# Patient Record
Sex: FEMALE | Race: BLACK OR AFRICAN AMERICAN | NOT HISPANIC OR LATINO | Employment: FULL TIME | URBAN - METROPOLITAN AREA
[De-identification: names, ages, dates, MRNs, and addresses within clinical notes are randomized per-mention and may not be internally consistent; named-entity substitution may affect disease eponyms.]

---

## 2017-05-22 ENCOUNTER — TELEPHONE (OUTPATIENT)
Dept: FAMILY MEDICINE | Facility: CLINIC | Age: 37
End: 2017-05-22

## 2017-05-22 NOTE — TELEPHONE ENCOUNTER
----- Message from Toshia Beckman sent at 5/22/2017  8:07 AM CDT -----  Pt would like to speak to the nurse regarding whats going on with her before shes schedules an appointment / pleasecall [phone]/ma

## 2017-05-22 NOTE — TELEPHONE ENCOUNTER
She needs to be seen and evaluated so that I have an idea what is going on, whether it can be treated, whether she needs to see specialist and what kind of specialist.

## 2017-05-22 NOTE — TELEPHONE ENCOUNTER
Pt states she has tingling in legs up to her back  Offered her a apt but she says that she would prefer you just send her to a specialist. States you usually just send her to a specialist when she has a complaint. Please advise

## 2017-05-23 ENCOUNTER — OFFICE VISIT (OUTPATIENT)
Dept: FAMILY MEDICINE | Facility: CLINIC | Age: 37
End: 2017-05-23
Payer: COMMERCIAL

## 2017-05-23 VITALS
SYSTOLIC BLOOD PRESSURE: 138 MMHG | TEMPERATURE: 98 F | HEART RATE: 106 BPM | OXYGEN SATURATION: 98 % | RESPIRATION RATE: 18 BRPM | BODY MASS INDEX: 52.02 KG/M2 | WEIGHT: 275.56 LBS | HEIGHT: 61 IN | DIASTOLIC BLOOD PRESSURE: 94 MMHG

## 2017-05-23 DIAGNOSIS — I10 ESSENTIAL HYPERTENSION: ICD-10-CM

## 2017-05-23 DIAGNOSIS — Z00.00 PREVENTATIVE HEALTH CARE: Primary | ICD-10-CM

## 2017-05-23 DIAGNOSIS — D50.9 IRON DEFICIENCY ANEMIA, UNSPECIFIED IRON DEFICIENCY ANEMIA TYPE: ICD-10-CM

## 2017-05-23 DIAGNOSIS — R20.2 PARESTHESIAS: ICD-10-CM

## 2017-05-23 PROCEDURE — 3080F DIAST BP >= 90 MM HG: CPT | Mod: S$GLB,,, | Performed by: FAMILY MEDICINE

## 2017-05-23 PROCEDURE — 3075F SYST BP GE 130 - 139MM HG: CPT | Mod: S$GLB,,, | Performed by: FAMILY MEDICINE

## 2017-05-23 PROCEDURE — 99395 PREV VISIT EST AGE 18-39: CPT | Mod: S$GLB,,, | Performed by: FAMILY MEDICINE

## 2017-05-23 PROCEDURE — 99999 PR PBB SHADOW E&M-EST. PATIENT-LVL III: CPT | Mod: PBBFAC,,, | Performed by: FAMILY MEDICINE

## 2017-05-23 RX ORDER — ESCITALOPRAM OXALATE 10 MG/1
10 TABLET ORAL DAILY
Qty: 30 TABLET | Refills: 5 | Status: SHIPPED | OUTPATIENT
Start: 2017-05-23 | End: 2018-01-25 | Stop reason: SDUPTHER

## 2017-05-23 RX ORDER — ALPRAZOLAM 0.25 MG/1
.25-.5 TABLET ORAL 3 TIMES DAILY PRN
Qty: 30 TABLET | Refills: 0 | Status: SHIPPED | OUTPATIENT
Start: 2017-05-23 | End: 2017-06-27 | Stop reason: SDUPTHER

## 2017-05-23 NOTE — PROGRESS NOTES
CHIEF COMPLAINT: This is a 37-year-old female here for preventive health exam.    SUBJECTIVE: The patient complains of days to weeks of tingling which started in her feet and then radiated into her legs.  Now tingling is all over including her arms and trunk.  This sensation is intermittent in nature.  She complains of blurry vision and recently saw eye doctor.  Her eye exam was negative.  She complains of fluid retention.  She denies headaches, vertigo, paresthesias, weakness in limb, dysarthria, dysphagia or abnormality of gait.  She complains of very stressful job and feels that blood pressure is elevated because of stress.  Today's reading is 138/94 despite taking hydrochlorothiazide 12.5 mg daily and losartan 100 mg daily.  She's morbidly obese with a BMI of 52.07.  She admits to anxiety, which she experiences every day.  She denies depression, suicidal ideation, manic episodes, or guilt.    Eye exam April 2017. Pap smear September 2011 (status post hysterectomy).  Colonoscopy July 2004.  Tdap July 2009.     ROS:  GENERAL: Patient denies fever, chills, night sweats. Patient denies weight loss. Patient denies anorexia, weakness or swollen glands. Positive for fatigue.  SKIN: Patient denies rash or hair loss.  HEENT: Patient denies sore throat, ear pain, hearing loss, nasal congestion, or runny nose. Patient denies visual disturbance, eye irritation or discharge.  LUNGS: Patient denies cough, wheeze or hemoptysis.  CARDIOVASCULAR: Patient denies chest pain, shortness of breath, palpitations, syncope or lower extremity edema.  GI: Patient denies abdominal pain, nausea, vomiting, diarrhea, constipation, blood in stool or melena.  GENITOURINARY: Patient denies pelvic pain, vaginal discharge, itch or odor. Patient denies irregular vaginal bleeding. Patient denies dysuria, frequency, hematuria, nocturia, urgency or incontinence.  BREASTS: Patient denies breast pain, mass or nipple discharge.  MUSCULOSKELETAL: Patient  denies joint pain, swelling, redness or warmth.  NEUROLOGIC: As above.  PSYCHIATRIC: Patient denies anxiety, depression, or memory loss.     OBJECTIVE:   GENERAL: Well-developed well-nourished, obese, black female alert and oriented x3, in no acute distress. Memory, judgment and cognition without deficit.  SKIN: Clear without rash. Normal color and tone.  HEENT: Eyes: Clear conjunctivae. No scleral icterus. Pupils equal reactive to light and accommodation. Ears: Clear TMs. Clear canals. Nose: Without congestion. Pharynx: Without injection or exudates.  NECK: Supple, normal range of motion. No masses, lymphadenopathy or enlarged thyroid. No JVD. Carotids 2+ and equal. No bruits.  LUNGS: Clear to auscultation. Normal respiratory effort.  CARDIOVASCULAR: Regular rhythm, normal S1, S2 without murmur, gallop or rub.  BACK: No CVA or spinal tenderness.  BREASTS: No masses, tenderness or nipple discharge.  ABDOMEN: Palpable fundus just below umbilicus. Active bowel sounds. Soft, nontender without mass or organomegaly. No rebound or guarding.  EXTREMITIES: Without cyanosis, clubbing.  Trace to 1+ ankle edema bilaterally.  Distal pulses 2+ and equal. Normal range of motion in all extremities. No joint effusion, erythema or warmth.  NEUROLOGIC: Cranial nerves II through XII without deficit. Motor strength equal bilaterally. Sensation normal to touch. Deep tendon reflexes 2+ and equal. Gait without abnormality. No tremor. Negative cerebellar signs.  PELVIC: Deferred per patient request.    Lengthy discussion with patient regarding pathophysiology of depression and anxiety. Discussed rationale behind treatment. Reviewed treatment options, including medication and psychotherapy. Reviewed pharmacology of SSRIs and benzodiazepines including onset of action, dosing, side effects, adverse reactions and duration of therapy (6-12 months).  Discussed the need to stay on medication if it is effective and not discontinue after a few  weeks due to the probability of relapse.    ASSESSMENT:  1. Preventative health care    2. Paresthesias    3. Essential hypertension    4. Iron deficiency anemia, unspecified iron deficiency anemia type      PLAN:   1.  Weight reduction.  Exercise regularly.  2.  Age-appropriate counseling.  3.  Fasting lab.  4.  Lexapro 10 mg daily.  5.  Alprazolam 0.25 mg 1-2 tablets up to 3 times daily as needed for anxiety.  6.  Follow-up in 4-6 weeks.  7.  Seek medical attention for worsening symptoms.

## 2017-05-24 ENCOUNTER — LAB VISIT (OUTPATIENT)
Dept: LAB | Facility: HOSPITAL | Age: 37
End: 2017-05-24
Attending: FAMILY MEDICINE
Payer: COMMERCIAL

## 2017-05-24 ENCOUNTER — TELEPHONE (OUTPATIENT)
Dept: FAMILY MEDICINE | Facility: CLINIC | Age: 37
End: 2017-05-24

## 2017-05-24 DIAGNOSIS — Z00.00 PREVENTATIVE HEALTH CARE: ICD-10-CM

## 2017-05-24 DIAGNOSIS — Z00.00 PREVENTATIVE HEALTH CARE: Primary | ICD-10-CM

## 2017-05-24 LAB
ALBUMIN SERPL BCP-MCNC: 3.2 G/DL
ALP SERPL-CCNC: 50 U/L
ALT SERPL W/O P-5'-P-CCNC: 18 U/L
ANION GAP SERPL CALC-SCNC: 8 MMOL/L
AST SERPL-CCNC: 22 U/L
BASOPHILS # BLD AUTO: 0.02 K/UL
BASOPHILS NFR BLD: 0.3 %
BILIRUB SERPL-MCNC: 0.3 MG/DL
BUN SERPL-MCNC: 11 MG/DL
CALCIUM SERPL-MCNC: 9.1 MG/DL
CHLORIDE SERPL-SCNC: 106 MMOL/L
CHOLEST/HDLC SERPL: 2.9 {RATIO}
CO2 SERPL-SCNC: 27 MMOL/L
CREAT SERPL-MCNC: 0.9 MG/DL
DIFFERENTIAL METHOD: ABNORMAL
EOSINOPHIL # BLD AUTO: 0.1 K/UL
EOSINOPHIL NFR BLD: 1.3 %
ERYTHROCYTE [DISTWIDTH] IN BLOOD BY AUTOMATED COUNT: 14.6 %
EST. GFR  (AFRICAN AMERICAN): >60 ML/MIN/1.73 M^2
EST. GFR  (NON AFRICAN AMERICAN): >60 ML/MIN/1.73 M^2
GLUCOSE SERPL-MCNC: 83 MG/DL
HCT VFR BLD AUTO: 38.5 %
HDL/CHOLESTEROL RATIO: 34 %
HDLC SERPL-MCNC: 162 MG/DL
HDLC SERPL-MCNC: 55 MG/DL
HGB BLD-MCNC: 12.1 G/DL
LDLC SERPL CALC-MCNC: 93.8 MG/DL
LYMPHOCYTES # BLD AUTO: 2.5 K/UL
LYMPHOCYTES NFR BLD: 39.7 %
MCH RBC QN AUTO: 29.7 PG
MCHC RBC AUTO-ENTMCNC: 31.4 %
MCV RBC AUTO: 95 FL
MONOCYTES # BLD AUTO: 0.6 K/UL
MONOCYTES NFR BLD: 9.5 %
NEUTROPHILS # BLD AUTO: 3.1 K/UL
NEUTROPHILS NFR BLD: 49 %
NONHDLC SERPL-MCNC: 107 MG/DL
PLATELET # BLD AUTO: 316 K/UL
PMV BLD AUTO: 10.1 FL
POTASSIUM SERPL-SCNC: 4.4 MMOL/L
PROT SERPL-MCNC: 7.4 G/DL
RBC # BLD AUTO: 4.07 M/UL
SODIUM SERPL-SCNC: 141 MMOL/L
TRIGL SERPL-MCNC: 66 MG/DL
TSH SERPL DL<=0.005 MIU/L-ACNC: 0.7 UIU/ML
WBC # BLD AUTO: 6.34 K/UL

## 2017-05-24 PROCEDURE — 80053 COMPREHEN METABOLIC PANEL: CPT

## 2017-05-24 PROCEDURE — 85025 COMPLETE CBC W/AUTO DIFF WBC: CPT

## 2017-05-24 PROCEDURE — 80061 LIPID PANEL: CPT

## 2017-05-24 PROCEDURE — 36415 COLL VENOUS BLD VENIPUNCTURE: CPT | Mod: PO

## 2017-05-24 PROCEDURE — 84443 ASSAY THYROID STIM HORMONE: CPT

## 2017-05-24 NOTE — TELEPHONE ENCOUNTER
----- Message from Alejandra Abdullahi sent at 5/24/2017  9:16 AM CDT -----  Contact: Patient  Patient called and stated she came in yesterday for her yearly physical. She normally have an HIV test done yearly but she doesn't think it was done. So she would like that ordered. She can be contacted at [phone].    Thanks,  Alejandra

## 2017-05-25 NOTE — ADDENDUM NOTE
Encounter addended by: Jannie Holt MA on: 5/25/2017  8:20 AM<BR>    Actions taken: Letter status changed

## 2017-05-25 NOTE — LETTER
May 25, 2017    Georgia L Radha  [address] [place] [address]             [facility]  [address]  [place] [address]  Phone: [phone] Dear Mrs. Georgia Garcia:    Below are the results from your recent visit:    Resulted Orders   Comprehensive metabolic panel   Result Value Ref Range    Sodium 141 136 - 145 mmol/L    Potassium 4.4 3.5 - 5.1 mmol/L    Chloride 106 95 - 110 mmol/L    CO2 27 23 - 29 mmol/L    Glucose 83 70 - 110 mg/dL    BUN, Bld 11 6 - 20 mg/dL    Creatinine 0.9 0.5 - 1.4 mg/dL    Calcium 9.1 8.7 - 10.5 mg/dL    Total Protein 7.4 6.0 - 8.4 g/dL    Albumin 3.2 (L) 3.5 - 5.2 g/dL    Total Bilirubin 0.3 0.1 - 1.0 mg/dL      Comment:      For infants and newborns, interpretation of results should be based  on gestational age, weight and in agreement with clinical  observations.  Premature Infant recommended reference ranges:  Up to 24 hours.............<8.0 mg/dL  Up to 48 hours............<12.0 mg/dL  3-5 days..................<15.0 mg/dL  6-29 days.................<15.0 mg/dL      Alkaline Phosphatase 50 (L) 55 - 135 U/L    AST 22 10 - 40 U/L    ALT 18 10 - 44 U/L    Anion Gap 8 8 - 16 mmol/L    eGFR if African American >60.0 >60 mL/min/1.73 m^2    eGFR if non African American >60.0 >60 mL/min/1.73 m^2      Comment:      Calculation used to obtain the estimated glomerular filtration  rate (eGFR) is the CKD-EPI equation. Since race is unknown   in our information system, the eGFR values for   -American and Non--American patients are given   for each creatinine result.     CBC auto differential   Result Value Ref Range    WBC 6.34 3.90 - 12.70 K/uL    RBC 4.07 4.00 - 5.40 M/uL    Hemoglobin 12.1 12.0 - 16.0 g/dL    Hematocrit 38.5 37.0 - 48.5 %    MCV 95 82 - 98 fL    MCH 29.7 27.0 - 31.0 pg    MCHC 31.4 (L) 32.0 - 36.0 %    RDW 14.6 (H) 11.5 - 14.5 %    Platelets 316 150 - 350 K/uL    MPV 10.1 9.2 - 12.9 fL    Gran # 3.1 1.8 - 7.7 K/uL     Lymph # 2.5 1.0 - 4.8 K/uL    Mono # 0.6 0.3 - 1.0 K/uL    Eos # 0.1 0.0 - 0.5 K/uL    Baso # 0.02 0.00 - 0.20 K/uL    Gran% 49.0 38.0 - 73.0 %    Lymph% 39.7 18.0 - 48.0 %    Mono% 9.5 4.0 - 15.0 %    Eosinophil% 1.3 0.0 - 8.0 %    Basophil% 0.3 0.0 - 1.9 %    Differential Method Automated    Lipid panel   Result Value Ref Range    Cholesterol 162 120 - 199 mg/dL      Comment:      The National Cholesterol Education Program (NCEP) has set the  following guidelines (reference ranges) for Cholesterol:  Optimal.....................<200 mg/dL  Borderline High.............200-239 mg/dL  High........................> or = 240 mg/dL      Triglycerides 66 30 - 150 mg/dL      Comment:      The National Cholesterol Education Program (NCEP) has set the  following guidelines (reference values) for triglycerides:  Normal......................<150 mg/dL  Borderline High.............150-199 mg/dL  High........................200-499 mg/dL      HDL 55 40 - 75 mg/dL      Comment:      The National Cholesterol Education Program (NCEP) has set the  following guidelines (reference values) for HDL Cholesterol:  Low...............<40 mg/dL  Optimal...........>60 mg/dL      LDL Cholesterol 93.8 63.0 - 159.0 mg/dL      Comment:      The National Cholesterol Education Program (NCEP) has set the  following guidelines (reference values) for LDL Cholesterol:  Optimal.......................<130 mg/dL  Borderline High...............130-159 mg/dL  High..........................160-189 mg/dL  Very High.....................>190 mg/dL      HDL/Chol Ratio 34.0 20.0 - 50.0 %    Total Cholesterol/HDL Ratio 2.9 2.0 - 5.0    Non-HDL Cholesterol 107 mg/dL      Comment:      Risk category and Non-HDL cholesterol goals:  Coronary heart disease (CHD)or equivalent (10-year risk of CHD >20%):  Non-HDL cholesterol goal     <130 mg/dL  Two or more CHD risk factors and 10-year risk of CHD <= 20%:  Non-HDL cholesterol goal     <160 mg/dL  0 to 1 CHD risk  factor:  Non-HDL cholesterol goal     <190 mg/dL     TSH   Result Value Ref Range    TSH 0.705 0.400 - 4.000 uIU/mL     Your lab results are acceptable. Please don't hesitate to call our office if you have any questions or concerns.      Sincerely,        Alyssa Muse MD

## 2017-05-25 NOTE — LETTER
May 25, 2017    Georgia Garcia  [address] [place] [address]             [facility]  [address]  [place] [address]  Phone: [phone] Dear {MR/MRS/MS/DR:45039} Georgia Garcia:    Below are the results from your recent visit:    Resulted Orders   Comprehensive metabolic panel   Result Value Ref Range    Sodium 141 136 - 145 mmol/L    Potassium 4.4 3.5 - 5.1 mmol/L    Chloride 106 95 - 110 mmol/L    CO2 27 23 - 29 mmol/L    Glucose 83 70 - 110 mg/dL    BUN, Bld 11 6 - 20 mg/dL    Creatinine 0.9 0.5 - 1.4 mg/dL    Calcium 9.1 8.7 - 10.5 mg/dL    Total Protein 7.4 6.0 - 8.4 g/dL    Albumin 3.2 (L) 3.5 - 5.2 g/dL    Total Bilirubin 0.3 0.1 - 1.0 mg/dL      Comment:      For infants and newborns, interpretation of results should be based  on gestational age, weight and in agreement with clinical  observations.  Premature Infant recommended reference ranges:  Up to 24 hours.............<8.0 mg/dL  Up to 48 hours............<12.0 mg/dL  3-5 days..................<15.0 mg/dL  6-29 days.................<15.0 mg/dL      Alkaline Phosphatase 50 (L) 55 - 135 U/L    AST 22 10 - 40 U/L    ALT 18 10 - 44 U/L    Anion Gap 8 8 - 16 mmol/L    eGFR if African American >60.0 >60 mL/min/1.73 m^2    eGFR if non African American >60.0 >60 mL/min/1.73 m^2      Comment:      Calculation used to obtain the estimated glomerular filtration  rate (eGFR) is the CKD-EPI equation. Since race is unknown   in our information system, the eGFR values for   -American and Non--American patients are given   for each creatinine result.     CBC auto differential   Result Value Ref Range    WBC 6.34 3.90 - 12.70 K/uL    RBC 4.07 4.00 - 5.40 M/uL    Hemoglobin 12.1 12.0 - 16.0 g/dL    Hematocrit 38.5 37.0 - 48.5 %    MCV 95 82 - 98 fL    MCH 29.7 27.0 - 31.0 pg    MCHC 31.4 (L) 32.0 - 36.0 %    RDW 14.6 (H) 11.5 - 14.5 %    Platelets 316 150 - 350 K/uL    MPV 10.1 9.2 - 12.9 fL    Gran # 3.1 1.8 -  7.7 K/uL    Lymph # 2.5 1.0 - 4.8 K/uL    Mono # 0.6 0.3 - 1.0 K/uL    Eos # 0.1 0.0 - 0.5 K/uL    Baso # 0.02 0.00 - 0.20 K/uL    Gran% 49.0 38.0 - 73.0 %    Lymph% 39.7 18.0 - 48.0 %    Mono% 9.5 4.0 - 15.0 %    Eosinophil% 1.3 0.0 - 8.0 %    Basophil% 0.3 0.0 - 1.9 %    Differential Method Automated    Lipid panel   Result Value Ref Range    Cholesterol 162 120 - 199 mg/dL      Comment:      The National Cholesterol Education Program (NCEP) has set the  following guidelines (reference ranges) for Cholesterol:  Optimal.....................<200 mg/dL  Borderline High.............200-239 mg/dL  High........................> or = 240 mg/dL      Triglycerides 66 30 - 150 mg/dL      Comment:      The National Cholesterol Education Program (NCEP) has set the  following guidelines (reference values) for triglycerides:  Normal......................<150 mg/dL  Borderline High.............150-199 mg/dL  High........................200-499 mg/dL      HDL 55 40 - 75 mg/dL      Comment:      The National Cholesterol Education Program (NCEP) has set the  following guidelines (reference values) for HDL Cholesterol:  Low...............<40 mg/dL  Optimal...........>60 mg/dL      LDL Cholesterol 93.8 63.0 - 159.0 mg/dL      Comment:      The National Cholesterol Education Program (NCEP) has set the  following guidelines (reference values) for LDL Cholesterol:  Optimal.......................<130 mg/dL  Borderline High...............130-159 mg/dL  High..........................160-189 mg/dL  Very High.....................>190 mg/dL      HDL/Chol Ratio 34.0 20.0 - 50.0 %    Total Cholesterol/HDL Ratio 2.9 2.0 - 5.0    Non-HDL Cholesterol 107 mg/dL      Comment:      Risk category and Non-HDL cholesterol goals:  Coronary heart disease (CHD)or equivalent (10-year risk of CHD >20%):  Non-HDL cholesterol goal     <130 mg/dL  Two or more CHD risk factors and 10-year risk of CHD <= 20%:  Non-HDL cholesterol goal     <160 mg/dL  0 to 1 CHD  "risk factor:  Non-HDL cholesterol goal     <190 mg/dL     TSH   Result Value Ref Range    TSH 0.705 0.400 - 4.000 uIU/mL     Your results are {normal/acceptable:91573::"normal"}.  Please {Therapies; lab letter directions:08110::"don't hesitate to call our office if you have any questions or concerns"}.      Sincerely,        Jannie Holt MA     "

## 2017-05-26 ENCOUNTER — LAB VISIT (OUTPATIENT)
Dept: LAB | Facility: HOSPITAL | Age: 37
End: 2017-05-26
Attending: FAMILY MEDICINE
Payer: COMMERCIAL

## 2017-05-26 DIAGNOSIS — Z00.00 PREVENTATIVE HEALTH CARE: ICD-10-CM

## 2017-05-26 PROCEDURE — 86703 HIV-1/HIV-2 1 RESULT ANTBDY: CPT

## 2017-05-26 PROCEDURE — 36415 COLL VENOUS BLD VENIPUNCTURE: CPT | Mod: PO

## 2017-05-29 LAB — HIV 1+2 AB+HIV1 P24 AG SERPL QL IA: NEGATIVE

## 2017-06-14 ENCOUNTER — TELEPHONE (OUTPATIENT)
Dept: FAMILY MEDICINE | Facility: CLINIC | Age: 37
End: 2017-06-14

## 2017-06-14 RX ORDER — HYDROCHLOROTHIAZIDE 12.5 MG/1
TABLET ORAL
Qty: 30 TABLET | Refills: 11 | Status: SHIPPED | OUTPATIENT
Start: 2017-06-14 | End: 2018-08-20 | Stop reason: SDUPTHER

## 2017-06-14 RX ORDER — MECLIZINE HYDROCHLORIDE 25 MG/1
25-50 TABLET ORAL
Qty: 30 TABLET | Refills: 1 | Status: SHIPPED | OUTPATIENT
Start: 2017-06-14 | End: 2019-09-19 | Stop reason: SDUPTHER

## 2017-06-14 NOTE — TELEPHONE ENCOUNTER
----- Message from Serina Rosenthal sent at 6/14/2017  9:30 AM CDT -----  Contact: pt  States she's returning a call from yesterday and she would like to know if it's ok if she faxes her FMLA paper work in and then pick it up. States she was on a cruise from 6/5- 6/10 and she has Vertigo and she would like to get something called in. States she had a prescription for Vertigo a while back. States it feels like she's going tip over and when she's lying in bed it feels like she's moving. It feels like she's still on the boat, but while she was on the boat she was ok, she took the Dramamine. Please call pt at [phone].  Thank you

## 2017-06-14 NOTE — TELEPHONE ENCOUNTER
Pt states she went on a cruise last week   No motion sickness while on the cruise  Pt states she is having bad vertigo   States a medication was given to her before it and wants it reorderd again.  Also pt states she will fax over [facility] paperwork for intermittent leave from work for migraines.  States you have filled them out for her in the past.

## 2017-06-15 RX ORDER — RAMELTEON 8 MG/1
8 TABLET ORAL NIGHTLY
Qty: 30 TABLET | Refills: 11 | Status: SHIPPED | OUTPATIENT
Start: 2017-06-15 | End: 2018-08-20

## 2017-06-20 ENCOUNTER — TELEPHONE (OUTPATIENT)
Dept: FAMILY MEDICINE | Facility: CLINIC | Age: 37
End: 2017-06-20

## 2017-06-20 NOTE — TELEPHONE ENCOUNTER
Pt states she needs a refill on xanax  States she is taking more than one a day and the amount given is for 30 tablets  Wants to know if you could increase the amount of tablets she gets per month  lr 5/23/17  lp 5/23/17

## 2017-06-21 NOTE — TELEPHONE ENCOUNTER
It is not a good idea to escalate the use of this medication as it is addicting.  Does she take the medication every day?  How many times during the day?  Does she ever go at least a day without taking medication?  Maybe we should increase the Lexapro if it is not effective in preventing anxiety.  She may need to see me.

## 2017-06-27 RX ORDER — ALPRAZOLAM 0.25 MG/1
.25-.5 TABLET ORAL 3 TIMES DAILY PRN
Qty: 30 TABLET | Refills: 0 | Status: SHIPPED | OUTPATIENT
Start: 2017-06-27 | End: 2017-08-01 | Stop reason: SDUPTHER

## 2017-06-27 NOTE — TELEPHONE ENCOUNTER
----- Message from Kariscielo Killian sent at 6/27/2017  6:31 AM CDT -----  Contact: Patient  1. What is the name of the medication you are requesting? Alprozolam  2. What is the dose? .25  3. How do you take the medication? Orally, topically, etc? orally  4. How often do you take this medication? PRN one or 2 a day  5. Do you need a 30 day or 90 day supply? 90  6. How many refills are you requesting? 1  7. What is your preferred pharmacy and location of the pharmacy? [facility]  8. Who can we contact with further questions? Patient at [phone]

## 2017-07-17 RX ORDER — LOSARTAN POTASSIUM 100 MG/1
TABLET ORAL
Qty: 30 TABLET | Refills: 11 | Status: SHIPPED | OUTPATIENT
Start: 2017-07-17 | End: 2018-07-24 | Stop reason: SDUPTHER

## 2017-07-18 RX ORDER — CYCLOBENZAPRINE HCL 10 MG
10 TABLET ORAL 3 TIMES DAILY PRN
Qty: 30 TABLET | Refills: 3 | Status: SHIPPED | OUTPATIENT
Start: 2017-07-18 | End: 2017-11-30

## 2017-07-18 RX ORDER — TOPIRAMATE 50 MG/1
50 TABLET, FILM COATED ORAL 2 TIMES DAILY
Qty: 60 TABLET | Refills: 5 | Status: SHIPPED | OUTPATIENT
Start: 2017-07-18 | End: 2018-08-20 | Stop reason: SDUPTHER

## 2017-07-18 RX ORDER — ZOLPIDEM TARTRATE 5 MG/1
5 TABLET ORAL NIGHTLY PRN
Qty: 30 TABLET | Refills: 5 | Status: SHIPPED | OUTPATIENT
Start: 2017-07-18 | End: 2019-10-25 | Stop reason: SDUPTHER

## 2017-07-18 RX ORDER — NAPROXEN SODIUM 550 MG/1
550 TABLET ORAL 2 TIMES DAILY WITH MEALS
Qty: 60 TABLET | Refills: 5 | Status: SHIPPED | OUTPATIENT
Start: 2017-07-18 | End: 2017-11-30

## 2017-07-18 RX ORDER — RIZATRIPTAN BENZOATE 10 MG/1
10 TABLET ORAL
Qty: 6 TABLET | Refills: 5 | Status: SHIPPED | OUTPATIENT
Start: 2017-07-18 | End: 2018-08-20 | Stop reason: SDUPTHER

## 2017-07-18 NOTE — TELEPHONE ENCOUNTER
----- Message from Ginna Andrews sent at 7/18/2017 12:43 PM CDT -----  Contact: Pt   Pt calling states she need a refill on all her medications not sure of the names pt is requesting a call back please...[phone]

## 2017-08-01 RX ORDER — ALPRAZOLAM 0.25 MG/1
.25-.5 TABLET ORAL 3 TIMES DAILY PRN
Qty: 30 TABLET | Refills: 3 | Status: SHIPPED | OUTPATIENT
Start: 2017-08-01 | End: 2018-01-25 | Stop reason: SDUPTHER

## 2017-08-01 NOTE — TELEPHONE ENCOUNTER
----- Message from Serina Rosenthal sent at 8/1/2017  8:09 AM CDT -----  Contact: pt  1. What is the name of the medication you are requesting? altprazolam  2. What is the dose? .25 mg  3. How do you take the medication? Orally, topically, etc? orally  4. How often do you take this medication? She takes it once a day, she would like to see if it can be increased 2 to 3 times a day.  5. Do you need a 30 day or 90 day supply? 90  6. How many refills are you requesting? 3 to 6  7. What is your preferred pharmacy and location of the pharmacy? .  [facility] [employer] [address] [place] [address] & [address] [address]  Phone: [phone] Fax: [phone]  8. Who can we contact with further questions? Pt at [phone]    Thank you

## 2017-11-25 ENCOUNTER — HOSPITAL ENCOUNTER (EMERGENCY)
Facility: HOSPITAL | Age: 37
Discharge: HOME | End: 2017-11-25
Attending: EMERGENCY MEDICINE
Payer: COMMERCIAL

## 2017-11-25 VITALS
DIASTOLIC BLOOD PRESSURE: 105 MMHG | HEIGHT: 60 IN | HEART RATE: 105 BPM | OXYGEN SATURATION: 99 % | RESPIRATION RATE: 20 BRPM | SYSTOLIC BLOOD PRESSURE: 199 MMHG | TEMPERATURE: 99 F | BODY MASS INDEX: 54.81 KG/M2 | WEIGHT: 279.19 LBS

## 2017-11-25 DIAGNOSIS — M25.561 KNEE PAIN, BILATERAL: ICD-10-CM

## 2017-11-25 DIAGNOSIS — M25.562 KNEE PAIN, BILATERAL: ICD-10-CM
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
## 2017-11-25 PROCEDURE — 99283 EMERGENCY DEPT VISIT LOW MDM: CPT

## 2017-11-25 RX ORDER — CYCLOBENZAPRINE HCL 10 MG
10 TABLET ORAL 3 TIMES DAILY PRN
Qty: 15 TABLET | Refills: 0 | Status: SHIPPED | OUTPATIENT
Start: 2017-11-25 | End: 2017-11-30

## 2017-11-25 RX ORDER — NAPROXEN 375 MG/1
375 TABLET ORAL 2 TIMES DAILY WITH MEALS
Qty: 30 TABLET | Refills: 0 | Status: SHIPPED | OUTPATIENT
Start: 2017-11-25 | End: 2017-11-30
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
## 2017-11-28 ENCOUNTER — TELEPHONE (OUTPATIENT)
Dept: FAMILY MEDICINE | Facility: CLINIC | Age: 37
End: 2017-11-28
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
## 2017-11-30 ENCOUNTER — OFFICE VISIT (OUTPATIENT)
Dept: FAMILY MEDICINE | Facility: CLINIC | Age: 37
End: 2017-11-30
Payer: COMMERCIAL

## 2017-11-30 VITALS
RESPIRATION RATE: 18 BRPM | SYSTOLIC BLOOD PRESSURE: 132 MMHG | DIASTOLIC BLOOD PRESSURE: 82 MMHG | TEMPERATURE: 97 F | WEIGHT: 281.5 LBS | BODY MASS INDEX: 55.27 KG/M2 | HEIGHT: 60 IN | OXYGEN SATURATION: 99 % | HEART RATE: 112 BPM

## 2017-11-30 DIAGNOSIS — S29.012A UPPER BACK STRAIN, INITIAL ENCOUNTER: ICD-10-CM

## 2017-11-30 DIAGNOSIS — S89.90XA KNEE INJURY, UNSPECIFIED LATERALITY, INITIAL ENCOUNTER: ICD-10-CM

## 2017-11-30 DIAGNOSIS — Z23 NEEDS FLU SHOT: ICD-10-CM

## 2017-11-30 DIAGNOSIS — S16.1XXA STRAIN OF NECK MUSCLE, INITIAL ENCOUNTER: Primary | ICD-10-CM

## 2017-11-30 PROCEDURE — 99213 OFFICE O/P EST LOW 20 MIN: CPT | Mod: 25,S$GLB,, | Performed by: FAMILY MEDICINE

## 2017-11-30 PROCEDURE — 99999 PR PBB SHADOW E&M-EST. PATIENT-LVL IV: CPT | Mod: PBBFAC,,, | Performed by: FAMILY MEDICINE

## 2017-11-30 PROCEDURE — 90686 IIV4 VACC NO PRSV 0.5 ML IM: CPT | Mod: S$GLB,,, | Performed by: FAMILY MEDICINE

## 2017-11-30 PROCEDURE — 90471 IMMUNIZATION ADMIN: CPT | Mod: S$GLB,,, | Performed by: FAMILY MEDICINE

## 2017-11-30 RX ORDER — HYDROCODONE BITARTRATE AND ACETAMINOPHEN 7.5; 325 MG/1; MG/1
1 TABLET ORAL EVERY 6 HOURS PRN
Qty: 30 TABLET | Refills: 0 | Status: SHIPPED | OUTPATIENT
Start: 2017-11-30 | End: 2018-01-25 | Stop reason: SDUPTHER

## 2017-11-30 RX ORDER — IBUPROFEN 800 MG/1
800 TABLET ORAL 3 TIMES DAILY
Qty: 60 TABLET | Refills: 1 | Status: SHIPPED | OUTPATIENT
Start: 2017-11-30 | End: 2018-05-16
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
## 2017-12-12 ENCOUNTER — TELEPHONE (OUTPATIENT)
Dept: FAMILY MEDICINE | Facility: CLINIC | Age: 37
End: 2017-12-12

## 2017-12-12 NOTE — LETTER
December 12, 2017      [facility]  [address] [place] [address]  Phone: [phone]       Patient: Georgia Garcia   YOB: 1980  Date of Visit: 12/12/2017    To Whom It May Concern:    Georgia Garcia  was at [facility] on 12/12/2017. She may return to work on 12/18/2017 on light duty. If you have any questions or concerns, or if I can be of further assistance, please do not hesitate to contact me.    Sincerely,    Alyssa Muse MD

## 2017-12-12 NOTE — TELEPHONE ENCOUNTER
----- Message from Yajaira Maloney sent at 12/12/2017  1:12 PM CST -----  Contact: pt   Please call pt @ [phone] regarding appt from 11/30, pt have some questions.

## 2018-01-25 ENCOUNTER — TELEPHONE (OUTPATIENT)
Dept: FAMILY MEDICINE | Facility: CLINIC | Age: 38
End: 2018-01-25

## 2018-01-25 RX ORDER — ALPRAZOLAM 0.25 MG/1
.25-.5 TABLET ORAL 3 TIMES DAILY PRN
Qty: 30 TABLET | Refills: 2 | Status: SHIPPED | OUTPATIENT
Start: 2018-01-25 | End: 2018-08-20

## 2018-01-25 RX ORDER — HYDROCODONE BITARTRATE AND ACETAMINOPHEN 7.5; 325 MG/1; MG/1
1 TABLET ORAL EVERY 6 HOURS PRN
Qty: 30 TABLET | Refills: 0 | Status: SHIPPED | OUTPATIENT
Start: 2018-01-25 | End: 2019-06-26 | Stop reason: SDUPTHER

## 2018-01-25 RX ORDER — HYDROCODONE BITARTRATE AND ACETAMINOPHEN 7.5; 325 MG/1; MG/1
1 TABLET ORAL EVERY 6 HOURS PRN
Qty: 30 TABLET | Refills: 0 | OUTPATIENT
Start: 2018-01-25

## 2018-01-25 RX ORDER — ESCITALOPRAM OXALATE 10 MG/1
10 TABLET ORAL DAILY
Qty: 30 TABLET | Refills: 2 | Status: SHIPPED | OUTPATIENT
Start: 2018-01-25 | End: 2018-08-20

## 2018-01-25 NOTE — TELEPHONE ENCOUNTER
Pt states that she is requesting this medication for her migraines.  Lp: 5/23/17  Last filled: 11/30/17

## 2018-01-25 NOTE — TELEPHONE ENCOUNTER
----- Message from Laila Cruz sent at 1/25/2018  3:21 PM CST -----  Please call pt back at [phone].pt miss call.

## 2018-04-27 DIAGNOSIS — Z12.39 BREAST CANCER SCREENING: ICD-10-CM

## 2018-05-04 DIAGNOSIS — Z12.39 BREAST CANCER SCREENING: ICD-10-CM

## 2018-05-16 RX ORDER — NAPROXEN SODIUM 550 MG/1
TABLET ORAL
Qty: 60 TABLET | Refills: 0 | Status: SHIPPED | OUTPATIENT
Start: 2018-05-16 | End: 2018-08-20 | Stop reason: SDUPTHER

## 2018-07-24 RX ORDER — LOSARTAN POTASSIUM 100 MG/1
TABLET ORAL
Qty: 30 TABLET | Refills: 0 | Status: SHIPPED | OUTPATIENT
Start: 2018-07-24 | End: 2018-08-20 | Stop reason: SDUPTHER

## 2018-08-20 ENCOUNTER — LAB VISIT (OUTPATIENT)
Dept: LAB | Facility: HOSPITAL | Age: 38
End: 2018-08-20
Attending: FAMILY MEDICINE
Payer: COMMERCIAL

## 2018-08-20 ENCOUNTER — OFFICE VISIT (OUTPATIENT)
Dept: FAMILY MEDICINE | Facility: CLINIC | Age: 38
End: 2018-08-20
Payer: COMMERCIAL

## 2018-08-20 VITALS
DIASTOLIC BLOOD PRESSURE: 86 MMHG | SYSTOLIC BLOOD PRESSURE: 138 MMHG | WEIGHT: 264.31 LBS | RESPIRATION RATE: 18 BRPM | HEART RATE: 96 BPM | HEIGHT: 60 IN | BODY MASS INDEX: 51.89 KG/M2 | TEMPERATURE: 99 F

## 2018-08-20 DIAGNOSIS — Z00.00 PREVENTATIVE HEALTH CARE: ICD-10-CM

## 2018-08-20 DIAGNOSIS — G47.00 INSOMNIA, UNSPECIFIED TYPE: ICD-10-CM

## 2018-08-20 DIAGNOSIS — D50.9 IRON DEFICIENCY ANEMIA, UNSPECIFIED IRON DEFICIENCY ANEMIA TYPE: ICD-10-CM

## 2018-08-20 DIAGNOSIS — I10 ESSENTIAL HYPERTENSION: ICD-10-CM

## 2018-08-20 DIAGNOSIS — Z12.31 ENCOUNTER FOR SCREENING MAMMOGRAM FOR MALIGNANT NEOPLASM OF BREAST: ICD-10-CM

## 2018-08-20 DIAGNOSIS — E66.01 MORBID OBESITY: ICD-10-CM

## 2018-08-20 DIAGNOSIS — Z00.00 PREVENTATIVE HEALTH CARE: Primary | ICD-10-CM

## 2018-08-20 LAB
ALBUMIN SERPL BCP-MCNC: 3.3 G/DL
ALP SERPL-CCNC: 50 U/L
ALT SERPL W/O P-5'-P-CCNC: 13 U/L
ANION GAP SERPL CALC-SCNC: 7 MMOL/L
AST SERPL-CCNC: 18 U/L
BASOPHILS # BLD AUTO: 0.04 K/UL
BASOPHILS NFR BLD: 0.6 %
BILIRUB SERPL-MCNC: 0.3 MG/DL
BUN SERPL-MCNC: 12 MG/DL
CALCIUM SERPL-MCNC: 8.9 MG/DL
CHLORIDE SERPL-SCNC: 106 MMOL/L
CHOLEST SERPL-MCNC: 163 MG/DL
CHOLEST/HDLC SERPL: 3.2 {RATIO}
CO2 SERPL-SCNC: 25 MMOL/L
CREAT SERPL-MCNC: 0.9 MG/DL
DIFFERENTIAL METHOD: ABNORMAL
EOSINOPHIL # BLD AUTO: 0 K/UL
EOSINOPHIL NFR BLD: 0.5 %
ERYTHROCYTE [DISTWIDTH] IN BLOOD BY AUTOMATED COUNT: 14.4 %
EST. GFR  (AFRICAN AMERICAN): >60 ML/MIN/1.73 M^2
EST. GFR  (NON AFRICAN AMERICAN): >60 ML/MIN/1.73 M^2
GLUCOSE SERPL-MCNC: 86 MG/DL
HCT VFR BLD AUTO: 38.8 %
HDLC SERPL-MCNC: 51 MG/DL
HDLC SERPL: 31.3 %
HGB BLD-MCNC: 12.1 G/DL
IMM GRANULOCYTES # BLD AUTO: 0.02 K/UL
IMM GRANULOCYTES NFR BLD AUTO: 0.3 %
LDLC SERPL CALC-MCNC: 104 MG/DL
LYMPHOCYTES # BLD AUTO: 2.1 K/UL
LYMPHOCYTES NFR BLD: 32.1 %
MCH RBC QN AUTO: 29.7 PG
MCHC RBC AUTO-ENTMCNC: 31.2 G/DL
MCV RBC AUTO: 95 FL
MONOCYTES # BLD AUTO: 0.6 K/UL
MONOCYTES NFR BLD: 9 %
NEUTROPHILS # BLD AUTO: 3.8 K/UL
NEUTROPHILS NFR BLD: 57.5 %
NONHDLC SERPL-MCNC: 112 MG/DL
NRBC BLD-RTO: 0 /100 WBC
PLATELET # BLD AUTO: 257 K/UL
PMV BLD AUTO: 11 FL
POTASSIUM SERPL-SCNC: 4.2 MMOL/L
PROT SERPL-MCNC: 7.5 G/DL
RBC # BLD AUTO: 4.07 M/UL
SODIUM SERPL-SCNC: 138 MMOL/L
TRIGL SERPL-MCNC: 40 MG/DL
TSH SERPL DL<=0.005 MIU/L-ACNC: 0.88 UIU/ML
WBC # BLD AUTO: 6.57 K/UL

## 2018-08-20 PROCEDURE — 80053 COMPREHEN METABOLIC PANEL: CPT

## 2018-08-20 PROCEDURE — 86703 HIV-1/HIV-2 1 RESULT ANTBDY: CPT

## 2018-08-20 PROCEDURE — 84443 ASSAY THYROID STIM HORMONE: CPT

## 2018-08-20 PROCEDURE — 36415 COLL VENOUS BLD VENIPUNCTURE: CPT | Mod: PO

## 2018-08-20 PROCEDURE — 99999 PR PBB SHADOW E&M-EST. PATIENT-LVL IV: CPT | Mod: PBBFAC,,, | Performed by: FAMILY MEDICINE

## 2018-08-20 PROCEDURE — 3079F DIAST BP 80-89 MM HG: CPT | Mod: CPTII,S$GLB,, | Performed by: FAMILY MEDICINE

## 2018-08-20 PROCEDURE — 3075F SYST BP GE 130 - 139MM HG: CPT | Mod: CPTII,S$GLB,, | Performed by: FAMILY MEDICINE

## 2018-08-20 PROCEDURE — 99396 PREV VISIT EST AGE 40-64: CPT | Mod: S$GLB,,, | Performed by: FAMILY MEDICINE

## 2018-08-20 PROCEDURE — 80061 LIPID PANEL: CPT

## 2018-08-20 PROCEDURE — 85025 COMPLETE CBC W/AUTO DIFF WBC: CPT

## 2018-08-20 RX ORDER — ESCITALOPRAM OXALATE 20 MG/1
20 TABLET ORAL DAILY
Qty: 30 TABLET | Refills: 11 | Status: SHIPPED | OUTPATIENT
Start: 2018-08-20 | End: 2019-04-16 | Stop reason: SDUPTHER

## 2018-08-20 RX ORDER — LOSARTAN POTASSIUM 100 MG/1
TABLET ORAL
Qty: 30 TABLET | Refills: 11 | Status: SHIPPED | OUTPATIENT
Start: 2018-08-20 | End: 2019-08-30 | Stop reason: SDUPTHER

## 2018-08-20 RX ORDER — TOPIRAMATE 50 MG/1
50 TABLET, FILM COATED ORAL 2 TIMES DAILY
Qty: 60 TABLET | Refills: 11 | Status: SHIPPED | OUTPATIENT
Start: 2018-08-20 | End: 2019-10-25 | Stop reason: SDUPTHER

## 2018-08-20 RX ORDER — NAPROXEN SODIUM 550 MG/1
TABLET ORAL
Qty: 60 TABLET | Refills: 11 | Status: SHIPPED | OUTPATIENT
Start: 2018-08-20 | End: 2019-09-20 | Stop reason: SDUPTHER

## 2018-08-20 RX ORDER — ALPRAZOLAM 0.25 MG/1
.25-.5 TABLET ORAL 3 TIMES DAILY PRN
Qty: 60 TABLET | Refills: 2 | Status: SHIPPED | OUTPATIENT
Start: 2018-08-20 | End: 2018-12-11 | Stop reason: SDUPTHER

## 2018-08-20 RX ORDER — RIZATRIPTAN BENZOATE 10 MG/1
10 TABLET ORAL
Qty: 6 TABLET | Refills: 11 | Status: SHIPPED | OUTPATIENT
Start: 2018-08-20 | End: 2021-11-01 | Stop reason: SDUPTHER

## 2018-08-20 RX ORDER — HYDROCHLOROTHIAZIDE 12.5 MG/1
12.5 TABLET ORAL DAILY
Qty: 30 TABLET | Refills: 11 | Status: SHIPPED | OUTPATIENT
Start: 2018-08-20 | End: 2018-10-01

## 2018-08-20 NOTE — PROGRESS NOTES
CHIEF COMPLAINT:  This is a 38-year-old female here for preventive health exam.    SUBJECTIVE:  The patient lost her mother few days ago and her grandmother 6 months ago.  She has been having increased headaches over the last several months and wonders if it is related to elevated blood pressure.  Blood pressure today is 138/86.  She takes hydrochlorothiazide 12.5 mg daily and losartan 100 mg daily.  She's morbidly obese with a BMI of 51.62.  She is having increased anxiety with episodes of shortness of breath and sense of doom.  She has increased alprazolam 0.25 mg to 2 tablets daily.  Patient continues to have rectal bleeding with blood on the tissue and sometimes in the toilet water.  She occasionally has blood clots.    Eye exam April 2017. Pap smear September 2011 (status post hysterectomy).  Colonoscopy July 2004.  Tdap July 2009.  Flu vaccine November 2017.     ROS:  GENERAL: Patient denies fever, chills, night sweats. Patient denies weight loss. Patient denies anorexia, weakness or swollen glands. Positive for fatigue.  SKIN: Patient denies rash or hair loss.  HEENT: Patient denies sore throat, ear pain, hearing loss, nasal congestion, or runny nose. Patient denies visual disturbance, eye irritation or discharge.  LUNGS: Patient denies cough, wheeze or hemoptysis.  CARDIOVASCULAR: Patient denies chest pain, shortness of breath, palpitations, syncope or lower extremity edema.  GI: Patient denies abdominal pain, nausea, vomiting, diarrhea, constipation, blood in stool or melena.  GENITOURINARY: Patient denies pelvic pain, vaginal discharge, itch or odor. Patient denies irregular vaginal bleeding. Patient denies dysuria, frequency, hematuria, nocturia, urgency or incontinence.  BREASTS: Patient denies breast pain, mass or nipple discharge.  MUSCULOSKELETAL: Patient denies joint pain, swelling, redness or warmth.  NEUROLOGIC: As above.  PSYCHIATRIC: Patient denies anxiety, depression, or memory  loss.     OBJECTIVE:   GENERAL: Well-developed well-nourished, obese, black female alert and oriented x3, in no acute distress. Memory, judgment and cognition without deficit.  SKIN: Clear without rash. Normal color and tone.  HEENT: Eyes: Clear conjunctivae. No scleral icterus. Pupils equal reactive to light and accommodation. Ears: Clear TMs. Clear canals. Nose: Without congestion. Pharynx: Without injection or exudates.  NECK: Supple, normal range of motion. No masses, lymphadenopathy or enlarged thyroid. No JVD. Carotids 2+ and equal. No bruits.  LUNGS: Clear to auscultation. Normal respiratory effort.  CARDIOVASCULAR: Regular rhythm, normal S1, S2 without murmur, gallop or rub.  BACK: No CVA or spinal tenderness.  BREASTS: No masses, tenderness or nipple discharge.  ABDOMEN:  Normal appearance. Active bowel sounds. Soft, nontender without mass or organomegaly. No rebound or guarding.  EXTREMITIES: Without cyanosis, clubbing or edema.  Distal pulses 2+ and equal. Normal range of motion in all extremities. No joint effusion, erythema or warmth.  NEUROLOGIC: Cranial nerves II through XII without deficit. Motor strength equal bilaterally. Sensation normal to touch. Deep tendon reflexes 2+ and equal. Gait without abnormality. No tremor. Negative cerebellar signs.  PELVIC: External: Without lesions or inflammation.  Vaginal:  Cuff intact. Bimanual: Non-tender, without masses.  Rectovaginal: Confirms, no bright red blood.  No stool.    ASSESSMENT:  1. Preventative health care    2. Essential hypertension    3. Morbid obesity    4. Insomnia, unspecified type    5. Iron deficiency anemia, unspecified iron deficiency anemia type    6. Encounter for screening mammogram for malignant neoplasm of breast      PLAN:   1.  Weight reduction.  Exercise regularly.  2.  Age-appropriate counseling.  3.  Fasting lab.  4.  Mammogram.  5.  Increase Lexapro to 20 mg daily.  6.  Increase alprazolam to 2 tablets daily.  Dispense #60.  7.   Refill medications.  8.  Colonoscopy.  The patient will call back when she is ready to schedule.

## 2018-08-21 LAB — HIV 1+2 AB+HIV1 P24 AG SERPL QL IA: NEGATIVE

## 2018-09-18 ENCOUNTER — HOSPITAL ENCOUNTER (OUTPATIENT)
Dept: RADIOLOGY | Facility: HOSPITAL | Age: 38
Discharge: HOME | End: 2018-09-18
Attending: FAMILY MEDICINE
Payer: COMMERCIAL

## 2018-09-18 DIAGNOSIS — Z12.31 ENCOUNTER FOR SCREENING MAMMOGRAM FOR MALIGNANT NEOPLASM OF BREAST: ICD-10-CM

## 2018-09-18 PROCEDURE — 77063 BREAST TOMOSYNTHESIS BI: CPT | Mod: 26,,, | Performed by: RADIOLOGY

## 2018-09-18 PROCEDURE — 77067 SCR MAMMO BI INCL CAD: CPT | Mod: TC,PO

## 2018-09-18 PROCEDURE — 77067 SCR MAMMO BI INCL CAD: CPT | Mod: 26,,, | Performed by: RADIOLOGY

## 2018-10-01 RX ORDER — HYDROCHLOROTHIAZIDE 12.5 MG/1
TABLET ORAL
Qty: 30 TABLET | Refills: 11 | Status: SHIPPED | OUTPATIENT
Start: 2018-10-01 | End: 2019-11-24 | Stop reason: SDUPTHER

## 2018-12-11 RX ORDER — ALPRAZOLAM 0.25 MG/1
TABLET ORAL
Qty: 60 TABLET | Refills: 3 | Status: SHIPPED | OUTPATIENT
Start: 2018-12-11 | End: 2019-04-04

## 2018-12-18 ENCOUNTER — TELEPHONE (OUTPATIENT)
Dept: FAMILY MEDICINE | Facility: CLINIC | Age: 38
End: 2018-12-18

## 2018-12-18 NOTE — TELEPHONE ENCOUNTER
Spoke with pt, she stated she needs an appt as soon as possible due to [place] paperwork for depression. She stated her mother and grandmother passed away within a week of each other and she is struggling to get out of bed everyday. Let her know to call early in AM to get same day access appt. Pt verbalized understanding and stated she would call tomorrow morning and Thursday morning.

## 2018-12-18 NOTE — TELEPHONE ENCOUNTER
----- Message from Janet Patiño sent at 12/18/2018 10:32 AM CST -----  Contact: self  [phone]  Would like to consult with nurse regarding getting FMLA paperwork filled out. Please call back at [phone].  Md Jared

## 2018-12-19 ENCOUNTER — OFFICE VISIT (OUTPATIENT)
Dept: FAMILY MEDICINE | Facility: CLINIC | Age: 38
End: 2018-12-19
Payer: COMMERCIAL

## 2018-12-19 ENCOUNTER — TELEPHONE (OUTPATIENT)
Dept: PSYCHIATRY | Facility: CLINIC | Age: 38
End: 2018-12-19

## 2018-12-19 ENCOUNTER — TELEPHONE (OUTPATIENT)
Dept: FAMILY MEDICINE | Facility: CLINIC | Age: 38
End: 2018-12-19

## 2018-12-19 VITALS
OXYGEN SATURATION: 99 % | DIASTOLIC BLOOD PRESSURE: 104 MMHG | HEIGHT: 60 IN | TEMPERATURE: 99 F | WEIGHT: 278.69 LBS | BODY MASS INDEX: 54.71 KG/M2 | HEART RATE: 95 BPM | RESPIRATION RATE: 18 BRPM | SYSTOLIC BLOOD PRESSURE: 162 MMHG

## 2018-12-19 DIAGNOSIS — Z23 NEEDS FLU SHOT: ICD-10-CM

## 2018-12-19 DIAGNOSIS — F43.23 ADJUSTMENT DISORDER WITH MIXED ANXIETY AND DEPRESSED MOOD: Primary | ICD-10-CM

## 2018-12-19 DIAGNOSIS — I10 ESSENTIAL HYPERTENSION: ICD-10-CM

## 2018-12-19 PROCEDURE — 99214 OFFICE O/P EST MOD 30 MIN: CPT | Mod: 25,S$GLB,, | Performed by: FAMILY MEDICINE

## 2018-12-19 PROCEDURE — 90471 IMMUNIZATION ADMIN: CPT | Mod: 59,S$GLB,, | Performed by: FAMILY MEDICINE

## 2018-12-19 PROCEDURE — 3008F BODY MASS INDEX DOCD: CPT | Mod: CPTII,S$GLB,, | Performed by: FAMILY MEDICINE

## 2018-12-19 PROCEDURE — 90686 IIV4 VACC NO PRSV 0.5 ML IM: CPT | Mod: S$GLB,,, | Performed by: FAMILY MEDICINE

## 2018-12-19 PROCEDURE — 99999 PR PBB SHADOW E&M-EST. PATIENT-LVL III: CPT | Mod: PBBFAC,,, | Performed by: FAMILY MEDICINE

## 2018-12-19 PROCEDURE — 3080F DIAST BP >= 90 MM HG: CPT | Mod: CPTII,S$GLB,, | Performed by: FAMILY MEDICINE

## 2018-12-19 PROCEDURE — 3077F SYST BP >= 140 MM HG: CPT | Mod: CPTII,S$GLB,, | Performed by: FAMILY MEDICINE

## 2018-12-19 NOTE — PROGRESS NOTES
CHIEF COMPLAINT:  The this is a 38-year-old female complaining of mood disorder.    SUBJECTIVE:  Patient requests leave of absence from work and needs [facility] paperwork filled out.  She is continuing  to have emotional problems.  She is struggling with the recent deaths of her mother and grandmother in 2018.  She is overwhelmed and has been going to work but is having difficulty leaving home.  Lexapro 20 mg had been helping her but has not been working in the last few weeks.  She complains of a reved up feeling in her body.  Her blood pressure has been elevated.  Today's reading is 162/104.  She takes hydrochlorothiazide 12.5 mg daily and losartan 100 mg daily.      ROS:  GENERAL: Patient denies fever, chills, night sweats. Patient denies weight loss. Patient denies anorexia, weakness or swollen glands. Positive for fatigue.  SKIN: Patient denies rash or hair loss.  HEENT: Patient denies sore throat, ear pain, hearing loss, nasal congestion, or runny nose. Patient denies visual disturbance, eye irritation or discharge.  LUNGS: Patient denies cough, wheeze or hemoptysis.  CARDIOVASCULAR: Patient denies chest pain, shortness of breath, palpitations, syncope or lower extremity edema.  GI: Patient denies abdominal pain, nausea, vomiting, diarrhea, constipation, blood in stool or melena.  GENITOURINARY: Patient denies dysuria, frequency, hematuria, nocturia, urgency or incontinence.  MUSCULOSKELETAL: Patient denies joint pain, swelling, redness or warmth.  NEUROLOGIC:  Patient denies headache, vertigo, paresthesias, weakness in limb or abnormality of gait or.  PSYCHIATRIC: Patient denies anxiety, depression, or memory loss.     OBJECTIVE:   GENERAL: Well-developed well-nourished, obese, black female alert and oriented x3, in no acute distress. Memory, judgment and cognition without deficit.  Depressed affect.  No hallucinations, delusions or flight of ideas.  SKIN: Clear without rash. Normal color and tone.  HEENT: Eyes:  Clear conjunctivae. No scleral icterus.   NECK: Supple, normal range of motion. No masses, lymphadenopathy or enlarged thyroid. No JVD. Carotids 2+ and equal. No bruits.  LUNGS: Clear to auscultation. Normal respiratory effort.  CARDIOVASCULAR: Regular rhythm, normal S1, S2 without murmur, gallop or rub.  EXTREMITIES: Without cyanosis, clubbing or edema.  Distal pulses 2+ and equal. Normal range of motion in all extremities. No joint effusion, erythema or warmth.  NEUROLOGIC:  Gait without abnormality.  No tremor.    ASSESSMENT:  1. Adjustment disorder with mixed anxiety and depressed mood    2. Essential hypertension      PLAN:   1.  [facility] paperwork filled out for extended leave of absence.  2.  Influenza vaccine.  3.  Return to clinic if no improvement or worsening symptoms.  4. Monitor blood pressure.  Report readings in 2-3 weeks.    This visit was 30 min, greater than 50% of which involved counseling.

## 2018-12-19 NOTE — TELEPHONE ENCOUNTER
----- Message from Janet Patiño sent at 12/19/2018 12:14 PM CST -----  Contact: self [phone]  Would like to schedule new patient appt.  Please call back at [phone].  Md Jared

## 2018-12-19 NOTE — TELEPHONE ENCOUNTER
----- Message from Yajaira Maloney sent at 12/19/2018  7:21 AM CST -----  Contact: pt  Please call pt @ [phone] regarding worked in appt, pt states she spoke to nurse yesterday and was told to call back today.

## 2018-12-19 NOTE — TELEPHONE ENCOUNTER
Spoke with pt, she is crying and stating she's been trying for three days to get in with the same day appt with  for 3 days and cant ever get in. She stated she is struggling a lot with her mother and grandmother death and would like to have FMLA paperwork filled out because her job is stating she needs to see someone for this. Is there any appt time we can see her today? She scheduled appt for tomorrow but she is requesting today. If not, is there any provider that will see her for you for FMLA paperwork?

## 2018-12-20 ENCOUNTER — OFFICE VISIT (OUTPATIENT)
Dept: PSYCHIATRY | Facility: CLINIC | Age: 38
End: 2018-12-20
Payer: COMMERCIAL

## 2018-12-20 DIAGNOSIS — F43.21 UNRESOLVED GRIEF: ICD-10-CM

## 2018-12-20 DIAGNOSIS — F32.9 MAJOR DEPRESSIVE EPISODE: Primary | ICD-10-CM

## 2018-12-20 PROCEDURE — 90791 PR PSYCHIATRIC DIAGNOSTIC EVALUATION: ICD-10-PCS | Mod: S$GLB,,, | Performed by: SOCIAL WORKER

## 2018-12-20 PROCEDURE — 90791 PSYCH DIAGNOSTIC EVALUATION: CPT | Mod: S$GLB,,, | Performed by: SOCIAL WORKER

## 2019-01-03 NOTE — PROGRESS NOTES
"Psychiatry Initial Visit (PhD/LCSW)  Diagnostic Interview - CPT 87204    Date: 2018    Site: [place]    Referral source: Alyssa Muse MD, primary care, Family Medicine    Clinical status of patient: Outpatient    Georgia Garcia, a 38 y.o. female, for initial evaluation visit.  Met with patient.    Chief complaint/reason for encounter: depression, anxiety and sleep    History of present illness:   38 year old  female presented as referral from primary care, complaining of several months history of depressive and anxiety symptoms, since the deaths of her mother and maternal grandmother.  Patient described symptoms of frequent uncontrolled tearfulness, pervasive sadness, difficulty with poor focus, excessive sleeping of 10 to 11 hours a night many nights, and a roughly 30 pounds weight gain since May of 2018.  Said she has been attending work but feeling distracted and "on auto-."  She was given a doctor's note for leave from work for 3 weeks by Dr. Muse to cover 18 through 19.  She reported grandmother first suffered a cva in 2017 and went to physical rehab.  She  in May.  The patient's mother had been ill with CHF since , but was in treatment and seemed to be in a sustained pattern that was functioning.  Her death in 2018 surprised and shocked the patient.  Patient said these two deaths also triggered ruminations about the loss of her twin brother, who  in , the result of being hit by a car.   The patient said she has experienced "waves" of distress since May, sharply increasing since August.  She said she does talk with other family about the loss of her mother but that she perceives her entire family as stressed, with the patient's daughter seeming to be emotionally the strongest.  Her daughter is 22 years old.  She said she has friends and a supportive aunt and fiance Vishal, but that she feels reluctant to burden them about her " grief.  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  Patient denied previous depression or anxiety.  Described her childhood as happy.  She did endorse experiencing a divorce from the father of her only child in , but felt she navigated that adequately.  She denied any drug or alcohol consumption, described only light caffeine consumption of a cup of coffee most days, denied any suicidal or homicidal ideation or history thereof.  Denied any psychosis, cognitive deficit, or mood swings.  Identified therapeutic goals include verbally processing her grief and learning coping skills for depression.      Pain: noncontributory    Symptoms:   · Mood: depressed mood, weight gain, hypersomnia, poor concentration and tearfulness  · Anxiety: excessive anxiety/worry  · Substance abuse: denied  · Cognitive functioning: denied  · Health behaviors: noncontributory    Psychiatric history: psychotropic management by PCP    Medical history: essential hypertension, obesity    Family history of psychiatric illness: none    Social history (marriage, employment, etc.):   Born and raised in [place], having a twin brother.  Raised by both parents, with parents  when she was 13; father still alive and residing in [place] now at age of 73.  Twin brother  suddenly from an accident in , struck by a motor vehicle.  Family relocated to [place] due to Hurricane [place] in .  Described her childhood as happy; education: attended some college, then went to work for [employer] as an .   at age 21,  around age 28.  Mother of one child, her daughter, age 22.  Engaged to miranda Rodgers, who has no biological or adoptive children of his own.  Patient described a social [place] that includes friends, a supportive aunt, fiance, daughter, and co-workers.  Has worked for the [employer] for 17 years.      Substance use:   Alcohol:  none   Drugs: none   Tobacco: none   Caffeine: light to moderate, average 1 cup of coffee most days.    Current medications and drug reactions (include OTC, herbal): see medication list      Strengths and liabilities: Strength: Patient accepts guidance/feedback, Strength: Patient is expressive/articulate., Strength: Patient is motivated for change., Strength: Patient has positive support network., Strength: Patient has reasonable judgment.    Current Evaluation:     Mental Status Exam:  General Appearance:  age appropriate, casually dressed, neatly groomed, obese   Speech: normal tone, normal rate, normal pitch, normal volume      Level of Cooperation: cooperative      Thought Processes: normal and logical   Mood: depressed, sad      Thought Content: normal, no suicidality, no homicidality, delusions, or paranoia   Affect: congruent and appropriate   Orientation: Oriented x3   Memory: recent and remote memory intact   Attention Span & Concentration: intact   Fund of General Knowledge: intact and appropriate to age and level of education   Abstract Reasoning: not formally assessed   Judgment & Insight: fair     Language  intact     Diagnostic Impression - Plan:       ICD-10-CM ICD-9-CM   1. Major depressive episode F32.9 296.20   2. Unresolved grief F43.21 309.1       Plan:individual psychotherapy    Return to Clinic: patient directed to schedule follow up within a month; tba    Length of Service (minutes): 45

## 2019-03-05 ENCOUNTER — OFFICE VISIT (OUTPATIENT)
Dept: ORTHOPEDICS | Facility: CLINIC | Age: 39
End: 2019-03-05
Payer: COMMERCIAL

## 2019-03-05 ENCOUNTER — HOSPITAL ENCOUNTER (OUTPATIENT)
Dept: RADIOLOGY | Facility: HOSPITAL | Age: 39
Discharge: HOME | End: 2019-03-05
Attending: FAMILY MEDICINE
Payer: COMMERCIAL

## 2019-03-05 VITALS
HEART RATE: 79 BPM | WEIGHT: 278 LBS | DIASTOLIC BLOOD PRESSURE: 98 MMHG | BODY MASS INDEX: 54.58 KG/M2 | SYSTOLIC BLOOD PRESSURE: 154 MMHG | HEIGHT: 60 IN

## 2019-03-05 DIAGNOSIS — M17.11 PRIMARY OSTEOARTHRITIS OF RIGHT KNEE: ICD-10-CM

## 2019-03-05 DIAGNOSIS — R52 PAIN: ICD-10-CM

## 2019-03-05 DIAGNOSIS — I10 ESSENTIAL HYPERTENSION: ICD-10-CM

## 2019-03-05 DIAGNOSIS — R52 PAIN: Primary | ICD-10-CM

## 2019-03-05 DIAGNOSIS — E66.01 MORBID OBESITY: ICD-10-CM

## 2019-03-05 DIAGNOSIS — M17.12 PRIMARY OSTEOARTHRITIS OF LEFT KNEE: Primary | ICD-10-CM

## 2019-03-05 PROCEDURE — 73564 XR KNEE ORTHO BILAT WITH FLEXION: ICD-10-PCS | Mod: 26,LT,, | Performed by: RADIOLOGY

## 2019-03-05 PROCEDURE — 20610 LARGE JOINT ASPIRATION/INJECTION: L KNEE: ICD-10-PCS | Mod: 50,S$GLB,, | Performed by: FAMILY MEDICINE

## 2019-03-05 PROCEDURE — 3077F PR MOST RECENT SYSTOLIC BLOOD PRESSURE >= 140 MM HG: ICD-10-PCS | Mod: CPTII,S$GLB,, | Performed by: FAMILY MEDICINE

## 2019-03-05 PROCEDURE — 20610 DRAIN/INJ JOINT/BURSA W/O US: CPT | Mod: 50,S$GLB,, | Performed by: FAMILY MEDICINE

## 2019-03-05 PROCEDURE — 3008F PR BODY MASS INDEX (BMI) DOCUMENTED: ICD-10-PCS | Mod: CPTII,S$GLB,, | Performed by: FAMILY MEDICINE

## 2019-03-05 PROCEDURE — 3080F PR MOST RECENT DIASTOLIC BLOOD PRESSURE >= 90 MM HG: ICD-10-PCS | Mod: CPTII,S$GLB,, | Performed by: FAMILY MEDICINE

## 2019-03-05 PROCEDURE — 73564 X-RAY EXAM KNEE 4 OR MORE: CPT | Mod: 26,LT,, | Performed by: RADIOLOGY

## 2019-03-05 PROCEDURE — 99214 OFFICE O/P EST MOD 30 MIN: CPT | Mod: 25,S$GLB,, | Performed by: FAMILY MEDICINE

## 2019-03-05 PROCEDURE — 3080F DIAST BP >= 90 MM HG: CPT | Mod: CPTII,S$GLB,, | Performed by: FAMILY MEDICINE

## 2019-03-05 PROCEDURE — 3008F BODY MASS INDEX DOCD: CPT | Mod: CPTII,S$GLB,, | Performed by: FAMILY MEDICINE

## 2019-03-05 PROCEDURE — 99999 PR PBB SHADOW E&M-EST. PATIENT-LVL III: CPT | Mod: PBBFAC,,, | Performed by: FAMILY MEDICINE

## 2019-03-05 PROCEDURE — 99999 PR PBB SHADOW E&M-EST. PATIENT-LVL III: ICD-10-PCS | Mod: PBBFAC,,, | Performed by: FAMILY MEDICINE

## 2019-03-05 PROCEDURE — 3077F SYST BP >= 140 MM HG: CPT | Mod: CPTII,S$GLB,, | Performed by: FAMILY MEDICINE

## 2019-03-05 PROCEDURE — 99214 PR OFFICE/OUTPT VISIT, EST, LEVL IV, 30-39 MIN: ICD-10-PCS | Mod: 25,S$GLB,, | Performed by: FAMILY MEDICINE

## 2019-03-05 PROCEDURE — 73564 X-RAY EXAM KNEE 4 OR MORE: CPT | Mod: 26,RT,, | Performed by: RADIOLOGY

## 2019-03-05 PROCEDURE — 73564 X-RAY EXAM KNEE 4 OR MORE: CPT | Mod: TC,50

## 2019-03-05 RX ORDER — DICLOFENAC SODIUM 10 MG/G
2 GEL TOPICAL 2 TIMES DAILY
Qty: 100 G | Refills: 2 | Status: SHIPPED | OUTPATIENT
Start: 2019-03-05 | End: 2019-09-20

## 2019-03-05 RX ORDER — MELOXICAM 7.5 MG/1
7.5 TABLET ORAL DAILY
Qty: 30 TABLET | Refills: 0 | Status: SHIPPED | OUTPATIENT
Start: 2019-03-05 | End: 2019-09-20

## 2019-03-05 RX ORDER — TRIAMCINOLONE ACETONIDE 40 MG/ML
80 INJECTION, SUSPENSION INTRA-ARTICULAR; INTRAMUSCULAR
Status: DISCONTINUED | OUTPATIENT
Start: 2019-03-05 | End: 2019-03-05 | Stop reason: HOSPADM

## 2019-03-05 RX ADMIN — TRIAMCINOLONE ACETONIDE 80 MG: 40 INJECTION, SUSPENSION INTRA-ARTICULAR; INTRAMUSCULAR at 02:03

## 2019-03-05 NOTE — PROCEDURES
Large Joint Aspiration/Injection: R knee  Date/Time: 3/5/2019 2:08 PM  Performed by: Mina Helton MD  Authorized by: Mina Helton MD     Consent Done?:  Yes (Verbal)  Indications:  Pain and diagnostic evaluation  Procedure site marked: Yes    Timeout: Prior to procedure the correct patient, procedure, and site was verified      Location:  Knee  Site:  R knee  Prep: Patient was prepped and draped in usual sterile fashion    Ultrasonic Guidance for needle placement: No  Needle size:  25 G  Approach:  Anterolateral  Medications:  80 mg triamcinolone acetonide 40 mg/mL  Patient tolerance:  Patient tolerated the procedure well with no immediate complications    Additional Comments: Patient experienced minimal blood loss (< 3 cc) and clean bandage was applied. Post procedure care was provided to the patient verbally and with their AVS. Patient was instructed to take it easy for the next 24-48 hours and was informed that their pain may increase once the anaesthesia wears off and before the steroid begins to work. Patient was instructed to ice area for 15 minutes and may take NSAIDs or Tylenol PRN if pain worsens. Patient was informed to contact clinic or go to the ED if they develop any fever, chills, redness, swelling, or other complications.

## 2019-03-05 NOTE — PROGRESS NOTES
Subjective:     Patient ID: Georgia Garcia is a 39 y.o. female.    Chief Complaint: Pain of the Right Knee and Pain of the Left Knee    Patient is a 39-year-old female presents clinic today complaining of bilateral knee pain (left worse than right).  Patient states that her right knee used to give her problems until she had a cortisone injection about 3 years years ago that worked very well for her right knee pain. Patient states that she has never had any cortisone injections for her left knee.  Patient states that she has not had any injuries or falls, but states that over the past several months her left knee has gradually worsened.  States that over the past few days, the left knee has been so painful it feels like it wants to give out or collapse on her.  Patient denies any recent physical therapy, surgeries, fever, chills, redness, swelling, or MRIs.  States she has tried over-the-counter anti-inflammatories without improvement.        Past Medical History:   Diagnosis Date    Anemia     Cervical radiculopathy     Diverticulosis     Hypermenorrhea     Hypertension     Insomnia     Internal hemorrhoid     Migraine headache     Morbid obesity     Rectal bleeding     Uterine fibroid      Past Surgical History:   Procedure Laterality Date     SECTION, LOW TRANSVERSE      x1    HYSTERECTOMY-ABDOMINAL-TOTAL (KHRIS) N/A 2014    [facility]    TOTAL ABDOMINAL HYSTERECTOMY  14    TUBAL LIGATION       Family History   Problem Relation Age of Onset    Hypertension Mother     Pulmonary embolism Mother     Deep vein thrombosis Mother     Thrombophilia Mother     Heart failure Mother     Pulmonary embolism Maternal Uncle     Deep vein thrombosis Maternal Uncle     Thrombophilia Maternal Uncle     Colon cancer Maternal Uncle     Diabetes Maternal Grandfather     Stroke Maternal Grandfather     Breast cancer Maternal Aunt     Lung cancer Paternal  Uncle     Breast cancer Maternal Grandmother     Stroke Maternal Grandmother     ADD / ADHD Daughter      Social History     Socioeconomic History    Marital status: Single     Spouse name: Not on file    Number of children: Not on file    Years of education: Not on file    Highest education level: Not on file   Social Needs    Financial resource strain: Not on file    Food insecurity - worry: Not on file    Food insecurity - inability: Not on file    Transportation needs - medical: Not on file    Transportation needs - non-medical: Not on file   Occupational History     Employer: [employer]   Tobacco Use    Smoking status: Never Smoker    Smokeless tobacco: Never Used   Substance and Sexual Activity    Alcohol use: No    Drug use: No    Sexual activity: Yes     Partners: Male     Birth control/protection: Surgical     Comment: hyst; mut monog   Other Topics Concern    Not on file   Social History Narrative    She is , has one daughter and 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              Medication List with Changes/Refills   New Medications    DICLOFENAC SODIUM (VOLTAREN) 1 % GEL    Apply 2 g topically 2 (two) times daily.    MELOXICAM (MOBIC) 7.5 MG TABLET    Take 1 tablet (7.5 mg total) by mouth once daily.   Current Medications    ALPRAZOLAM (XANAX) 0.25 MG TABLET    TAKE 1 TO 2 TABLETS BY MOUTH THREE TIMES DAILY AS NEEDED FOR ANXIETY FOR 30 DAYS    ESCITALOPRAM OXALATE (LEXAPRO) 20 MG TABLET    Take 1 tablet (20 mg total) by mouth once daily.    HYDROCHLOROTHIAZIDE (HYDRODIURIL) 12.5 MG TAB    TAKE 1 TABLET BY MOUTH ONCE DAILY    HYDROCODONE-ACETAMINOPHEN 7.5-325MG (NORCO) 7.5-325 MG PER TABLET    Take 1 tablet by mouth every 6 (six) hours as needed for Pain.    LOSARTAN (COZAAR) 100 MG TABLET    TAKE 1 TABLET(100 MG) BY MOUTH EVERY DAY    MECLIZINE (ANTIVERT) 25 MG TABLET    Take 1-2 tablets (25-50 mg total) by mouth every 4 to 6  hours as needed for Dizziness.    NAPROXEN SODIUM (ANAPROX) 550 MG TABLET    TAKE 1 TABLET(550 MG) BY MOUTH TWICE DAILY WITH MEALS    RIZATRIPTAN (MAXALT) 10 MG TABLET    Take 1 tablet (10 mg total) by mouth as needed.    TOPIRAMATE (TOPAMAX) 50 MG TABLET    Take 1 tablet (50 mg total) by mouth 2 (two) times daily.    ZOLPIDEM (AMBIEN) 5 MG TAB    Take 1 tablet (5 mg total) by mouth nightly as needed.     Review of patient's allergies indicates:  No Known Allergies  Review of Systems   Constitutional: Negative for chills and fever.   Cardiovascular: Negative for leg swelling.   Gastrointestinal: Negative for nausea and vomiting.   Musculoskeletal: Positive for joint pain. Negative for back pain, falls and myalgias.   Skin: Negative for rash.   Neurological: Negative for tingling, sensory change, focal weakness and weakness.        Objective:   Body mass index is 54.29 kg/m².  Vitals:    03/05/19 1341   BP: (!) 154/98   Pulse: 79   Weight: 126.1 kg (278 lb)   Height: 5' (1.524 m)   PainSc:   8   PainLoc: Knee           General    Nursing note and vitals reviewed.  Constitutional: She is oriented to person, place, and time. She appears well-developed and well-nourished. No distress.   Eyes: Conjunctivae are normal. No scleral icterus.   Pulmonary/Chest: Effort normal.   Neurological: She is alert and oriented to person, place, and time.   Psychiatric: She has a normal mood and affect. Her behavior is normal. Judgment and thought content normal.     General Musculoskeletal Exam   Gait: antalgic       Right Knee Exam     Inspection   Erythema: absent  Effusion: absent  Deformity: absent    Tenderness   The patient is tender to palpation of the patella.    Crepitus   The patient has crepitus of the patella.    Range of Motion   Extension: normal   Flexion: normal     Tests   Meniscus   Arlette:  Medial - positive Lateral - negative  Patella   Passive Patellar Tilt: lateral tilt  Patellar Tracking: normal    Other    Sensation: normal    Left Knee Exam     Inspection   Erythema: absent  Effusion: absent  Deformity: absent    Tenderness   The patient tender to palpation of the medial joint line, patella and lateral joint line.    Crepitus   The patient has crepitus of the patella.    Range of Motion   Extension: normal   Flexion: normal     Tests   Meniscus   Arlette:  Medial - positive Lateral - negative  Patella   Passive Patellar Tilt: lateral tilt  Patellar Tracking: normal    Other   Sensation: normal    Muscle Strength   Right Lower Extremity   Quadriceps:  5/5   Left Lower Extremity   Quadriceps:  5/5         EXAMINATION:  XR KNEE ORTHO BILAT WITH FLEXION    CLINICAL HISTORY:  Pain, unspecified    TECHNIQUE:  AP standing of both knees, PA flexion standing views of both knees, and Merchant views of both knees were performed.  Lateral views of both knees were also performed.    COMPARISON:  November 25, 2017    FINDINGS:  Juxta-articular osteopenia.  Mild degenerative change bilaterally without significant interval progression.  No fracture or dislocation.  There is slight superolateral positioning of the patellae on the standing AP view, left greater than right.  Normal articulation noted on the sunrise views.  No right effusion.  Probable small left effusion.      Impression       As above.      Electronically signed by: Chris Gaffney MD  Date: 03/05/2019  Time: 13:54         Georgia was seen today for pain and pain.    Diagnoses and all orders for this visit:    Primary osteoarthritis of left knee  -     diclofenac sodium (VOLTAREN) 1 % Gel; Apply 2 g topically 2 (two) times daily.  -     Ambulatory Referral to Physical/Occupational Therapy  -     meloxicam (MOBIC) 7.5 MG tablet; Take 1 tablet (7.5 mg total) by mouth once daily.  -     Large Joint Aspiration/Injection: L knee    Primary osteoarthritis of right knee  -     Large Joint Aspiration/Injection: R knee    Essential hypertension    Morbid  obesity    -discussed the clinical course and nature of osteoarthritis with patient.  At this time patient would like to conservative approach with cortisone injections, NSAIDs/Tylenol, and physical therapy.  If patient does not get significant relief, would recommend viscosupplementation.   -provided [employer] coupon for Voltaren gel  -bilateral knee Xray images were independently viewed and read by me showing mild-to-moderate medial joint space loss (left worse than right).  No osteophyte formation.  No fractures or dislocations.  -Formal read by radiologist is as described above  -Discussed findings with patient  -Treatment options and alternatives were discussed with the patient. Patient expressed understanding. Patient was given the opportunity to ask questions and be an active participant in their medical care. Patient had no further questions or concerns at this time.   -Patient is an overall moderate risk for health complications from their medical conditions.

## 2019-03-05 NOTE — PROCEDURES
Large Joint Aspiration/Injection: L knee  Date/Time: 3/5/2019 2:08 PM  Performed by: Mina Helton MD  Authorized by: Mina Helton MD     Consent Done?:  Yes (Verbal)  Indications:  Pain and diagnostic evaluation  Procedure site marked: Yes    Timeout: Prior to procedure the correct patient, procedure, and site was verified      Location:  Knee  Site:  L knee  Prep: Patient was prepped and draped in usual sterile fashion    Ultrasonic Guidance for needle placement: No  Needle size:  25 G  Approach:  Anterolateral  Medications:  80 mg triamcinolone acetonide 40 mg/mL  Patient tolerance:  Patient tolerated the procedure well with no immediate complications    Additional Comments: Patient experienced minimal blood loss (< 3 cc) and clean bandage was applied. Post procedure care was provided to the patient verbally and with their AVS. Patient was instructed to take it easy for the next 24-48 hours and was informed that their pain may increase once the anaesthesia wears off and before the steroid begins to work. Patient was instructed to ice area for 15 minutes and may take NSAIDs or Tylenol PRN if pain worsens. Patient was informed to contact clinic or go to the ED if they develop any fever, chills, redness, swelling, or other complications.

## 2019-04-02 ENCOUNTER — TELEPHONE (OUTPATIENT)
Dept: FAMILY MEDICINE | Facility: CLINIC | Age: 39
End: 2019-04-02

## 2019-04-02 RX ORDER — SCOLOPAMINE TRANSDERMAL SYSTEM 1 MG/1
1 PATCH, EXTENDED RELEASE TRANSDERMAL
Qty: 4 PATCH | Refills: 0 | Status: SHIPPED | OUTPATIENT
Start: 2019-04-02 | End: 2019-10-25

## 2019-04-02 NOTE — TELEPHONE ENCOUNTER
----- Message from Laila Arriaga sent at 4/2/2019  7:08 AM CDT -----  Contact: pt  Pt requesting a prescription for Dramamien patch. Pt states the pills don't work well for her. Pt is going on a 7 day cruise.  [phone]

## 2019-04-04 ENCOUNTER — PATIENT OUTREACH (OUTPATIENT)
Dept: ADMINISTRATIVE | Facility: HOSPITAL | Age: 39
End: 2019-04-04

## 2019-04-04 RX ORDER — ALPRAZOLAM 0.25 MG/1
TABLET ORAL
Qty: 30 TABLET | Refills: 0 | Status: SHIPPED | OUTPATIENT
Start: 2019-04-04 | End: 2019-04-27 | Stop reason: SDUPTHER

## 2019-04-04 NOTE — PROGRESS NOTES
Attempted to contact pt to schedule a blood pressure f/u per [facility].  No answer. Left message for pt to return call.

## 2019-04-04 NOTE — Clinical Note
Pt would like a call back concerning an appt.  She states she can be reached at [phone].  Thank you.

## 2019-04-04 NOTE — PROGRESS NOTES
Pt return call and stated she will check her schedule to see if she can come in for bp follow up.  Pt stated she will like a call from the psychiatry dept to scheduled appt.  Message sent to Presley Lagunas [facility] staff.

## 2019-04-05 ENCOUNTER — OFFICE VISIT (OUTPATIENT)
Dept: FAMILY MEDICINE | Facility: CLINIC | Age: 39
End: 2019-04-05
Payer: COMMERCIAL

## 2019-04-05 VITALS
SYSTOLIC BLOOD PRESSURE: 128 MMHG | HEART RATE: 104 BPM | TEMPERATURE: 98 F | DIASTOLIC BLOOD PRESSURE: 82 MMHG | OXYGEN SATURATION: 98 % | BODY MASS INDEX: 55.06 KG/M2 | HEIGHT: 60 IN | WEIGHT: 280.44 LBS

## 2019-04-05 DIAGNOSIS — F32.1 CURRENT MODERATE EPISODE OF MAJOR DEPRESSIVE DISORDER WITHOUT PRIOR EPISODE: ICD-10-CM

## 2019-04-05 DIAGNOSIS — G43.909 MIGRAINE WITHOUT STATUS MIGRAINOSUS, NOT INTRACTABLE, UNSPECIFIED MIGRAINE TYPE: ICD-10-CM

## 2019-04-05 DIAGNOSIS — R68.82 DECREASED LIBIDO: ICD-10-CM

## 2019-04-05 DIAGNOSIS — I10 ESSENTIAL HYPERTENSION: Primary | ICD-10-CM

## 2019-04-05 DIAGNOSIS — F41.1 GAD (GENERALIZED ANXIETY DISORDER): ICD-10-CM

## 2019-04-05 PROCEDURE — 99999 PR PBB SHADOW E&M-EST. PATIENT-LVL III: CPT | Mod: PBBFAC,,, | Performed by: FAMILY MEDICINE

## 2019-04-05 PROCEDURE — 99214 OFFICE O/P EST MOD 30 MIN: CPT | Mod: S$GLB,,, | Performed by: FAMILY MEDICINE

## 2019-04-05 PROCEDURE — 3074F PR MOST RECENT SYSTOLIC BLOOD PRESSURE < 130 MM HG: ICD-10-PCS | Mod: CPTII,S$GLB,, | Performed by: FAMILY MEDICINE

## 2019-04-05 PROCEDURE — 3008F PR BODY MASS INDEX (BMI) DOCUMENTED: ICD-10-PCS | Mod: CPTII,S$GLB,, | Performed by: FAMILY MEDICINE

## 2019-04-05 PROCEDURE — 3008F BODY MASS INDEX DOCD: CPT | Mod: CPTII,S$GLB,, | Performed by: FAMILY MEDICINE

## 2019-04-05 PROCEDURE — 99999 PR PBB SHADOW E&M-EST. PATIENT-LVL III: ICD-10-PCS | Mod: PBBFAC,,, | Performed by: FAMILY MEDICINE

## 2019-04-05 PROCEDURE — 3079F PR MOST RECENT DIASTOLIC BLOOD PRESSURE 80-89 MM HG: ICD-10-PCS | Mod: CPTII,S$GLB,, | Performed by: FAMILY MEDICINE

## 2019-04-05 PROCEDURE — 99214 PR OFFICE/OUTPT VISIT, EST, LEVL IV, 30-39 MIN: ICD-10-PCS | Mod: S$GLB,,, | Performed by: FAMILY MEDICINE

## 2019-04-05 PROCEDURE — 3079F DIAST BP 80-89 MM HG: CPT | Mod: CPTII,S$GLB,, | Performed by: FAMILY MEDICINE

## 2019-04-05 PROCEDURE — 3074F SYST BP LT 130 MM HG: CPT | Mod: CPTII,S$GLB,, | Performed by: FAMILY MEDICINE

## 2019-04-05 NOTE — PROGRESS NOTES
CHIEF COMPLAINT:  This is a 39-year-old female with multiple medical complaints.    SUBJECTIVE:  Patient is concerned about elevated blood pressure.  At home, her blood pressures are usually in the 130s over 85.  However blood pressures in the clinic have been elevated with systolics in the range of 150-160 over 90s to 100s.  Patient reports that when she is in the clinic her blood pressure is elevated because she usually is in a great deal of pain. Today's  blood pressure reading is 140/92.  Patient takes losartan 100 mg daily and hydrochlorothiazide 12.5 mg daily for hypertension.  She complains of headache currently and rates the pain 5/10 on the pain scale.  She reports that she is having almost daily migraines despite taking Topamax daily and Maxalt as needed.  Today's headache is not a typical migraine.  She denies nausea, vomiting, scotomata, diplopia or photophobia.    Patient complains of problem with vaginal odor.  She has tried  anti yeast medication without improvement.  Patient denies itching, vaginal discharge, irritation or dysuria.    Patient complains of depression even though she is happily engaged to be .  She was taking Lexapro 20 mg daily which was helping but then became ineffective.  She complained of a reved up feeling in her body.  Patient had mental health counseling in December 2018.  She reports that if she does not take alprazolam, she feels more anxious.  Patient denies suicidal ideation.  She complains of decreased libido which she reports was present even when she was younger.    ROS:  GENERAL: Patient denies fever, chills, night sweats. Patient denies weight loss. Patient denies anorexia, weakness or swollen glands. Positive for fatigue.  SKIN: Patient denies rash.  HEENT: Patient denies sore throat, ear pain, hearing loss, nasal congestion, or runny nose. Patient denies visual disturbance, eye irritation or discharge.  LUNGS: Patient denies cough, wheeze or  hemoptysis.  CARDIOVASCULAR: Patient denies chest pain, shortness of breath, palpitations, syncope or lower extremity edema.  GI: Patient denies abdominal pain, nausea, vomiting, diarrhea, constipation, blood in stool or melena.  GENITOURINARY: Patient denies pelvic pain or irregular vaginal bleeding. Patient denies dysuria, frequency, hematuria, nocturia, urgency or incontinence.  MUSCULOSKELETAL: Patient denies joint pain, swelling, redness or warmth.  NEUROLOGIC:  Patient denies vertigo, numbness, tingling, weakness in limb or abnormality of gait.  PSYCHIATRIC:  Positive for anxiety and depression.       OBJECTIVE:   GENERAL: Well-developed well-nourished, obese, black female alert and oriented x3, in no acute distress. Memory, judgment and cognition without deficit.  Depressed affect.  SKIN: Clear without rash. Normal color and tone.  HEENT: Eyes: Clear conjunctivae. No scleral icterus. Pupils equal reactive to light and accommodation. Extraocular movements intact. No nystagmus.  Ears: Clear TMs. Clear canals. Nose: Without congestion. Pharynx: Without injection or exudates.  NECK: Supple, normal range of motion. No masses, lymphadenopathy or enlarged thyroid. No JVD. Carotids 2+ and equal. No bruits.  LUNGS: Clear to auscultation. Normal respiratory effort.  CARDIOVASCULAR: Regular rhythm, normal S1, S2 without murmur, gallop or rub.  BACK: No CVA or spinal tenderness.  ABDOMEN:  Soft, nontender without mass or organomegaly. No rebound or guarding.  EXTREMITIES: Without cyanosis, clubbing or edema.  Distal pulses 2+ and equal. Normal range of motion in all extremities. No joint effusion, erythema or warmth.  NEUROLOGIC: Cranial nerves II through XII without deficit. Motor strength equal bilaterally. Sensation normal to touch. Deep tendon reflexes 2+ and equal. Gait without abnormality. No tremor. Negative cerebellar signs. Negative NATHALY.    ASSESSMENT:  1. Essential hypertension    2. Migraine without status  migrainosus, not intractable, unspecified migraine type    3. Decreased libido    4. PREMA (generalized anxiety disorder)    5. Current moderate episode of major depressive disorder without prior episode      PLAN:   1.  Add metoprolol XL 25 mg daily for hypertension and migraine prophylaxis.  2.  Increase dose of metoprolol as needed.  3.  Continue losartan and HCTZ.  4.  Continue mental health counseling.    This visit was 30 min greater than 50% of which involved counseling.    This note is generated with speech recognition software and is subject to transcription error and sound alike phrases that may be missed by proofreading.

## 2019-04-08 RX ORDER — METOPROLOL SUCCINATE 25 MG/1
25 TABLET, EXTENDED RELEASE ORAL DAILY
Qty: 30 TABLET | Refills: 5 | Status: SHIPPED | OUTPATIENT
Start: 2019-04-08 | End: 2019-10-25

## 2019-04-12 ENCOUNTER — TELEPHONE (OUTPATIENT)
Dept: FAMILY MEDICINE | Facility: CLINIC | Age: 39
End: 2019-04-12

## 2019-04-12 NOTE — TELEPHONE ENCOUNTER
----- Message from Yajaira Maloney sent at 4/12/2019  2:09 PM CDT -----  Contact: pt  Please call pt @ [phone] regarding FMLA Papers, pt need to know if ready to be picked up.

## 2019-04-12 NOTE — TELEPHONE ENCOUNTER
----- Message from Yajaira Maloney sent at 4/12/2019  2:09 PM CDT -----  Contact: pt  Please call pt @ [phone] regarding FMLA Papers, pt need to know if ready to be picked up.

## 2019-04-12 NOTE — TELEPHONE ENCOUNTER
Called patient and advised that paperwork has not yet been completed, but I can give her a call back on Monday with an update; verified mobile number. Patient advised that she needs this paperwork back by Monday (4/15/19). /CLARE

## 2019-04-15 ENCOUNTER — PATIENT MESSAGE (OUTPATIENT)
Dept: FAMILY MEDICINE | Facility: CLINIC | Age: 39
End: 2019-04-15

## 2019-04-16 RX ORDER — ESCITALOPRAM OXALATE 20 MG/1
20 TABLET ORAL DAILY
Qty: 30 TABLET | Refills: 4 | Status: SHIPPED | OUTPATIENT
Start: 2019-04-16 | End: 2019-10-02 | Stop reason: SDUPTHER

## 2019-04-17 NOTE — TELEPHONE ENCOUNTER
Called patient and advised that medication refill request has been approved and sent to her pharmacy. Patient verbalized understanding, and asked if she will have to call each month for refills. I advised that this prescription was approved with 4 refills, which should last until July 2019. She thanked me for calling/VLW

## 2019-04-29 RX ORDER — ALPRAZOLAM 0.25 MG/1
TABLET ORAL
Qty: 30 TABLET | Refills: 2 | Status: SHIPPED | OUTPATIENT
Start: 2019-04-29 | End: 2019-07-09

## 2019-06-26 NOTE — TELEPHONE ENCOUNTER
Last annual: 8/20/18  Pharmacy verified.   Patient also requesting a letter to return to work on Monday (7/7/19) due to the migraine she's had since Saturday. She's been nauseous, dizzy, and sensitive to light. She checked her blood pressure and it's normal so she knows it's not the problem. She just wants to rest at home to see if it will subside. /VLW    ----- Message from Milagros Smith sent at 6/26/2019  3:14 PM CDT -----  ..Type:  RX Refill Request    Who Called: pt   Refill or New Rx: refill   RX Name and Strength: Hydro   How is the patient currently taking it? (ex. 1XDay):  Is this a 30 day or 90 day RX: 30  Preferred Pharmacy with phone number: .  [facility] [address] [facility] [address] [facility] & [address] [address]  Phone: [phone] Fax: [phone]    Local or Mail Order:local   Ordering Provider: Micha   Would the patient rather a call back or a response via [facility]? Call back   Best Call Back Number: [phone]  Additional Information: Pt is requesting a work excuse for 6/26-7/1

## 2019-06-26 NOTE — LETTER
June 27, 2019                 [facility]  Family Medicine  [address] [facility] [address] [address]  Phone: [phone]  Fax: [phone]   June 27, 2019     Patient: Georgia Garcia   YOB: 1980   Date of Visit: 6/26/2019       To Whom it May Concern:    Georgia Garcia is currently under my care.  She may return to work on 7/1/19.    Please excuse her from any work missed.    If you have any questions or concerns, please don't hesitate to call.    Sincerely,         Alyssa Muse MD

## 2019-06-27 RX ORDER — HYDROCODONE BITARTRATE AND ACETAMINOPHEN 7.5; 325 MG/1; MG/1
1 TABLET ORAL EVERY 6 HOURS PRN
Qty: 20 TABLET | Refills: 0 | Status: SHIPPED | OUTPATIENT
Start: 2019-06-27 | End: 2021-09-20 | Stop reason: ALTCHOICE

## 2019-06-27 NOTE — TELEPHONE ENCOUNTER
Advised patient that letter will be ready at  for pick-up tomorrow.Patient verbally verified understanding./RonaldW

## 2019-06-27 NOTE — TELEPHONE ENCOUNTER
Called patient and advised that medication refill has been approved and sent to the pharmacy. I'll give her a call back when her letter is ready for pickup. I advised patient that I cannot fax to her. She will come pick it up tomorrow./CLARE    ----- Message from Kim Quevedo sent at 6/27/2019 10:56 AM CDT -----  Contact: pt  Type:  Needs Medical Advice    Who Called:ANA FOLEY   Symptoms (please be specific):  How long has patient had these symptoms:   Pharmacy name and phone #:      [id number] - [place], [place] - [address] [place] [facility] [place] & [address] [address]  Phone: [phone] Fax: [phone]    Would the patient rather a call back or a response via [facility]? Call   Best Call Back Number: [phone]    Additional Information: pt is requesting a call back from the nurse in regards to the pt Dr sanchez and med refill on HYDROcodone

## 2019-07-09 RX ORDER — ALPRAZOLAM 0.25 MG/1
TABLET ORAL
Qty: 60 TABLET | Refills: 0 | Status: SHIPPED | OUTPATIENT
Start: 2019-07-09 | End: 2019-09-19 | Stop reason: SDUPTHER

## 2019-08-30 RX ORDER — LOSARTAN POTASSIUM 100 MG/1
TABLET ORAL
Qty: 30 TABLET | Refills: 0 | Status: SHIPPED | OUTPATIENT
Start: 2019-08-30 | End: 2019-09-19 | Stop reason: SDUPTHER

## 2019-09-20 RX ORDER — NAPROXEN SODIUM 550 MG/1
TABLET ORAL
Qty: 60 TABLET | Refills: 0 | Status: SHIPPED | OUTPATIENT
Start: 2019-09-20 | End: 2019-10-25 | Stop reason: SDUPTHER

## 2019-09-20 RX ORDER — LOSARTAN POTASSIUM 100 MG/1
TABLET ORAL
Qty: 30 TABLET | Refills: 0 | Status: SHIPPED | OUTPATIENT
Start: 2019-09-20 | End: 2019-10-31 | Stop reason: SDUPTHER

## 2019-09-20 RX ORDER — ALPRAZOLAM 0.25 MG/1
TABLET ORAL
Qty: 60 TABLET | Refills: 0 | Status: SHIPPED | OUTPATIENT
Start: 2019-09-20 | End: 2019-10-25 | Stop reason: SDUPTHER

## 2019-09-20 RX ORDER — MECLIZINE HYDROCHLORIDE 25 MG/1
25-50 TABLET ORAL
Qty: 30 TABLET | Refills: 0 | Status: SHIPPED | OUTPATIENT
Start: 2019-09-20 | End: 2019-10-25 | Stop reason: SDUPTHER

## 2019-09-20 NOTE — TELEPHONE ENCOUNTER
Next annual: 10/25/19 scheduled with patient/vlw    ----- Message from Tammy Clifford sent at 9/20/2019  3:01 PM CDT -----  Contact: Pt  Type:  Patient Returning Call    Who Called:Georgia Garcia  Who Left Message for Patient: ROLLY CULLEN  Does the patient know what this is regarding?:Medication refill  Would the patient rather a call back or a response via [facility]? Call Back  Best Call Back Number: [phone]  Additional Information:

## 2019-10-04 RX ORDER — ESCITALOPRAM OXALATE 20 MG/1
TABLET ORAL
Qty: 30 TABLET | Refills: 0 | Status: SHIPPED | OUTPATIENT
Start: 2019-10-04 | End: 2019-10-25 | Stop reason: SDUPTHER

## 2019-10-07 ENCOUNTER — TELEPHONE (OUTPATIENT)
Dept: FAMILY MEDICINE | Facility: CLINIC | Age: 39
End: 2019-10-07

## 2019-10-07 DIAGNOSIS — Z12.31 ENCOUNTER FOR SCREENING MAMMOGRAM FOR MALIGNANT NEOPLASM OF BREAST: Primary | ICD-10-CM

## 2019-10-07 NOTE — TELEPHONE ENCOUNTER
----- Message from Rosanna Hernandez sent at 10/7/2019 10:16 AM CDT -----  Contact: pt  She's calling in regards to orders for mammo,      pls call pt back at [phone] (home)         Pt request 10/23 or 10/25

## 2019-10-25 ENCOUNTER — OFFICE VISIT (OUTPATIENT)
Dept: FAMILY MEDICINE | Facility: CLINIC | Age: 39
End: 2019-10-25
Payer: COMMERCIAL

## 2019-10-25 ENCOUNTER — HOSPITAL ENCOUNTER (OUTPATIENT)
Dept: RADIOLOGY | Facility: HOSPITAL | Age: 39
Discharge: HOME | End: 2019-10-25
Attending: FAMILY MEDICINE
Payer: COMMERCIAL

## 2019-10-25 VITALS
SYSTOLIC BLOOD PRESSURE: 139 MMHG | DIASTOLIC BLOOD PRESSURE: 80 MMHG | BODY MASS INDEX: 56.27 KG/M2 | WEIGHT: 286.63 LBS | HEIGHT: 60 IN | OXYGEN SATURATION: 97 % | HEART RATE: 83 BPM | TEMPERATURE: 99 F

## 2019-10-25 DIAGNOSIS — Z00.00 PREVENTATIVE HEALTH CARE: Primary | ICD-10-CM

## 2019-10-25 DIAGNOSIS — G47.00 INSOMNIA, UNSPECIFIED TYPE: ICD-10-CM

## 2019-10-25 DIAGNOSIS — N89.8 VAGINAL ODOR: ICD-10-CM

## 2019-10-25 DIAGNOSIS — I10 ESSENTIAL HYPERTENSION: ICD-10-CM

## 2019-10-25 DIAGNOSIS — Z12.31 ENCOUNTER FOR SCREENING MAMMOGRAM FOR MALIGNANT NEOPLASM OF BREAST: ICD-10-CM

## 2019-10-25 DIAGNOSIS — G43.909 MIGRAINE WITHOUT STATUS MIGRAINOSUS, NOT INTRACTABLE, UNSPECIFIED MIGRAINE TYPE: ICD-10-CM

## 2019-10-25 DIAGNOSIS — E66.01 MORBID OBESITY: ICD-10-CM

## 2019-10-25 PROCEDURE — 3079F PR MOST RECENT DIASTOLIC BLOOD PRESSURE 80-89 MM HG: ICD-10-PCS | Mod: CPTII,S$GLB,, | Performed by: FAMILY MEDICINE

## 2019-10-25 PROCEDURE — 3079F DIAST BP 80-89 MM HG: CPT | Mod: CPTII,S$GLB,, | Performed by: FAMILY MEDICINE

## 2019-10-25 PROCEDURE — 77067 SCR MAMMO BI INCL CAD: CPT | Mod: 26,,, | Performed by: RADIOLOGY

## 2019-10-25 PROCEDURE — 99999 PR PBB SHADOW E&M-EST. PATIENT-LVL III: CPT | Mod: PBBFAC,,, | Performed by: FAMILY MEDICINE

## 2019-10-25 PROCEDURE — 77063 BREAST TOMOSYNTHESIS BI: CPT | Mod: 26,,, | Performed by: RADIOLOGY

## 2019-10-25 PROCEDURE — 77063 MAMMO DIGITAL SCREENING BILAT WITH TOMOSYNTHESIS_CAD: ICD-10-PCS | Mod: 26,,, | Performed by: RADIOLOGY

## 2019-10-25 PROCEDURE — 3075F SYST BP GE 130 - 139MM HG: CPT | Mod: CPTII,S$GLB,, | Performed by: FAMILY MEDICINE

## 2019-10-25 PROCEDURE — 99999 PR PBB SHADOW E&M-EST. PATIENT-LVL III: ICD-10-PCS | Mod: PBBFAC,,, | Performed by: FAMILY MEDICINE

## 2019-10-25 PROCEDURE — 3075F PR MOST RECENT SYSTOLIC BLOOD PRESS GE 130-139MM HG: ICD-10-PCS | Mod: CPTII,S$GLB,, | Performed by: FAMILY MEDICINE

## 2019-10-25 PROCEDURE — 99396 PREV VISIT EST AGE 40-64: CPT | Mod: S$GLB,,, | Performed by: FAMILY MEDICINE

## 2019-10-25 PROCEDURE — 99396 PR PREVENTIVE VISIT,EST,40-64: ICD-10-PCS | Mod: S$GLB,,, | Performed by: FAMILY MEDICINE

## 2019-10-25 PROCEDURE — 77067 MAMMO DIGITAL SCREENING BILAT WITH TOMOSYNTHESIS_CAD: ICD-10-PCS | Mod: 26,,, | Performed by: RADIOLOGY

## 2019-10-25 PROCEDURE — 77067 SCR MAMMO BI INCL CAD: CPT | Mod: TC

## 2019-10-25 RX ORDER — MECLIZINE HYDROCHLORIDE 25 MG/1
25-50 TABLET ORAL
Qty: 30 TABLET | Refills: 0 | Status: SHIPPED | OUTPATIENT
Start: 2019-10-25 | End: 2020-05-07 | Stop reason: SDUPTHER

## 2019-10-25 RX ORDER — METOPROLOL SUCCINATE 50 MG/1
50 TABLET, EXTENDED RELEASE ORAL DAILY
Qty: 30 TABLET | Refills: 11 | Status: SHIPPED | OUTPATIENT
Start: 2019-10-25 | End: 2020-09-25 | Stop reason: SDUPTHER

## 2019-10-25 RX ORDER — ESCITALOPRAM OXALATE 20 MG/1
TABLET ORAL
Qty: 30 TABLET | Refills: 11 | Status: SHIPPED | OUTPATIENT
Start: 2019-10-25 | End: 2020-11-04 | Stop reason: SDUPTHER

## 2019-10-25 RX ORDER — TOPIRAMATE 50 MG/1
50 TABLET, FILM COATED ORAL 2 TIMES DAILY
Qty: 60 TABLET | Refills: 11 | Status: SHIPPED | OUTPATIENT
Start: 2019-10-25 | End: 2020-11-04 | Stop reason: SDUPTHER

## 2019-10-25 RX ORDER — METRONIDAZOLE 500 MG/1
500 TABLET ORAL EVERY 12 HOURS
Qty: 14 TABLET | Refills: 0 | Status: SHIPPED | OUTPATIENT
Start: 2019-10-25 | End: 2019-11-01

## 2019-10-25 RX ORDER — NAPROXEN SODIUM 550 MG/1
TABLET ORAL
Qty: 60 TABLET | Refills: 3 | Status: SHIPPED | OUTPATIENT
Start: 2019-10-25 | End: 2020-07-08 | Stop reason: SDUPTHER

## 2019-10-25 RX ORDER — ZOLPIDEM TARTRATE 5 MG/1
5 TABLET ORAL NIGHTLY PRN
Qty: 30 TABLET | Refills: 5 | Status: SHIPPED | OUTPATIENT
Start: 2019-10-25 | End: 2020-05-07 | Stop reason: SDUPTHER

## 2019-10-25 RX ORDER — ALPRAZOLAM 0.25 MG/1
TABLET ORAL
Qty: 60 TABLET | Refills: 0 | Status: SHIPPED | OUTPATIENT
Start: 2019-10-25 | End: 2019-12-04 | Stop reason: SDUPTHER

## 2019-10-25 NOTE — PROGRESS NOTES
CHIEF COMPLAINT:  This is a 39-year-old female here for preventive health exam.     SUBJECTIVE:  The patient is doing well without complaints except for odorous vaginal discharge which has been going on for months to years.  Patient denies vaginal discharge, itching or fishy odor.  She has hypertension for which she takes hydrochlorothiazide 12.5 mg daily, losartan 100 mg daily and metoprolol XL 25 mg daily.  Blood pressure is elevated today at 145/80.   She's morbidly obese with a BMI of 55.97.  Patient's anxiety is controlled on Lexapro 20 mg daily.  She has increased alprazolam 0.25 mg to 3 times daily due to impending divorce.  She requests refill of zolpidem for sleeplessness.   Patient continues to have frequent rectal bleeding with blood on the tissue and sometimes in the toilet water.  She occasionally has blood clots.  Patient complains of frequent migraine headaches despite taking Topamax.  She uses Imitrex as needed for acute headache.  Patient had acute onset of vertigo 3 weeks ago and was seen at an urgent care clinic.  She was given a prescription for meclizine and symptoms have now resolved.  She requests refill of meclizine for use in future episodes.    Eye exam 2018. Pap smear September 2011 (status post hysterectomy).  Mammogram September 2018.  Colonoscopy July 2004.  Tdap July 2009.  Flu vaccine October 2019.     ROS:  GENERAL: Patient denies fever, chills, night sweats. Patient denies weight loss. Patient denies anorexia, weakness or swollen glands. Positive for fatigue and weight gain.  SKIN: Patient denies rash or hair loss.  HEENT: Patient denies sore throat, ear pain, hearing loss, nasal congestion, or runny nose. Patient denies visual disturbance, eye irritation or discharge.  LUNGS: Patient denies cough, wheeze or hemoptysis.  CARDIOVASCULAR: Patient denies chest pain, shortness of breath, palpitations, syncope or lower extremity edema.  GI: Patient denies abdominal pain, nausea,  vomiting, diarrhea, constipation, blood in stool or melena.  GENITOURINARY: Patient denies pelvic pain, vaginal discharge or itch.  Positive for vaginal odor.  Patient denies irregular vaginal bleeding. Patient denies dysuria, frequency, hematuria, nocturia, urgency or incontinence.  BREASTS: Patient denies breast pain, mass or nipple discharge.  MUSCULOSKELETAL: Patient denies joint pain, swelling, redness or warmth.  NEUROLOGIC:  Positive for headaches.  Patient denies dizziness, numbness, tingling, weakness in limb, dysarthria, dysphagia or abnormality of gait.  PSYCHIATRIC: Patient denies depression, or memory loss.  Positive for anxiety.     OBJECTIVE:   GENERAL: Well-developed well-nourished, morbidly obese, black female alert and oriented x3, in no acute distress. Memory, judgment and cognition without deficit.  No hallucinations, delusions or flight of ideas.  Patient has gained 7 lb in the last year.  SKIN: Clear without rash. Normal color and tone.  HEENT: Eyes: Clear conjunctivae. No scleral icterus. Pupils equal reactive to light and accommodation. Ears: Clear TMs. Clear canals. Nose: Without congestion. Pharynx: Without injection or exudates.  NECK: Supple, normal range of motion. No masses, lymphadenopathy or enlarged thyroid. No JVD. Carotids 2+ and equal. No bruits.  LUNGS: Clear to auscultation. Normal respiratory effort.  CARDIOVASCULAR: Regular rhythm, normal S1, S2 without murmur, gallop or rub.  BACK: No CVA or spinal tenderness.  BREASTS: No masses, tenderness or nipple discharge.  ABDOMEN:  Normal appearance. Active bowel sounds. Soft, nontender without mass or organomegaly. No rebound or guarding.  EXTREMITIES: Without cyanosis, clubbing or edema.  Distal pulses 2+ and equal. Normal range of motion in all extremities. No joint effusion, erythema or warmth.  NEUROLOGIC: Cranial nerves II through XII without deficit. Motor strength equal bilaterally. Sensation normal to touch. Deep tendon  reflexes 2+ and equal. Gait without abnormality. No tremor. Negative cerebellar signs.  PELVIC: External: Without lesions or inflammation.  Vaginal:  Minimal discharge without odor.  Cuff intact. Bimanual: Non-tender, without masses.  Rectovaginal: Confirms, no bright red blood.  No stool.    ASSESSMENT:  1. Preventative health care    2. Essential hypertension    3. Vaginal odor    4. Morbid obesity    5. Migraine without status migrainosus, not intractable, unspecified migraine type    6. Insomnia, unspecified type      PLAN:  1.  Weight reduction. Exercise regularly.  2.  Age-appropriate counseling.  3.  Fasting lab.  4.  Mammogram.  5.  Refill medications.  6.  Empiric treatment with Flagyl 500 mg twice daily for 1 week.  No alcohol during treatment course.  7.  Patient declines neurology consult.  8.  Refill meclizine.  9.  Increase metoprolol XL to 50 mg daily.  10.  Monitor blood pressure.  Report readings greater than or equal to 140/90.  11.  Limit salt in diet.    This note is generated with speech recognition software and is subject to transcription error and sound alike phrases that may be missed by proofreading.

## 2019-10-27 RX ORDER — METOPROLOL SUCCINATE 25 MG/1
TABLET, EXTENDED RELEASE ORAL
Qty: 30 TABLET | Refills: 0 | OUTPATIENT
Start: 2019-10-27

## 2019-10-31 RX ORDER — LOSARTAN POTASSIUM 100 MG/1
TABLET ORAL
Qty: 30 TABLET | Refills: 11 | Status: SHIPPED | OUTPATIENT
Start: 2019-10-31 | End: 2020-11-04 | Stop reason: SDUPTHER

## 2019-11-18 RX ORDER — ALPRAZOLAM 0.25 MG/1
TABLET ORAL
Qty: 60 TABLET | Refills: 0 | Status: CANCELLED | OUTPATIENT
Start: 2019-11-18

## 2019-11-24 RX ORDER — HYDROCHLOROTHIAZIDE 12.5 MG/1
TABLET ORAL
Qty: 30 TABLET | Refills: 11 | Status: SHIPPED | OUTPATIENT
Start: 2019-11-24 | End: 2020-12-09 | Stop reason: SDUPTHER

## 2019-12-04 RX ORDER — ALPRAZOLAM 0.25 MG/1
TABLET ORAL
Qty: 60 TABLET | Refills: 0 | Status: CANCELLED | OUTPATIENT
Start: 2019-12-04

## 2019-12-04 RX ORDER — HYDROCODONE BITARTRATE AND ACETAMINOPHEN 7.5; 325 MG/1; MG/1
1 TABLET ORAL EVERY 6 HOURS PRN
Qty: 20 TABLET | Refills: 0 | OUTPATIENT
Start: 2019-12-04

## 2019-12-04 RX ORDER — ALPRAZOLAM 0.25 MG/1
TABLET ORAL
Qty: 60 TABLET | Refills: 0 | Status: SHIPPED | OUTPATIENT
Start: 2019-12-04 | End: 2020-01-13 | Stop reason: SDUPTHER

## 2020-01-13 RX ORDER — ALPRAZOLAM 0.25 MG/1
TABLET ORAL
Qty: 60 TABLET | Refills: 3 | Status: SHIPPED | OUTPATIENT
Start: 2020-01-13 | End: 2020-07-08 | Stop reason: SDUPTHER

## 2020-01-13 RX ORDER — ALPRAZOLAM 0.25 MG/1
TABLET ORAL
Qty: 60 TABLET | Refills: 0 | OUTPATIENT
Start: 2020-01-13

## 2020-01-29 ENCOUNTER — PATIENT MESSAGE (OUTPATIENT)
Dept: ADMINISTRATIVE | Facility: OTHER | Age: 40
End: 2020-01-29

## 2020-01-29 ENCOUNTER — PATIENT MESSAGE (OUTPATIENT)
Dept: FAMILY MEDICINE | Facility: CLINIC | Age: 40
End: 2020-01-29

## 2020-01-29 NOTE — TELEPHONE ENCOUNTER
Please contact the patient and ask her if it's possible she can get extension of her leave from the healthcare provider who saw her, diagnosed her condition and treated her?  Let me know what she says.

## 2020-01-30 DIAGNOSIS — Z13.79 GENETIC SCREENING: ICD-10-CM

## 2020-03-16 ENCOUNTER — PATIENT MESSAGE (OUTPATIENT)
Dept: FAMILY MEDICINE | Facility: CLINIC | Age: 40
End: 2020-03-16

## 2020-03-16 NOTE — TELEPHONE ENCOUNTER
Okay to drop off the forms as long as nothing has changed since the previous [place] papers.  Why does she need a handicapped license?

## 2020-03-26 ENCOUNTER — PATIENT MESSAGE (OUTPATIENT)
Dept: FAMILY MEDICINE | Facility: CLINIC | Age: 40
End: 2020-03-26

## 2020-03-26 DIAGNOSIS — Z20.822 EXPOSURE TO COVID-19 VIRUS: ICD-10-CM

## 2020-03-26 DIAGNOSIS — R50.9 FEVER, UNSPECIFIED FEVER CAUSE: Primary | ICD-10-CM

## 2020-04-01 ENCOUNTER — TELEPHONE (OUTPATIENT)
Dept: FAMILY MEDICINE | Facility: CLINIC | Age: 40
End: 2020-04-01

## 2020-04-01 NOTE — TELEPHONE ENCOUNTER
Patient advised that FMLA forms have been completed and ready for pick-up. She said she'll come tomorrow to pick it up along with her completed handicap form. She said she's waiting on Dr. Muse's response to the message she sent via [employer].    *Please see [employer] message from patient

## 2020-04-06 LAB — SARS-COV-2 RNA RESP QL NAA+PROBE: NOT DETECTED

## 2020-04-09 ENCOUNTER — PATIENT MESSAGE (OUTPATIENT)
Dept: FAMILY MEDICINE | Facility: CLINIC | Age: 40
End: 2020-04-09

## 2020-05-01 ENCOUNTER — PATIENT MESSAGE (OUTPATIENT)
Dept: FAMILY MEDICINE | Facility: CLINIC | Age: 40
End: 2020-05-01

## 2020-05-01 RX ORDER — FLUCONAZOLE 150 MG/1
150 TABLET ORAL DAILY
Qty: 1 TABLET | Refills: 0 | Status: SHIPPED | OUTPATIENT
Start: 2020-05-01 | End: 2020-05-02

## 2020-05-04 ENCOUNTER — TELEPHONE (OUTPATIENT)
Dept: FAMILY MEDICINE | Facility: CLINIC | Age: 40
End: 2020-05-04

## 2020-05-04 RX ORDER — FLUCONAZOLE 150 MG/1
150 TABLET ORAL ONCE
Qty: 1 TABLET | Refills: 0 | Status: SHIPPED | OUTPATIENT
Start: 2020-05-04 | End: 2021-03-15

## 2020-05-04 NOTE — TELEPHONE ENCOUNTER
Dr. Benavides's last message to patient on Friday:   This may be a yeast infection. I will provide you with a pill to take once. If it does not improve your symptoms, I will need to see you in office for further evaluation.     Medication wasn't sent in; can you please send it  [facility] on [address]

## 2020-07-09 RX ORDER — NAPROXEN SODIUM 550 MG/1
TABLET ORAL
Qty: 60 TABLET | Refills: 1 | Status: SHIPPED | OUTPATIENT
Start: 2020-07-09 | End: 2020-10-22 | Stop reason: SDUPTHER

## 2020-07-09 RX ORDER — ALPRAZOLAM 0.25 MG/1
TABLET ORAL
Qty: 60 TABLET | Refills: 1 | Status: SHIPPED | OUTPATIENT
Start: 2020-07-09 | End: 2020-11-04 | Stop reason: SDUPTHER

## 2020-07-09 RX ORDER — MECLIZINE HYDROCHLORIDE 25 MG/1
25-50 TABLET ORAL
Qty: 30 TABLET | Refills: 1 | Status: SHIPPED | OUTPATIENT
Start: 2020-07-09 | End: 2020-11-04 | Stop reason: SDUPTHER

## 2020-09-25 RX ORDER — METOPROLOL SUCCINATE 50 MG/1
50 TABLET, EXTENDED RELEASE ORAL DAILY
Qty: 30 TABLET | Refills: 0 | Status: SHIPPED | OUTPATIENT
Start: 2020-09-25 | End: 2020-10-22 | Stop reason: SDUPTHER

## 2020-10-22 ENCOUNTER — PATIENT MESSAGE (OUTPATIENT)
Dept: FAMILY MEDICINE | Facility: CLINIC | Age: 40
End: 2020-10-22

## 2020-10-22 DIAGNOSIS — Z12.31 ENCOUNTER FOR SCREENING MAMMOGRAM FOR MALIGNANT NEOPLASM OF BREAST: Primary | ICD-10-CM

## 2020-10-22 RX ORDER — METOPROLOL SUCCINATE 50 MG/1
50 TABLET, EXTENDED RELEASE ORAL DAILY
Qty: 30 TABLET | Refills: 0 | Status: SHIPPED | OUTPATIENT
Start: 2020-10-22 | End: 2020-11-04 | Stop reason: SDUPTHER

## 2020-10-22 RX ORDER — NAPROXEN SODIUM 550 MG/1
TABLET ORAL
Qty: 60 TABLET | Refills: 0 | Status: SHIPPED | OUTPATIENT
Start: 2020-10-22 | End: 2020-11-04 | Stop reason: SDUPTHER

## 2020-10-29 ENCOUNTER — TELEPHONE (OUTPATIENT)
Dept: RADIOLOGY | Facility: HOSPITAL | Age: 40
End: 2020-10-29

## 2020-10-30 ENCOUNTER — OFFICE VISIT (OUTPATIENT)
Dept: FAMILY MEDICINE | Facility: CLINIC | Age: 40
End: 2020-10-30
Payer: COMMERCIAL

## 2020-10-30 ENCOUNTER — LAB VISIT (OUTPATIENT)
Dept: LAB | Facility: HOSPITAL | Age: 40
End: 2020-10-30
Attending: FAMILY MEDICINE
Payer: COMMERCIAL

## 2020-10-30 ENCOUNTER — TELEPHONE (OUTPATIENT)
Dept: FAMILY MEDICINE | Facility: CLINIC | Age: 40
End: 2020-10-30

## 2020-10-30 VITALS
TEMPERATURE: 98 F | SYSTOLIC BLOOD PRESSURE: 116 MMHG | HEART RATE: 91 BPM | BODY MASS INDEX: 57.52 KG/M2 | DIASTOLIC BLOOD PRESSURE: 80 MMHG | OXYGEN SATURATION: 100 % | WEIGHT: 293 LBS | HEIGHT: 60 IN

## 2020-10-30 DIAGNOSIS — Z00.00 PREVENTATIVE HEALTH CARE: Primary | ICD-10-CM

## 2020-10-30 DIAGNOSIS — Z00.00 PREVENTATIVE HEALTH CARE: ICD-10-CM

## 2020-10-30 DIAGNOSIS — R68.82 DECREASED LIBIDO: ICD-10-CM

## 2020-10-30 DIAGNOSIS — N76.0 BACTERIAL VAGINOSIS: ICD-10-CM

## 2020-10-30 DIAGNOSIS — I10 ESSENTIAL HYPERTENSION: ICD-10-CM

## 2020-10-30 DIAGNOSIS — E66.01 MORBID OBESITY: ICD-10-CM

## 2020-10-30 DIAGNOSIS — B96.89 BACTERIAL VAGINOSIS: ICD-10-CM

## 2020-10-30 DIAGNOSIS — F41.1 GAD (GENERALIZED ANXIETY DISORDER): ICD-10-CM

## 2020-10-30 LAB
BACTERIA HYPHAE, POC: NEGATIVE
BASOPHILS # BLD AUTO: 0.04 K/UL (ref 0–0.2)
BASOPHILS NFR BLD: 0.6 % (ref 0–1.9)
DIFFERENTIAL METHOD: ABNORMAL
EOSINOPHIL # BLD AUTO: 0.1 K/UL (ref 0–0.5)
EOSINOPHIL NFR BLD: 1.5 % (ref 0–8)
ERYTHROCYTE [DISTWIDTH] IN BLOOD BY AUTOMATED COUNT: 14.2 % (ref 11.5–14.5)
GARDNERELLA VAGINALIS: POSITIVE
HCT VFR BLD AUTO: 39.1 % (ref 37–48.5)
HGB BLD-MCNC: 12.2 G/DL (ref 12–16)
IMM GRANULOCYTES # BLD AUTO: 0.02 K/UL (ref 0–0.04)
IMM GRANULOCYTES NFR BLD AUTO: 0.3 % (ref 0–0.5)
LYMPHOCYTES # BLD AUTO: 2.9 K/UL (ref 1–4.8)
LYMPHOCYTES NFR BLD: 39.5 % (ref 18–48)
MCH RBC QN AUTO: 29.7 PG (ref 27–31)
MCHC RBC AUTO-ENTMCNC: 31.2 G/DL (ref 32–36)
MCV RBC AUTO: 95 FL (ref 82–98)
MONOCYTES # BLD AUTO: 0.7 K/UL (ref 0.3–1)
MONOCYTES NFR BLD: 9.6 % (ref 4–15)
NEUTROPHILS # BLD AUTO: 3.5 K/UL (ref 1.8–7.7)
NEUTROPHILS NFR BLD: 48.5 % (ref 38–73)
NRBC BLD-RTO: 0 /100 WBC
OTHER MICROSC. OBSERVATIONS: ABNORMAL
PLATELET # BLD AUTO: 287 K/UL (ref 150–350)
PMV BLD AUTO: 10.9 FL (ref 9.2–12.9)
POC BACTERIAL VAGINOSIS: ABNORMAL
POC CLUE CELLS: ABNORMAL
RBC # BLD AUTO: 4.11 M/UL (ref 4–5.4)
TRICHOMONAS, POC: NEGATIVE
WBC # BLD AUTO: 7.26 K/UL (ref 3.9–12.7)
YEAST WET PREP: NEGATIVE
YEAST, POC: NEGATIVE

## 2020-10-30 PROCEDURE — 3079F DIAST BP 80-89 MM HG: CPT | Mod: CPTII,S$GLB,, | Performed by: FAMILY MEDICINE

## 2020-10-30 PROCEDURE — 3008F PR BODY MASS INDEX (BMI) DOCUMENTED: ICD-10-PCS | Mod: CPTII,S$GLB,, | Performed by: FAMILY MEDICINE

## 2020-10-30 PROCEDURE — 99999 PR PBB SHADOW E&M-EST. PATIENT-LVL IV: CPT | Mod: PBBFAC,,, | Performed by: FAMILY MEDICINE

## 2020-10-30 PROCEDURE — 86703 HIV-1/HIV-2 1 RESULT ANTBDY: CPT

## 2020-10-30 PROCEDURE — 99396 PREV VISIT EST AGE 40-64: CPT | Mod: 25,S$GLB,, | Performed by: FAMILY MEDICINE

## 2020-10-30 PROCEDURE — 99999 PR PBB SHADOW E&M-EST. PATIENT-LVL IV: ICD-10-PCS | Mod: PBBFAC,,, | Performed by: FAMILY MEDICINE

## 2020-10-30 PROCEDURE — 86803 HEPATITIS C AB TEST: CPT

## 2020-10-30 PROCEDURE — 3074F SYST BP LT 130 MM HG: CPT | Mod: CPTII,S$GLB,, | Performed by: FAMILY MEDICINE

## 2020-10-30 PROCEDURE — 36415 COLL VENOUS BLD VENIPUNCTURE: CPT | Mod: PO

## 2020-10-30 PROCEDURE — 99396 PR PREVENTIVE VISIT,EST,40-64: ICD-10-PCS | Mod: 25,S$GLB,, | Performed by: FAMILY MEDICINE

## 2020-10-30 PROCEDURE — 3079F PR MOST RECENT DIASTOLIC BLOOD PRESSURE 80-89 MM HG: ICD-10-PCS | Mod: CPTII,S$GLB,, | Performed by: FAMILY MEDICINE

## 2020-10-30 PROCEDURE — 85025 COMPLETE CBC W/AUTO DIFF WBC: CPT

## 2020-10-30 PROCEDURE — 3008F BODY MASS INDEX DOCD: CPT | Mod: CPTII,S$GLB,, | Performed by: FAMILY MEDICINE

## 2020-10-30 PROCEDURE — 3074F PR MOST RECENT SYSTOLIC BLOOD PRESSURE < 130 MM HG: ICD-10-PCS | Mod: CPTII,S$GLB,, | Performed by: FAMILY MEDICINE

## 2020-10-30 RX ORDER — BUPROPION HYDROCHLORIDE 100 MG/1
100 TABLET ORAL 2 TIMES DAILY
Qty: 60 TABLET | Refills: 11 | Status: SHIPPED | OUTPATIENT
Start: 2020-10-30 | End: 2021-11-01 | Stop reason: SDUPTHER

## 2020-10-30 RX ORDER — METRONIDAZOLE 7.5 MG/G
1 GEL VAGINAL 2 TIMES DAILY
Qty: 70 G | Refills: 0 | Status: SHIPPED | OUTPATIENT
Start: 2020-10-30 | End: 2021-06-21 | Stop reason: SDUPTHER

## 2020-10-30 NOTE — PROGRESS NOTES
CHIEF COMPLAINT:  This is a 40-year-old female here for preventive health exam.     SUBJECTIVE:  The patient is doing well without complaints except for chronic vaginal discharge with odor. She has hypertension for which she takes hydrochlorothiazide 12.5 mg daily, losartan 100 mg daily and metoprolol XL 25 mg daily.  Blood pressure is elevated today at 116/80.   She's morbidly obese with a BMI of 57.57.  Patient's anxiety is controlled on Lexapro 20 mg daily.  She takes alprazolam 0.25 mg to 3 times daily as needed.  She takes zolpidem as needed for sleeplessness.  Patient complains of frequent migraine headaches despite taking Topamax. She uses Imitrex as needed for acute headache. She complains of decreased libido.     Eye exam 2018. Pap smear September 2011 (status post hysterectomy).  Mammogram September 2018.  Colonoscopy July 2004.  Tdap July 2009.  Flu vaccine September 2020.     ROS:  GENERAL: Patient denies fever, chills, night sweats. Patient denies weight loss. Patient denies anorexia, weakness or swollen glands. Positive for fatigue and weight gain.  SKIN: Patient denies rash or hair loss.  HEENT: Patient denies sore throat, ear pain, hearing loss, nasal congestion, or runny nose. Patient denies visual disturbance, eye irritation or discharge.  LUNGS: Patient denies cough, wheeze or hemoptysis.  CARDIOVASCULAR: Patient denies chest pain, shortness of breath, palpitations, syncope or lower extremity edema.  GI: Patient denies abdominal pain, nausea, vomiting, diarrhea, constipation, blood in stool or melena.  GENITOURINARY: Patient denies pelvic pain, vaginal discharge or itch.  Positive for vaginal odor.  Patient denies irregular vaginal bleeding. Patient denies dysuria, frequency, hematuria, nocturia, urgency or incontinence.  BREASTS: Patient denies breast pain, mass or nipple discharge.  MUSCULOSKELETAL: Patient denies joint pain, swelling, redness or warmth.  NEUROLOGIC:  Positive for headaches.   Patient denies dizziness, numbness, tingling, weakness in limb, dysarthria, dysphagia or abnormality of gait.  PSYCHIATRIC: Patient denies depression, or memory loss.  Positive for anxiety.     OBJECTIVE:   GENERAL: Well-developed well-nourished, morbidly obese, black female alert and oriented x3, in no acute distress. Memory, judgment and cognition without deficit.  No hallucinations, delusions or flight of ideas.  Patient has gained 8 lb in the last year.  SKIN: Clear without rash. Normal color and tone.  HEENT: Eyes: Clear conjunctivae. No scleral icterus. Pupils equal reactive to light and accommodation. Ears: Clear TMs. Clear canals. Nose: Without congestion. Pharynx: Without injection or exudates.  NECK: Supple, normal range of motion. No masses, lymphadenopathy or enlarged thyroid. No JVD. Carotids 2+ and equal. No bruits.  LUNGS: Clear to auscultation. Normal respiratory effort.  CARDIOVASCULAR: Regular rhythm, normal S1, S2 without murmur, gallop or rub.  BACK: No CVA or spinal tenderness.  BREASTS: No masses, tenderness or nipple discharge.  ABDOMEN:  Normal appearance. Active bowel sounds. Soft, nontender without mass or organomegaly. No rebound or guarding.  EXTREMITIES: Without cyanosis, clubbing or edema.  Distal pulses 2+ and equal. Normal range of motion in all extremities. No joint effusion, erythema or warmth.  NEUROLOGIC: Cranial nerves II through XII without deficit. Motor strength equal bilaterally. Sensation normal to touch. Deep tendon reflexes 2+ and equal. Gait without abnormality. No tremor. Negative cerebellar signs.  PELVIC: External: Without lesions or inflammation.  Vaginal:  Minimal discharge without odor.  Cuff intact. Bimanual: Non-tender, without masses.  Rectovaginal: Confirms, heme-negative stool x2.    KOH:  Negative hyphae.  Wet prep:  Rare clue cells.  No Trichomonas.    ASSESSMENT:  1. Preventative health care    2. Essential hypertension    3. Morbid obesity    4. PREMA  (generalized anxiety disorder)    5. Bacterial vaginosis    6. Decreased libido        PLAN:  1.  Weight reduction. Exercise regularly.  2.  Age-appropriate counseling.  3.  Fasting lab.  4.  Mammogram.  5.  Metrogel vaginal gel twice daily for 5 days.  6.  Bupropion 100 mg daily.  7.  Refill medications as needed.  8.  Follow-up annually.    This note is generated with speech recognition software and is subject to transcription error and sound alike phrases that may be missed by proofreading.

## 2020-10-31 ENCOUNTER — HOSPITAL ENCOUNTER (OUTPATIENT)
Dept: RADIOLOGY | Facility: HOSPITAL | Age: 40
Discharge: HOME | End: 2020-10-31
Attending: FAMILY MEDICINE
Payer: COMMERCIAL

## 2020-10-31 VITALS — BODY MASS INDEX: 57.52 KG/M2 | HEIGHT: 60 IN | WEIGHT: 293 LBS

## 2020-10-31 DIAGNOSIS — Z12.31 ENCOUNTER FOR SCREENING MAMMOGRAM FOR MALIGNANT NEOPLASM OF BREAST: ICD-10-CM

## 2020-10-31 PROCEDURE — 77067 MAMMO DIGITAL SCREENING BILAT WITH TOMO: ICD-10-PCS | Mod: 26,,, | Performed by: RADIOLOGY

## 2020-10-31 PROCEDURE — 77067 SCR MAMMO BI INCL CAD: CPT | Mod: TC

## 2020-10-31 PROCEDURE — 77063 MAMMO DIGITAL SCREENING BILAT WITH TOMO: ICD-10-PCS | Mod: 26,,, | Performed by: RADIOLOGY

## 2020-10-31 PROCEDURE — 77063 BREAST TOMOSYNTHESIS BI: CPT | Mod: 26,,, | Performed by: RADIOLOGY

## 2020-10-31 PROCEDURE — 77067 SCR MAMMO BI INCL CAD: CPT | Mod: 26,,, | Performed by: RADIOLOGY

## 2020-11-02 ENCOUNTER — TELEPHONE (OUTPATIENT)
Dept: FAMILY MEDICINE | Facility: CLINIC | Age: 40
End: 2020-11-02

## 2020-11-02 LAB
HCV AB SERPL QL IA: NEGATIVE
HIV 1+2 AB+HIV1 P24 AG SERPL QL IA: NEGATIVE

## 2020-11-02 NOTE — TELEPHONE ENCOUNTER
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 We had a problem with some lab work on this patient. If someone from your office could call us at [phone] or ext. [phone] that would be great. Anyone in my department can help.                                                                  Thank you

## 2020-11-04 DIAGNOSIS — Z00.00 PREVENTATIVE HEALTH CARE: Primary | ICD-10-CM

## 2020-11-04 RX ORDER — MECLIZINE HYDROCHLORIDE 25 MG/1
25-50 TABLET ORAL
Qty: 30 TABLET | Refills: 1 | Status: SHIPPED | OUTPATIENT
Start: 2020-11-04

## 2020-11-04 RX ORDER — METOPROLOL SUCCINATE 50 MG/1
50 TABLET, EXTENDED RELEASE ORAL DAILY
Qty: 30 TABLET | Refills: 11 | Status: SHIPPED | OUTPATIENT
Start: 2020-11-04 | End: 2021-11-01 | Stop reason: SDUPTHER

## 2020-11-04 RX ORDER — NAPROXEN SODIUM 550 MG/1
TABLET ORAL
Qty: 60 TABLET | Refills: 1 | Status: SHIPPED | OUTPATIENT
Start: 2020-11-04 | End: 2021-09-20 | Stop reason: ALTCHOICE

## 2020-11-04 RX ORDER — ESCITALOPRAM OXALATE 20 MG/1
TABLET ORAL
Qty: 30 TABLET | Refills: 11 | Status: SHIPPED | OUTPATIENT
Start: 2020-11-04 | End: 2021-11-01 | Stop reason: SDUPTHER

## 2020-11-04 RX ORDER — ALPRAZOLAM 0.25 MG/1
TABLET ORAL
Qty: 60 TABLET | Refills: 1 | Status: SHIPPED | OUTPATIENT
Start: 2020-11-04 | End: 2021-03-12

## 2020-11-04 RX ORDER — TOPIRAMATE 50 MG/1
50 TABLET, FILM COATED ORAL 2 TIMES DAILY
Qty: 60 TABLET | Refills: 11 | Status: SHIPPED | OUTPATIENT
Start: 2020-11-04 | End: 2021-11-01 | Stop reason: SDUPTHER

## 2020-11-04 RX ORDER — LOSARTAN POTASSIUM 100 MG/1
TABLET ORAL
Qty: 30 TABLET | Refills: 11 | Status: SHIPPED | OUTPATIENT
Start: 2020-11-04 | End: 2021-06-21 | Stop reason: SDUPTHER

## 2020-11-05 ENCOUNTER — PATIENT OUTREACH (OUTPATIENT)
Dept: ADMINISTRATIVE | Facility: HOSPITAL | Age: 40
End: 2020-11-05

## 2020-11-05 ENCOUNTER — TELEPHONE (OUTPATIENT)
Dept: FAMILY MEDICINE | Facility: CLINIC | Age: 40
End: 2020-11-05

## 2020-11-05 NOTE — TELEPHONE ENCOUNTER
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 We had a problem with some lab work on this patient. If someone from your office could call us at [phone] or ext. [phone] that would be great. Anyone in my department can help.                                                                  Thank you

## 2020-11-05 NOTE — TELEPHONE ENCOUNTER
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 We had a problem with some lab work on this patient. If someone from your office could call us at [phone] or ext. [phone] that would be great. Anyone in my department can help.                                                                  Thank you

## 2020-11-05 NOTE — TELEPHONE ENCOUNTER
Called pt and pt understood lab work needed to be redone and pt understood and stated she would come in next week

## 2020-11-05 NOTE — TELEPHONE ENCOUNTER
----- Message from Chantell Orantes sent at 11/5/2020  4:49 PM CST -----  Contact: shira Ho is calling because she missed a call from your office. Please give her a call back at [phone].    Thanks  KT     no

## 2020-11-11 ENCOUNTER — LAB VISIT (OUTPATIENT)
Dept: LAB | Facility: HOSPITAL | Age: 40
End: 2020-11-11
Payer: COMMERCIAL

## 2020-11-11 DIAGNOSIS — Z00.00 PREVENTATIVE HEALTH CARE: ICD-10-CM

## 2020-11-11 LAB
ALBUMIN SERPL BCP-MCNC: 3.2 G/DL (ref 3.5–5.2)
ALP SERPL-CCNC: 51 U/L (ref 55–135)
ALT SERPL W/O P-5'-P-CCNC: 16 U/L (ref 10–44)
ANION GAP SERPL CALC-SCNC: 10 MMOL/L (ref 8–16)
AST SERPL-CCNC: 23 U/L (ref 10–40)
BILIRUB SERPL-MCNC: 0.4 MG/DL (ref 0.1–1)
BUN SERPL-MCNC: 11 MG/DL (ref 6–20)
CALCIUM SERPL-MCNC: 9 MG/DL (ref 8.7–10.5)
CHLORIDE SERPL-SCNC: 105 MMOL/L (ref 95–110)
CO2 SERPL-SCNC: 26 MMOL/L (ref 23–29)
CREAT SERPL-MCNC: 0.9 MG/DL (ref 0.5–1.4)
EST. GFR  (AFRICAN AMERICAN): >60 ML/MIN/1.73 M^2
EST. GFR  (NON AFRICAN AMERICAN): >60 ML/MIN/1.73 M^2
GLUCOSE SERPL-MCNC: 86 MG/DL (ref 70–110)
POTASSIUM SERPL-SCNC: 3.6 MMOL/L (ref 3.5–5.1)
PROT SERPL-MCNC: 7.3 G/DL (ref 6–8.4)
SODIUM SERPL-SCNC: 141 MMOL/L (ref 136–145)
TSH SERPL DL<=0.005 MIU/L-ACNC: 0.85 UIU/ML (ref 0.4–4)

## 2020-11-11 PROCEDURE — 80053 COMPREHEN METABOLIC PANEL: CPT

## 2020-11-11 PROCEDURE — 84443 ASSAY THYROID STIM HORMONE: CPT

## 2020-11-11 PROCEDURE — 36415 COLL VENOUS BLD VENIPUNCTURE: CPT | Mod: PO

## 2020-12-09 RX ORDER — HYDROCHLOROTHIAZIDE 12.5 MG/1
12.5 TABLET ORAL DAILY
Qty: 30 TABLET | Refills: 11 | Status: SHIPPED | OUTPATIENT
Start: 2020-12-09 | End: 2021-11-01 | Stop reason: SDUPTHER

## 2021-04-06 ENCOUNTER — PATIENT MESSAGE (OUTPATIENT)
Dept: FAMILY MEDICINE | Facility: CLINIC | Age: 41
End: 2021-04-06

## 2021-04-28 ENCOUNTER — PATIENT MESSAGE (OUTPATIENT)
Dept: RESEARCH | Facility: HOSPITAL | Age: 41
End: 2021-04-28

## 2021-05-12 ENCOUNTER — PATIENT MESSAGE (OUTPATIENT)
Dept: FAMILY MEDICINE | Facility: CLINIC | Age: 41
End: 2021-05-12

## 2021-07-06 ENCOUNTER — OFFICE VISIT (OUTPATIENT)
Dept: FAMILY MEDICINE | Facility: CLINIC | Age: 41
End: 2021-07-06
Payer: COMMERCIAL

## 2021-07-06 ENCOUNTER — LAB VISIT (OUTPATIENT)
Dept: LAB | Facility: HOSPITAL | Age: 41
End: 2021-07-06
Payer: COMMERCIAL

## 2021-07-06 VITALS
TEMPERATURE: 99 F | SYSTOLIC BLOOD PRESSURE: 130 MMHG | DIASTOLIC BLOOD PRESSURE: 89 MMHG | HEIGHT: 60 IN | WEIGHT: 288.56 LBS | BODY MASS INDEX: 56.65 KG/M2

## 2021-07-06 DIAGNOSIS — G43.909 MIGRAINE WITHOUT STATUS MIGRAINOSUS, NOT INTRACTABLE, UNSPECIFIED MIGRAINE TYPE: ICD-10-CM

## 2021-07-06 DIAGNOSIS — E66.01 MORBID OBESITY: Chronic | ICD-10-CM

## 2021-07-06 DIAGNOSIS — R68.89 HEAT INTOLERANCE: ICD-10-CM

## 2021-07-06 DIAGNOSIS — F51.8 ABNORMAL DREAMS: ICD-10-CM

## 2021-07-06 DIAGNOSIS — R68.89 HEAT INTOLERANCE: Primary | ICD-10-CM

## 2021-07-06 LAB
ALBUMIN SERPL BCP-MCNC: 3.1 G/DL (ref 3.5–5.2)
ALP SERPL-CCNC: 51 U/L (ref 55–135)
ALT SERPL W/O P-5'-P-CCNC: 15 U/L (ref 10–44)
ANION GAP SERPL CALC-SCNC: 7 MMOL/L (ref 8–16)
AST SERPL-CCNC: 19 U/L (ref 10–40)
BASOPHILS # BLD AUTO: 0.04 K/UL (ref 0–0.2)
BASOPHILS NFR BLD: 0.6 % (ref 0–1.9)
BILIRUB SERPL-MCNC: 0.3 MG/DL (ref 0.1–1)
BUN SERPL-MCNC: 15 MG/DL (ref 6–20)
CALCIUM SERPL-MCNC: 8.7 MG/DL (ref 8.7–10.5)
CHLORIDE SERPL-SCNC: 107 MMOL/L (ref 95–110)
CO2 SERPL-SCNC: 25 MMOL/L (ref 23–29)
CREAT SERPL-MCNC: 1 MG/DL (ref 0.5–1.4)
DIFFERENTIAL METHOD: ABNORMAL
EOSINOPHIL # BLD AUTO: 0.1 K/UL (ref 0–0.5)
EOSINOPHIL NFR BLD: 2.1 % (ref 0–8)
ERYTHROCYTE [DISTWIDTH] IN BLOOD BY AUTOMATED COUNT: 14.4 % (ref 11.5–14.5)
EST. GFR  (AFRICAN AMERICAN): >60 ML/MIN/1.73 M^2
EST. GFR  (NON AFRICAN AMERICAN): >60 ML/MIN/1.73 M^2
FSH SERPL-ACNC: 2.5 MIU/ML
GLUCOSE SERPL-MCNC: 96 MG/DL (ref 70–110)
HCT VFR BLD AUTO: 35.9 % (ref 37–48.5)
HGB BLD-MCNC: 11.4 G/DL (ref 12–16)
IMM GRANULOCYTES # BLD AUTO: 0.01 K/UL (ref 0–0.04)
IMM GRANULOCYTES NFR BLD AUTO: 0.1 % (ref 0–0.5)
LYMPHOCYTES # BLD AUTO: 2.6 K/UL (ref 1–4.8)
LYMPHOCYTES NFR BLD: 38.9 % (ref 18–48)
MCH RBC QN AUTO: 29.3 PG (ref 27–31)
MCHC RBC AUTO-ENTMCNC: 31.8 G/DL (ref 32–36)
MCV RBC AUTO: 92 FL (ref 82–98)
MONOCYTES # BLD AUTO: 0.6 K/UL (ref 0.3–1)
MONOCYTES NFR BLD: 8.4 % (ref 4–15)
NEUTROPHILS # BLD AUTO: 3.4 K/UL (ref 1.8–7.7)
NEUTROPHILS NFR BLD: 49.9 % (ref 38–73)
NRBC BLD-RTO: 0 /100 WBC
PLATELET # BLD AUTO: 276 K/UL (ref 150–450)
PMV BLD AUTO: 10.1 FL (ref 9.2–12.9)
POTASSIUM SERPL-SCNC: 3.9 MMOL/L (ref 3.5–5.1)
PROT SERPL-MCNC: 7.2 G/DL (ref 6–8.4)
RBC # BLD AUTO: 3.89 M/UL (ref 4–5.4)
SODIUM SERPL-SCNC: 139 MMOL/L (ref 136–145)
TSH SERPL DL<=0.005 MIU/L-ACNC: 1.46 UIU/ML (ref 0.4–4)
WBC # BLD AUTO: 6.79 K/UL (ref 3.9–12.7)

## 2021-07-06 PROCEDURE — 1126F PR PAIN SEVERITY QUANTIFIED, NO PAIN PRESENT: ICD-10-PCS | Mod: S$GLB,,, | Performed by: FAMILY MEDICINE

## 2021-07-06 PROCEDURE — 99214 PR OFFICE/OUTPT VISIT, EST, LEVL IV, 30-39 MIN: ICD-10-PCS | Mod: S$GLB,,, | Performed by: FAMILY MEDICINE

## 2021-07-06 PROCEDURE — 36415 COLL VENOUS BLD VENIPUNCTURE: CPT | Mod: PO | Performed by: FAMILY MEDICINE

## 2021-07-06 PROCEDURE — 1126F AMNT PAIN NOTED NONE PRSNT: CPT | Mod: S$GLB,,, | Performed by: FAMILY MEDICINE

## 2021-07-06 PROCEDURE — 80053 COMPREHEN METABOLIC PANEL: CPT | Performed by: FAMILY MEDICINE

## 2021-07-06 PROCEDURE — 99999 PR PBB SHADOW E&M-EST. PATIENT-LVL III: ICD-10-PCS | Mod: PBBFAC,,, | Performed by: FAMILY MEDICINE

## 2021-07-06 PROCEDURE — 85025 COMPLETE CBC W/AUTO DIFF WBC: CPT | Performed by: FAMILY MEDICINE

## 2021-07-06 PROCEDURE — 3008F PR BODY MASS INDEX (BMI) DOCUMENTED: ICD-10-PCS | Mod: CPTII,S$GLB,, | Performed by: FAMILY MEDICINE

## 2021-07-06 PROCEDURE — 99214 OFFICE O/P EST MOD 30 MIN: CPT | Mod: S$GLB,,, | Performed by: FAMILY MEDICINE

## 2021-07-06 PROCEDURE — 83001 ASSAY OF GONADOTROPIN (FSH): CPT | Performed by: FAMILY MEDICINE

## 2021-07-06 PROCEDURE — 84443 ASSAY THYROID STIM HORMONE: CPT | Performed by: FAMILY MEDICINE

## 2021-07-06 PROCEDURE — 3008F BODY MASS INDEX DOCD: CPT | Mod: CPTII,S$GLB,, | Performed by: FAMILY MEDICINE

## 2021-07-06 PROCEDURE — 99999 PR PBB SHADOW E&M-EST. PATIENT-LVL III: CPT | Mod: PBBFAC,,, | Performed by: FAMILY MEDICINE

## 2021-08-26 RX ORDER — ALPRAZOLAM 0.25 MG/1
TABLET ORAL
Qty: 60 TABLET | Refills: 0 | Status: SHIPPED | OUTPATIENT
Start: 2021-08-26 | End: 2021-11-02 | Stop reason: SDUPTHER

## 2021-09-20 ENCOUNTER — HOSPITAL ENCOUNTER (EMERGENCY)
Facility: HOSPITAL | Age: 41
Discharge: HOME | End: 2021-09-20
Attending: EMERGENCY MEDICINE
Payer: COMMERCIAL

## 2021-09-20 VITALS
HEART RATE: 86 BPM | SYSTOLIC BLOOD PRESSURE: 133 MMHG | RESPIRATION RATE: 18 BRPM | DIASTOLIC BLOOD PRESSURE: 72 MMHG | TEMPERATURE: 99 F | HEIGHT: 60 IN | BODY MASS INDEX: 56.99 KG/M2 | WEIGHT: 290.25 LBS | OXYGEN SATURATION: 100 %

## 2021-09-20 DIAGNOSIS — M25.512 NONTRAUMATIC SHOULDER PAIN, LEFT: Primary | ICD-10-CM

## 2021-09-20 DIAGNOSIS — M25.512 SEVERE SHOULDER PAIN, LEFT: ICD-10-CM

## 2021-09-20 PROCEDURE — 99283 EMERGENCY DEPT VISIT LOW MDM: CPT | Mod: 25

## 2021-09-20 PROCEDURE — 25000003 PHARM REV CODE 250: Performed by: EMERGENCY MEDICINE

## 2021-09-20 RX ORDER — NAPROXEN 500 MG/1
500 TABLET ORAL
Status: COMPLETED | OUTPATIENT
Start: 2021-09-20 | End: 2021-09-20

## 2021-09-20 RX ORDER — NAPROXEN 500 MG/1
500 TABLET ORAL 2 TIMES DAILY WITH MEALS
Qty: 20 TABLET | Refills: 0 | Status: SHIPPED | OUTPATIENT
Start: 2021-09-20 | End: 2021-11-01 | Stop reason: SDUPTHER

## 2021-09-20 RX ORDER — TRAMADOL HYDROCHLORIDE 50 MG/1
50 TABLET ORAL EVERY 6 HOURS PRN
Qty: 12 TABLET | Refills: 0 | Status: SHIPPED | OUTPATIENT
Start: 2021-09-20 | End: 2021-11-01

## 2021-09-20 RX ORDER — HYDROCODONE BITARTRATE AND ACETAMINOPHEN 7.5; 325 MG/1; MG/1
1 TABLET ORAL EVERY 6 HOURS PRN
Status: DISCONTINUED | OUTPATIENT
Start: 2021-09-20 | End: 2021-09-20 | Stop reason: HOSPADM

## 2021-09-20 RX ADMIN — NAPROXEN 500 MG: 500 TABLET ORAL at 06:09

## 2021-09-27 ENCOUNTER — PATIENT OUTREACH (OUTPATIENT)
Dept: ADMINISTRATIVE | Facility: HOSPITAL | Age: 41
End: 2021-09-27

## 2021-09-28 DIAGNOSIS — M25.512 LEFT SHOULDER PAIN, UNSPECIFIED CHRONICITY: Primary | ICD-10-CM

## 2021-10-27 ENCOUNTER — TELEPHONE (OUTPATIENT)
Dept: ORTHOPEDICS | Facility: CLINIC | Age: 41
End: 2021-10-27
Payer: COMMERCIAL

## 2021-11-01 ENCOUNTER — LAB VISIT (OUTPATIENT)
Dept: LAB | Facility: HOSPITAL | Age: 41
End: 2021-11-01
Payer: COMMERCIAL

## 2021-11-01 ENCOUNTER — OFFICE VISIT (OUTPATIENT)
Dept: FAMILY MEDICINE | Facility: CLINIC | Age: 41
End: 2021-11-01
Payer: COMMERCIAL

## 2021-11-01 ENCOUNTER — TELEPHONE (OUTPATIENT)
Dept: FAMILY MEDICINE | Facility: CLINIC | Age: 41
End: 2021-11-01
Payer: COMMERCIAL

## 2021-11-01 ENCOUNTER — IMMUNIZATION (OUTPATIENT)
Dept: PRIMARY CARE CLINIC | Facility: CLINIC | Age: 41
End: 2021-11-01
Payer: COMMERCIAL

## 2021-11-01 ENCOUNTER — PATIENT MESSAGE (OUTPATIENT)
Dept: FAMILY MEDICINE | Facility: CLINIC | Age: 41
End: 2021-11-01

## 2021-11-01 VITALS
OXYGEN SATURATION: 100 % | WEIGHT: 291.56 LBS | TEMPERATURE: 97 F | HEIGHT: 60 IN | BODY MASS INDEX: 57.24 KG/M2 | HEART RATE: 96 BPM | DIASTOLIC BLOOD PRESSURE: 84 MMHG | SYSTOLIC BLOOD PRESSURE: 140 MMHG

## 2021-11-01 DIAGNOSIS — I10 ESSENTIAL HYPERTENSION: Chronic | ICD-10-CM

## 2021-11-01 DIAGNOSIS — F41.1 GAD (GENERALIZED ANXIETY DISORDER): ICD-10-CM

## 2021-11-01 DIAGNOSIS — Z00.00 PREVENTATIVE HEALTH CARE: ICD-10-CM

## 2021-11-01 DIAGNOSIS — K62.5 RECTAL BLEEDING: ICD-10-CM

## 2021-11-01 DIAGNOSIS — Z00.00 PREVENTATIVE HEALTH CARE: Primary | ICD-10-CM

## 2021-11-01 DIAGNOSIS — Z12.31 ENCOUNTER FOR SCREENING MAMMOGRAM FOR MALIGNANT NEOPLASM OF BREAST: ICD-10-CM

## 2021-11-01 DIAGNOSIS — Z23 NEED FOR VACCINATION: Primary | ICD-10-CM

## 2021-11-01 DIAGNOSIS — E66.01 MORBID OBESITY: Chronic | ICD-10-CM

## 2021-11-01 DIAGNOSIS — Z23 NEED FOR VACCINATION: ICD-10-CM

## 2021-11-01 DIAGNOSIS — F32.5 MAJOR DEPRESSIVE DISORDER WITH SINGLE EPISODE, IN FULL REMISSION: ICD-10-CM

## 2021-11-01 LAB
BASOPHILS # BLD AUTO: 0.03 K/UL (ref 0–0.2)
BASOPHILS NFR BLD: 0.5 % (ref 0–1.9)
CHOLEST SERPL-MCNC: 149 MG/DL (ref 120–199)
CHOLEST/HDLC SERPL: 3 {RATIO} (ref 2–5)
DIFFERENTIAL METHOD: ABNORMAL
EOSINOPHIL # BLD AUTO: 0.1 K/UL (ref 0–0.5)
EOSINOPHIL NFR BLD: 1.6 % (ref 0–8)
ERYTHROCYTE [DISTWIDTH] IN BLOOD BY AUTOMATED COUNT: 14.6 % (ref 11.5–14.5)
HCT VFR BLD AUTO: 35.5 % (ref 37–48.5)
HDLC SERPL-MCNC: 50 MG/DL (ref 40–75)
HDLC SERPL: 33.6 % (ref 20–50)
HGB BLD-MCNC: 11 G/DL (ref 12–16)
IMM GRANULOCYTES # BLD AUTO: 0.01 K/UL (ref 0–0.04)
IMM GRANULOCYTES NFR BLD AUTO: 0.2 % (ref 0–0.5)
LDLC SERPL CALC-MCNC: 89 MG/DL (ref 63–159)
LYMPHOCYTES # BLD AUTO: 2.4 K/UL (ref 1–4.8)
LYMPHOCYTES NFR BLD: 42.6 % (ref 18–48)
MCH RBC QN AUTO: 29.3 PG (ref 27–31)
MCHC RBC AUTO-ENTMCNC: 31 G/DL (ref 32–36)
MCV RBC AUTO: 94 FL (ref 82–98)
MONOCYTES # BLD AUTO: 0.5 K/UL (ref 0.3–1)
MONOCYTES NFR BLD: 9.2 % (ref 4–15)
NEUTROPHILS # BLD AUTO: 2.5 K/UL (ref 1.8–7.7)
NEUTROPHILS NFR BLD: 45.9 % (ref 38–73)
NONHDLC SERPL-MCNC: 99 MG/DL
NRBC BLD-RTO: 0 /100 WBC
PLATELET # BLD AUTO: 302 K/UL (ref 150–450)
PMV BLD AUTO: 10.8 FL (ref 9.2–12.9)
RBC # BLD AUTO: 3.76 M/UL (ref 4–5.4)
TRIGL SERPL-MCNC: 50 MG/DL (ref 30–150)
WBC # BLD AUTO: 5.52 K/UL (ref 3.9–12.7)

## 2021-11-01 PROCEDURE — 90714 TD VACC NO PRESV 7 YRS+ IM: CPT | Mod: S$GLB,,, | Performed by: FAMILY MEDICINE

## 2021-11-01 PROCEDURE — 85025 COMPLETE CBC W/AUTO DIFF WBC: CPT | Performed by: FAMILY MEDICINE

## 2021-11-01 PROCEDURE — 1159F PR MEDICATION LIST DOCUMENTED IN MEDICAL RECORD: ICD-10-PCS | Mod: CPTII,S$GLB,, | Performed by: FAMILY MEDICINE

## 2021-11-01 PROCEDURE — 99396 PREV VISIT EST AGE 40-64: CPT | Mod: 25,S$GLB,, | Performed by: FAMILY MEDICINE

## 2021-11-01 PROCEDURE — 1159F MED LIST DOCD IN RCRD: CPT | Mod: CPTII,S$GLB,, | Performed by: FAMILY MEDICINE

## 2021-11-01 PROCEDURE — 3077F SYST BP >= 140 MM HG: CPT | Mod: CPTII,S$GLB,, | Performed by: FAMILY MEDICINE

## 2021-11-01 PROCEDURE — 90471 TD VACCINE GREATER THAN OR EQUAL TO 7YO PRESERVATIVE FREE IM: ICD-10-PCS | Mod: S$GLB,,, | Performed by: FAMILY MEDICINE

## 2021-11-01 PROCEDURE — 90471 IMMUNIZATION ADMIN: CPT | Mod: S$GLB,,, | Performed by: FAMILY MEDICINE

## 2021-11-01 PROCEDURE — 0004A COVID-19, MRNA, LNP-S, PF, 30 MCG/0.3 ML DOSE VACCINE: CPT | Mod: CV19,PBBFAC | Performed by: FAMILY MEDICINE

## 2021-11-01 PROCEDURE — 3008F BODY MASS INDEX DOCD: CPT | Mod: CPTII,S$GLB,, | Performed by: FAMILY MEDICINE

## 2021-11-01 PROCEDURE — 3079F PR MOST RECENT DIASTOLIC BLOOD PRESSURE 80-89 MM HG: ICD-10-PCS | Mod: CPTII,S$GLB,, | Performed by: FAMILY MEDICINE

## 2021-11-01 PROCEDURE — 4010F PR ACE/ARB THEARPY RXD/TAKEN: ICD-10-PCS | Mod: CPTII,S$GLB,, | Performed by: FAMILY MEDICINE

## 2021-11-01 PROCEDURE — 3079F DIAST BP 80-89 MM HG: CPT | Mod: CPTII,S$GLB,, | Performed by: FAMILY MEDICINE

## 2021-11-01 PROCEDURE — 80061 LIPID PANEL: CPT | Performed by: FAMILY MEDICINE

## 2021-11-01 PROCEDURE — 90714 TD VACCINE GREATER THAN OR EQUAL TO 7YO PRESERVATIVE FREE IM: ICD-10-PCS | Mod: S$GLB,,, | Performed by: FAMILY MEDICINE

## 2021-11-01 PROCEDURE — 4010F ACE/ARB THERAPY RXD/TAKEN: CPT | Mod: CPTII,S$GLB,, | Performed by: FAMILY MEDICINE

## 2021-11-01 PROCEDURE — 87389 HIV-1 AG W/HIV-1&-2 AB AG IA: CPT | Performed by: FAMILY MEDICINE

## 2021-11-01 PROCEDURE — 3008F PR BODY MASS INDEX (BMI) DOCUMENTED: ICD-10-PCS | Mod: CPTII,S$GLB,, | Performed by: FAMILY MEDICINE

## 2021-11-01 PROCEDURE — 3077F PR MOST RECENT SYSTOLIC BLOOD PRESSURE >= 140 MM HG: ICD-10-PCS | Mod: CPTII,S$GLB,, | Performed by: FAMILY MEDICINE

## 2021-11-01 PROCEDURE — 99999 PR PBB SHADOW E&M-EST. PATIENT-LVL V: ICD-10-PCS | Mod: PBBFAC,,, | Performed by: FAMILY MEDICINE

## 2021-11-01 PROCEDURE — 99396 PR PREVENTIVE VISIT,EST,40-64: ICD-10-PCS | Mod: 25,S$GLB,, | Performed by: FAMILY MEDICINE

## 2021-11-01 PROCEDURE — 99999 PR PBB SHADOW E&M-EST. PATIENT-LVL V: CPT | Mod: PBBFAC,,, | Performed by: FAMILY MEDICINE

## 2021-11-01 PROCEDURE — 36415 COLL VENOUS BLD VENIPUNCTURE: CPT | Mod: PO | Performed by: FAMILY MEDICINE

## 2021-11-01 RX ORDER — METOPROLOL SUCCINATE 50 MG/1
50 TABLET, EXTENDED RELEASE ORAL DAILY
Qty: 30 TABLET | Refills: 11 | Status: SHIPPED | OUTPATIENT
Start: 2021-11-01 | End: 2021-11-02

## 2021-11-01 RX ORDER — ESCITALOPRAM OXALATE 20 MG/1
TABLET ORAL
Qty: 30 TABLET | Refills: 11 | Status: SHIPPED | OUTPATIENT
Start: 2021-11-01 | End: 2022-11-18 | Stop reason: SDUPTHER

## 2021-11-01 RX ORDER — RIZATRIPTAN BENZOATE 10 MG/1
10 TABLET ORAL
Qty: 6 TABLET | Refills: 11 | Status: SHIPPED | OUTPATIENT
Start: 2021-11-01 | End: 2022-11-18 | Stop reason: SDUPTHER

## 2021-11-01 RX ORDER — BUPROPION HYDROCHLORIDE 100 MG/1
100 TABLET ORAL 2 TIMES DAILY
Qty: 60 TABLET | Refills: 11 | Status: SHIPPED | OUTPATIENT
Start: 2021-11-01 | End: 2022-11-18 | Stop reason: SDUPTHER

## 2021-11-01 RX ORDER — NAPROXEN 500 MG/1
500 TABLET ORAL 2 TIMES DAILY WITH MEALS
Qty: 30 TABLET | Refills: 2 | Status: SHIPPED | OUTPATIENT
Start: 2021-11-01 | End: 2022-01-31

## 2021-11-01 RX ORDER — LOSARTAN POTASSIUM 100 MG/1
TABLET ORAL
Qty: 30 TABLET | Refills: 11 | Status: SHIPPED | OUTPATIENT
Start: 2021-11-01 | End: 2022-06-15

## 2021-11-01 RX ORDER — CYCLOBENZAPRINE HCL 10 MG
10 TABLET ORAL 3 TIMES DAILY PRN
Qty: 30 TABLET | Refills: 1 | Status: SHIPPED | OUTPATIENT
Start: 2021-11-01 | End: 2022-12-28

## 2021-11-01 RX ORDER — HYDROCHLOROTHIAZIDE 12.5 MG/1
12.5 TABLET ORAL DAILY
Qty: 30 TABLET | Refills: 11 | Status: SHIPPED | OUTPATIENT
Start: 2021-11-01 | End: 2022-11-18 | Stop reason: SDUPTHER

## 2021-11-01 RX ORDER — HYDROCODONE BITARTRATE AND ACETAMINOPHEN 7.5; 325 MG/1; MG/1
1 TABLET ORAL EVERY 6 HOURS PRN
Qty: 20 TABLET | Refills: 0 | Status: SHIPPED | OUTPATIENT
Start: 2021-11-01 | End: 2021-12-23 | Stop reason: SDUPTHER

## 2021-11-01 RX ORDER — TOPIRAMATE 50 MG/1
50 TABLET, FILM COATED ORAL 2 TIMES DAILY
Qty: 60 TABLET | Refills: 11 | Status: SHIPPED | OUTPATIENT
Start: 2021-11-01 | End: 2023-11-10

## 2021-11-02 ENCOUNTER — HOSPITAL ENCOUNTER (OUTPATIENT)
Dept: RADIOLOGY | Facility: HOSPITAL | Age: 41
Discharge: HOME | End: 2021-11-02
Attending: FAMILY MEDICINE
Payer: COMMERCIAL

## 2021-11-02 VITALS — BODY MASS INDEX: 57.22 KG/M2 | HEIGHT: 60 IN | WEIGHT: 291.44 LBS

## 2021-11-02 DIAGNOSIS — Z12.31 ENCOUNTER FOR SCREENING MAMMOGRAM FOR MALIGNANT NEOPLASM OF BREAST: ICD-10-CM

## 2021-11-02 LAB — HIV 1+2 AB+HIV1 P24 AG SERPL QL IA: NEGATIVE

## 2021-11-02 PROCEDURE — 77067 SCR MAMMO BI INCL CAD: CPT | Mod: TC

## 2021-11-02 PROCEDURE — 77063 MAMMO DIGITAL SCREENING BILAT WITH TOMO: ICD-10-PCS | Mod: 26,,, | Performed by: RADIOLOGY

## 2021-11-02 PROCEDURE — 77067 SCR MAMMO BI INCL CAD: CPT | Mod: 26,,, | Performed by: RADIOLOGY

## 2021-11-02 PROCEDURE — 77067 MAMMO DIGITAL SCREENING BILAT WITH TOMO: ICD-10-PCS | Mod: 26,,, | Performed by: RADIOLOGY

## 2021-11-02 PROCEDURE — 77063 BREAST TOMOSYNTHESIS BI: CPT | Mod: 26,,, | Performed by: RADIOLOGY

## 2021-11-05 ENCOUNTER — OFFICE VISIT (OUTPATIENT)
Dept: SPORTS MEDICINE | Facility: CLINIC | Age: 41
End: 2021-11-05
Payer: COMMERCIAL

## 2021-11-05 ENCOUNTER — HOSPITAL ENCOUNTER (OUTPATIENT)
Dept: RADIOLOGY | Facility: HOSPITAL | Age: 41
Discharge: HOME | End: 2021-11-05
Attending: FAMILY MEDICINE
Payer: COMMERCIAL

## 2021-11-05 ENCOUNTER — HOSPITAL ENCOUNTER (OUTPATIENT)
Dept: RADIOLOGY | Facility: HOSPITAL | Age: 41
Discharge: HOME | End: 2021-11-05
Attending: STUDENT IN AN ORGANIZED HEALTH CARE EDUCATION/TRAINING PROGRAM
Payer: COMMERCIAL

## 2021-11-05 VITALS
HEART RATE: 67 BPM | BODY MASS INDEX: 57.22 KG/M2 | DIASTOLIC BLOOD PRESSURE: 85 MMHG | SYSTOLIC BLOOD PRESSURE: 132 MMHG | WEIGHT: 291.44 LBS | HEIGHT: 60 IN

## 2021-11-05 DIAGNOSIS — M25.561 PAIN IN BOTH KNEES, UNSPECIFIED CHRONICITY: Primary | ICD-10-CM

## 2021-11-05 DIAGNOSIS — M25.512 LEFT SHOULDER PAIN, UNSPECIFIED CHRONICITY: ICD-10-CM

## 2021-11-05 DIAGNOSIS — M17.12 PRIMARY OSTEOARTHRITIS OF LEFT KNEE: Primary | ICD-10-CM

## 2021-11-05 DIAGNOSIS — M25.562 PAIN IN BOTH KNEES, UNSPECIFIED CHRONICITY: ICD-10-CM

## 2021-11-05 DIAGNOSIS — M25.562 PAIN IN BOTH KNEES, UNSPECIFIED CHRONICITY: Primary | ICD-10-CM

## 2021-11-05 DIAGNOSIS — M25.561 PAIN IN BOTH KNEES, UNSPECIFIED CHRONICITY: ICD-10-CM

## 2021-11-05 DIAGNOSIS — M17.11 PRIMARY OSTEOARTHRITIS OF RIGHT KNEE: ICD-10-CM

## 2021-11-05 DIAGNOSIS — M17.0 OSTEOARTHRITIS OF BOTH KNEES, UNSPECIFIED OSTEOARTHRITIS TYPE: Primary | ICD-10-CM

## 2021-11-05 PROCEDURE — 3075F PR MOST RECENT SYSTOLIC BLOOD PRESS GE 130-139MM HG: ICD-10-PCS | Mod: CPTII,S$GLB,, | Performed by: FAMILY MEDICINE

## 2021-11-05 PROCEDURE — 3075F SYST BP GE 130 - 139MM HG: CPT | Mod: CPTII,S$GLB,, | Performed by: FAMILY MEDICINE

## 2021-11-05 PROCEDURE — 73564 XR KNEE ORTHO BILAT WITH FLEXION: ICD-10-PCS | Mod: 26,,, | Performed by: RADIOLOGY

## 2021-11-05 PROCEDURE — 3008F BODY MASS INDEX DOCD: CPT | Mod: CPTII,S$GLB,, | Performed by: FAMILY MEDICINE

## 2021-11-05 PROCEDURE — 99999 PR PBB SHADOW E&M-EST. PATIENT-LVL III: ICD-10-PCS | Mod: PBBFAC,,, | Performed by: FAMILY MEDICINE

## 2021-11-05 PROCEDURE — 73030 X-RAY EXAM OF SHOULDER: CPT | Mod: TC,LT

## 2021-11-05 PROCEDURE — 3079F PR MOST RECENT DIASTOLIC BLOOD PRESSURE 80-89 MM HG: ICD-10-PCS | Mod: CPTII,S$GLB,, | Performed by: FAMILY MEDICINE

## 2021-11-05 PROCEDURE — 99999 PR PBB SHADOW E&M-EST. PATIENT-LVL III: CPT | Mod: PBBFAC,,, | Performed by: FAMILY MEDICINE

## 2021-11-05 PROCEDURE — 20610 LARGE JOINT ASPIRATION/INJECTION: BILATERAL KNEE: ICD-10-PCS | Mod: 50,S$GLB,, | Performed by: FAMILY MEDICINE

## 2021-11-05 PROCEDURE — 20610 DRAIN/INJ JOINT/BURSA W/O US: CPT | Mod: 50,S$GLB,, | Performed by: FAMILY MEDICINE

## 2021-11-05 PROCEDURE — 3079F DIAST BP 80-89 MM HG: CPT | Mod: CPTII,S$GLB,, | Performed by: FAMILY MEDICINE

## 2021-11-05 PROCEDURE — 4010F PR ACE/ARB THEARPY RXD/TAKEN: ICD-10-PCS | Mod: CPTII,S$GLB,, | Performed by: FAMILY MEDICINE

## 2021-11-05 PROCEDURE — 4010F ACE/ARB THERAPY RXD/TAKEN: CPT | Mod: CPTII,S$GLB,, | Performed by: FAMILY MEDICINE

## 2021-11-05 PROCEDURE — 1159F PR MEDICATION LIST DOCUMENTED IN MEDICAL RECORD: ICD-10-PCS | Mod: CPTII,S$GLB,, | Performed by: FAMILY MEDICINE

## 2021-11-05 PROCEDURE — 73564 X-RAY EXAM KNEE 4 OR MORE: CPT | Mod: 26,,, | Performed by: RADIOLOGY

## 2021-11-05 PROCEDURE — 73030 X-RAY EXAM OF SHOULDER: CPT | Mod: 26,LT,, | Performed by: RADIOLOGY

## 2021-11-05 PROCEDURE — 99204 OFFICE O/P NEW MOD 45 MIN: CPT | Mod: 25,S$GLB,, | Performed by: FAMILY MEDICINE

## 2021-11-05 PROCEDURE — 3008F PR BODY MASS INDEX (BMI) DOCUMENTED: ICD-10-PCS | Mod: CPTII,S$GLB,, | Performed by: FAMILY MEDICINE

## 2021-11-05 PROCEDURE — 73030 XR SHOULDER COMPLETE 2 OR MORE VIEWS LEFT: ICD-10-PCS | Mod: 26,LT,, | Performed by: RADIOLOGY

## 2021-11-05 PROCEDURE — 73564 X-RAY EXAM KNEE 4 OR MORE: CPT | Mod: TC,50

## 2021-11-05 PROCEDURE — 1159F MED LIST DOCD IN RCRD: CPT | Mod: CPTII,S$GLB,, | Performed by: FAMILY MEDICINE

## 2021-11-05 PROCEDURE — 99204 PR OFFICE/OUTPT VISIT, NEW, LEVL IV, 45-59 MIN: ICD-10-PCS | Mod: 25,S$GLB,, | Performed by: FAMILY MEDICINE

## 2021-11-05 RX ORDER — BETAMETHASONE SODIUM PHOSPHATE AND BETAMETHASONE ACETATE 3; 3 MG/ML; MG/ML
6 INJECTION, SUSPENSION INTRA-ARTICULAR; INTRALESIONAL; INTRAMUSCULAR; SOFT TISSUE
Status: DISCONTINUED | OUTPATIENT
Start: 2021-11-05 | End: 2021-11-05 | Stop reason: HOSPADM

## 2021-11-05 RX ADMIN — BETAMETHASONE SODIUM PHOSPHATE AND BETAMETHASONE ACETATE 6 MG: 3; 3 INJECTION, SUSPENSION INTRA-ARTICULAR; INTRALESIONAL; INTRAMUSCULAR; SOFT TISSUE at 10:11

## 2021-11-27 ENCOUNTER — PATIENT MESSAGE (OUTPATIENT)
Dept: SURGERY | Facility: CLINIC | Age: 41
End: 2021-11-27
Payer: COMMERCIAL

## 2021-11-29 ENCOUNTER — PATIENT MESSAGE (OUTPATIENT)
Dept: FAMILY MEDICINE | Facility: CLINIC | Age: 41
End: 2021-11-29
Payer: COMMERCIAL

## 2021-11-29 ENCOUNTER — PATIENT MESSAGE (OUTPATIENT)
Dept: SPORTS MEDICINE | Facility: CLINIC | Age: 41
End: 2021-11-29
Payer: COMMERCIAL

## 2021-12-03 ENCOUNTER — OFFICE VISIT (OUTPATIENT)
Dept: SPORTS MEDICINE | Facility: CLINIC | Age: 41
End: 2021-12-03
Payer: COMMERCIAL

## 2021-12-03 VITALS — HEIGHT: 60 IN | WEIGHT: 291 LBS | BODY MASS INDEX: 57.13 KG/M2

## 2021-12-03 DIAGNOSIS — M17.0 OSTEOARTHRITIS OF BOTH KNEES, UNSPECIFIED OSTEOARTHRITIS TYPE: Primary | ICD-10-CM

## 2021-12-03 PROCEDURE — 20610 LARGE JOINT ASPIRATION/INJECTION: BILATERAL KNEE: ICD-10-PCS | Mod: 50,S$GLB,, | Performed by: FAMILY MEDICINE

## 2021-12-03 PROCEDURE — 99999 PR PBB SHADOW E&M-EST. PATIENT-LVL III: ICD-10-PCS | Mod: PBBFAC,,, | Performed by: FAMILY MEDICINE

## 2021-12-03 PROCEDURE — 4010F PR ACE/ARB THEARPY RXD/TAKEN: ICD-10-PCS | Mod: CPTII,S$GLB,, | Performed by: FAMILY MEDICINE

## 2021-12-03 PROCEDURE — 4010F ACE/ARB THERAPY RXD/TAKEN: CPT | Mod: CPTII,S$GLB,, | Performed by: FAMILY MEDICINE

## 2021-12-03 PROCEDURE — 20610 DRAIN/INJ JOINT/BURSA W/O US: CPT | Mod: 50,S$GLB,, | Performed by: FAMILY MEDICINE

## 2021-12-03 PROCEDURE — 99499 NO LOS: ICD-10-PCS | Mod: S$GLB,,, | Performed by: FAMILY MEDICINE

## 2021-12-03 PROCEDURE — 99499 UNLISTED E&M SERVICE: CPT | Mod: S$GLB,,, | Performed by: FAMILY MEDICINE

## 2021-12-03 PROCEDURE — 99999 PR PBB SHADOW E&M-EST. PATIENT-LVL III: CPT | Mod: PBBFAC,,, | Performed by: FAMILY MEDICINE

## 2021-12-08 ENCOUNTER — TELEPHONE (OUTPATIENT)
Dept: ORTHOPEDICS | Facility: CLINIC | Age: 41
End: 2021-12-08
Payer: COMMERCIAL

## 2021-12-10 ENCOUNTER — OFFICE VISIT (OUTPATIENT)
Dept: SPORTS MEDICINE | Facility: CLINIC | Age: 41
End: 2021-12-10
Payer: COMMERCIAL

## 2021-12-10 VITALS — WEIGHT: 291 LBS | HEIGHT: 60 IN | BODY MASS INDEX: 57.13 KG/M2

## 2021-12-10 DIAGNOSIS — M17.0 OSTEOARTHRITIS OF BOTH KNEES, UNSPECIFIED OSTEOARTHRITIS TYPE: Primary | ICD-10-CM

## 2021-12-10 PROCEDURE — 4010F ACE/ARB THERAPY RXD/TAKEN: CPT | Mod: CPTII,S$GLB,, | Performed by: FAMILY MEDICINE

## 2021-12-10 PROCEDURE — 20610 DRAIN/INJ JOINT/BURSA W/O US: CPT | Mod: 50,S$GLB,, | Performed by: FAMILY MEDICINE

## 2021-12-10 PROCEDURE — 99999 PR PBB SHADOW E&M-EST. PATIENT-LVL III: CPT | Mod: PBBFAC,,, | Performed by: FAMILY MEDICINE

## 2021-12-10 PROCEDURE — 20610 LARGE JOINT ASPIRATION/INJECTION: BILATERAL KNEE: ICD-10-PCS | Mod: 50,S$GLB,, | Performed by: FAMILY MEDICINE

## 2021-12-10 PROCEDURE — 99999 PR PBB SHADOW E&M-EST. PATIENT-LVL III: ICD-10-PCS | Mod: PBBFAC,,, | Performed by: FAMILY MEDICINE

## 2021-12-10 PROCEDURE — 4010F PR ACE/ARB THEARPY RXD/TAKEN: ICD-10-PCS | Mod: CPTII,S$GLB,, | Performed by: FAMILY MEDICINE

## 2021-12-10 PROCEDURE — 99499 UNLISTED E&M SERVICE: CPT | Mod: S$GLB,,, | Performed by: FAMILY MEDICINE

## 2021-12-10 PROCEDURE — 99499 NO LOS: ICD-10-PCS | Mod: S$GLB,,, | Performed by: FAMILY MEDICINE

## 2021-12-17 ENCOUNTER — OFFICE VISIT (OUTPATIENT)
Dept: SPORTS MEDICINE | Facility: CLINIC | Age: 41
End: 2021-12-17
Payer: COMMERCIAL

## 2021-12-17 VITALS — HEIGHT: 60 IN | WEIGHT: 291 LBS | BODY MASS INDEX: 57.13 KG/M2

## 2021-12-17 DIAGNOSIS — M17.0 OSTEOARTHRITIS OF BOTH KNEES, UNSPECIFIED OSTEOARTHRITIS TYPE: Primary | ICD-10-CM

## 2021-12-17 PROCEDURE — 99999 PR PBB SHADOW E&M-EST. PATIENT-LVL III: ICD-10-PCS | Mod: PBBFAC,,, | Performed by: PHYSICAL MEDICINE & REHABILITATION

## 2021-12-17 PROCEDURE — 99499 UNLISTED E&M SERVICE: CPT | Mod: S$GLB,,, | Performed by: PHYSICAL MEDICINE & REHABILITATION

## 2021-12-17 PROCEDURE — 4010F PR ACE/ARB THEARPY RXD/TAKEN: ICD-10-PCS | Mod: CPTII,S$GLB,, | Performed by: PHYSICAL MEDICINE & REHABILITATION

## 2021-12-17 PROCEDURE — 20610 DRAIN/INJ JOINT/BURSA W/O US: CPT | Mod: 50,S$GLB,, | Performed by: PHYSICAL MEDICINE & REHABILITATION

## 2021-12-17 PROCEDURE — 99499 NO LOS: ICD-10-PCS | Mod: S$GLB,,, | Performed by: PHYSICAL MEDICINE & REHABILITATION

## 2021-12-17 PROCEDURE — 4010F ACE/ARB THERAPY RXD/TAKEN: CPT | Mod: CPTII,S$GLB,, | Performed by: PHYSICAL MEDICINE & REHABILITATION

## 2021-12-17 PROCEDURE — 20610 LARGE JOINT ASPIRATION/INJECTION: BILATERAL KNEE: ICD-10-PCS | Mod: 50,S$GLB,, | Performed by: PHYSICAL MEDICINE & REHABILITATION

## 2021-12-17 PROCEDURE — 99999 PR PBB SHADOW E&M-EST. PATIENT-LVL III: CPT | Mod: PBBFAC,,, | Performed by: PHYSICAL MEDICINE & REHABILITATION

## 2021-12-24 RX ORDER — HYDROCODONE BITARTRATE AND ACETAMINOPHEN 7.5; 325 MG/1; MG/1
1 TABLET ORAL EVERY 6 HOURS PRN
Qty: 20 TABLET | Refills: 0 | Status: SHIPPED | OUTPATIENT
Start: 2021-12-24 | End: 2022-11-18

## 2021-12-24 RX ORDER — ALPRAZOLAM 0.25 MG/1
TABLET ORAL
Qty: 90 TABLET | Refills: 0 | Status: SHIPPED | OUTPATIENT
Start: 2021-12-24 | End: 2022-06-14 | Stop reason: SDUPTHER

## 2022-01-03 ENCOUNTER — OFFICE VISIT (OUTPATIENT)
Dept: SURGERY | Facility: CLINIC | Age: 42
End: 2022-01-03
Payer: COMMERCIAL

## 2022-01-03 VITALS
HEART RATE: 75 BPM | BODY MASS INDEX: 57.09 KG/M2 | TEMPERATURE: 98 F | DIASTOLIC BLOOD PRESSURE: 91 MMHG | WEIGHT: 292.31 LBS | SYSTOLIC BLOOD PRESSURE: 148 MMHG

## 2022-01-03 DIAGNOSIS — K62.5 RECTAL BLEEDING: ICD-10-CM

## 2022-01-03 DIAGNOSIS — Z01.818 PRE-OP TESTING: Primary | ICD-10-CM

## 2022-01-03 DIAGNOSIS — K92.1 HEMATOCHEZIA: Primary | ICD-10-CM

## 2022-01-03 PROCEDURE — 3077F SYST BP >= 140 MM HG: CPT | Mod: CPTII,S$GLB,, | Performed by: COLON & RECTAL SURGERY

## 2022-01-03 PROCEDURE — 99204 PR OFFICE/OUTPT VISIT, NEW, LEVL IV, 45-59 MIN: ICD-10-PCS | Mod: S$GLB,,, | Performed by: COLON & RECTAL SURGERY

## 2022-01-03 PROCEDURE — 3008F PR BODY MASS INDEX (BMI) DOCUMENTED: ICD-10-PCS | Mod: CPTII,S$GLB,, | Performed by: COLON & RECTAL SURGERY

## 2022-01-03 PROCEDURE — 99999 PR PBB SHADOW E&M-EST. PATIENT-LVL IV: ICD-10-PCS | Mod: PBBFAC,,, | Performed by: COLON & RECTAL SURGERY

## 2022-01-03 PROCEDURE — 1159F PR MEDICATION LIST DOCUMENTED IN MEDICAL RECORD: ICD-10-PCS | Mod: CPTII,S$GLB,, | Performed by: COLON & RECTAL SURGERY

## 2022-01-03 PROCEDURE — 99204 OFFICE O/P NEW MOD 45 MIN: CPT | Mod: S$GLB,,, | Performed by: COLON & RECTAL SURGERY

## 2022-01-03 PROCEDURE — 1159F MED LIST DOCD IN RCRD: CPT | Mod: CPTII,S$GLB,, | Performed by: COLON & RECTAL SURGERY

## 2022-01-03 PROCEDURE — 3077F PR MOST RECENT SYSTOLIC BLOOD PRESSURE >= 140 MM HG: ICD-10-PCS | Mod: CPTII,S$GLB,, | Performed by: COLON & RECTAL SURGERY

## 2022-01-03 PROCEDURE — 3080F PR MOST RECENT DIASTOLIC BLOOD PRESSURE >= 90 MM HG: ICD-10-PCS | Mod: CPTII,S$GLB,, | Performed by: COLON & RECTAL SURGERY

## 2022-01-03 PROCEDURE — 99999 PR PBB SHADOW E&M-EST. PATIENT-LVL IV: CPT | Mod: PBBFAC,,, | Performed by: COLON & RECTAL SURGERY

## 2022-01-03 PROCEDURE — 3008F BODY MASS INDEX DOCD: CPT | Mod: CPTII,S$GLB,, | Performed by: COLON & RECTAL SURGERY

## 2022-01-03 PROCEDURE — 3080F DIAST BP >= 90 MM HG: CPT | Mod: CPTII,S$GLB,, | Performed by: COLON & RECTAL SURGERY

## 2022-01-03 RX ORDER — SODIUM, POTASSIUM,MAG SULFATES 17.5-3.13G
1 SOLUTION, RECONSTITUTED, ORAL ORAL DAILY
Qty: 1 KIT | Refills: 0 | Status: SHIPPED | OUTPATIENT
Start: 2022-01-03 | End: 2022-01-05

## 2022-01-03 NOTE — PROGRESS NOTES
History & Physical    SUBJECTIVE:     History of Present Illness:  Patient is a 41 y.o. female presents with an approximately 10 year history of rectal bleeding without pain. The bleeding only occurs with bowel movements, but sometime it is enough to turn the toilet water pink. Her last colonoscopy was about 15 years ago, which she reports was normal. She is unsure of why it was performed. She has 1-2 bowel movements a day, occasionally strains, does not have hard stools, does not take stool softeners, laxatives, or fiber, sits for 10-15 minutes when she uses the restroom, drinks less than 32 ounces of water a day, and uses only toilet paper. She has an uncle with a history of colon cancer, but no first degree relative history of CRC, UC, CD, or colonic polyps. Denies fevers, chills, nausea, vomiting, changes in stool caliber, night sweats, unexpected weight loss.     Chief Complaint   Patient presents with    Rectal Bleeding       Review of patient's allergies indicates:  No Known Allergies    Current Outpatient Medications   Medication Sig Dispense Refill    ALPRAZolam (XANAX) 0.25 MG tablet TAKE 1 TO 2 TABLETS BY MOUTH THREE TIMES DAILY AS NEEDED FOR ANXIETY 90 tablet 0    buPROPion (WELLBUTRIN) 100 MG tablet Take 1 tablet (100 mg total) by mouth 2 (two) times daily. 60 tablet 11    cyclobenzaprine (FLEXERIL) 10 MG tablet Take 1 tablet (10 mg total) by mouth 3 (three) times daily as needed for Muscle spasms. 30 tablet 1    EScitalopram oxalate (LEXAPRO) 20 MG tablet TAKE 1 TABLET(20 MG) BY MOUTH EVERY DAY 30 tablet 11    hydroCHLOROthiazide (HYDRODIURIL) 12.5 MG Tab Take 1 tablet (12.5 mg total) by mouth once daily. 30 tablet 11    HYDROcodone-acetaminophen (NORCO) 7.5-325 mg per tablet Take 1 tablet by mouth every 6 (six) hours as needed for Pain. 20 tablet 0    losartan (COZAAR) 100 MG tablet TAKE 1 TABLET BY MOUTH EVERY DAY 30 tablet 11    meclizine (ANTIVERT) 25 mg tablet Take 1-2 tablets (25-50 mg  total) by mouth every 4 to 6 hours as needed for Dizziness. 30 tablet 1    metoprolol succinate (TOPROL-XL) 50 MG 24 hr tablet TAKE 1 TABLET(50 MG) BY MOUTH EVERY DAY 30 tablet 11    naproxen (NAPROSYN) 500 MG tablet Take 1 tablet (500 mg total) by mouth 2 (two) times daily with meals. 30 tablet 2    rizatriptan (MAXALT) 10 MG tablet Take 1 tablet (10 mg total) by mouth as needed. 6 tablet 11    topiramate (TOPAMAX) 50 MG tablet Take 1 tablet (50 mg total) by mouth 2 (two) times daily. 60 tablet 11    zolpidem (AMBIEN) 5 MG Tab Take 1 tablet (5 mg total) by mouth nightly as needed. 30 tablet 5     No current facility-administered medications for this visit.       Past Medical History:   Diagnosis Date    Anemia     Cervical radiculopathy     COVID-19 virus infection 2021    Diverticulosis     Hypermenorrhea     Hypertension     Insomnia     Internal hemorrhoid     Migraine headache     Morbid obesity     Rectal bleeding     Uterine fibroid      Past Surgical History:   Procedure Laterality Date     SECTION, LOW TRANSVERSE      x1    TOTAL ABDOMINAL HYSTERECTOMY  14    TUBAL LIGATION       Family History   Problem Relation Age of Onset    Hypertension Mother     Pulmonary embolism Mother     Deep vein thrombosis Mother     Thrombophilia Mother     Heart failure Mother     Pulmonary embolism Maternal Uncle     Deep vein thrombosis Maternal Uncle     Thrombophilia Maternal Uncle     Colon cancer Maternal Uncle     COPD Father     Diabetes Maternal Grandfather     Stroke Maternal Grandfather     Breast cancer Maternal Aunt     Lung cancer Paternal Uncle     Breast cancer Maternal Grandmother     Stroke Maternal Grandmother     ADD / ADHD Daughter      Social History     Tobacco Use    Smoking status: Never Smoker    Smokeless tobacco: Never Used   Substance Use Topics    Alcohol use: No    Drug use: No        Review of Systems:  Review of Systems   Constitutional:  Negative for activity change, appetite change, chills, diaphoresis, fatigue, fever and unexpected weight change.   HENT: Negative for congestion, dental problem, rhinorrhea and sore throat.    Eyes: Negative for visual disturbance.   Respiratory: Negative for cough, chest tightness, shortness of breath and wheezing.    Cardiovascular: Negative for chest pain, palpitations and leg swelling.   Gastrointestinal: Positive for blood in stool. Negative for abdominal distention, abdominal pain, constipation, diarrhea, nausea, rectal pain and vomiting.   Endocrine: Negative for cold intolerance, heat intolerance, polydipsia, polyphagia and polyuria.   Genitourinary: Negative for difficulty urinating, dysuria, frequency, hematuria and urgency.   Musculoskeletal: Negative for arthralgias, gait problem, myalgias and neck pain.   Skin: Negative for color change, pallor, rash and wound.   Neurological: Negative for dizziness, syncope, weakness, light-headedness, numbness and headaches.   Hematological: Negative for adenopathy. Does not bruise/bleed easily.   Psychiatric/Behavioral: Negative for confusion, decreased concentration and sleep disturbance. The patient is not nervous/anxious.        OBJECTIVE:     Vital Signs (Most Recent)  Temp: 98.1 °F (36.7 °C) (01/03/22 1314)  Pulse: 75 (01/03/22 1314)  BP: (!) 148/91 (01/03/22 1314)     132.6 kg (292 lb 5.3 oz)     Physical Exam:  Physical Exam  Vitals reviewed.   Constitutional:       General: She is not in acute distress.     Appearance: Normal appearance. She is well-developed and well-nourished. She is not ill-appearing.   HENT:      Head: Normocephalic and atraumatic.      Right Ear: External ear normal.      Left Ear: External ear normal.   Eyes:      General: No scleral icterus.     Extraocular Movements: Extraocular movements intact and EOM normal.      Conjunctiva/sclera: Conjunctivae normal.   Cardiovascular:      Rate and Rhythm: Normal rate.   Pulmonary:      Effort:  Pulmonary effort is normal. No respiratory distress.   Abdominal:      General: There is no distension.      Palpations: Abdomen is soft.      Tenderness: There is no abdominal tenderness.   Genitourinary:     Comments: Anorectal: offered but declined  Musculoskeletal:      Cervical back: Neck supple.   Skin:     General: Skin is warm and dry.   Neurological:      Mental Status: She is alert and oriented to person, place, and time.   Psychiatric:         Behavior: Behavior normal.         Laboratory  CMP  Sodium   Date Value Ref Range Status   07/06/2021 139 136 - 145 mmol/L Final     Potassium   Date Value Ref Range Status   07/06/2021 3.9 3.5 - 5.1 mmol/L Final     Chloride   Date Value Ref Range Status   07/06/2021 107 95 - 110 mmol/L Final     CO2   Date Value Ref Range Status   07/06/2021 25 23 - 29 mmol/L Final     Glucose   Date Value Ref Range Status   07/06/2021 96 70 - 110 mg/dL Final     BUN   Date Value Ref Range Status   07/06/2021 15 6 - 20 mg/dL Final     Creatinine   Date Value Ref Range Status   07/06/2021 1.0 0.5 - 1.4 mg/dL Final     Calcium   Date Value Ref Range Status   07/06/2021 8.7 8.7 - 10.5 mg/dL Final     Total Protein   Date Value Ref Range Status   07/06/2021 7.2 6.0 - 8.4 g/dL Final     Albumin   Date Value Ref Range Status   07/06/2021 3.1 (L) 3.5 - 5.2 g/dL Final     Total Bilirubin   Date Value Ref Range Status   07/06/2021 0.3 0.1 - 1.0 mg/dL Final     Comment:     For infants and newborns, interpretation of results should be based  on gestational age, weight and in agreement with clinical  observations.    Premature Infant recommended reference ranges:  Up to 24 hours.............<8.0 mg/dL  Up to 48 hours............<12.0 mg/dL  3-5 days..................<15.0 mg/dL  6-29 days.................<15.0 mg/dL       Alkaline Phosphatase   Date Value Ref Range Status   07/06/2021 51 (L) 55 - 135 U/L Final     AST   Date Value Ref Range Status   07/06/2021 19 10 - 40 U/L Final     ALT    Date Value Ref Range Status   07/06/2021 15 10 - 44 U/L Final     Anion Gap   Date Value Ref Range Status   07/06/2021 7 (L) 8 - 16 mmol/L Final     eGFR if    Date Value Ref Range Status   07/06/2021 >60.0 >60 mL/min/1.73 m^2 Final     eGFR if non    Date Value Ref Range Status   07/06/2021 >60.0 >60 mL/min/1.73 m^2 Final     Comment:     Calculation used to obtain the estimated glomerular filtration  rate (eGFR) is the CKD-EPI equation.          ASSESSMENT/PLAN:     42 y/o female with rectal bleeding and possible hemorrhoids    - long discussion with the patient regarding her hematochezia and possible hemorrhoidal disease.  Discussed that even that she may have hemorrhoids that could be bleeding, we would need evaluated more proximal source endoscopically to fully rule out any more proximal source of her bleeding.  She is comfortable with this and will proceed in this manner.  Discussed that we would need to perform a full anorectal exam prior to any treatment hemorrhoids and she would like this done during her colonoscopy which is reasonable.  - Discussed that a minimum I would recommend behavioral, lifestyle and medication modifications to improve bowel habits. Usual bowel management handout given to patient. This includes a stool softener twice per day, fiber powder supplementation daily, drinking at least 64oz of water/day, avoiding straining with bowel movements, spending less than 5 min on toilet per bowel movement, eating a high fiber diet, using miralax as needed to achieve a bowel movement daily and using wet wipes to wipe after bowel movements when irritated.   - Colonoscopy in near future. Suprep called into pharmacy and instructions given on use.  - discuss further interventions for hemorrhoidal disease such as hemorrhoid banding or surgical excision.  Discussed that we would need to complete the exam 1st and establish her true possibly prior to any discussion of  treatment.  - RTC after colonoscopy PRN    Brittany Lagunas PA-C  [facility]

## 2022-01-19 ENCOUNTER — PATIENT MESSAGE (OUTPATIENT)
Dept: ENDOSCOPY | Facility: HOSPITAL | Age: 42
End: 2022-01-19
Payer: COMMERCIAL

## 2022-01-20 ENCOUNTER — PATIENT MESSAGE (OUTPATIENT)
Dept: ADMINISTRATIVE | Facility: HOSPITAL | Age: 42
End: 2022-01-20
Payer: COMMERCIAL

## 2022-02-01 ENCOUNTER — PATIENT MESSAGE (OUTPATIENT)
Dept: ADMINISTRATIVE | Facility: HOSPITAL | Age: 42
End: 2022-02-01
Payer: COMMERCIAL

## 2022-02-03 ENCOUNTER — PATIENT MESSAGE (OUTPATIENT)
Dept: FAMILY MEDICINE | Facility: CLINIC | Age: 42
End: 2022-02-03
Payer: COMMERCIAL

## 2022-02-07 ENCOUNTER — OFFICE VISIT (OUTPATIENT)
Dept: FAMILY MEDICINE | Facility: CLINIC | Age: 42
End: 2022-02-07
Payer: COMMERCIAL

## 2022-02-07 VITALS — SYSTOLIC BLOOD PRESSURE: 125 MMHG | DIASTOLIC BLOOD PRESSURE: 79 MMHG

## 2022-02-07 DIAGNOSIS — E66.01 MORBID OBESITY: Chronic | ICD-10-CM

## 2022-02-07 DIAGNOSIS — F41.1 GAD (GENERALIZED ANXIETY DISORDER): ICD-10-CM

## 2022-02-07 DIAGNOSIS — I10 ESSENTIAL HYPERTENSION: Primary | Chronic | ICD-10-CM

## 2022-02-07 DIAGNOSIS — F32.5 MAJOR DEPRESSIVE DISORDER WITH SINGLE EPISODE, IN FULL REMISSION: ICD-10-CM

## 2022-02-07 PROCEDURE — 3078F PR MOST RECENT DIASTOLIC BLOOD PRESSURE < 80 MM HG: ICD-10-PCS | Mod: CPTII,95,, | Performed by: FAMILY MEDICINE

## 2022-02-07 PROCEDURE — 1159F PR MEDICATION LIST DOCUMENTED IN MEDICAL RECORD: ICD-10-PCS | Mod: CPTII,95,, | Performed by: FAMILY MEDICINE

## 2022-02-07 PROCEDURE — 1159F MED LIST DOCD IN RCRD: CPT | Mod: CPTII,95,, | Performed by: FAMILY MEDICINE

## 2022-02-07 PROCEDURE — 99214 OFFICE O/P EST MOD 30 MIN: CPT | Mod: 95,,, | Performed by: FAMILY MEDICINE

## 2022-02-07 PROCEDURE — 3074F PR MOST RECENT SYSTOLIC BLOOD PRESSURE < 130 MM HG: ICD-10-PCS | Mod: CPTII,95,, | Performed by: FAMILY MEDICINE

## 2022-02-07 PROCEDURE — 1160F RVW MEDS BY RX/DR IN RCRD: CPT | Mod: CPTII,95,, | Performed by: FAMILY MEDICINE

## 2022-02-07 PROCEDURE — 4010F ACE/ARB THERAPY RXD/TAKEN: CPT | Mod: CPTII,95,, | Performed by: FAMILY MEDICINE

## 2022-02-07 PROCEDURE — 3078F DIAST BP <80 MM HG: CPT | Mod: CPTII,95,, | Performed by: FAMILY MEDICINE

## 2022-02-07 PROCEDURE — 1160F PR REVIEW ALL MEDS BY PRESCRIBER/CLIN PHARMACIST DOCUMENTED: ICD-10-PCS | Mod: CPTII,95,, | Performed by: FAMILY MEDICINE

## 2022-02-07 PROCEDURE — 4010F PR ACE/ARB THEARPY RXD/TAKEN: ICD-10-PCS | Mod: CPTII,95,, | Performed by: FAMILY MEDICINE

## 2022-02-07 PROCEDURE — 99214 PR OFFICE/OUTPT VISIT, EST, LEVL IV, 30-39 MIN: ICD-10-PCS | Mod: 95,,, | Performed by: FAMILY MEDICINE

## 2022-02-07 PROCEDURE — 3074F SYST BP LT 130 MM HG: CPT | Mod: CPTII,95,, | Performed by: FAMILY MEDICINE

## 2022-02-07 RX ORDER — NIFEDIPINE 60 MG/1
60 TABLET, EXTENDED RELEASE ORAL DAILY
Qty: 30 TABLET | Refills: 2 | Status: SHIPPED | OUTPATIENT
Start: 2022-02-07 | End: 2022-05-16

## 2022-02-07 NOTE — PROGRESS NOTES
The patient location is: At home   The chief complaint leading to consultation is:  Elevated blood pressure    Visit type:  Audiovisual    Face to Face time with patient: 16 min  30 minutes of total time spent on the encounter, which includes face to face time and non-face to face time preparing to see the patient (eg, review of tests), Obtaining and/or reviewing separately obtained history, Documenting clinical information in the electronic or other health record, Independently interpreting results (not separately reported) and communicating results to the patient/family/caregiver, or Care coordination (not separately reported).     Each patient to whom he or she provides medical services by telemedicine is:  (1) informed of the relationship between the physician and patient and the respective role of any other health care provider with respect to management of the patient; and (2) notified that he or she may decline to receive medical services by telemedicine and may withdraw from such care at any time.      CHIEF COMPLAINT:  This is a 41-year-old female complaining of elevated blood pressures.    SUBJECTIVE:  The patient complains of recent problems with elevated blood pressure despite taking her antihypertensive medication consistently.  She takes metoprolol XL 50 mg daily, losartan 100 mg daily and hydrochlorothiazide 12.5 mg daily.  Her diastolic blood pressures have been in the low to high 90s with systolics in the 140-150 range.  However yesterday and today she has had improved blood pressure readings as follows: 125/79, 126/78 and 137/89.  In fact yesterday she had a low blood pressure reading of 108/67.  Patient has taken other antihypertensive medications in the past with side effects such as Ace inhibitors and amlodipine.  Patient reports severe headaches in the last 2 weeks.  She has a history of migraine headaches which have been refractory to treatment in the past.  She takes Imitrex and Topamax.       The patient takes Lexapro 20 mg daily for anxiety which is in partial remission.  She takes alprazolam 0.25 mg to 3 times daily as needed. She takes Wellbutrin  mg twice daily for major depression which is controlled.  She takes zolpidem as needed for sleeplessness.     Patient will be traveling to [facility] soon and needs COVID PCR test 48 hours prior to getting on the airplane.  She is fully vaccinated against COVID including booster.    ROS:  GENERAL: Patient denies fever, chills, night sweats.  Patient denies weight gain or loss. Patient denies anorexia, fatigue, weakness or swollen glands.  SKIN: Patient denies rash.  HEENT: Patient denies sore throat, ear pain, hearing loss, nasal congestion, or runny nose. Patient denies visual disturbance, eye irritation or discharge.  LUNGS: Patient denies cough, wheeze or hemoptysis.  CARDIOVASCULAR: Patient denies chest pain, shortness of breath, palpitations, or syncope.  Positive for lower extremity swelling.  GI: Patient denies abdominal pain, nausea, vomiting, diarrhea, constipation, blood in stool or melena.  GENITOURINARY: Patient denies pelvic pain, vaginal discharge, itch or odor. Patient denies irregular vaginal bleeding.  Patient denies dysuria, frequency, hematuria, nocturia, urgency or incontinence.  MUSCULOSKELETAL: Patient denies joint pain, swelling, redness or warmth.  NEUROLOGIC: Patient denies vertigo, paresthesias, weakness in limb, dysarthria, dysphagia or abnormality of gait.  Positive for headaches.  PSYCHIATRIC: Patient denies depression.  Positive for anxiety and insomnia.    OBJECTIVE:   GENERAL: Well-developed well-nourished, morbidly obese black female alert and oriented x3 in no acute distress. Memory, judgment and cognition without deficit.  Normal affect.  No hallucinations, delusions or flight of ideas.  SKIN: Clear without rash. Normal color and tone.  HEENT: Eyes: Clear conjunctivae. No scleral icterus.  Nose:  Not  congested-sounding.    NECK: Supple, normal range of motion.  LUNGS:  Normal respiratory effort.  No audible wheezing.  EXTREMITIES:  Normal range of motion in all extremities.  NEUROLOGIC:  Gait without abnormality.  No tremor.    ASSESSMENT:  1. Essential hypertension    2. PREMA (generalized anxiety disorder)    3. Major depressive disorder with single episode, in full remission    4. Morbid obesity      PLAN:   1. Continue antihypertensive medication.  2. Add nifedipine 60 mg daily.  3. Monitor blood pressure.  4. Report readings in 2-3 weeks.  5. Reduce weight.  6. Exercise 150 minutes per week.  7. Limit salt in diet.  8. Follow-up if no improvement or worsening symptoms.    This note is generated with speech recognition software and is subject to transcription error and sound alike phrases that may be missed by proofreading.

## 2022-03-29 ENCOUNTER — TELEPHONE (OUTPATIENT)
Dept: ADMINISTRATIVE | Facility: HOSPITAL | Age: 42
End: 2022-03-29
Payer: COMMERCIAL

## 2022-03-29 DIAGNOSIS — K62.5 RECTAL BLEEDING: Primary | ICD-10-CM

## 2022-03-29 NOTE — TELEPHONE ENCOUNTER
Case Request for Endo has been canceled and new Referral for Endo Procedure  has been entered for Diagnostic Colonoscopy

## 2022-04-04 ENCOUNTER — PATIENT MESSAGE (OUTPATIENT)
Dept: FAMILY MEDICINE | Facility: CLINIC | Age: 42
End: 2022-04-04
Payer: COMMERCIAL

## 2022-04-11 ENCOUNTER — TELEPHONE (OUTPATIENT)
Dept: FAMILY MEDICINE | Facility: CLINIC | Age: 42
End: 2022-04-11

## 2022-04-19 DIAGNOSIS — K62.5 RECTAL BLEEDING: Primary | ICD-10-CM

## 2022-04-22 ENCOUNTER — PATIENT MESSAGE (OUTPATIENT)
Dept: FAMILY MEDICINE | Facility: CLINIC | Age: 42
End: 2022-04-22
Payer: COMMERCIAL

## 2022-04-22 NOTE — TELEPHONE ENCOUNTER
Attempted to call pt. PtJuancho Hung up.     ----- Message from Sol Baron sent at 4/22/2022  2:07 PM CDT -----  Contact: Patient, [phone]  Calling to speak with the nurse regarding she left at the office to be completed. Please call her. Thanks.

## 2022-04-25 ENCOUNTER — TELEPHONE (OUTPATIENT)
Dept: FAMILY MEDICINE | Facility: CLINIC | Age: 42
End: 2022-04-25
Payer: COMMERCIAL

## 2022-04-25 RX ORDER — BUTALBITAL, ACETAMINOPHEN AND CAFFEINE 50; 325; 40 MG/1; MG/1; MG/1
TABLET ORAL
Qty: 30 TABLET | Refills: 0 | Status: SHIPPED | OUTPATIENT
Start: 2022-04-25 | End: 2022-06-25 | Stop reason: SDUPTHER

## 2022-04-25 RX ORDER — METHYLPREDNISOLONE 4 MG/1
TABLET ORAL
Qty: 1 EACH | Refills: 0 | Status: ON HOLD | OUTPATIENT
Start: 2022-04-25 | End: 2022-07-07

## 2022-04-25 NOTE — TELEPHONE ENCOUNTER
Have [place] been [place]? I mentioned looking at the old form and copying? Did she request a handicapped tag?

## 2022-04-25 NOTE — TELEPHONE ENCOUNTER
Spoke with pt and pt has had a migraine headache since last Tuesday. Pt states that she has been taking naproxen and Excedrin for the pain with no relief. Pt denies any blurred vision, dizziness, or nausea. Pt  c/o having chills, hot and cold sweats, no voice since this past Saturday. Pt has took 2 covid test and they both were negative. Pt has been taking elisabeth seltzer cold and sinus but still no relief. Please advise

## 2022-04-26 ENCOUNTER — TELEPHONE (OUTPATIENT)
Dept: FAMILY MEDICINE | Facility: CLINIC | Age: 42
End: 2022-04-26
Payer: COMMERCIAL

## 2022-04-26 NOTE — TELEPHONE ENCOUNTER
Spoke with pt and pt states that she was on her way to . I informed pt that she has some meds at her pharmacy so pt stated that she would not go to  and just go  the medicine from pharmacy. Pt states that she has burning in her throat and it make its hard for her to swallow, not sure if it is a infection or not. Pt would like to know what she can do for the burning of her throat. Pt is also concerned about her FMLA & Mobility  Paperwork. Pt needs a excuse starting this Monday until next Monday.Please advise

## 2022-04-29 ENCOUNTER — IMMUNIZATION (OUTPATIENT)
Dept: PRIMARY CARE CLINIC | Facility: CLINIC | Age: 42
End: 2022-04-29
Payer: COMMERCIAL

## 2022-04-29 DIAGNOSIS — Z23 NEED FOR VACCINATION: Primary | ICD-10-CM

## 2022-04-29 PROCEDURE — 0051A COVID-19, MRNA, LNP-S, PF, 30 MCG/0.3 ML DOSE VACCINE (PFIZER): CPT | Mod: CV19,PBBFAC | Performed by: FAMILY MEDICINE

## 2022-04-29 PROCEDURE — 91305 COVID-19, MRNA, LNP-S, PF, 30 MCG/0.3 ML DOSE VACCINE (PFIZER): CPT | Mod: PBBFAC | Performed by: FAMILY MEDICINE

## 2022-05-15 NOTE — TELEPHONE ENCOUNTER
Care Due:                  Date            Visit Type   Department     Provider  --------------------------------------------------------------------------------                                ESTABLISHED                              PATIENT -    [facility]  Last Visit: 02-      [facility]      MEDICINE       Alyssa Muse  Next Visit: None Scheduled  None         None Found                                                            Last  Test          Frequency    Reason                     Performed    Due Date  --------------------------------------------------------------------------------    CMP.........  12 months..  hydroCHLOROthiazide,       07- 07-                             losartan.................    [employer] Embedded Care Gaps. Reference number: [id number]. 5/15/2022   8:10:13 AM CDT

## 2022-05-16 RX ORDER — NIFEDIPINE 60 MG/1
TABLET, EXTENDED RELEASE ORAL
Qty: 30 TABLET | Refills: 2 | Status: SHIPPED | OUTPATIENT
Start: 2022-05-16 | End: 2022-11-18 | Stop reason: SDUPTHER

## 2022-05-16 NOTE — TELEPHONE ENCOUNTER
Refill Authorization Note   Georgia Garcia  is requesting a refill authorization.  Brief Assessment and Rationale for Refill:  Approve    -Medication-Related Problems Identified: Requires labs  Medication Therapy Plan:       Medication Reconciliation Completed: No   Comments:     No Care Gaps recommended.     Note composed:1:31 PM 05/16/2022

## 2022-06-07 ENCOUNTER — PATIENT MESSAGE (OUTPATIENT)
Dept: FAMILY MEDICINE | Facility: CLINIC | Age: 42
End: 2022-06-07
Payer: COMMERCIAL

## 2022-06-14 ENCOUNTER — TELEPHONE (OUTPATIENT)
Dept: PREADMISSION TESTING | Facility: HOSPITAL | Age: 42
End: 2022-06-14
Payer: COMMERCIAL

## 2022-06-14 DIAGNOSIS — K62.5 RECTAL BLEEDING: Primary | ICD-10-CM

## 2022-06-14 NOTE — TELEPHONE ENCOUNTER
----- Message from Sarah Jones sent at 6/14/2022  3:11 PM CDT -----  Pt called in regarding an appt for a colonoscopy please call pt at  [phone] to schedule     Thanksbs

## 2022-06-14 NOTE — TELEPHONE ENCOUNTER
No new care gaps identified.  [facility] Embedded Care Gaps. Reference number: [id number]. 6/14/2022   1:39:14 PM CDT

## 2022-06-15 RX ORDER — ALPRAZOLAM 0.25 MG/1
TABLET ORAL
Qty: 90 TABLET | Refills: 3 | Status: SHIPPED | OUTPATIENT
Start: 2022-06-15 | End: 2022-11-18 | Stop reason: SDUPTHER

## 2022-06-25 RX ORDER — ALPRAZOLAM 0.25 MG/1
TABLET ORAL
Qty: 90 TABLET | Refills: 3 | Status: CANCELLED | OUTPATIENT
Start: 2022-06-25

## 2022-06-25 NOTE — TELEPHONE ENCOUNTER
No new care gaps identified.  [facility] Embedded Care Gaps. Reference number: [id number]. 6/25/2022   10:00:15 AM CDT

## 2022-06-25 NOTE — TELEPHONE ENCOUNTER
No new care gaps identified.  [facility] Embedded Care Gaps. Reference number: [id number]. 6/25/2022   9:59:18 AM CDT

## 2022-06-29 ENCOUNTER — PATIENT MESSAGE (OUTPATIENT)
Dept: FAMILY MEDICINE | Facility: CLINIC | Age: 42
End: 2022-06-29
Payer: COMMERCIAL

## 2022-06-29 RX ORDER — BUTALBITAL, ACETAMINOPHEN AND CAFFEINE 50; 325; 40 MG/1; MG/1; MG/1
TABLET ORAL
Qty: 30 TABLET | Refills: 0 | OUTPATIENT
Start: 2022-06-29

## 2022-06-29 RX ORDER — BUTALBITAL, ACETAMINOPHEN AND CAFFEINE 50; 325; 40 MG/1; MG/1; MG/1
TABLET ORAL
Qty: 30 TABLET | Refills: 2 | Status: SHIPPED | OUTPATIENT
Start: 2022-06-29 | End: 2022-11-18 | Stop reason: SDUPTHER

## 2022-06-29 NOTE — TELEPHONE ENCOUNTER
Requested Prescriptions     Pending Prescriptions Disp Refills    butalbital-acetaminophen-caffeine -40 mg (FIORICET, ESGIC) -40 mg per tablet 30 tablet 0     Sig: Take 1-2 tablets every 6 hr as needed for headache.  Not to exceed 6 tablets in 24 hr.       LV 02/07/22  NV none scheduled.   LF 04/25/2022

## 2022-07-05 ENCOUNTER — PATIENT MESSAGE (OUTPATIENT)
Dept: ENDOSCOPY | Facility: HOSPITAL | Age: 42
End: 2022-07-05
Payer: COMMERCIAL

## 2022-07-06 ENCOUNTER — TELEPHONE (OUTPATIENT)
Dept: PREADMISSION TESTING | Facility: HOSPITAL | Age: 42
End: 2022-07-06
Payer: COMMERCIAL

## 2022-07-06 NOTE — TELEPHONE ENCOUNTER
----- Message from Cherelle Acosta sent at 7/6/2022  8:46 AM CDT -----  Contact: pt  The pt request a return call concerning her 07/07 appt, no additional info given and can be reached at [phone]///[id number]

## 2022-07-07 ENCOUNTER — ANESTHESIA (OUTPATIENT)
Dept: ENDOSCOPY | Facility: HOSPITAL | Age: 42
End: 2022-07-07
Payer: COMMERCIAL

## 2022-07-07 ENCOUNTER — HOSPITAL ENCOUNTER (OUTPATIENT)
Facility: HOSPITAL | Age: 42
Discharge: HOME | End: 2022-07-07
Attending: INTERNAL MEDICINE | Admitting: INTERNAL MEDICINE
Payer: COMMERCIAL

## 2022-07-07 ENCOUNTER — ANESTHESIA EVENT (OUTPATIENT)
Dept: ENDOSCOPY | Facility: HOSPITAL | Age: 42
End: 2022-07-07
Payer: COMMERCIAL

## 2022-07-07 PROBLEM — K62.5 RECTAL BLEEDING: Status: ACTIVE | Noted: 2022-07-07

## 2022-07-07 PROCEDURE — 27201089 HC SNARE, DISP (ANY): Performed by: INTERNAL MEDICINE

## 2022-07-07 PROCEDURE — 37000008 HC ANESTHESIA 1ST 15 MINUTES: Performed by: INTERNAL MEDICINE

## 2022-07-07 PROCEDURE — 63600175 PHARM REV CODE 636 W HCPCS: Performed by: NURSE ANESTHETIST, CERTIFIED REGISTERED

## 2022-07-07 PROCEDURE — 88305 TISSUE EXAM BY PATHOLOGIST: CPT | Mod: 59 | Performed by: PATHOLOGY

## 2022-07-07 PROCEDURE — 45385 COLONOSCOPY W/LESION REMOVAL: CPT | Mod: ,,, | Performed by: INTERNAL MEDICINE

## 2022-07-07 PROCEDURE — 88305 TISSUE EXAM BY PATHOLOGIST: CPT | Mod: 26,,, | Performed by: PATHOLOGY

## 2022-07-07 PROCEDURE — 45385 COLONOSCOPY W/LESION REMOVAL: CPT | Performed by: INTERNAL MEDICINE

## 2022-07-07 PROCEDURE — 25000003 PHARM REV CODE 250: Performed by: NURSE ANESTHETIST, CERTIFIED REGISTERED

## 2022-07-07 PROCEDURE — 88305 TISSUE EXAM BY PATHOLOGIST: ICD-10-PCS | Mod: 26,,, | Performed by: PATHOLOGY

## 2022-07-07 PROCEDURE — 37000009 HC ANESTHESIA EA ADD 15 MINS: Performed by: INTERNAL MEDICINE

## 2022-07-07 PROCEDURE — 45385 PR COLONOSCOPY,REMV LESN,SNARE: ICD-10-PCS | Mod: ,,, | Performed by: INTERNAL MEDICINE

## 2022-07-07 RX ORDER — SODIUM CHLORIDE, SODIUM LACTATE, POTASSIUM CHLORIDE, CALCIUM CHLORIDE 600; 310; 30; 20 MG/100ML; MG/100ML; MG/100ML; MG/100ML
INJECTION, SOLUTION INTRAVENOUS CONTINUOUS PRN
Status: DISCONTINUED | OUTPATIENT
Start: 2022-07-07 | End: 2022-07-07

## 2022-07-07 RX ORDER — LIDOCAINE HYDROCHLORIDE 10 MG/ML
INJECTION, SOLUTION EPIDURAL; INFILTRATION; INTRACAUDAL; PERINEURAL
Status: DISCONTINUED | OUTPATIENT
Start: 2022-07-07 | End: 2022-07-07

## 2022-07-07 RX ORDER — PROPOFOL 10 MG/ML
VIAL (ML) INTRAVENOUS
Status: DISCONTINUED | OUTPATIENT
Start: 2022-07-07 | End: 2022-07-07

## 2022-07-07 RX ADMIN — SODIUM CHLORIDE, SODIUM LACTATE, POTASSIUM CHLORIDE, AND CALCIUM CHLORIDE: 600; 310; 30; 20 INJECTION, SOLUTION INTRAVENOUS at 08:07

## 2022-07-07 RX ADMIN — PROPOFOL 50 MG: 10 INJECTION, EMULSION INTRAVENOUS at 08:07

## 2022-07-07 RX ADMIN — PROPOFOL 30 MG: 10 INJECTION, EMULSION INTRAVENOUS at 08:07

## 2022-07-07 RX ADMIN — PROPOFOL 70 MG: 10 INJECTION, EMULSION INTRAVENOUS at 08:07

## 2022-07-07 RX ADMIN — LIDOCAINE HYDROCHLORIDE 50 MG: 10 INJECTION, SOLUTION EPIDURAL; INFILTRATION; INTRACAUDAL; PERINEURAL at 08:07

## 2022-07-07 NOTE — ANESTHESIA RELEASE NOTE
Anesthesia Release from PACU Note    Patient: Georgia Garcia    Procedure(s) Performed: Procedure(s) (LRB):  COLONOSCOPY (N/A)    Anesthesia type: MAC    Post pain: Adequate analgesia    Post assessment: no apparent anesthetic complications and tolerated procedure well    Last Vitals:   Visit Vitals  BP (!) 140/84 (BP Location: Left arm, Patient Position: Lying)   Pulse 82   Temp 36.9 °C (98.4 °F) (Temporal)   Resp 16   Ht 5' (1.524 m)   Wt 127 kg (280 lb)   LMP 09/09/2014   SpO2 98%   Breastfeeding No   BMI 54.68 kg/m²       Post vital signs: stable    Level of consciousness: awake, alert  and oriented    Nausea/Vomiting: no nausea/no vomiting    Complications: none    Airway Patency: patent    Respiratory: unassisted, spontaneous ventilation, room air    Cardiovascular: stable and blood pressure at baseline    Hydration: euvolemic

## 2022-07-07 NOTE — [employer] PATIENT INSTRUCTIONS
Discharge Summary/Instructions after an Endoscopic Procedure  Patient Name: Georgia Garcia  Patient MRN: [id number]  Patient YOB: 1980  Thursday, July 7, 2022 Maximus Lopes MD  Dear patient,  As a result of recent federal legislation (The Federal Cures Act), you may   receive lab or pathology results from your procedure in your [facility]   account before your physician is able to contact you. Your physician or   their representative will relay the results to you with their   recommendations at their soonest availability.  Thank you,  RESTRICTIONS:  During your procedure today, you received medications for sedation.  These   medications may affect your judgment, balance and coordination.  Therefore,   for 24 hours, you have the following restrictions:   - DO NOT drive a car, operate machinery, make legal/financial decisions,   sign important papers or drink alcohol.    ACTIVITY:  Today: no heavy lifting, straining or running due to procedural   sedation/anesthesia.  The following day: return to full activity including work.  DIET:  Eat and drink normally unless instructed otherwise.     TREATMENT FOR COMMON SIDE EFFECTS:  - Mild abdominal pain, nausea, belching, bloating or excessive gas:  rest,   eat lightly and use a heating pad.  - Sore Throat: treat with throat lozenges and/or gargle with warm salt   water.  - Because air was used during the procedure, expelling large amounts of air   from your rectum or belching is normal.  - If a bowel prep was taken, you may not have a bowel movement for 1-3 days.    This is normal.  SYMPTOMS TO WATCH FOR AND REPORT TO YOUR PHYSICIAN:  1. Abdominal pain or bloating, other than gas cramps.  2. Chest pain.  3. Back pain.  4. Signs of infection such as: chills or fever occurring within 24 hours   after the procedure.  5. Rectal bleeding, which would show as bright red, maroon, or black stools.   (A tablespoon of blood from the rectum is not serious, especially  if   hemorrhoids are present.)  6. Vomiting.  7. Weakness or dizziness.  GO DIRECTLY TO THE NEAREST EMERGENCY ROOM IF YOU HAVE ANY OF THE FOLLOWING:      Difficulty breathing              Chills and/or fever over 101 F   Persistent vomiting and/or vomiting blood   Severe abdominal pain   Severe chest pain   Black, tarry stools   Bleeding- more than one tablespoon   Any other symptom or condition that you feel may need urgent attention  Your doctor recommends these additional instructions:  If any biopsies were taken, your doctors clinic will contact you in 1 to 2   weeks with any results.  - Discharge patient to home (via wheelchair).   - Resume previous diet.   - Continue present medications.   - Await pathology results.   - Repeat colonoscopy in 5 years for surveillance considering family history   of Ca colon   - Return to referring physician (date not yet determined).  For questions, problems or results please call your physician Maximus Lopes MD at Work:  [phone]  If you have any questions about the above instructions, call the GI   department at [phone] or call the endoscopy unit at [phone]   from 7am until 3 pm.  [facility], EMERGENCY ROOM PHONE NUMBER:   [phone]  IF A COMPLICATION OR EMERGENCY SITUATION ARISES AND YOU ARE UNABLE TO REACH   YOUR PHYSICIAN - GO DIRECTLY TO THE EMERGENCY ROOM.  I have read or have had read to me these discharge instructions for my   procedure and have received a written copy.  I understand these   instructions and will follow-up with my physician if I have any questions.     __________________________________       _____________________________________  Nurse Signature                                          Patient/Designated   Responsible Party Signature  MD Maximus Bal MD  7/7/2022 8:46:54 AM  This report has been verified and signed electronically.  Dear patient,  As a result of  recent federal legislation (The Federal Cures Act), you may   receive lab or pathology results from your procedure in your [facility]   account before your physician is able to contact you. Your physician or   their representative will relay the results to you with their   recommendations at their soonest availability.  Thank you,  PROVATION

## 2022-07-07 NOTE — DISCHARGE SUMMARY
[facility])  Discharge Note  Short Stay    Procedure(s) (LRB):  COLONOSCOPY (N/A)    OUTCOME: Patient tolerated treatment/procedure well without complication and is now ready for discharge.    DISPOSITION: Home or Self Care    FINAL DIAGNOSIS:  Rectal bleeding    FOLLOWUP: With primary care provider    DISCHARGE INSTRUCTIONS:  No discharge procedures on file.

## 2022-07-07 NOTE — ANESTHESIA PREPROCEDURE EVALUATION
07/07/2022  Georgia Garcia is a 42 y.o., female.      Pre-op Assessment    I have reviewed the Patient Summary Reports.     I have reviewed the Nursing Notes. I have reviewed the NPO Status.   I have reviewed the Medications.     Review of Systems  Cardiovascular:   Hypertension    Musculoskeletal:   Arthritis     Neurological:   Neuromuscular Disease, Headaches    Psych:   Psychiatric History          Physical Exam  General: Well nourished, Cooperative, Alert and Oriented    Airway:  Mallampati: II   Mouth Opening: Normal  Tongue: Normal  Neck ROM: Normal ROM    Dental:  Intact        Anesthesia Plan  Type of Anesthesia, risks & benefits discussed:    Anesthesia Type: MAC  Intra-op Monitoring Plan: Standard ASA Monitors  Post Op Pain Control Plan: multimodal analgesia  Informed Consent: Informed consent signed with the Patient and all parties understand the risks and agree with anesthesia plan.  All questions answered.   ASA Score: 3  Day of Surgery Review of History & Physical: H&P Update referred to the surgeon/provider.    Ready For Surgery From Anesthesia Perspective.     .

## 2022-07-07 NOTE — H&P
Short Stay Endoscopy History and Physical    PCP - Alyssa Muse MD  Referring Physician - Hetal Knowles LPN  No address on file    Procedure - Colonoscopy  ASA - per anesthesia  Mallampati - per anesthesia  History of Anesthesia problems - no  Family history Anesthesia problems -  no   Plan of anesthesia - General    HPI  42 y.o. female  Reason for procedure: Rectal bleediing      ROS:  Constitutional: No fevers, chills, No weight loss  CV: No chest pain  Pulm: No cough, No shortness of breath  GI: see HPI    Medical History:  has a past medical history of Anemia, Cervical radiculopathy, COVID-19 virus infection (2021), Diverticulosis, Hypermenorrhea, Hypertension, Insomnia, Internal hemorrhoid, Migraine headache, Morbid obesity, Rectal bleeding, and Uterine fibroid.    Surgical History:  has a past surgical history that includes Tubal ligation;  section, low transverse; and Total abdominal hysterectomy (14).    Family History: family history includes ADD / ADHD in her daughter; Breast cancer in her maternal aunt and maternal grandmother; COPD in her father; Colon cancer in her maternal uncle; Deep vein thrombosis in her maternal uncle and mother; Diabetes in her maternal grandfather; Heart failure in her mother; Hypertension in her mother; Lung cancer in her paternal uncle; Pulmonary embolism in her maternal uncle and mother; Stroke in her maternal grandfather and maternal grandmother; Thrombophilia in her maternal uncle and mother..    Social History:  reports that she has never smoked. She has never used smokeless tobacco. She reports that she does not drink alcohol and does not use drugs.    Review of patient's allergies indicates:  No Known Allergies    Medications:   Medications Prior to Admission   Medication Sig Dispense Refill Last Dose    ALPRAZolam (XANAX) 0.25 MG tablet TAKE 1 TO 2 TABLETS BY MOUTH THREE TIMES DAILY AS NEEDED FOR ANXIETY 90 tablet 3 2022 at Unknown time     butalbital-acetaminophen-caffeine -40 mg (FIORICET, ESGIC) -40 mg per tablet Take 1-2 tablets every 6 hr as needed for headache.  Not to exceed 6 tablets in 24 hr. 30 tablet 2 Past Month at Unknown time    hydroCHLOROthiazide (HYDRODIURIL) 12.5 MG Tab Take 1 tablet (12.5 mg total) by mouth once daily. 30 tablet 11 7/6/2022 at Unknown time    losartan (COZAAR) 100 MG tablet TAKE 1 TABLET BY MOUTH EVERY DAY 90 tablet 0 7/7/2022 at Unknown time    metoprolol succinate (TOPROL-XL) 50 MG 24 hr tablet TAKE 1 TABLET(50 MG) BY MOUTH EVERY DAY 30 tablet 11 7/7/2022 at Unknown time    NIFEdipine (PROCARDIA-XL) 60 MG (OSM) 24 hr tablet TAKE 1 TABLET(60 MG) BY MOUTH EVERY DAY 30 tablet 2 Past Week at Unknown time    rizatriptan (MAXALT) 10 MG tablet Take 1 tablet (10 mg total) by mouth as needed. 6 tablet 11 Past Month at Unknown time    topiramate (TOPAMAX) 50 MG tablet Take 1 tablet (50 mg total) by mouth 2 (two) times daily. 60 tablet 11 Past Month at Unknown time    buPROPion (WELLBUTRIN) 100 MG tablet Take 1 tablet (100 mg total) by mouth 2 (two) times daily. 60 tablet 11 More than a month at Unknown time    cyclobenzaprine (FLEXERIL) 10 MG tablet Take 1 tablet (10 mg total) by mouth 3 (three) times daily as needed for Muscle spasms. 30 tablet 1 More than a month at Unknown time    EScitalopram oxalate (LEXAPRO) 20 MG tablet TAKE 1 TABLET(20 MG) BY MOUTH EVERY DAY 30 tablet 11 More than a month at Unknown time    HYDROcodone-acetaminophen (NORCO) 7.5-325 mg per tablet Take 1 tablet by mouth every 6 (six) hours as needed for Pain. 20 tablet 0 More than a month at Unknown time    meclizine (ANTIVERT) 25 mg tablet Take 1-2 tablets (25-50 mg total) by mouth every 4 to 6 hours as needed for Dizziness. 30 tablet 1 More than a month at Unknown time    naproxen (NAPROSYN) 500 MG tablet TAKE 1 TABLET(500 MG) BY MOUTH TWICE DAILY WITH MEALS 30 tablet 2     zolpidem (AMBIEN) 5 MG Tab Take 1 tablet (5 mg  total) by mouth nightly as needed. 30 tablet 5        Physical Exam:    Vital Signs: There were no vitals filed for this visit.    General Appearance: Well appearing in no acute distress  Abdomen: Soft, non tender, non distended with normal bowel sounds, no masses    Labs:  Lab Results   Component Value Date    WBC 5.52 11/01/2021    HGB 11.0 (L) 11/01/2021    HCT 35.5 (L) 11/01/2021     11/01/2021    CHOL 149 11/01/2021    TRIG 50 11/01/2021    HDL 50 11/01/2021    ALT 15 07/06/2021    AST 19 07/06/2021     07/06/2021    K 3.9 07/06/2021     07/06/2021    CREATININE 1.0 07/06/2021    BUN 15 07/06/2021    CO2 25 07/06/2021    TSH 1.457 07/06/2021       I have explained the risks and benefits of this endoscopic procedure to the patient including but not limited to bleeding, inflammation, infection, perforation, and death.    SEDATION PLAN: per anesthesia      History reviewed, vital signs satisfactory, cardiopulmonary status satisfactory, sedation options, risks and plans have been discussed with the patient  All their questions were answered and the patient agrees to the sedation procedures as planned and the patient is deemed an appropriate candidate for the sedation as planned.    Procedure explained to patient, informed consent obtained and placed in chart.        Maximus Lopes MD

## 2022-07-07 NOTE — TRANSFER OF CARE
Anesthesia Transfer of Care Note    Patient: Georgia Garcia    Procedure(s) Performed: Procedure(s) (LRB):  COLONOSCOPY (N/A)    Patient location: GI    Anesthesia Type: MAC    Transport from OR: Transported from OR on room air with adequate spontaneous ventilation    Post pain: adequate analgesia    Post assessment: no apparent anesthetic complications and tolerated procedure well    Post vital signs: stable    Level of consciousness: awake, alert and oriented    Nausea/Vomiting: no nausea/vomiting    Complications: none    Transfer of care protocol was followed      Last vitals:   Visit Vitals  BP (!) 146/90 (BP Location: Left arm, Patient Position: Lying)   Pulse 99   Temp 36.5 °C (97.7 °F) (Temporal)   Resp 18   Ht 5' (1.524 m)   Wt 127 kg (280 lb)   LMP 09/09/2014   SpO2 98%   Breastfeeding No   BMI 54.68 kg/m²

## 2022-07-07 NOTE — ANESTHESIA POSTPROCEDURE EVALUATION
Anesthesia Post Evaluation    Patient: Georgia Garcia    Procedure(s) Performed: Procedure(s) (LRB):  COLONOSCOPY (N/A)    Final Anesthesia Type: MAC      Patient location during evaluation: GI PACU  Patient participation: Yes- Able to Participate  Level of consciousness: awake and alert and oriented  Post-procedure vital signs: reviewed and stable  Pain management: adequate  Airway patency: patent  ALFIE mitigation strategies: Multimodal analgesia  PONV status at discharge: No PONV  Anesthetic complications: no      Cardiovascular status: hemodynamically stable  Respiratory status: unassisted, spontaneous ventilation and room air  Hydration status: euvolemic  Follow-up not needed.          Vitals Value Taken Time   /84 07/07/22 0855   Temp 36.9 °C (98.4 °F) 07/07/22 0845   Pulse 82 07/07/22 0855   Resp 16 07/07/22 0855   SpO2 98 % 07/07/22 0855         No case tracking events are documented in the log.      Pain/Jama Score: Jama Score: 10 (7/7/2022  8:55 AM)

## 2022-07-07 NOTE — PLAN OF CARE
Pt and family received discharge instructions, they verbalized understanding. Tolerated clear liquids. No complaints. VSS. Will discharge home.

## 2022-07-08 ENCOUNTER — OFFICE VISIT (OUTPATIENT)
Dept: FAMILY MEDICINE | Facility: CLINIC | Age: 42
End: 2022-07-08
Payer: COMMERCIAL

## 2022-07-08 VITALS
OXYGEN SATURATION: 100 % | TEMPERATURE: 98 F | SYSTOLIC BLOOD PRESSURE: 145 MMHG | RESPIRATION RATE: 15 BRPM | HEIGHT: 60 IN | DIASTOLIC BLOOD PRESSURE: 89 MMHG | BODY MASS INDEX: 54.97 KG/M2 | WEIGHT: 280 LBS | HEART RATE: 76 BPM

## 2022-07-08 VITALS
TEMPERATURE: 97 F | HEIGHT: 60 IN | HEART RATE: 98 BPM | BODY MASS INDEX: 56.74 KG/M2 | OXYGEN SATURATION: 100 % | SYSTOLIC BLOOD PRESSURE: 126 MMHG | DIASTOLIC BLOOD PRESSURE: 78 MMHG | WEIGHT: 289 LBS

## 2022-07-08 DIAGNOSIS — G43.009 MIGRAINE WITHOUT AURA AND WITHOUT STATUS MIGRAINOSUS, NOT INTRACTABLE: ICD-10-CM

## 2022-07-08 DIAGNOSIS — M54.32 LEFT SIDED SCIATICA: ICD-10-CM

## 2022-07-08 DIAGNOSIS — M79.89 SOFT TISSUE MASS: Primary | ICD-10-CM

## 2022-07-08 PROCEDURE — 3074F PR MOST RECENT SYSTOLIC BLOOD PRESSURE < 130 MM HG: ICD-10-PCS | Mod: CPTII,S$GLB,, | Performed by: FAMILY MEDICINE

## 2022-07-08 PROCEDURE — 1160F PR REVIEW ALL MEDS BY PRESCRIBER/CLIN PHARMACIST DOCUMENTED: ICD-10-PCS | Mod: CPTII,S$GLB,, | Performed by: FAMILY MEDICINE

## 2022-07-08 PROCEDURE — 3078F PR MOST RECENT DIASTOLIC BLOOD PRESSURE < 80 MM HG: ICD-10-PCS | Mod: CPTII,S$GLB,, | Performed by: FAMILY MEDICINE

## 2022-07-08 PROCEDURE — 1159F PR MEDICATION LIST DOCUMENTED IN MEDICAL RECORD: ICD-10-PCS | Mod: CPTII,S$GLB,, | Performed by: FAMILY MEDICINE

## 2022-07-08 PROCEDURE — 1160F RVW MEDS BY RX/DR IN RCRD: CPT | Mod: CPTII,S$GLB,, | Performed by: FAMILY MEDICINE

## 2022-07-08 PROCEDURE — 3074F SYST BP LT 130 MM HG: CPT | Mod: CPTII,S$GLB,, | Performed by: FAMILY MEDICINE

## 2022-07-08 PROCEDURE — 3008F PR BODY MASS INDEX (BMI) DOCUMENTED: ICD-10-PCS | Mod: CPTII,S$GLB,, | Performed by: FAMILY MEDICINE

## 2022-07-08 PROCEDURE — 99214 PR OFFICE/OUTPT VISIT, EST, LEVL IV, 30-39 MIN: ICD-10-PCS | Mod: S$GLB,,, | Performed by: FAMILY MEDICINE

## 2022-07-08 PROCEDURE — 4010F PR ACE/ARB THEARPY RXD/TAKEN: ICD-10-PCS | Mod: CPTII,S$GLB,, | Performed by: FAMILY MEDICINE

## 2022-07-08 PROCEDURE — 3008F BODY MASS INDEX DOCD: CPT | Mod: CPTII,S$GLB,, | Performed by: FAMILY MEDICINE

## 2022-07-08 PROCEDURE — 99999 PR PBB SHADOW E&M-EST. PATIENT-LVL IV: CPT | Mod: PBBFAC,,, | Performed by: FAMILY MEDICINE

## 2022-07-08 PROCEDURE — 1159F MED LIST DOCD IN RCRD: CPT | Mod: CPTII,S$GLB,, | Performed by: FAMILY MEDICINE

## 2022-07-08 PROCEDURE — 3078F DIAST BP <80 MM HG: CPT | Mod: CPTII,S$GLB,, | Performed by: FAMILY MEDICINE

## 2022-07-08 PROCEDURE — 99214 OFFICE O/P EST MOD 30 MIN: CPT | Mod: S$GLB,,, | Performed by: FAMILY MEDICINE

## 2022-07-08 PROCEDURE — 99999 PR PBB SHADOW E&M-EST. PATIENT-LVL IV: ICD-10-PCS | Mod: PBBFAC,,, | Performed by: FAMILY MEDICINE

## 2022-07-08 PROCEDURE — 4010F ACE/ARB THERAPY RXD/TAKEN: CPT | Mod: CPTII,S$GLB,, | Performed by: FAMILY MEDICINE

## 2022-07-08 RX ORDER — PREDNISONE 20 MG/1
TABLET ORAL
Qty: 10 TABLET | Refills: 0 | Status: SHIPPED | OUTPATIENT
Start: 2022-07-08 | End: 2022-11-18

## 2022-07-08 RX ORDER — AZELASTINE 1 MG/ML
SPRAY, METERED NASAL
COMMUNITY
Start: 2022-04-26

## 2022-07-08 NOTE — PROGRESS NOTES
CHIEF COMPLAINT:  This is a 42-year-old female with multiple medical complaints.    SUBJECTIVE:  Patient is concerned about a soft tissue mass left occipital region which has been present since birth.  She states that over the last few months the lesion seems to have increased in size slightly and become uncomfortable she describes this as a tenderness around the area.  She denies any history of trauma to the area.  She is concerned that it may be related to her increase in migraine headaches lately.  She recently had a migraine headache that lasted for 5 days and finally resolved after taking hydrocodone APAP.  The patient takes Topamax 50 mg twice daily for prevention and Maxalt 10 mg as needed to abort the headache.  These medications do not seem to have been working as well lately.    Patient also complains of left-sided sciatica which has been ongoing for the previous few months but has gotten worse in the last 5 days.  The pain starts in her buttock and radiates down her leg.  She is taking Tylenol and already takes naproxen for joint pain.  She denies any history of injury or increased activity.    Patient recently had colonoscopy which was unrevealing with 1 polyp and a first degree non bleeding internal hemorrhoid.  She was also noted to have  diverticulosis.  She was told that I needed to refer her to a GI specialist but I am unclear as to the reason that she needs the referral.  She was also told there may be a  gynecological problem but she is not aware of what was found during her procedure.  Patient is status post hysterectomy with ovaries remaining.    ROS:  GENERAL: Patient denies fever, chills, night sweats. Patient denies weight loss or gain. Patient denies fatigue, anorexia, weakness or swollen glands.   SKIN: Patient denies rash or hair loss.  Positive for skin lesion.  HEENT: Patient denies sore throat, ear pain, hearing loss, nasal congestion, or runny nose. Patient denies visual disturbance,  eye irritation or discharge.  LUNGS: Patient denies cough, wheeze or hemoptysis.  CARDIOVASCULAR: Patient denies chest pain, shortness of breath, palpitations, syncope or lower extremity edema.  GI: Patient denies abdominal pain, nausea, vomiting, diarrhea, constipation, blood in stool or melena.  GENITOURINARY: Patient denies pelvic pain, vaginal discharge, odor or itch.  Patient denies irregular vaginal bleeding. Patient denies dysuria, frequency, hematuria, nocturia, urgency or incontinence.  MUSCULOSKELETAL: Patient denies joint pain, swelling, redness or warmth.  Positive for back pain and leg pain.  NEUROLOGIC:  Patient denies dizziness, numbness, tingling, weakness in limb, dysarthria, dysphagia or abnormality of gait.  Positive for headaches.  PSYCHIATRIC: Patient denies depression, anxiety, or memory loss.       OBJECTIVE:   GENERAL: Well-developed well-nourished, morbidly obese, black female alert and oriented x3, in no acute distress. Memory, judgment and cognition without deficit.    SKIN: Clear without rash. Normal color and tone.  2.5 x 3 cm soft tissue nodule left occiput to.  Movable.  Nontender.  HEENT: Eyes: Clear conjunctivae. No scleral icterus.  NECK: Supple, normal range of motion.   LUNGS: Normal respiratory effort.  CARDIOVASCULAR: Regular rhythm, normal S1, S2 without murmur, gallop or rub.  EXTREMITIES: Without cyanosis, clubbing or edema. Normal range of motion in all extremities. No joint effusion, erythema or warmth.  NEUROLOGIC: Gait without abnormality. No tremor.     Lengthy discussion regarding above issues.  Discussed new medication, Ubrelvy to abort migraines.    ASSESSMENT:  1. Soft tissue mass    2. Left sided sciatica    3. Migraine without aura and without status migrainosus, not intractable      PLAN:   1. Surgical consult offered.  Patient agrees to monitor soft tissue mass and will recheck size at next visit.  2. Short prednisone taper starting at 40 mg over 1 week.  3.  Consider physical therapy.  4. Increase Topamax to 50 mg in the morning and 100 mg at bedtime.  5. Follow-up if no improvement or worsening condition.  6. Obtain recommendations from GI specialist regarding need for further GI evaluation (does she go back to Dr. Mullins?) and reason for GYN evaluation.    30 minutes of total time spent on the encounter, which includes face to face time and non-face to face time preparing to see the patient (eg, review of tests), Obtaining and/or reviewing separately obtained history, Documenting clinical information in the electronic or other health record, Independently interpreting results (not separately reported) and communicating results to the patient/family/caregiver, or Care coordination (not separately reported).     This note is generated with speech recognition software and is subject to transcription error and sound alike phrases that may be missed by proofreading.

## 2022-07-12 ENCOUNTER — PATIENT MESSAGE (OUTPATIENT)
Dept: GASTROENTEROLOGY | Facility: CLINIC | Age: 42
End: 2022-07-12
Payer: COMMERCIAL

## 2022-07-12 LAB
FINAL PATHOLOGIC DIAGNOSIS: NORMAL
GROSS: NORMAL
Lab: NORMAL

## 2022-07-12 RX ORDER — NAPROXEN 500 MG/1
TABLET ORAL
Qty: 30 TABLET | Refills: 2 | Status: SHIPPED | OUTPATIENT
Start: 2022-07-12 | End: 2022-08-30

## 2022-07-12 NOTE — TELEPHONE ENCOUNTER
Requested Prescriptions     Pending Prescriptions Disp Refills    naproxen (NAPROSYN) 500 MG tablet [Pharmacy Med Name: NAPROXEN 500MG TABLETS] 30 tablet 2     Sig: TAKE 1 TABLET(500 MG) BY MOUTH TWICE DAILY WITH MEALS       LV 07/08/2022   NV none  LF 01/31/2022

## 2022-07-13 ENCOUNTER — PATIENT MESSAGE (OUTPATIENT)
Dept: GASTROENTEROLOGY | Facility: CLINIC | Age: 42
End: 2022-07-13
Payer: COMMERCIAL

## 2022-07-28 ENCOUNTER — PATIENT MESSAGE (OUTPATIENT)
Dept: FAMILY MEDICINE | Facility: CLINIC | Age: 42
End: 2022-07-28
Payer: COMMERCIAL

## 2022-07-28 LAB
CHOLEST SERPL-MSCNC: 158 MG/DL (ref 100–200)
HDL/CHOLESTEROL RATIO: 2.9 %
HDLC SERPL-MCNC: 55 MG/DL
LDLC SERPL CALC-MCNC: 86 MG/DL
TRIGL SERPL-MCNC: 84 MG/DL
VLDLC SERPL-MCNC: 17 MG/DL (ref 5–40)

## 2022-09-08 ENCOUNTER — PATIENT OUTREACH (OUTPATIENT)
Dept: ADMINISTRATIVE | Facility: HOSPITAL | Age: 42
End: 2022-09-08
Payer: COMMERCIAL

## 2022-10-05 DIAGNOSIS — I10 ESSENTIAL HYPERTENSION: ICD-10-CM

## 2022-10-20 ENCOUNTER — PATIENT MESSAGE (OUTPATIENT)
Dept: FAMILY MEDICINE | Facility: CLINIC | Age: 42
End: 2022-10-20
Payer: COMMERCIAL

## 2022-10-21 ENCOUNTER — TELEPHONE (OUTPATIENT)
Dept: FAMILY MEDICINE | Facility: CLINIC | Age: 42
End: 2022-10-21

## 2022-10-21 DIAGNOSIS — Z12.31 ENCOUNTER FOR SCREENING MAMMOGRAM FOR MALIGNANT NEOPLASM OF BREAST: Primary | ICD-10-CM

## 2022-11-03 RX ORDER — NAPROXEN 500 MG/1
500 TABLET ORAL 2 TIMES DAILY WITH MEALS
Qty: 30 TABLET | Refills: 0 | Status: SHIPPED | OUTPATIENT
Start: 2022-11-03 | End: 2022-11-18 | Stop reason: SDUPTHER

## 2022-11-10 ENCOUNTER — OFFICE VISIT (OUTPATIENT)
Dept: OBSTETRICS AND GYNECOLOGY | Facility: CLINIC | Age: 42
End: 2022-11-10
Payer: COMMERCIAL

## 2022-11-10 ENCOUNTER — OFFICE VISIT (OUTPATIENT)
Dept: SPORTS MEDICINE | Facility: CLINIC | Age: 42
End: 2022-11-10
Payer: COMMERCIAL

## 2022-11-10 ENCOUNTER — HOSPITAL ENCOUNTER (OUTPATIENT)
Dept: RADIOLOGY | Facility: HOSPITAL | Age: 42
Discharge: HOME | End: 2022-11-10
Attending: FAMILY MEDICINE
Payer: COMMERCIAL

## 2022-11-10 VITALS
SYSTOLIC BLOOD PRESSURE: 122 MMHG | HEIGHT: 61 IN | DIASTOLIC BLOOD PRESSURE: 84 MMHG | BODY MASS INDEX: 55.32 KG/M2 | WEIGHT: 293 LBS

## 2022-11-10 VITALS — HEIGHT: 61 IN | BODY MASS INDEX: 55.32 KG/M2 | WEIGHT: 293 LBS

## 2022-11-10 DIAGNOSIS — M54.32 LEFT SIDED SCIATICA: ICD-10-CM

## 2022-11-10 DIAGNOSIS — Z01.419 ROUTINE GYNECOLOGICAL EXAMINATION: Primary | ICD-10-CM

## 2022-11-10 DIAGNOSIS — M25.562 PAIN IN BOTH KNEES, UNSPECIFIED CHRONICITY: ICD-10-CM

## 2022-11-10 DIAGNOSIS — M25.561 PAIN IN BOTH KNEES, UNSPECIFIED CHRONICITY: ICD-10-CM

## 2022-11-10 DIAGNOSIS — M17.0 PRIMARY OSTEOARTHRITIS OF BOTH KNEES: Primary | ICD-10-CM

## 2022-11-10 DIAGNOSIS — M25.562 PAIN IN BOTH KNEES, UNSPECIFIED CHRONICITY: Primary | ICD-10-CM

## 2022-11-10 DIAGNOSIS — N89.8 VAGINAL ODOR: ICD-10-CM

## 2022-11-10 DIAGNOSIS — M25.561 PAIN IN BOTH KNEES, UNSPECIFIED CHRONICITY: Primary | ICD-10-CM

## 2022-11-10 PROCEDURE — 99214 OFFICE O/P EST MOD 30 MIN: CPT | Mod: S$GLB,,, | Performed by: FAMILY MEDICINE

## 2022-11-10 PROCEDURE — 1160F PR REVIEW ALL MEDS BY PRESCRIBER/CLIN PHARMACIST DOCUMENTED: ICD-10-PCS | Mod: CPTII,S$GLB,, | Performed by: NURSE PRACTITIONER

## 2022-11-10 PROCEDURE — 3008F PR BODY MASS INDEX (BMI) DOCUMENTED: ICD-10-PCS | Mod: CPTII,S$GLB,, | Performed by: FAMILY MEDICINE

## 2022-11-10 PROCEDURE — 73564 XR KNEE ORTHO BILAT WITH FLEXION: ICD-10-PCS | Mod: 26,,, | Performed by: RADIOLOGY

## 2022-11-10 PROCEDURE — 99999 PR PBB SHADOW E&M-EST. PATIENT-LVL III: ICD-10-PCS | Mod: PBBFAC,,, | Performed by: NURSE PRACTITIONER

## 2022-11-10 PROCEDURE — 99999 PR PBB SHADOW E&M-EST. PATIENT-LVL III: CPT | Mod: PBBFAC,,, | Performed by: FAMILY MEDICINE

## 2022-11-10 PROCEDURE — 1160F RVW MEDS BY RX/DR IN RCRD: CPT | Mod: CPTII,S$GLB,, | Performed by: NURSE PRACTITIONER

## 2022-11-10 PROCEDURE — 1159F PR MEDICATION LIST DOCUMENTED IN MEDICAL RECORD: ICD-10-PCS | Mod: CPTII,S$GLB,, | Performed by: NURSE PRACTITIONER

## 2022-11-10 PROCEDURE — 73564 X-RAY EXAM KNEE 4 OR MORE: CPT | Mod: TC,50

## 2022-11-10 PROCEDURE — 73564 X-RAY EXAM KNEE 4 OR MORE: CPT | Mod: 26,,, | Performed by: RADIOLOGY

## 2022-11-10 PROCEDURE — 4010F PR ACE/ARB THEARPY RXD/TAKEN: ICD-10-PCS | Mod: CPTII,S$GLB,, | Performed by: NURSE PRACTITIONER

## 2022-11-10 PROCEDURE — 99386 PR PREVENTIVE VISIT,NEW,40-64: ICD-10-PCS | Mod: S$GLB,,, | Performed by: NURSE PRACTITIONER

## 2022-11-10 PROCEDURE — 3079F DIAST BP 80-89 MM HG: CPT | Mod: CPTII,S$GLB,, | Performed by: NURSE PRACTITIONER

## 2022-11-10 PROCEDURE — 4010F ACE/ARB THERAPY RXD/TAKEN: CPT | Mod: CPTII,S$GLB,, | Performed by: NURSE PRACTITIONER

## 2022-11-10 PROCEDURE — 99214 PR OFFICE/OUTPT VISIT, EST, LEVL IV, 30-39 MIN: ICD-10-PCS | Mod: S$GLB,,, | Performed by: FAMILY MEDICINE

## 2022-11-10 PROCEDURE — 3074F SYST BP LT 130 MM HG: CPT | Mod: CPTII,S$GLB,, | Performed by: NURSE PRACTITIONER

## 2022-11-10 PROCEDURE — 81514 NFCT DS BV&VAGINITIS DNA ALG: CPT | Performed by: NURSE PRACTITIONER

## 2022-11-10 PROCEDURE — 3079F PR MOST RECENT DIASTOLIC BLOOD PRESSURE 80-89 MM HG: ICD-10-PCS | Mod: CPTII,S$GLB,, | Performed by: NURSE PRACTITIONER

## 2022-11-10 PROCEDURE — 1159F PR MEDICATION LIST DOCUMENTED IN MEDICAL RECORD: ICD-10-PCS | Mod: CPTII,S$GLB,, | Performed by: FAMILY MEDICINE

## 2022-11-10 PROCEDURE — 3008F BODY MASS INDEX DOCD: CPT | Mod: CPTII,S$GLB,, | Performed by: FAMILY MEDICINE

## 2022-11-10 PROCEDURE — 1159F MED LIST DOCD IN RCRD: CPT | Mod: CPTII,S$GLB,, | Performed by: FAMILY MEDICINE

## 2022-11-10 PROCEDURE — 3008F BODY MASS INDEX DOCD: CPT | Mod: CPTII,S$GLB,, | Performed by: NURSE PRACTITIONER

## 2022-11-10 PROCEDURE — 99386 PREV VISIT NEW AGE 40-64: CPT | Mod: S$GLB,,, | Performed by: NURSE PRACTITIONER

## 2022-11-10 PROCEDURE — 3074F PR MOST RECENT SYSTOLIC BLOOD PRESSURE < 130 MM HG: ICD-10-PCS | Mod: CPTII,S$GLB,, | Performed by: NURSE PRACTITIONER

## 2022-11-10 PROCEDURE — 99999 PR PBB SHADOW E&M-EST. PATIENT-LVL III: ICD-10-PCS | Mod: PBBFAC,,, | Performed by: FAMILY MEDICINE

## 2022-11-10 PROCEDURE — 4010F ACE/ARB THERAPY RXD/TAKEN: CPT | Mod: CPTII,S$GLB,, | Performed by: FAMILY MEDICINE

## 2022-11-10 PROCEDURE — 4010F PR ACE/ARB THEARPY RXD/TAKEN: ICD-10-PCS | Mod: CPTII,S$GLB,, | Performed by: FAMILY MEDICINE

## 2022-11-10 PROCEDURE — 1159F MED LIST DOCD IN RCRD: CPT | Mod: CPTII,S$GLB,, | Performed by: NURSE PRACTITIONER

## 2022-11-10 PROCEDURE — 3008F PR BODY MASS INDEX (BMI) DOCUMENTED: ICD-10-PCS | Mod: CPTII,S$GLB,, | Performed by: NURSE PRACTITIONER

## 2022-11-10 PROCEDURE — 99999 PR PBB SHADOW E&M-EST. PATIENT-LVL III: CPT | Mod: PBBFAC,,, | Performed by: NURSE PRACTITIONER

## 2022-11-10 RX ORDER — METRONIDAZOLE 500 MG/1
500 TABLET ORAL EVERY 12 HOURS
Qty: 14 TABLET | Refills: 0 | Status: SHIPPED | OUTPATIENT
Start: 2022-11-10 | End: 2022-11-17

## 2022-11-10 NOTE — PATIENT INSTRUCTIONS
MEDICAL NECESSITY FOR VISCOSUPPLEMENTATION: After thorough evaluation of the patient, I have determined that visco-supplementation is medically necessary. The patient has painful DJD of the knee with failure of conservative therapy including lifestyle modifications and rehabilitation exercises. Oral analgesis/NSAIDs have not adequately controlled symptoms and there is radiographic evidence of joint space narrowing, subchondral sclerosis, and some early osteophytic changes, or in lack of radiographic evidence, there is arthroscopic or other evidence of chondrosis.  Kellgren- Sarkis grade 2 or greater.  Bilateral and desires Synvisc 1Patient Education       Viscosupplementation   Why is this procedure done?   Your joints are where two bones meet. The ends of the bones are covered with a protective coating of cartilage. This helps them glide smoothly during movement. A fluid also helps the joint move more smoothly. With years of use, the cartilage may begin to wear away. This causes pain in the joints. This procedure is done to relieve the pain. A special fluid is used to help the bones glide more easily. It also acts like a shock absorber. This is most often done on the knee joints.  What will the results be?   Lubricates the joint  Less pain in the joints during movement or weight bearing  Improved joint mobility or movement  What happens before the procedure?   Your doctor may ask you to try other ways to treat osteoarthritis or OA, such as exercise, physical therapy, drugs for pain, applying hot compress, and losing weight.  Talk to your doctor about all the drugs you are taking. Be sure to include all prescription and over-the-counter (OTC) drugs, and herbal supplements. Tell the doctor about any drug allergy. Bring a list of drugs you take with you. Some drugs may interact with the injection.  What happens during the procedure?   Your doctor will clean the skin area where the injection will be given. You will  be given a drug called local anesthesia. This will numb your skin and you will not feel any pain.  In some cases, your doctor might use imaging like x-ray or ultrasound to make sure they inject the right spot.  If you have excess fluid in your joint, your doctor may remove the fluid before beginning.  A needle will be used to inject the hyaluronic acid into the joint. Hyaluronic acid is a natural fluid in your body. It lubricates the joint to ease body movements.  The doctor will cover the injection site with a small bandage to prevent infection.  The procedure may only take a couple of minutes.  What happens after the procedure?   You may feel slight pain, warmth, and swelling around the joint area.  You will be able to go home after the injection.  What care is needed at home?   Ask your doctor what you need to do when you go home. Make sure you ask questions if you do not understand what the doctor says. This way you will know what you need to do.  Place an ice pack or a bag of frozen peas wrapped in a towel over the painful part. Never put ice right on the skin. Do not leave the ice on more than 10 to 15 minutes at a time.  Rest for the first few days after the procedure. Avoid strenuous activities like heavy lifting, jogging, standing for a long time, and hard exercise.  What follow-up care is needed?   Your doctor may ask you to make visits to the office to check on your progress. Be sure to keep these visits. Your doctor may want you to have more injections.  What lifestyle changes are needed?   Ask your doctor about when you can go back to your normal activities like exercise or work.  What problems could happen?   Pain, redness, and swelling around the injection site  Infection of the joint  Fever  Allergic reaction  Itching  Rash  Bruising  Bleeding in the joint  No change in pain after injection  Where can I learn more?   [employer]  [employer]  http://orthoinfo.aaos.org/topic.cfm?kwjja=u36741   Last Reviewed Date   2021-03-30  Consumer Information Use and Disclaimer   This information is not specific medical advice and does not replace information you receive from your health care provider. This is only a brief summary of general information. It does NOT include all information about conditions, illnesses, injuries, tests, procedures, treatments, therapies, discharge instructions or life-style choices that may apply to you. You must talk with your health care provider for complete information about your health and treatment options. This information should not be used to decide whether or not to accept your health care providers advice, instructions or recommendations. Only your health care provider has the knowledge and training to provide advice that is right for you.  Copyright   Copyright © 2021 [employer], [employer] and its affiliates and/or licensors. All rights reserved.

## 2022-11-10 NOTE — PROGRESS NOTES
"  Subjective:       Patient ID: Georgia Garcia is a 42 y.o. female.    Chief Complaint:  Annual Exam      History of Present Illness  HPI  Presents today for [employer]   Hysterectomy 2014 non cancerous reasons  Complains of vaginal odor, thinks she has bv  Health Maintenance   Topic Date Due    Mammogram  2022    Lipid Panel  2023    TETANUS VACCINE  2031    Hepatitis C Screening  Completed     GYN & OB History  Patient's last menstrual period was 2014.   Date of Last Pap: hysterectomy     OB History    Para Term  AB Living   2 2 1     1   SAB IAB Ectopic Multiple Live Births                  # Outcome Date GA Lbr Jimmie/2nd Weight Sex Delivery Anes PTL Lv   2 Term            1 Para      CS-Unspec          Review of Systems  Review of Systems        Objective:   /84   Ht 5' 1" (1.549 m)   Wt 134 kg (295 lb 6.7 oz)   LMP 2014   BMI 55.82 kg/m²    Physical Exam:   Constitutional: She is oriented to person, place, and time. She appears well-developed and well-nourished.             Abdominal: Soft.     Genitourinary:    Inguinal canal, vagina, right adnexa and left adnexa normal.      Pelvic exam was performed with patient supine.   The external female genitalia was normal.   Genitalia hair distrobution normal .   Labial bartholins normal.No no adexnal prolapse. Right adnexum displays no mass, no tenderness and no fullness. Left adnexum displays no mass, no tenderness and no fullness. Vaginal cuff normal.  No erythema,  no vaginal discharge, bleeding, rectocele, cystocele or unspecified prolapse of vaginal walls in the vagina.    No foreign body in the vagina.      No signs of injury in the vagina.   Cervix is absent.Uterus is absent.               Neurological: She is alert and oriented to person, place, and time.    Skin: Skin is warm and dry.    Psychiatric: She has a normal mood and affect. Her behavior is normal. Judgment and thought content normal.      Assessment: "        1. Routine gynecological examination    2. Vaginal odor               Plan:      Return to clinic in one year for WWMAN[facility]      Georgia was seen today for annual exam.    Diagnoses and all orders for this visit:    Routine gynecological examination    Vaginal odor  -     metroNIDAZOLE (FLAGYL) 500 MG tablet; Take 1 tablet (500 mg total) by mouth every 12 (twelve) hours. for 7 days  -     Vaginosis Screen by DNA Probe

## 2022-11-11 ENCOUNTER — HOSPITAL ENCOUNTER (OUTPATIENT)
Dept: RADIOLOGY | Facility: HOSPITAL | Age: 42
Discharge: HOME | End: 2022-11-11
Attending: FAMILY MEDICINE
Payer: COMMERCIAL

## 2022-11-11 VITALS — WEIGHT: 293 LBS | BODY MASS INDEX: 55.32 KG/M2 | HEIGHT: 61 IN

## 2022-11-11 DIAGNOSIS — M54.32 LEFT SIDED SCIATICA: Primary | ICD-10-CM

## 2022-11-11 DIAGNOSIS — M17.0 BILATERAL PRIMARY OSTEOARTHRITIS OF KNEE: ICD-10-CM

## 2022-11-11 DIAGNOSIS — Z12.31 ENCOUNTER FOR SCREENING MAMMOGRAM FOR MALIGNANT NEOPLASM OF BREAST: ICD-10-CM

## 2022-11-11 LAB
BACTERIAL VAGINOSIS DNA: POSITIVE
CANDIDA GLABRATA DNA: NEGATIVE
CANDIDA KRUSEI DNA: NEGATIVE
CANDIDA RRNA VAG QL PROBE: NEGATIVE
T VAGINALIS RRNA GENITAL QL PROBE: NEGATIVE

## 2022-11-11 PROCEDURE — 77067 SCR MAMMO BI INCL CAD: CPT | Mod: TC

## 2022-11-11 PROCEDURE — 77063 BREAST TOMOSYNTHESIS BI: CPT | Mod: 26,,, | Performed by: RADIOLOGY

## 2022-11-11 PROCEDURE — 77063 MAMMO DIGITAL SCREENING BILAT WITH TOMO: ICD-10-PCS | Mod: 26,,, | Performed by: RADIOLOGY

## 2022-11-11 PROCEDURE — 77067 MAMMO DIGITAL SCREENING BILAT WITH TOMO: ICD-10-PCS | Mod: 26,,, | Performed by: RADIOLOGY

## 2022-11-11 PROCEDURE — 77067 SCR MAMMO BI INCL CAD: CPT | Mod: 26,,, | Performed by: RADIOLOGY

## 2022-11-11 PROCEDURE — 77063 BREAST TOMOSYNTHESIS BI: CPT | Mod: TC

## 2022-11-18 ENCOUNTER — OFFICE VISIT (OUTPATIENT)
Dept: FAMILY MEDICINE | Facility: CLINIC | Age: 42
End: 2022-11-18
Payer: COMMERCIAL

## 2022-11-18 ENCOUNTER — LAB VISIT (OUTPATIENT)
Dept: LAB | Facility: HOSPITAL | Age: 42
End: 2022-11-18
Attending: FAMILY MEDICINE
Payer: COMMERCIAL

## 2022-11-18 VITALS
SYSTOLIC BLOOD PRESSURE: 124 MMHG | BODY MASS INDEX: 54.99 KG/M2 | HEIGHT: 61 IN | HEART RATE: 104 BPM | DIASTOLIC BLOOD PRESSURE: 82 MMHG | TEMPERATURE: 98 F | OXYGEN SATURATION: 100 % | WEIGHT: 291.25 LBS

## 2022-11-18 DIAGNOSIS — Z00.00 PREVENTATIVE HEALTH CARE: Primary | ICD-10-CM

## 2022-11-18 DIAGNOSIS — I10 ESSENTIAL HYPERTENSION: Chronic | ICD-10-CM

## 2022-11-18 DIAGNOSIS — E66.01 MORBID OBESITY: Chronic | ICD-10-CM

## 2022-11-18 DIAGNOSIS — F32.5 MAJOR DEPRESSIVE DISORDER WITH SINGLE EPISODE, IN FULL REMISSION: ICD-10-CM

## 2022-11-18 DIAGNOSIS — G43.909 MIGRAINE WITHOUT STATUS MIGRAINOSUS, NOT INTRACTABLE, UNSPECIFIED MIGRAINE TYPE: ICD-10-CM

## 2022-11-18 DIAGNOSIS — Z23 NEEDS FLU SHOT: ICD-10-CM

## 2022-11-18 DIAGNOSIS — Z00.00 PREVENTATIVE HEALTH CARE: ICD-10-CM

## 2022-11-18 PROBLEM — M25.562 PAIN IN BOTH KNEES: Status: RESOLVED | Noted: 2021-11-05 | Resolved: 2022-11-18

## 2022-11-18 PROBLEM — M17.11 PRIMARY OSTEOARTHRITIS OF RIGHT KNEE: Status: RESOLVED | Noted: 2019-03-05 | Resolved: 2022-11-18

## 2022-11-18 PROBLEM — M25.561 PAIN IN BOTH KNEES: Status: RESOLVED | Noted: 2021-11-05 | Resolved: 2022-11-18

## 2022-11-18 LAB
ALBUMIN SERPL BCP-MCNC: 3.5 G/DL (ref 3.5–5.2)
ALP SERPL-CCNC: 45 U/L (ref 55–135)
ALT SERPL W/O P-5'-P-CCNC: 15 U/L (ref 10–44)
ANION GAP SERPL CALC-SCNC: 8 MMOL/L (ref 8–16)
AST SERPL-CCNC: 19 U/L (ref 10–40)
BASOPHILS # BLD AUTO: 0.04 K/UL (ref 0–0.2)
BASOPHILS NFR BLD: 0.6 % (ref 0–1.9)
BILIRUB SERPL-MCNC: 0.3 MG/DL (ref 0.1–1)
BUN SERPL-MCNC: 13 MG/DL (ref 6–20)
CALCIUM SERPL-MCNC: 9.2 MG/DL (ref 8.7–10.5)
CHLORIDE SERPL-SCNC: 104 MMOL/L (ref 95–110)
CO2 SERPL-SCNC: 27 MMOL/L (ref 23–29)
CREAT SERPL-MCNC: 0.9 MG/DL (ref 0.5–1.4)
DIFFERENTIAL METHOD: ABNORMAL
EOSINOPHIL # BLD AUTO: 0.1 K/UL (ref 0–0.5)
EOSINOPHIL NFR BLD: 1.3 % (ref 0–8)
ERYTHROCYTE [DISTWIDTH] IN BLOOD BY AUTOMATED COUNT: 14.8 % (ref 11.5–14.5)
ERYTHROCYTE [SEDIMENTATION RATE] IN BLOOD BY PHOTOMETRIC METHOD: 38 MM/HR (ref 0–36)
EST. GFR  (NO RACE VARIABLE): >60 ML/MIN/1.73 M^2
GLUCOSE SERPL-MCNC: 91 MG/DL (ref 70–110)
HCT VFR BLD AUTO: 38.7 % (ref 37–48.5)
HGB BLD-MCNC: 12.4 G/DL (ref 12–16)
HIV 1+2 AB+HIV1 P24 AG SERPL QL IA: NORMAL
IMM GRANULOCYTES # BLD AUTO: 0.01 K/UL (ref 0–0.04)
IMM GRANULOCYTES NFR BLD AUTO: 0.2 % (ref 0–0.5)
LYMPHOCYTES # BLD AUTO: 2.9 K/UL (ref 1–4.8)
LYMPHOCYTES NFR BLD: 45.9 % (ref 18–48)
MCH RBC QN AUTO: 29.8 PG (ref 27–31)
MCHC RBC AUTO-ENTMCNC: 32 G/DL (ref 32–36)
MCV RBC AUTO: 93 FL (ref 82–98)
MONOCYTES # BLD AUTO: 0.7 K/UL (ref 0.3–1)
MONOCYTES NFR BLD: 11.5 % (ref 4–15)
NEUTROPHILS # BLD AUTO: 2.5 K/UL (ref 1.8–7.7)
NEUTROPHILS NFR BLD: 40.5 % (ref 38–73)
NRBC BLD-RTO: 0 /100 WBC
PLATELET # BLD AUTO: 302 K/UL (ref 150–450)
PMV BLD AUTO: 10.7 FL (ref 9.2–12.9)
POTASSIUM SERPL-SCNC: 3.8 MMOL/L (ref 3.5–5.1)
PROT SERPL-MCNC: 7.7 G/DL (ref 6–8.4)
RBC # BLD AUTO: 4.16 M/UL (ref 4–5.4)
SODIUM SERPL-SCNC: 139 MMOL/L (ref 136–145)
TSH SERPL DL<=0.005 MIU/L-ACNC: 1.21 UIU/ML (ref 0.4–4)
WBC # BLD AUTO: 6.27 K/UL (ref 3.9–12.7)

## 2022-11-18 PROCEDURE — 3074F SYST BP LT 130 MM HG: CPT | Mod: CPTII,S$GLB,, | Performed by: FAMILY MEDICINE

## 2022-11-18 PROCEDURE — 80053 COMPREHEN METABOLIC PANEL: CPT | Performed by: FAMILY MEDICINE

## 2022-11-18 PROCEDURE — 99999 PR PBB SHADOW E&M-EST. PATIENT-LVL IV: ICD-10-PCS | Mod: PBBFAC,,, | Performed by: FAMILY MEDICINE

## 2022-11-18 PROCEDURE — 85652 RBC SED RATE AUTOMATED: CPT | Performed by: FAMILY MEDICINE

## 2022-11-18 PROCEDURE — 3074F PR MOST RECENT SYSTOLIC BLOOD PRESSURE < 130 MM HG: ICD-10-PCS | Mod: CPTII,S$GLB,, | Performed by: FAMILY MEDICINE

## 2022-11-18 PROCEDURE — 90471 FLU VACCINE (QUAD) GREATER THAN OR EQUAL TO 3YO PRESERVATIVE FREE IM: ICD-10-PCS | Mod: S$GLB,,, | Performed by: FAMILY MEDICINE

## 2022-11-18 PROCEDURE — 3079F PR MOST RECENT DIASTOLIC BLOOD PRESSURE 80-89 MM HG: ICD-10-PCS | Mod: CPTII,S$GLB,, | Performed by: FAMILY MEDICINE

## 2022-11-18 PROCEDURE — 4010F ACE/ARB THERAPY RXD/TAKEN: CPT | Mod: CPTII,S$GLB,, | Performed by: FAMILY MEDICINE

## 2022-11-18 PROCEDURE — 4010F PR ACE/ARB THEARPY RXD/TAKEN: ICD-10-PCS | Mod: CPTII,S$GLB,, | Performed by: FAMILY MEDICINE

## 2022-11-18 PROCEDURE — 99999 PR PBB SHADOW E&M-EST. PATIENT-LVL IV: CPT | Mod: PBBFAC,,, | Performed by: FAMILY MEDICINE

## 2022-11-18 PROCEDURE — 90686 FLU VACCINE (QUAD) GREATER THAN OR EQUAL TO 3YO PRESERVATIVE FREE IM: ICD-10-PCS | Mod: S$GLB,,, | Performed by: FAMILY MEDICINE

## 2022-11-18 PROCEDURE — 90471 IMMUNIZATION ADMIN: CPT | Mod: S$GLB,,, | Performed by: FAMILY MEDICINE

## 2022-11-18 PROCEDURE — 90686 IIV4 VACC NO PRSV 0.5 ML IM: CPT | Mod: S$GLB,,, | Performed by: FAMILY MEDICINE

## 2022-11-18 PROCEDURE — 3008F BODY MASS INDEX DOCD: CPT | Mod: CPTII,S$GLB,, | Performed by: FAMILY MEDICINE

## 2022-11-18 PROCEDURE — 84443 ASSAY THYROID STIM HORMONE: CPT | Performed by: FAMILY MEDICINE

## 2022-11-18 PROCEDURE — 99396 PREV VISIT EST AGE 40-64: CPT | Mod: 25,S$GLB,, | Performed by: FAMILY MEDICINE

## 2022-11-18 PROCEDURE — 3079F DIAST BP 80-89 MM HG: CPT | Mod: CPTII,S$GLB,, | Performed by: FAMILY MEDICINE

## 2022-11-18 PROCEDURE — 99396 PR PREVENTIVE VISIT,EST,40-64: ICD-10-PCS | Mod: 25,S$GLB,, | Performed by: FAMILY MEDICINE

## 2022-11-18 PROCEDURE — 1159F MED LIST DOCD IN RCRD: CPT | Mod: CPTII,S$GLB,, | Performed by: FAMILY MEDICINE

## 2022-11-18 PROCEDURE — 1159F PR MEDICATION LIST DOCUMENTED IN MEDICAL RECORD: ICD-10-PCS | Mod: CPTII,S$GLB,, | Performed by: FAMILY MEDICINE

## 2022-11-18 PROCEDURE — 85025 COMPLETE CBC W/AUTO DIFF WBC: CPT | Performed by: FAMILY MEDICINE

## 2022-11-18 PROCEDURE — 1160F PR REVIEW ALL MEDS BY PRESCRIBER/CLIN PHARMACIST DOCUMENTED: ICD-10-PCS | Mod: CPTII,S$GLB,, | Performed by: FAMILY MEDICINE

## 2022-11-18 PROCEDURE — 3008F PR BODY MASS INDEX (BMI) DOCUMENTED: ICD-10-PCS | Mod: CPTII,S$GLB,, | Performed by: FAMILY MEDICINE

## 2022-11-18 PROCEDURE — 1160F RVW MEDS BY RX/DR IN RCRD: CPT | Mod: CPTII,S$GLB,, | Performed by: FAMILY MEDICINE

## 2022-11-18 PROCEDURE — 36415 COLL VENOUS BLD VENIPUNCTURE: CPT | Mod: PO | Performed by: FAMILY MEDICINE

## 2022-11-18 PROCEDURE — 87389 HIV-1 AG W/HIV-1&-2 AB AG IA: CPT | Performed by: FAMILY MEDICINE

## 2022-11-18 RX ORDER — NAPROXEN 500 MG/1
500 TABLET ORAL 2 TIMES DAILY WITH MEALS
Qty: 60 TABLET | Refills: 3 | Status: SHIPPED | OUTPATIENT
Start: 2022-11-18 | End: 2022-11-29

## 2022-11-18 RX ORDER — LOSARTAN POTASSIUM 100 MG/1
100 TABLET ORAL DAILY
Qty: 90 TABLET | Refills: 3 | Status: SHIPPED | OUTPATIENT
Start: 2022-11-18 | End: 2023-11-10 | Stop reason: SDUPTHER

## 2022-11-18 RX ORDER — METOPROLOL SUCCINATE 50 MG/1
50 TABLET, EXTENDED RELEASE ORAL DAILY
Qty: 90 TABLET | Refills: 3 | Status: SHIPPED | OUTPATIENT
Start: 2022-11-18 | End: 2023-11-09

## 2022-11-18 RX ORDER — CETIRIZINE HYDROCHLORIDE 10 MG/1
10 TABLET ORAL DAILY
COMMUNITY
Start: 2022-09-03

## 2022-11-18 RX ORDER — NIFEDIPINE 60 MG/1
60 TABLET, EXTENDED RELEASE ORAL DAILY
Qty: 90 TABLET | Refills: 3 | Status: SHIPPED | OUTPATIENT
Start: 2022-11-18 | End: 2023-11-10 | Stop reason: SDUPTHER

## 2022-11-18 RX ORDER — BUPROPION HYDROCHLORIDE 100 MG/1
100 TABLET ORAL 2 TIMES DAILY
Qty: 180 TABLET | Refills: 3 | Status: SHIPPED | OUTPATIENT
Start: 2022-11-18 | End: 2023-11-10 | Stop reason: SDUPTHER

## 2022-11-18 RX ORDER — BUTALBITAL, ACETAMINOPHEN AND CAFFEINE 50; 325; 40 MG/1; MG/1; MG/1
TABLET ORAL
Qty: 30 TABLET | Refills: 2 | Status: SHIPPED | OUTPATIENT
Start: 2022-11-18

## 2022-11-18 RX ORDER — RIZATRIPTAN BENZOATE 10 MG/1
10 TABLET ORAL
Qty: 6 TABLET | Refills: 11 | Status: SHIPPED | OUTPATIENT
Start: 2022-11-18 | End: 2023-11-10 | Stop reason: SDUPTHER

## 2022-11-18 RX ORDER — ALPRAZOLAM 0.25 MG/1
TABLET ORAL
Qty: 90 TABLET | Refills: 3 | Status: SHIPPED | OUTPATIENT
Start: 2022-11-18 | End: 2023-06-26 | Stop reason: SDUPTHER

## 2022-11-18 RX ORDER — ESCITALOPRAM OXALATE 20 MG/1
TABLET ORAL
Qty: 90 TABLET | Refills: 3 | Status: SHIPPED | OUTPATIENT
Start: 2022-11-18 | End: 2023-11-10 | Stop reason: SDUPTHER

## 2022-11-18 RX ORDER — HYDROCHLOROTHIAZIDE 12.5 MG/1
12.5 TABLET ORAL DAILY
Qty: 90 TABLET | Refills: 3 | Status: SHIPPED | OUTPATIENT
Start: 2022-11-18 | End: 2023-11-09

## 2022-11-18 RX ORDER — IBUPROFEN 800 MG/1
800 TABLET ORAL EVERY 8 HOURS PRN
COMMUNITY
Start: 2022-07-30 | End: 2023-01-24

## 2022-11-18 NOTE — PROGRESS NOTES
CHIEF COMPLAINT:  This is a 42-year-old female here for preventive health exam.     SUBJECTIVE:  The patient is doing well without complaints except frequent respiratory illnesses and fatigue.  She has hypertension for which she takes hydrochlorothiazide 12.5 mg daily, losartan 100 mg daily and metoprolol XL 25 mg daily. She has taken nifedipine 60 mg intermittently.  Blood pressure is 124/82.   She's morbidly obese with a BMI of 55.03.  Patient's anxiety is controlled on Lexapro 20 mg daily.  She takes alprazolam 0.25 mg to 3 times daily as needed. She takes Wellbutrin  mg twice daily for major depression which is controlled.  She takes zolpidem as needed for sleeplessness.  Patient reports improvement with Fioricet when treating migraine headaches.  She continues to take Imitrex as needed and Topamax daily.       Eye exam 2022. Pap smear September 2011 (status post hysterectomy).  Mammogram November 2022.  Colonoscopy July 2004.  Tdap July 2009.  Flu vaccine October 2020. COVID 19 vaccine March, November 2021, April, September 2022.     ROS:  GENERAL: Patient denies fever, chills, night sweats. Patient denies weight gain or loss. Patient denies anorexia, weakness or swollen glands. Positive for fatigue and weight loss.  SKIN: Patient denies rash or hair loss.  HEENT: Patient denies sore throat, ear pain, hearing loss, nasal congestion, or runny nose. Patient denies visual disturbance, eye irritation or discharge.  LUNGS: Patient denies cough, wheeze or hemoptysis.  CARDIOVASCULAR: Patient denies chest pain, shortness of breath, palpitations, syncope or lower extremity edema.  GI: Patient denies abdominal pain, nausea, vomiting, diarrhea, constipation, blood in stool or melena.  GENITOURINARY: Patient denies pelvic pain, vaginal discharge or itch.  Positive for vaginal odor.  Patient denies irregular vaginal bleeding. Patient denies dysuria, frequency, hematuria, nocturia, urgency or incontinence.  BREASTS:  Patient denies breast pain, mass or nipple discharge.  MUSCULOSKELETAL: Patient denies joint pain, swelling, redness or warmth.  NEUROLOGIC:  Positive for headaches.  Patient denies dizziness, numbness, tingling, weakness in limb, dysarthria, dysphagia or abnormality of gait.  PSYCHIATRIC: Patient denies depression, or memory loss.  Positive for anxiety.     OBJECTIVE:   GENERAL: Well-developed well-nourished, morbidly obese, black female alert and oriented x3, in no acute distress. Memory, judgment and cognition without deficit.  Normal affect.  SKIN: Clear without rash. Normal color and tone.  HEENT: Eyes: Clear conjunctivae. No scleral icterus. Pupils equal reactive to light and accommodation. Ears: Clear TMs. Clear canals. Nose: Without congestion. Pharynx: Without injection or exudates.  NECK: Supple, normal range of motion. No masses, lymphadenopathy or enlarged thyroid. No JVD. Carotids 2+ and equal. No bruits.  LUNGS: Clear to auscultation. Normal respiratory effort.  CARDIOVASCULAR: Regular rhythm, normal S1, S2 without murmur, gallop or rub.  BACK: No CVA or spinal tenderness.  BREASTS:  Deferred due to recent exam  ABDOMEN:  Normal appearance. Active bowel sounds. Soft, nontender without mass or organomegaly. No rebound or guarding.  EXTREMITIES: Without cyanosis, clubbing or edema.  Distal pulses 2+ and equal. Normal range of motion in all extremities. No joint effusion, erythema or warmth.  NEUROLOGIC: Cranial nerves II through XII without deficit. Motor strength equal bilaterally. Sensation normal to touch. Deep tendon reflexes 2+ and equal. Gait without abnormality. No tremor. Negative cerebellar signs.  PELVIC:  Deferred due to recent exam.    ASSESSMENT:  1. Preventative health care    2. Essential hypertension    3. Morbid obesity    4. Migraine without status migrainosus, not intractable, unspecified migraine type    5. Major depressive disorder with single episode, in full remission       PLAN:  1. Weight reduction. Exercise regularly.  2. Age-appropriate counseling.  3. Fasting lab including ESR.  4. Refill medications.  5. Influenza vaccine.    6. Follow-up annually.    This note is generated with speech recognition software and is subject to transcription error and sound alike phrases that may be missed by proofreading.

## 2022-11-25 ENCOUNTER — CLINICAL SUPPORT (OUTPATIENT)
Dept: REHABILITATION | Facility: HOSPITAL | Age: 42
End: 2022-11-25
Payer: COMMERCIAL

## 2022-11-25 DIAGNOSIS — M25.561 CHRONIC PAIN OF BOTH KNEES: ICD-10-CM

## 2022-11-25 DIAGNOSIS — G89.29 CHRONIC PAIN OF BOTH KNEES: ICD-10-CM

## 2022-11-25 DIAGNOSIS — M54.32 LEFT SIDED SCIATICA: ICD-10-CM

## 2022-11-25 DIAGNOSIS — M25.562 CHRONIC PAIN OF BOTH KNEES: ICD-10-CM

## 2022-11-25 DIAGNOSIS — M17.0 BILATERAL PRIMARY OSTEOARTHRITIS OF KNEE: ICD-10-CM

## 2022-11-25 PROCEDURE — 97161 PT EVAL LOW COMPLEX 20 MIN: CPT | Mod: PN

## 2022-11-25 PROCEDURE — 97110 THERAPEUTIC EXERCISES: CPT | Mod: PN

## 2022-11-25 NOTE — PLAN OF CARE
[facility]   Physical Therapy Initial Evaluation     Date: 11/25/2022   Name: Georgia STANLEY [facility] Number: [id number]    Therapy Diagnosis:   Encounter Diagnoses   Name Primary?    Left sided sciatica     Bilateral primary osteoarthritis of knee     Chronic pain of both knees      Physician: Jules Valadez MD    Physician Orders: PT Eval and Treat   Medical Diagnosis from Referral: M54.32 (ICD-10-CM) - Left sided sciatica M17.0 (ICD-10-CM) - Bilateral primary osteoarthritis of knee   Evaluation Date: 11/25/2022  Authorization Period Expiration: 11/11/2023  Plan of Care Expiration: 01/13/2022  Progress Note Due: 10th visit  Visit # / Visits authorized: 1/ 1   FOTO: 1/3    Precautions: Standard     Time In: 7:20am  Time Out: 8:00am  Total Appointment Time (timed & untimed codes): 40 minutes      SUBJECTIVE     Date of onset: months    History of current condition - Georgia reports: that she is having sciatic pain on the left side and has had it for months now.  She denies any injury to the area. She has been having a pain really deep in her hip on the left side.  She has pain in the top of the back of the left leg and then goes down the leg and she has numbness in the leg.  She has been years of B knee pain.  She has been getting cortisone shots in the knees for a while.  She had a new shot that was suppose to fill in the cartilage in the knee and that helped for about 3 months, the last time she had that shot was in December of 2021.  The last time she went to the MD he wanted her to go to the therapy.      Falls: 0    Imaging, Xrays:   There is equivocal mild joint space narrowing seen involving the medial compartment of either knee.  Mild degenerative change seen at either patellofemoral compartment.  No joint effusions are suggested.  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      Medical History:   Past Medical History:   Diagnosis Date    Anemia     Cervical radiculopathy     COVID-19 virus infection 2021    Diverticulosis     Hypermenorrhea     Hypertension     Insomnia     Internal hemorrhoid     Migraine headache     Morbid obesity     Rectal bleeding     Uterine fibroid        Surgical History:   Georgia Garcia  has a past surgical history that includes Tubal ligation;  section, low transverse; Total abdominal hysterectomy (12); and Colonoscopy (N/A, 2022).    Medications:   Georgia has a current medication list which includes the following prescription(s): alprazolam, azelastine, bupropion, butalbital-acetaminophen-caffeine -40 mg, cetirizine, cyclobenzaprine, escitalopram oxalate, hydrochlorothiazide, ibuprofen, losartan, meclizine, metoprolol succinate, naproxen, nifedipine, rizatriptan, topiramate, and zolpidem.    Allergies:   Review of patient's allergies indicates:  No Known Allergies       OBJECTIVE     Posture Observation:  Increased thoraco-lumbar lordosis, slight forward trunk posturing.    Palpation:  TTP over the lumbar and sacral paraspinals more on the left side, TTP over the Left piriformis and glut area    AROM:    Thoracolumbar  (single inclinometer at L4/5) AROM  (degrees) Pain/Dysfunction with Movement   Flexion 75% Pain in low back   Extension 75% Pain in low back   Right side bending 75%    Left side bending 75%    Right rotation 100%    Left rotation 100%      AROM: B hips/knees/ankle WNL in all directions    Strength:     L/E MMT Right Left Pain/Dysfunction with Movement    Hip Flexion 5/5 4+/5    Hip Extension 4/5 3+/5    Hip Abduction 4/5 3+/5    Knee Flexion 4/5 4-/5    Knee Extension 4/5 4/5    Ankle DF 4+/5 4+/5    Ankle PF 4+/5 4+/5            Limitation/Restriction for FOTO Lumbar/Knee Survey    Therapist reviewed FOTO scores for Georgia Garcia on 11/25/2022.   FOTO documents entered into [email or web address] - see Media section.    Limitation Score: 40%33%         TREATMENT     Total Treatment time (time-based codes) separate from Evaluation: 10 minutes      Georgia received the treatments listed below:      therapeutic exercises to develop strength, endurance, ROM, flexibility, posture, and core stabilization for 10 minutes including:  LTR  Sciatic nerve glides  Piriformis stretch  PPT        PATIENT EDUCATION AND HOME EXERCISES     Education provided:   - HEP and plan of care and diagnosis    Written Home Exercises Provided: yes. Exercises were reviewed and Georgia was able to demonstrate them prior to the end of the session.  Georgia demonstrated good  understanding of the education provided. See EMR under Patient Instructions for exercises provided during therapy sessions.    ASSESSMENT     Georgia is a 42 y.o. female referred to outpatient Physical Therapy with a medical diagnosis of L sided sciatica and B knee pain. Patient presents with decreased AROM of the lumbar spine, strength deficits in the core/hips/knees, tightness in her hips, pain in the left side of the low back and into the leg, and pain in B knees constantly.  Her s/s are consistent with her diagnosis and will benefit from therapy to improve her functional mobility.     Patient prognosis is Good.   Patient will benefit from skilled outpatient Physical Therapy to address the deficits stated above and in the chart below, provide patient /family education, and to maximize patientt's level of independence.     Plan of care discussed with patient: Yes  Patient's spiritual, cultural and educational needs considered and  patient is agreeable to the plan of care and goals as stated below:     Anticipated Barriers for therapy: none    Medical Necessity is demonstrated by the following  History  Co-morbidities and personal factors that may impact the plan of care Co-morbidities:   See above PMHx    Personal Factors:   none     moderate   Examination  Body Structures and Functions, activity limitations and participation restrictions that may impact the plan of care Body Regions:   back  lower extremities    Body Systems:    ROM  strength  gross coordinated movement  gait  transfers    Participation Restrictions:   none    Activity limitations:   Learning and applying knowledge  no deficits    General Tasks and Commands  no deficits    Communication  no deficits    Mobility  lifting and carrying objects  walking    Self care  dressing    Domestic Life  doing house work (cleaning house, washing dishes, laundry)    Interactions/Relationships  no deficits    Life Areas  employment    Community and Social Life  recreation and leisure         moderate   Clinical Presentation stable and uncomplicated low   Decision Making/ Complexity Score: low     Goals:  Short Term Goals:    Pt will be 50% independent with HEP.    Long Term Goals: 6 weeks   Pt will be 100% independent with HEP.  Pt will be able to walk 1 mile without difficulty.  Pt will be able to perform her normal household duties without difficulty.  Pt will be able to carry groceries without difficulty.    PLAN   Plan of care Certification: 11/25/2022 to 01/13/2022.    Outpatient Physical Therapy 2 times weekly for 6 weeks to include the following interventions: Electrical Stimulation PRN, Gait Training, Manual Therapy, Moist Heat/ Ice, Neuromuscular Re-ed, Patient Education, Self Care, Therapeutic Activities, Therapeutic Exercise, and FDN .     Farzaneh Shelley, PT      I CERTIFY THE NEED FOR THESE SERVICES FURNISHED UNDER THIS PLAN OF TREATMENT AND WHILE UNDER MY CARE   Physician's  comments:     Physician's Signature: ___________________________________________________

## 2022-11-28 ENCOUNTER — TELEPHONE (OUTPATIENT)
Dept: SPORTS MEDICINE | Facility: CLINIC | Age: 42
End: 2022-11-28
Payer: COMMERCIAL

## 2022-11-28 ENCOUNTER — PATIENT MESSAGE (OUTPATIENT)
Dept: SPORTS MEDICINE | Facility: CLINIC | Age: 42
End: 2022-11-28
Payer: COMMERCIAL

## 2022-11-28 NOTE — TELEPHONE ENCOUNTER
Spoke with patient and provided recommendations in regards to lower back pain. Advised patient to visit urgent care or ED for severe pain. Patient accepted and verbalized understanding.

## 2022-11-29 ENCOUNTER — TELEPHONE (OUTPATIENT)
Dept: SPORTS MEDICINE | Facility: CLINIC | Age: 42
End: 2022-11-29
Payer: COMMERCIAL

## 2022-11-29 DIAGNOSIS — M54.42 LOW BACK PAIN WITH LEFT-SIDED SCIATICA, UNSPECIFIED BACK PAIN LATERALITY, UNSPECIFIED CHRONICITY: Primary | ICD-10-CM

## 2022-11-29 RX ORDER — MELOXICAM 15 MG/1
15 TABLET ORAL DAILY
Qty: 30 TABLET | Refills: 1 | Status: SHIPPED | OUTPATIENT
Start: 2022-11-29 | End: 2023-01-24

## 2022-11-29 NOTE — TELEPHONE ENCOUNTER
Informed patient that a prescription will be sent in to her pharmacy of choice and that a referral to pain management will be put in. Patient accepted and verbalized understanding.

## 2022-12-02 ENCOUNTER — CLINICAL SUPPORT (OUTPATIENT)
Dept: REHABILITATION | Facility: HOSPITAL | Age: 42
End: 2022-12-02
Payer: COMMERCIAL

## 2022-12-02 DIAGNOSIS — M25.561 CHRONIC PAIN OF BOTH KNEES: Primary | ICD-10-CM

## 2022-12-02 DIAGNOSIS — M25.562 CHRONIC PAIN OF BOTH KNEES: Primary | ICD-10-CM

## 2022-12-02 DIAGNOSIS — G89.29 CHRONIC PAIN OF BOTH KNEES: Primary | ICD-10-CM

## 2022-12-02 PROCEDURE — 97110 THERAPEUTIC EXERCISES: CPT | Mod: PN

## 2022-12-02 PROCEDURE — 97140 MANUAL THERAPY 1/> REGIONS: CPT | Mod: PN

## 2022-12-02 PROCEDURE — 97014 ELECTRIC STIMULATION THERAPY: CPT | Mod: PN

## 2022-12-02 NOTE — PROGRESS NOTES
[facility]   Physical Therapy Treatment Note     Name: Georgia STANLEY [facility] Number: [id number]    Therapy Diagnosis:   Encounter Diagnosis   Name Primary?    Chronic pain of both knees Yes     Physician: Jules Valadez MD    Visit Date: 12/2/2022    Physician Orders: PT Eval and Treat   Medical Diagnosis from Referral: M54.32 (ICD-10-CM) - Left sided sciatica M17.0 (ICD-10-CM) - Bilateral primary osteoarthritis of knee   Evaluation Date: 11/25/2022  Authorization Period Expiration: 11/11/2023  Plan of Care Expiration: 01/13/2022  Progress Note Due: 10th visit  Visit # / Visits authorized: 1/12+Eval  FOTO: 1/3     Precautions: Standard     PTA Visit #: --/5     Time In: 6:30am  Time Out: 7:25am  Total Billable Time: 55 minutes    SUBJECTIVE     Pt reports: that she had increased pain in her back the day after her evaluation.  She had so much pain in her low back that she had trouble standing and walking.  She had gotten an appointment with pain management but that is not until next week.   She was not compliant with home exercise program.  Response to previous treatment: increased pain in her back  Functional change: increased difficulty with walking, moving , and standing    Pain: 10/10  Location: bilateral back      OBJECTIVE     Objective Measures updated at progress report unless specified.     Treatment     Georgia received the treatments listed below:      therapeutic exercises to develop strength, endurance, ROM, flexibility, posture, and core stabilization for 10 minutes including:  Cat-Cow  Quadraped rocking  Modified downward dog at table      manual therapy techniques: Joint mobilizations, Manual traction, Soft tissue Mobilization, and FDN were applied to the: low back for 30 minutes, including:  STM to lumbar musculature  Gentle PA mobs to lumbar and sacral spine  FDN to lumbar paraspinals    Palpation Assessment to determine the necessity for Functional Dry Needling  to  the lumbar musculature.     Patient provided written and verbal consent to Functional Dry Needling in her chart      Patient demonstrated appropriate response to Functional Dry Needling. Fair  rhythmical contractions observed with estim to treated muscle groups x 5 min     supervised modalities after being cleared for contradictions: IFC Electrical Stimulation:  Georgia received IFC Electrical Stimulation for pain control applied to the low back. Pt received stimulation at  for 15 minutes. Georgia tolderated treatment well without any adverse effects.      hot pack for 15 minutes to low back during IFC.          Patient Education and Home Exercises     Home Exercises Provided and Patient Education Provided     Education provided:   - HEP and plan of care    Written Home Exercises Provided:  Advised patient to stop doing any exercises at home for now   Start on her meloxicam and see if that will start to reduce some of her back pain. Exercises were reviewed and Georgia was able to demonstrate them prior to the end of the session.  Georgia demonstrated fair  understanding of the education provided. See EMR under Patient Instructions for exercises provided during therapy sessions    ASSESSMENT     Georgia presents with increased low back pain.  Performed manual therapy including FDN to help reduce her pain levels as they were high today.  Performed a few gentle AROM exercises for the low back.  She still had pain when she left but no more than when she started.  Continue to assess when she returns at her next visit.     Georgia Is not progressing well towards her goals.   Pt prognosis is Fair.     Pt will continue to benefit from skilled outpatient physical therapy to address the deficits listed in the problem list box on initial evaluation, provide pt/family education and to maximize pt's level of independence in the home and community environment.     Pt's spiritual, cultural and educational needs considered  and pt agreeable to plan of care and goals.     Anticipated barriers to physical therapy: none    Short Term Goals:    Pt will be 50% independent with HEP. Progressing     Long Term Goals: 6 weeks   Pt will be 100% independent with HEP. Progressing  Pt will be able to walk 1 mile without difficulty. Progressing  Pt will be able to perform her normal household duties without difficulty. Progressing  Pt will be able to carry groceries without difficulty. Progressing     PLAN   Plan of care Certification: 11/25/2022 to 01/13/2022.     Outpatient Physical Therapy 2 times weekly for 6 weeks to include the following interventions: Electrical Stimulation PRN, Gait Training, Manual Therapy, Moist Heat/ Ice, Neuromuscular Re-ed, Patient Education, Self Care, Therapeutic Activities, Therapeutic Exercise, and FDN .  Farzaneh Shelley, PT

## 2022-12-06 ENCOUNTER — CLINICAL SUPPORT (OUTPATIENT)
Dept: REHABILITATION | Facility: HOSPITAL | Age: 42
End: 2022-12-06
Payer: COMMERCIAL

## 2022-12-06 DIAGNOSIS — M25.561 CHRONIC PAIN OF BOTH KNEES: Primary | ICD-10-CM

## 2022-12-06 DIAGNOSIS — M25.562 CHRONIC PAIN OF BOTH KNEES: Primary | ICD-10-CM

## 2022-12-06 DIAGNOSIS — G89.29 CHRONIC PAIN OF BOTH KNEES: Primary | ICD-10-CM

## 2022-12-06 PROCEDURE — 97014 ELECTRIC STIMULATION THERAPY: CPT | Mod: PN

## 2022-12-06 PROCEDURE — 97110 THERAPEUTIC EXERCISES: CPT | Mod: PN

## 2022-12-06 NOTE — PROGRESS NOTES
"[facility]   Physical Therapy Treatment Note     Name: Georgia Garcia  Clinic Number: [id number]    Therapy Diagnosis:   Encounter Diagnosis   Name Primary?    Chronic pain of both knees Yes     Physician: Jules Valadez MD    Visit Date: 12/6/2022    Physician Orders: PT Eval and Treat   Medical Diagnosis from Referral: M54.32 (ICD-10-CM) - Left sided sciatica M17.0 (ICD-10-CM) - Bilateral primary osteoarthritis of knee   Evaluation Date: 11/25/2022  Authorization Period Expiration: 11/11/2023  Plan of Care Expiration: 01/13/2022  Progress Note Due: 10th visit  Visit # / Visits authorized: 1/12+Eval  FOTO: 1/3     Precautions: Standard     PTA Visit #: --/5     Time In: 6:30am  Time Out: 7:30am  Total Billable Time: 60 minutes    SUBJECTIVE     Pt reports: she is doing better today.  Yesterday her back finally started to ease up as far as the pain. She did notice on Friday her pain had improved but then woke up Saturday morning in a lot of pain again.     She was not compliant with home exercise program.  Response to previous treatment: increased pain in her back  Functional change: increased difficulty with walking, moving , and standing    Pain: 7/10  Location: bilateral back      OBJECTIVE     Objective Measures updated at progress report unless specified.     Treatment     Georgia received the treatments listed below:      therapeutic exercises to develop strength, endurance, ROM, flexibility, posture, and core stabilization for 45 minutes including:  LTR 10x  DKTC w/SB  PPT 10x  Piriformis stretch (therapist stretching)  Sciatic nerve [id number]  Supine hamstring stretch 20" x 3  Clamshells 15x  S/L hip abd 15x  Prone on elbows 10x      Deferred:   Cat-Cow  Quadraped rocking  Modified downward dog at table      manual therapy techniques: Joint mobilizations, Manual traction, Soft tissue Mobilization, and FDN were applied to the: low back for -- minutes, including:  STM to lumbar " musculature  Gentle PA mobs to lumbar and sacral spine  FDN to lumbar paraspinals    Palpation Assessment to determine the necessity for Functional Dry Needling  to the lumbar musculature.     Patient provided written and verbal consent to Functional Dry Needling in her chart      Patient demonstrated appropriate response to Functional Dry Needling. Fair  rhythmical contractions observed with estim to treated muscle groups x -- min     supervised modalities after being cleared for contradictions: IFC Electrical Stimulation:  Georgia received IFC Electrical Stimulation for pain control applied to the low back. Pt received stimulation at  for 15 minutes. Georgia tolderated treatment well without any adverse effects.      hot pack for 15 minutes to low back during IFC.          Patient Education and Home Exercises     Home Exercises Provided and Patient Education Provided     Education provided:   - HEP and plan of care    Written Home Exercises Provided:  Advised patient to stop doing any exercises at home for now   Start on her meloxicam and see if that will start to reduce some of her back pain. Exercises were reviewed and Georgia was able to demonstrate them prior to the end of the session.  Georgia demonstrated fair  understanding of the education provided. See EMR under Patient Instructions for exercises provided during therapy sessions    ASSESSMENT     Georgia presents with improved pain levels in her back today.   Therapy consisted of gentle AROM exercises and strengthening.  She tolerated her treatment well and without any increased pain.  Continue with gentle stretching and strengthening to the low back to reduce her overall pain.     Georgia Is not progressing well towards her goals.   Pt prognosis is Fair.     Pt will continue to benefit from skilled outpatient physical therapy to address the deficits listed in the problem list box on initial evaluation, provide pt/family education and to maximize  pt's level of independence in the home and community environment.     Pt's spiritual, cultural and educational needs considered and pt agreeable to plan of care and goals.     Anticipated barriers to physical therapy: none    Short Term Goals:    Pt will be 50% independent with HEP. Progressing     Long Term Goals: 6 weeks   Pt will be 100% independent with HEP. Progressing  Pt will be able to walk 1 mile without difficulty. Progressing  Pt will be able to perform her normal household duties without difficulty. Progressing  Pt will be able to carry groceries without difficulty. Progressing     PLAN   Plan of care Certification: 11/25/2022 to 01/13/2022.     Outpatient Physical Therapy 2 times weekly for 6 weeks to include the following interventions: Electrical Stimulation PRN, Gait Training, Manual Therapy, Moist Heat/ Ice, Neuromuscular Re-ed, Patient Education, Self Care, Therapeutic Activities, Therapeutic Exercise, and FDN .  Farzaneh Shelley, PT

## 2022-12-07 ENCOUNTER — OFFICE VISIT (OUTPATIENT)
Dept: PAIN MEDICINE | Facility: CLINIC | Age: 42
End: 2022-12-07
Payer: COMMERCIAL

## 2022-12-07 VITALS
DIASTOLIC BLOOD PRESSURE: 98 MMHG | BODY MASS INDEX: 55.32 KG/M2 | WEIGHT: 293 LBS | SYSTOLIC BLOOD PRESSURE: 148 MMHG | HEART RATE: 83 BPM | HEIGHT: 61 IN

## 2022-12-07 DIAGNOSIS — M54.42 LOW BACK PAIN WITH LEFT-SIDED SCIATICA, UNSPECIFIED BACK PAIN LATERALITY, UNSPECIFIED CHRONICITY: ICD-10-CM

## 2022-12-07 PROCEDURE — 99999 PR PBB SHADOW E&M-EST. PATIENT-LVL IV: CPT | Mod: PBBFAC,,, | Performed by: PHYSICAL MEDICINE & REHABILITATION

## 2022-12-07 PROCEDURE — 4010F PR ACE/ARB THEARPY RXD/TAKEN: ICD-10-PCS | Mod: CPTII,S$GLB,, | Performed by: PHYSICAL MEDICINE & REHABILITATION

## 2022-12-07 PROCEDURE — 99204 OFFICE O/P NEW MOD 45 MIN: CPT | Mod: S$GLB,,, | Performed by: PHYSICAL MEDICINE & REHABILITATION

## 2022-12-07 PROCEDURE — 4010F ACE/ARB THERAPY RXD/TAKEN: CPT | Mod: CPTII,S$GLB,, | Performed by: PHYSICAL MEDICINE & REHABILITATION

## 2022-12-07 PROCEDURE — 3008F BODY MASS INDEX DOCD: CPT | Mod: CPTII,S$GLB,, | Performed by: PHYSICAL MEDICINE & REHABILITATION

## 2022-12-07 PROCEDURE — 99204 PR OFFICE/OUTPT VISIT, NEW, LEVL IV, 45-59 MIN: ICD-10-PCS | Mod: S$GLB,,, | Performed by: PHYSICAL MEDICINE & REHABILITATION

## 2022-12-07 PROCEDURE — 3008F PR BODY MASS INDEX (BMI) DOCUMENTED: ICD-10-PCS | Mod: CPTII,S$GLB,, | Performed by: PHYSICAL MEDICINE & REHABILITATION

## 2022-12-07 PROCEDURE — 1159F PR MEDICATION LIST DOCUMENTED IN MEDICAL RECORD: ICD-10-PCS | Mod: CPTII,S$GLB,, | Performed by: PHYSICAL MEDICINE & REHABILITATION

## 2022-12-07 PROCEDURE — 3080F PR MOST RECENT DIASTOLIC BLOOD PRESSURE >= 90 MM HG: ICD-10-PCS | Mod: CPTII,S$GLB,, | Performed by: PHYSICAL MEDICINE & REHABILITATION

## 2022-12-07 PROCEDURE — 99999 PR PBB SHADOW E&M-EST. PATIENT-LVL IV: ICD-10-PCS | Mod: PBBFAC,,, | Performed by: PHYSICAL MEDICINE & REHABILITATION

## 2022-12-07 PROCEDURE — 1159F MED LIST DOCD IN RCRD: CPT | Mod: CPTII,S$GLB,, | Performed by: PHYSICAL MEDICINE & REHABILITATION

## 2022-12-07 PROCEDURE — 3077F SYST BP >= 140 MM HG: CPT | Mod: CPTII,S$GLB,, | Performed by: PHYSICAL MEDICINE & REHABILITATION

## 2022-12-07 PROCEDURE — 3077F PR MOST RECENT SYSTOLIC BLOOD PRESSURE >= 140 MM HG: ICD-10-PCS | Mod: CPTII,S$GLB,, | Performed by: PHYSICAL MEDICINE & REHABILITATION

## 2022-12-07 PROCEDURE — 3080F DIAST BP >= 90 MM HG: CPT | Mod: CPTII,S$GLB,, | Performed by: PHYSICAL MEDICINE & REHABILITATION

## 2022-12-07 NOTE — PROGRESS NOTES
New Patient Chronic Pain Note (Initial Visit)    Referring Physician: Jules Valadez MD    PCP: Alyssa Muse MD    Chief Complaint:   Chief Complaint   Patient presents with    Low-back Pain    Knee Pain     bilateral        SUBJECTIVE:    Georgia Garcia is a 42 y.o. female who presents to the clinic for the evaluation of lower back and leg pain.  She was referred by the Orthopedics Department for further evaluation and management of this pain.  She has past medical history of migraine, anxiety disorder, depression, hypertension, iron deficiency anemia, morbid obesity, polyarthritis, insomnia, multiple other medical comorbidities as listed in her chart.  The pain started 3 months ago and symptoms have been worsening.The pain is located in the lumbosacral area and radiates to the left lower extremity extending posterior laterally to the calf.  The pain is described as  sharp, stabbing, aching  and is rated as 7/10. The pain is rated with a score of  5/10 on the BEST day and a score of 9/10 on the WORST day.  Symptoms interfere with daily activity. The pain is exacerbated by activities.  The pain is mitigated by nothing currently.    Patient denies night fever/night sweats, urinary incontinence, bowel incontinence, significant weight loss, significant motor weakness, and loss of sensations.    Pain Disability Index Review:     Last 3 PDI Scores 12/7/2022   Pain Disability Index (PDI) 58       Non-Pharmacologic Treatments:  Physical Therapy/Home Exercise: no  Ice/Heat:yes  TENS: no  Acupuncture: no  Massage: no  Chiropractic: no    Other: no      Pain Medications:  - Opioids:  Norco, tramadol  - Adjuvant Medications:  Alprazolam, Fioricet, Wellbutrin, Flexeril, Lexapro, NSAIDs, Topamax, Maxalt, Ambien  - Anti-Coagulants:  None     report:  Reviewed and consistent with medication use as prescribed.      Pain Procedures:   Denies      Imaging:   X-ray bilateral knee 11/10/2022:  There is equivocal mild  joint space narrowing seen involving the medial compartment of either knee.  Mild degenerative change seen at either patellofemoral compartment.  No joint effusions are suggested.  No acute fracture or dislocation.       Past Medical History:   Diagnosis Date    Anemia     Cervical radiculopathy     COVID-19 virus infection 2021    Diverticulosis     Hypermenorrhea     Hypertension     Insomnia     Internal hemorrhoid     Migraine headache     Morbid obesity     Rectal bleeding     Uterine fibroid      Past Surgical History:   Procedure Laterality Date     SECTION, LOW TRANSVERSE      x1    COLONOSCOPY N/A 2022    Procedure: COLONOSCOPY;  Surgeon: Maximus Lopes MD;  Location: [facility];  Service: Endoscopy;  Laterality: N/A;    TOTAL ABDOMINAL HYSTERECTOMY  14    TUBAL LIGATION       Social History     Socioeconomic History    Marital status: Single   Occupational History     Employer: [employer]   Tobacco Use    Smoking status: Never    Smokeless tobacco: Never   Substance and Sexual Activity    Alcohol use: No    Drug use: No    Sexual activity: Yes     Partners: Male     Birth control/protection: Surgical     Comment: hyst; mut monog   Social History Narrative    She is , has one daughter and 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              Social Determinants of Health     Financial Resource Strain: Low Risk     Difficulty of Paying Living Expenses: Not very hard   Food Insecurity: No Food Insecurity    Worried About Running Out of Food in the Last Year: Never true    Ran Out of Food in the Last Year: Never true   Transportation Needs: No Transportation Needs    Lack of Transportation (Medical): No    Lack of Transportation (Non-Medical): No   Physical Activity: Insufficiently Active    Days of Exercise per Week: 3 days    Minutes of Exercise per Session: 20 min   Stress: Stress Concern Present    Feeling of Stress : To  some extent   Social Connections: Unknown    Frequency of Communication with Friends and Family: More than three times a week    Frequency of Social Gatherings with Friends and Family: Patient refused    Active Member of Clubs or Organizations: Yes    Attends Club or Organization Meetings: 1 to 4 times per year    Marital Status: Patient refused   Housing Stability: Low Risk     Unable to Pay for Housing in the Last Year: No    Number of Places Lived in the Last Year: 1    Unstable Housing in the Last Year: No     Family History   Problem Relation Age of Onset    Hypertension Mother     Pulmonary embolism Mother     Deep vein thrombosis Mother     Thrombophilia Mother     Heart failure Mother     Pulmonary embolism Maternal Uncle     Deep vein thrombosis Maternal Uncle     Thrombophilia Maternal Uncle     Colon cancer Maternal Uncle     COPD Father     Diabetes Maternal Grandfather     Stroke Maternal Grandfather     Breast cancer Maternal Aunt     Lung cancer Paternal Uncle     Breast cancer Maternal Grandmother     Stroke Maternal Grandmother     ADD / ADHD Daughter        Review of patient's allergies indicates:  No Known Allergies    Current Outpatient Medications   Medication Sig    ALPRAZolam (XANAX) 0.25 MG tablet TAKE 1 TO 2 TABLETS BY MOUTH THREE TIMES DAILY AS NEEDED FOR ANXIETY    azelastine (ASTELIN) 137 mcg (0.1 %) nasal spray SMARTSIG:Both Nares    buPROPion (WELLBUTRIN) 100 MG tablet Take 1 tablet (100 mg total) by mouth 2 (two) times daily.    butalbital-acetaminophen-caffeine -40 mg (FIORICET, ESGIC) -40 mg per tablet Take 1-2 tablets every 6 hr as needed for headache.  Not to exceed 6 tablets in 24 hr.    cetirizine (ZYRTEC) 10 MG tablet Take 10 mg by mouth once daily.    cyclobenzaprine (FLEXERIL) 10 MG tablet Take 1 tablet (10 mg total) by mouth 3 (three) times daily as needed for Muscle spasms.    EScitalopram oxalate (LEXAPRO) 20 MG tablet TAKE 1 TABLET(20 MG) BY MOUTH EVERY DAY     hydroCHLOROthiazide (HYDRODIURIL) 12.5 MG Tab Take 1 tablet (12.5 mg total) by mouth once daily.    ibuprofen (ADVIL,MOTRIN) 800 MG tablet Take 800 mg by mouth every 8 (eight) hours as needed.    losartan (COZAAR) 100 MG tablet Take 1 tablet (100 mg total) by mouth once daily.    meclizine (ANTIVERT) 25 mg tablet Take 1-2 tablets (25-50 mg total) by mouth every 4 to 6 hours as needed for Dizziness.    meloxicam (MOBIC) 15 MG tablet Take 1 tablet (15 mg total) by mouth once daily.    metoprolol succinate (TOPROL-XL) 50 MG 24 hr tablet Take 1 tablet (50 mg total) by mouth once daily.    NIFEdipine (PROCARDIA-XL) 60 MG (OSM) 24 hr tablet Take 1 tablet (60 mg total) by mouth once daily.    rizatriptan (MAXALT) 10 MG tablet Take 1 tablet (10 mg total) by mouth as needed.    topiramate (TOPAMAX) 50 MG tablet Take 1 tablet (50 mg total) by mouth 2 (two) times daily.    zolpidem (AMBIEN) 5 MG Tab Take 1 tablet (5 mg total) by mouth nightly as needed.     No current facility-administered medications for this visit.       Review of Systems     GENERAL:  No weight loss, malaise or fevers.  HEENT:   No recent changes in vision or hearing  NECK:  Negative for lumps, no difficulty with swallowing.  RESPIRATORY:  Negative for cough, wheezing or shortness of breath, patient denies any recent URI.  CARDIOVASCULAR:  Negative for chest pain, leg swelling or palpitations.  GI:  Negative for abdominal discomfort, blood in stools or black stools or change in bowel habits.  MUSCULOSKELETAL:  See HPI.  SKIN:  Negative for lesions, rash, and itching.  PSYCH:  No mood disorder or recent psychosocial stressors.  Patients sleep is not disturbed secondary to pain.  HEMATOLOGY/LYMPHOLOGY:  Negative for prolonged bleeding, bruising easily or swollen nodes.  Patient is not currently taking any anti-coagulants  NEURO:   No history of headaches, syncope, paralysis, seizures or tremors.  All other reviewed and negative other than  "HPI.    OBJECTIVE:    BP (!) 148/98   Pulse 83   Ht 5' 1" (1.549 m)   Wt (!) 136.2 kg (300 lb 4.3 oz)   LMP 09/09/2014   BMI 56.73 kg/m²         Physical Exam    GENERAL: Well appearing, in no acute distress, alert and oriented x3.  Morbidly obese  PSYCH:  Mood and affect appropriate.  SKIN: Skin color, texture, turgor normal, no rashes or lesions.  HEAD/FACE:  Normocephalic, atraumatic. Cranial nerves grossly intact.  CV: RRR with palpation of the radial artery.  PULM: No evidence of respiratory difficulty, symmetric chest rise.  GI:  Soft and non-tender.  BACK: Straight leg raising in the sitting and supine positions is equivocal to radicular pain.  Moderate pain to palpation over the facet joints of the lumbar spine and lumbar paraspinals.  Limited range of motion secondary to pain reproduction.  EXTREMITIES: No deformities, edema, or skin discoloration. Good capillary refill.  MUSCULOSKELETAL:  Hip and knee provocative maneuvers are unremarkable.  There is mild to moderate pain with palpation over the sacroiliac joints bilaterally.  FABERs test is equivocal.  FADIRs test is equivocal.   Bilateral upper and lower extremity strength is normal and symmetric.  No atrophy or tone abnormalities are noted.  NEURO: Bilateral upper and lower extremity coordination and muscle stretch reflexes are physiologic and symmetric.  Plantar response are downgoing. No clonus.  No loss of sensation is noted.  GAIT:  Slow, antalgic.      LABS:  Lab Results   Component Value Date    WBC 6.27 11/18/2022    HGB 12.4 11/18/2022    HCT 38.7 11/18/2022    MCV 93 11/18/2022     11/18/2022       CMP  Sodium   Date Value Ref Range Status   11/18/2022 139 136 - 145 mmol/L Final     Potassium   Date Value Ref Range Status   11/18/2022 3.8 3.5 - 5.1 mmol/L Final     Chloride   Date Value Ref Range Status   11/18/2022 104 95 - 110 mmol/L Final     CO2   Date Value Ref Range Status   11/18/2022 27 23 - 29 mmol/L Final     Glucose "   Date Value Ref Range Status   11/18/2022 91 70 - 110 mg/dL Final     BUN   Date Value Ref Range Status   11/18/2022 13 6 - 20 mg/dL Final     Creatinine   Date Value Ref Range Status   11/18/2022 0.9 0.5 - 1.4 mg/dL Final     Calcium   Date Value Ref Range Status   11/18/2022 9.2 8.7 - 10.5 mg/dL Final     Total Protein   Date Value Ref Range Status   11/18/2022 7.7 6.0 - 8.4 g/dL Final     Albumin   Date Value Ref Range Status   11/18/2022 3.5 3.5 - 5.2 g/dL Final     Total Bilirubin   Date Value Ref Range Status   11/18/2022 0.3 0.1 - 1.0 mg/dL Final     Comment:     For infants and newborns, interpretation of results should be based  on gestational age, weight and in agreement with clinical  observations.    Premature Infant recommended reference ranges:  Up to 24 hours.............<8.0 mg/dL  Up to 48 hours............<12.0 mg/dL  3-5 days..................<15.0 mg/dL  6-29 days.................<15.0 mg/dL       Alkaline Phosphatase   Date Value Ref Range Status   11/18/2022 45 (L) 55 - 135 U/L Final     AST   Date Value Ref Range Status   11/18/2022 19 10 - 40 U/L Final     ALT   Date Value Ref Range Status   11/18/2022 15 10 - 44 U/L Final     Anion Gap   Date Value Ref Range Status   11/18/2022 8 8 - 16 mmol/L Final     eGFR if    Date Value Ref Range Status   07/06/2021 >60.0 >60 mL/min/1.73 m^2 Final     eGFR if non    Date Value Ref Range Status   07/06/2021 >60.0 >60 mL/min/1.73 m^2 Final     Comment:     Calculation used to obtain the estimated glomerular filtration  rate (eGFR) is the CKD-EPI equation.          No results found for: LABA1C, HGBA1C          ASSESSMENT: 42 y.o. year old female with lower back pain, consistent with     1. Low back pain with left-sided sciatica, unspecified back pain laterality, unspecified chronicity  Ambulatory referral/consult to Pain Clinic    X-Ray Lumbar Complete Including Flex And Ext            PLAN:   - Interventions:  None at this  time .    - Anticoagulation use:  None    - Medications: I have stressed the importance of physical activity and a home exercise plan to help with pain and improve health. and Patient can continue with medications for now since they are providing benefits, using them appropriately, and without side effects.     - Therapy:  Advised patient continue with activities as tolerated    - Psychological:  Discussed coping mechanisms to help address chronic pain issues    - Labs:  Reviewed    - Imaging: Reviewed available imaging with patient and answered any questions they had regarding study.Order Flexion-Extension X-rays of the Lumbar spine to rule out any instability.    - Consults/Referrals:  None at this time    - Records:  Reviewed/Obtain old records from outside physicians and imaging    - Follow up visit: return to clinic as needed    - Counseled patient regarding the importance of activity modification and physical therapy    - This condition does not require this patient to take time off of work, and the primary goal of our Pain Management services is to improve the patient's functional capacity.    - Patient Questions: Answered all of the patient's questions regarding diagnosis, therapy, and treatment        The above plan and management options were discussed at length with patient. Patient is in agreement with the above and verbalized understanding.    I discussed the goals of interventional chronic pain management with the patient on today's visit.  I explained the utility of injections for diagnostic and therapeutic purposes.  We discussed a multimodal approach to pain including treating the patient's given worst pain at any given time.  We will use a systematic approach to addressing pain.  We will also adopt a multimodal approach that includes injections, adjuvant medications, physical therapy, at times psychiatry.  There may be a limited role for opioid use intermittently in the treatment of pain, more  particularly for acute pain although no one approach can be used as a sole treatment modality.    I emphasized the importance of regular exercise, core strengthening and stretching, diet and weight loss as a cornerstone of long-term pain management.      Moise Olmos MD  Interventional Pain Management  [facility]    Disclaimer:  This note was prepared using voice recognition system and is likely to have sound alike errors that may have been overlooked even after proof reading.  Please call me with any questions

## 2022-12-09 ENCOUNTER — HOSPITAL ENCOUNTER (OUTPATIENT)
Dept: RADIOLOGY | Facility: HOSPITAL | Age: 42
Discharge: HOME | End: 2022-12-09
Attending: PHYSICAL MEDICINE & REHABILITATION
Payer: COMMERCIAL

## 2022-12-09 ENCOUNTER — CLINICAL SUPPORT (OUTPATIENT)
Dept: REHABILITATION | Facility: HOSPITAL | Age: 42
End: 2022-12-09
Payer: COMMERCIAL

## 2022-12-09 DIAGNOSIS — M25.561 CHRONIC PAIN OF BOTH KNEES: Primary | ICD-10-CM

## 2022-12-09 DIAGNOSIS — M25.562 CHRONIC PAIN OF BOTH KNEES: Primary | ICD-10-CM

## 2022-12-09 DIAGNOSIS — G89.29 CHRONIC PAIN OF BOTH KNEES: Primary | ICD-10-CM

## 2022-12-09 DIAGNOSIS — M54.42 LOW BACK PAIN WITH LEFT-SIDED SCIATICA, UNSPECIFIED BACK PAIN LATERALITY, UNSPECIFIED CHRONICITY: ICD-10-CM

## 2022-12-09 PROCEDURE — 72114 X-RAY EXAM L-S SPINE BENDING: CPT | Mod: 26,,, | Performed by: RADIOLOGY

## 2022-12-09 PROCEDURE — 97110 THERAPEUTIC EXERCISES: CPT | Mod: PN

## 2022-12-09 PROCEDURE — 72114 X-RAY EXAM L-S SPINE BENDING: CPT | Mod: TC

## 2022-12-09 PROCEDURE — 97014 ELECTRIC STIMULATION THERAPY: CPT | Mod: PN

## 2022-12-09 PROCEDURE — 72114 XR LUMBAR SPINE 5 VIEW WITH FLEX AND EXT: ICD-10-PCS | Mod: 26,,, | Performed by: RADIOLOGY

## 2022-12-09 NOTE — PROGRESS NOTES
"[facility]   Physical Therapy Treatment Note     Name: Georgia STANLEY [facility] Number: [id number]    Therapy Diagnosis:   Encounter Diagnosis   Name Primary?    Chronic pain of both knees Yes     Physician: Jules Valadez MD    Visit Date: 12/9/2022    Physician Orders: PT Eval and Treat   Medical Diagnosis from Referral: M54.32 (ICD-10-CM) - Left sided sciatica M17.0 (ICD-10-CM) - Bilateral primary osteoarthritis of knee   Evaluation Date: 11/25/2022  Authorization Period Expiration: 11/11/2023  Plan of Care Expiration: 01/13/2022  Progress Note Due: 10th visit  Visit # / Visits authorized: 2/12+Eval  FOTO: 1/3     Precautions: Standard     PTA Visit #: --/5     Time In: 6:30am  Time Out: 7:30am  Total Billable Time: 60 minutes    SUBJECTIVE     Pt reports: her back is doing better and feeling much better.  The pain management  MD told her that he thought it was a problem with the muscles in her low back.  She feels like the pain is more like when you have an exposed nerve in your tooth and air gets in it, that is the pain that she feels in her low back.        She was not compliant with home exercise program.  Response to previous treatment: increased pain in her back  Functional change: increased difficulty with walking, moving , and standing    Pain: 5/10  Location: bilateral back      OBJECTIVE     Objective Measures updated at progress report unless specified.     Treatment     Georgia received the treatments listed below:      therapeutic exercises to develop strength, endurance, ROM, flexibility, posture, and core stabilization for 45 minutes including:  Nustep 6' for stretching and Range of motion   Hip extension on table 20x ea  Cat-Cow 10x  LTR 10x  PPT 20x  Piriformis stretch 3 x 20'  Sciatic nerve [id number]  Supine hamstring stretch 20" x 3  Bridging 2 x 10  Open Books 10x  Clamshells 2 x 10  S/L hip abd 2 x 10  Prone on elbows 10x        Deferred:   Quadraped " rocking  Modified downward dog at table      manual therapy techniques: Joint mobilizations, Manual traction, Soft tissue Mobilization, and FDN were applied to the: low back for -- minutes, including:  STM to lumbar musculature  Gentle PA mobs to lumbar and sacral spine  FDN to lumbar paraspinals    Palpation Assessment to determine the necessity for Functional Dry Needling  to the lumbar musculature.     Patient provided written and verbal consent to Functional Dry Needling in her chart      Patient demonstrated appropriate response to Functional Dry Needling. Fair  rhythmical contractions observed with estim to treated muscle groups x -- min     supervised modalities after being cleared for contradictions: IFC Electrical Stimulation:  Georgia received IFC Electrical Stimulation for pain control applied to the low back. Pt received stimulation at  for 15 minutes. Georgia tolderated treatment well without any adverse effects.      hot pack for 15 minutes to low back during IFC.          Patient Education and Home Exercises     Home Exercises Provided and Patient Education Provided     Education provided:   - HEP and plan of care    Written Home Exercises Provided:  Advised patient to stop doing any exercises at home for now   Start on her meloxicam and see if that will start to reduce some of her back pain. Exercises were reviewed and Georgia was able to demonstrate them prior to the end of the session.  Georgia demonstrated fair  understanding of the education provided. See EMR under Patient Instructions for exercises provided during therapy sessions    ASSESSMENT     Georgia presents with improved pain levels in her back today.   Therapy consisted of gentle AROM exercises and strengthening.  She started to have increased pain in the left side of the low back during her exercises.  Advised patient to continue taking her meloxicam for a few more days and then stop taking it if she wants and see how she feels.   Her back was slightly better after her therapy. Continue with gentle stretching and strengthening to the low back to reduce her overall pain.     Georgia Is not progressing well towards her goals.   Pt prognosis is Fair.     Pt will continue to benefit from skilled outpatient physical therapy to address the deficits listed in the problem list box on initial evaluation, provide pt/family education and to maximize pt's level of independence in the home and community environment.     Pt's spiritual, cultural and educational needs considered and pt agreeable to plan of care and goals.     Anticipated barriers to physical therapy: none    Short Term Goals:    Pt will be 50% independent with HEP. Progressing     Long Term Goals: 6 weeks   Pt will be 100% independent with HEP. Progressing  Pt will be able to walk 1 mile without difficulty. Progressing  Pt will be able to perform her normal household duties without difficulty. Progressing  Pt will be able to carry groceries without difficulty. Progressing     PLAN   Plan of care Certification: 11/25/2022 to 01/13/2022.     Outpatient Physical Therapy 2 times weekly for 6 weeks to include the following interventions: Electrical Stimulation PRN, Gait Training, Manual Therapy, Moist Heat/ Ice, Neuromuscular Re-ed, Patient Education, Self Care, Therapeutic Activities, Therapeutic Exercise, and FDN .  Farzaneh Shelley, PT

## 2022-12-14 ENCOUNTER — CLINICAL SUPPORT (OUTPATIENT)
Dept: REHABILITATION | Facility: HOSPITAL | Age: 42
End: 2022-12-14
Payer: COMMERCIAL

## 2022-12-14 DIAGNOSIS — M25.561 CHRONIC PAIN OF BOTH KNEES: Primary | ICD-10-CM

## 2022-12-14 DIAGNOSIS — M25.562 CHRONIC PAIN OF BOTH KNEES: Primary | ICD-10-CM

## 2022-12-14 DIAGNOSIS — G89.29 CHRONIC PAIN OF BOTH KNEES: Primary | ICD-10-CM

## 2022-12-14 PROCEDURE — 97140 MANUAL THERAPY 1/> REGIONS: CPT | Mod: PN

## 2022-12-14 PROCEDURE — 97110 THERAPEUTIC EXERCISES: CPT | Mod: PN

## 2022-12-14 PROCEDURE — 97112 NEUROMUSCULAR REEDUCATION: CPT | Mod: PN

## 2022-12-14 NOTE — PROGRESS NOTES
"[facility]   Physical Therapy Treatment Note     Name: Georgia STANLEY Garcia  Clinic Number: [id number]    Therapy Diagnosis:   Encounter Diagnosis   Name Primary?    Chronic pain of both knees Yes     Physician: Jules Valadez MD    Visit Date: 12/14/2022    Physician Orders: PT Eval and Treat   Medical Diagnosis from Referral: M54.32 (ICD-10-CM) - Left sided sciatica M17.0 (ICD-10-CM) - Bilateral primary osteoarthritis of knee   Evaluation Date: 11/25/2022  Authorization Period Expiration: 11/11/2023  Plan of Care Expiration: 01/13/2022  Progress Note Due: 10th visit  Visit # / Visits authorized: 3/12+Eval  FOTO: 1/3     Precautions: Standard     PTA Visit #: --/5     Time In: 6:30am  Time Out: 7:25am  Total Billable Time: 45 minutes    SUBJECTIVE     Pt reports: Friday she felt good after her therapy session and that day.  Then Saturday she could hardly get out of bed and had increased pain.  She reports that's how it was last week too.  And when the pain gets so bad it takes a few days to calm down.  Today her back is achy but better from Saturday.        She was not compliant with home exercise program.  Response to previous treatment: increased pain in her back  Functional change: increased difficulty with walking, moving , and standing    Pain: 7/10  Location: bilateral back      OBJECTIVE     Objective Measures updated at progress report unless specified.     Treatment     Georgia received the treatments listed below:      therapeutic exercises to develop strength, endurance, ROM, flexibility, posture, and core stabilization for 25 minutes including:  Nustep 6' for stretching and Range of motion   Prone press ups 10x  Cat-Cow 4x-started to be painful in lower back  Quadraped rocking 10x  PPT 10x  Sciatic nerve [id number]  Supine hamstring stretch 20" x 3    Deferred:  LTR 10x  Piriformis stretch 3 x 20'  Bridging 2 x 10  Open Books 10x  Clamshells 2 x 10  S/L hip abd 2 x 10  Hip " extension on table 20x eaProne on elbows 10x  Modified downward dog at table      manual therapy techniques: Joint mobilizations, Manual traction, Soft tissue Mobilization, and FDN were applied to the: low back for 10 minutes, including:  STM to lumbar musculature  Gentle PA mobs to lumbar and sacral spine  FDN to lumbar paraspinals    Palpation Assessment to determine the necessity for Functional Dry Needling  to the lumbar musculature.     Patient provided written and verbal consent to Functional Dry Needling in her chart    Georgia participated in neuromuscular re-education activities to improve: Sense for 10 minutes. The following activities were included:  -Patient demonstrated appropriate response to Functional Dry Needling. Fair  rhythmical contractions observed with estim to treated muscle groups for improving muscle activation x -10 min     supervised modalities after being cleared for contradictions: IFC Electrical Stimulation:  Georgia received IFC Electrical Stimulation for pain control applied to the low back. Pt received stimulation at  for -- minutes. Georgia tolderated treatment well without any adverse effects.      hot pack for 10 minutes to low back after treatment.          Patient Education and Home Exercises     Home Exercises Provided and Patient Education Provided     Education provided:   - HEP and plan of care    Written Home Exercises Provided:  Advised patient to stop doing any exercises at home for now   Start on her meloxicam and see if that will start to reduce some of her back pain. Exercises were reviewed and Georgia was able to demonstrate them prior to the end of the session.  Georgia demonstrated fair  understanding of the education provided. See EMR under Patient Instructions for exercises provided during therapy sessions    ASSESSMENT     Georgia presents with increased pain with radicular pain into the right lower extremity and in the lower back.  Therapy consisted with  gentle stretching/Range of motion exercises and gentle core strengthening.  She has continued numbness into the right lower extremity indicative of nerve involvement in the lumbar spine.  Continue with gentle stretching and strengthening to the low back to reduce her overall pain.   Will reach out the pain management MD if her pain persists.    Georgia Is not progressing well towards her goals.   Pt prognosis is Fair.     Pt will continue to benefit from skilled outpatient physical therapy to address the deficits listed in the problem list box on initial evaluation, provide pt/family education and to maximize pt's level of independence in the home and community environment.     Pt's spiritual, cultural and educational needs considered and pt agreeable to plan of care and goals.     Anticipated barriers to physical therapy: none    Short Term Goals:    Pt will be 50% independent with HEP. Progressing     Long Term Goals: 6 weeks   Pt will be 100% independent with HEP. Progressing  Pt will be able to walk 1 mile without difficulty. Progressing  Pt will be able to perform her normal household duties without difficulty. Progressing  Pt will be able to carry groceries without difficulty. Progressing     PLAN   Plan of care Certification: 11/25/2022 to 01/13/2022.     Outpatient Physical Therapy 2 times weekly for 6 weeks to include the following interventions: Electrical Stimulation PRN, Gait Training, Manual Therapy, Moist Heat/ Ice, Neuromuscular Re-ed, Patient Education, Self Care, Therapeutic Activities, Therapeutic Exercise, and FDN .  Farzaneh Shelley, PT

## 2022-12-15 ENCOUNTER — PATIENT MESSAGE (OUTPATIENT)
Dept: SPORTS MEDICINE | Facility: CLINIC | Age: 42
End: 2022-12-15
Payer: COMMERCIAL

## 2022-12-15 ENCOUNTER — PATIENT MESSAGE (OUTPATIENT)
Dept: REHABILITATION | Facility: HOSPITAL | Age: 42
End: 2022-12-15
Payer: COMMERCIAL

## 2022-12-15 ENCOUNTER — TELEPHONE (OUTPATIENT)
Dept: SPORTS MEDICINE | Facility: CLINIC | Age: 42
End: 2022-12-15
Payer: COMMERCIAL

## 2022-12-15 ENCOUNTER — PATIENT MESSAGE (OUTPATIENT)
Dept: PAIN MEDICINE | Facility: CLINIC | Age: 42
End: 2022-12-15
Payer: COMMERCIAL

## 2022-12-15 NOTE — TELEPHONE ENCOUNTER
Spoke with pt in reference to pain she's having , she has expressed that PT is possibly making the pain worst since going to sessions. Pt is scheduled to see Dr Otero at [place] location for 12/27 wants to discuss possible MRI. The pt accepted and verbalized understanding.

## 2022-12-16 DIAGNOSIS — M54.9 DORSALGIA, UNSPECIFIED: Primary | ICD-10-CM

## 2022-12-19 ENCOUNTER — PATIENT MESSAGE (OUTPATIENT)
Dept: REHABILITATION | Facility: HOSPITAL | Age: 42
End: 2022-12-19
Payer: COMMERCIAL

## 2022-12-28 ENCOUNTER — TELEPHONE (OUTPATIENT)
Dept: SPORTS MEDICINE | Facility: CLINIC | Age: 42
End: 2022-12-28
Payer: COMMERCIAL

## 2022-12-28 ENCOUNTER — OFFICE VISIT (OUTPATIENT)
Dept: ORTHOPEDICS | Facility: CLINIC | Age: 42
End: 2022-12-28
Payer: COMMERCIAL

## 2022-12-28 ENCOUNTER — HOSPITAL ENCOUNTER (OUTPATIENT)
Dept: RADIOLOGY | Facility: HOSPITAL | Age: 42
Discharge: HOME | End: 2022-12-28
Attending: PHYSICAL MEDICINE & REHABILITATION
Payer: COMMERCIAL

## 2022-12-28 ENCOUNTER — TELEPHONE (OUTPATIENT)
Dept: ORTHOPEDICS | Facility: CLINIC | Age: 42
End: 2022-12-28

## 2022-12-28 VITALS — BODY MASS INDEX: 55.32 KG/M2 | HEIGHT: 61 IN | WEIGHT: 293 LBS

## 2022-12-28 DIAGNOSIS — M54.9 DORSALGIA, UNSPECIFIED: ICD-10-CM

## 2022-12-28 DIAGNOSIS — M54.32 LEFT SIDED SCIATICA: Primary | ICD-10-CM

## 2022-12-28 PROCEDURE — 99999 PR PBB SHADOW E&M-EST. PATIENT-LVL V: CPT | Mod: PBBFAC,,, | Performed by: STUDENT IN AN ORGANIZED HEALTH CARE EDUCATION/TRAINING PROGRAM

## 2022-12-28 PROCEDURE — 1159F PR MEDICATION LIST DOCUMENTED IN MEDICAL RECORD: ICD-10-PCS | Mod: CPTII,S$GLB,, | Performed by: STUDENT IN AN ORGANIZED HEALTH CARE EDUCATION/TRAINING PROGRAM

## 2022-12-28 PROCEDURE — 72148 MRI LUMBAR SPINE W/O DYE: CPT | Mod: TC

## 2022-12-28 PROCEDURE — 99204 OFFICE O/P NEW MOD 45 MIN: CPT | Mod: S$GLB,,, | Performed by: STUDENT IN AN ORGANIZED HEALTH CARE EDUCATION/TRAINING PROGRAM

## 2022-12-28 PROCEDURE — 1160F PR REVIEW ALL MEDS BY PRESCRIBER/CLIN PHARMACIST DOCUMENTED: ICD-10-PCS | Mod: CPTII,S$GLB,, | Performed by: STUDENT IN AN ORGANIZED HEALTH CARE EDUCATION/TRAINING PROGRAM

## 2022-12-28 PROCEDURE — 99999 PR PBB SHADOW E&M-EST. PATIENT-LVL V: ICD-10-PCS | Mod: PBBFAC,,, | Performed by: STUDENT IN AN ORGANIZED HEALTH CARE EDUCATION/TRAINING PROGRAM

## 2022-12-28 PROCEDURE — 1159F MED LIST DOCD IN RCRD: CPT | Mod: CPTII,S$GLB,, | Performed by: STUDENT IN AN ORGANIZED HEALTH CARE EDUCATION/TRAINING PROGRAM

## 2022-12-28 PROCEDURE — 3008F PR BODY MASS INDEX (BMI) DOCUMENTED: ICD-10-PCS | Mod: CPTII,S$GLB,, | Performed by: STUDENT IN AN ORGANIZED HEALTH CARE EDUCATION/TRAINING PROGRAM

## 2022-12-28 PROCEDURE — 1160F RVW MEDS BY RX/DR IN RCRD: CPT | Mod: CPTII,S$GLB,, | Performed by: STUDENT IN AN ORGANIZED HEALTH CARE EDUCATION/TRAINING PROGRAM

## 2022-12-28 PROCEDURE — 4010F PR ACE/ARB THEARPY RXD/TAKEN: ICD-10-PCS | Mod: CPTII,S$GLB,, | Performed by: STUDENT IN AN ORGANIZED HEALTH CARE EDUCATION/TRAINING PROGRAM

## 2022-12-28 PROCEDURE — 99204 PR OFFICE/OUTPT VISIT, NEW, LEVL IV, 45-59 MIN: ICD-10-PCS | Mod: S$GLB,,, | Performed by: STUDENT IN AN ORGANIZED HEALTH CARE EDUCATION/TRAINING PROGRAM

## 2022-12-28 PROCEDURE — 3008F BODY MASS INDEX DOCD: CPT | Mod: CPTII,S$GLB,, | Performed by: STUDENT IN AN ORGANIZED HEALTH CARE EDUCATION/TRAINING PROGRAM

## 2022-12-28 PROCEDURE — 4010F ACE/ARB THERAPY RXD/TAKEN: CPT | Mod: CPTII,S$GLB,, | Performed by: STUDENT IN AN ORGANIZED HEALTH CARE EDUCATION/TRAINING PROGRAM

## 2022-12-28 RX ORDER — METHOCARBAMOL 500 MG/1
500 TABLET, FILM COATED ORAL 4 TIMES DAILY PRN
Qty: 40 TABLET | Refills: 1 | Status: SHIPPED | OUTPATIENT
Start: 2022-12-28 | End: 2023-01-07

## 2022-12-28 RX ORDER — GABAPENTIN 100 MG/1
100 CAPSULE ORAL 3 TIMES DAILY
Qty: 90 CAPSULE | Refills: 1 | Status: SHIPPED | OUTPATIENT
Start: 2022-12-28 | End: 2023-02-22

## 2022-12-28 NOTE — TELEPHONE ENCOUNTER
Spoke with patient regarding appointment she had scheduled with office on December 30.  She stated when she left appt today she had received a call from Dr. Valadez's office cancelling her appt for gel injection so she went online to try and r/s the soonest appt possible.  Explained to patient that she would not be able to get the gel injection this Friday as the insurance will not cover until January 4, 2023.  Patient asked if she could just get CSI instead.  Explained that provider would like for her to wait and see how gel injections do prior to doing another CSI.  If after 4 weeks with gel shots she is not improving then we can do another round of CSI.  Offered to  her January 6 appt up to January 4 and patient accepted.

## 2022-12-28 NOTE — PATIENT INSTRUCTIONS
Assessment:  Georgia Garcia is a 42 y.o. female   Chief Complaint   Patient presents with    Spine - Pain    Left Knee - Pain    Right Knee - Pain       Encounter Diagnosis   Name Primary?    Left sided sciatica Yes        Plan:  Referral placed to Back and Spine Clinic  A referral has been placed to our Back and Spine Department. A member of their staff will reach out to you to get an appointment scheduled with the appropriate provider. You may also contact their department using the contact numbers below:    [place]: [phone]  [place]: [phone]  [place]: [phone]    If you would like more information on [facility] department, please use the following link: Back and Spine  Continue with physical therapy at [facility]  Stop taking Flexeril and begin taking prescription Methocarbomol 500 mg four times per day as needed.  Apply topical diclofenac (Voltaren) up to 4 times a day to the affected area.  It can be bought over the counter at any local pharmacy.    Patient may use over the counter lidocaine patches as needed for pain.  Prescription of Gabapentin begin with 100 mg three times per day and can increase to 300 mg three times per day after 1 week.  Patient may begin using a heating pad every 2 hours for 15-20 minutes at a time.  Patient should keep scheduled appointment with Dr. Jules Valadez in January for gel injections for knee.    Follow-up: with Dr. Jules Valadez for gel injections or sooner if there are any problems between now and then.    Thank you for choosing [facility] and Dr. Oswaldo Nova for your orthopedic & sports medicine care. It is our goal to provide you with exceptional care that will help keep you healthy, active, and get you back in the game.    Please do not hesitate to reach out to us via email, phone, or MyChart with any questions, concerns, or feedback.    If you felt that you received exemplary care today,  please consider leaving us feedback on [employer] at:  [email or web address]    If you are experiencing pain/discomfort ,or have questions after 5pm and would like to be connected to the [facility] on-call team, please call this number and specify which Sports Medicine provider is treating you: [phone]

## 2022-12-28 NOTE — TELEPHONE ENCOUNTER
LVM informing patient that upcoming office visit needed to be r/s. Provided call back number and option to send message via my chart to r/s.

## 2022-12-28 NOTE — PROGRESS NOTES
"        Patient ID: Georgia Garcia  YOB: 1980  MRN: [id number]    Chief Complaint: Pain of the Spine, Pain of the Left Knee, and Pain of the Right Knee      Referred By: Jules Valadez MD for Bilateral Knee Pain and Low Back Pain    History of Present Illness: Georgia Garcia is a  42 y.o. female who presents today with complaints of low back pain and bilateral knee pain.  She states that she is to receive gel injection from Dr. Valadez in January and was wondering if she could receive today.  Explained to patient that authorization for injection had not yet been obtained so could not perform gel injection today.  Patient states frustration in having to miss so much work between doctors appointments and physical therapy and not being able to determine what cause of pain is.  Patient states that she would like to discuss her back pain today as she just had an MRI this morning.  She has been seeing physical therapy and states that physical therapy doesn't believe the issue is a muscular issue.  She was referred to Pain Management and was told that it is not a neurological issue and believed it was muscular and sent back to physical therapy.  Patient believes that PT is actually making symptoms worse. She rates her pain in the back at a 10/10 and describes the pain as a intermittent achy pain, similar to what a "dry socket" feels like with a wisdom tooth.  She states she has numbness and radicular achiness down the left posterior leg and that she has had 2 falls.  She is unable to get comfortable at night.  She currently is in PT at [facility] and is taking Tylenol Arthritis and Meloxicam.  She is scheduled for Synvisc 1 injection for both knees on January 4 with Dr. Jules Valadez. Patient denies any loss of bowel or bladder control       The patient is active in none.  Occupation:        Past Medical History:   Past Medical History:   Diagnosis Date    Anemia     Cervical radiculopathy     COVID-19 " virus infection 2021    Diverticulosis     Hypermenorrhea     Hypertension     Insomnia     Internal hemorrhoid     Migraine headache     Morbid obesity     Rectal bleeding     Uterine fibroid      Past Surgical History:   Procedure Laterality Date     SECTION, LOW TRANSVERSE      x1    COLONOSCOPY N/A 2022    Procedure: COLONOSCOPY;  Surgeon: Maximus Lopes MD;  Location: [facility];  Service: Endoscopy;  Laterality: N/A;    TOTAL ABDOMINAL HYSTERECTOMY  14    TUBAL LIGATION       Family History   Problem Relation Age of Onset    Hypertension Mother     Pulmonary embolism Mother     Deep vein thrombosis Mother     Thrombophilia Mother     Heart failure Mother     Pulmonary embolism Maternal Uncle     Deep vein thrombosis Maternal Uncle     Thrombophilia Maternal Uncle     Colon cancer Maternal Uncle     COPD Father     Diabetes Maternal Grandfather     Stroke Maternal Grandfather     Breast cancer Maternal Aunt     Lung cancer Paternal Uncle     Breast cancer Maternal Grandmother     Stroke Maternal Grandmother     ADD / ADHD Daughter      Social History     Socioeconomic History    Marital status: Single   Occupational History     Employer: [employer]   Tobacco Use    Smoking status: Never    Smokeless tobacco: Never   Substance and Sexual Activity    Alcohol use: No    Drug use: No    Sexual activity: Yes     Partners: Male     Birth control/protection: Surgical     Comment: hyst; mut monog   Social History Narrative    She is , has one daughter and 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              Social Determinants of Health     Financial Resource Strain: Low Risk     Difficulty of Paying Living Expenses: Not very hard   Food Insecurity: No Food Insecurity    Worried About Running Out of Food in the Last Year: Never true    Ran Out of Food in the Last Year: Never true   Transportation Needs: No Transportation Needs    Lack  of Transportation (Medical): No    Lack of Transportation (Non-Medical): No   Physical Activity: Insufficiently Active    Days of Exercise per Week: 3 days    Minutes of Exercise per Session: 20 min   Stress: Stress Concern Present    Feeling of Stress : To some extent   Social Connections: Unknown    Frequency of Communication with Friends and Family: More than three times a week    Frequency of Social Gatherings with Friends and Family: Patient refused    Active Member of Clubs or Organizations: Yes    Attends Club or Organization Meetings: 1 to 4 times per year    Marital Status: Patient refused   Housing Stability: Low Risk     Unable to Pay for Housing in the Last Year: No    Number of Places Lived in the Last Year: 1    Unstable Housing in the Last Year: No     Medication List with Changes/Refills   New Medications    GABAPENTIN (NEURONTIN) 100 MG CAPSULE    Take 1 capsule (100 mg total) by mouth 3 (three) times daily.    METHOCARBAMOL (ROBAXIN) 500 MG TAB    Take 1 tablet (500 mg total) by mouth 4 (four) times daily as needed.   Current Medications    ALPRAZOLAM (XANAX) 0.25 MG TABLET    TAKE 1 TO 2 TABLETS BY MOUTH THREE TIMES DAILY AS NEEDED FOR ANXIETY    AZELASTINE (ASTELIN) 137 MCG (0.1 %) NASAL SPRAY    SMARTSIG:Both Nares    BUPROPION (WELLBUTRIN) 100 MG TABLET    Take 1 tablet (100 mg total) by mouth 2 (two) times daily.    BUTALBITAL-ACETAMINOPHEN-CAFFEINE -40 MG (FIORICET, ESGIC) -40 MG PER TABLET    Take 1-2 tablets every 6 hr as needed for headache.  Not to exceed 6 tablets in 24 hr.    CETIRIZINE (ZYRTEC) 10 MG TABLET    Take 10 mg by mouth once daily.    ESCITALOPRAM OXALATE (LEXAPRO) 20 MG TABLET    TAKE 1 TABLET(20 MG) BY MOUTH EVERY DAY    HYDROCHLOROTHIAZIDE (HYDRODIURIL) 12.5 MG TAB    Take 1 tablet (12.5 mg total) by mouth once daily.    IBUPROFEN (ADVIL,MOTRIN) 800 MG TABLET    Take 800 mg by mouth every 8 (eight) hours as needed.    LOSARTAN (COZAAR) 100 MG TABLET    Take 1  tablet (100 mg total) by mouth once daily.    MECLIZINE (ANTIVERT) 25 MG TABLET    Take 1-2 tablets (25-50 mg total) by mouth every 4 to 6 hours as needed for Dizziness.    MELOXICAM (MOBIC) 15 MG TABLET    Take 1 tablet (15 mg total) by mouth once daily.    METOPROLOL SUCCINATE (TOPROL-XL) 50 MG 24 HR TABLET    Take 1 tablet (50 mg total) by mouth once daily.    NIFEDIPINE (PROCARDIA-XL) 60 MG (OSM) 24 HR TABLET    Take 1 tablet (60 mg total) by mouth once daily.    RIZATRIPTAN (MAXALT) 10 MG TABLET    Take 1 tablet (10 mg total) by mouth as needed.    TOPIRAMATE (TOPAMAX) 50 MG TABLET    Take 1 tablet (50 mg total) by mouth 2 (two) times daily.    ZOLPIDEM (AMBIEN) 5 MG TAB    Take 1 tablet (5 mg total) by mouth nightly as needed.   Discontinued Medications    CYCLOBENZAPRINE (FLEXERIL) 10 MG TABLET    Take 1 tablet (10 mg total) by mouth 3 (three) times daily as needed for Muscle spasms.     Review of patient's allergies indicates:  No Known Allergies    Physical Exam:   Body mass index is 56.68 kg/m².    GENERAL: Well appearing, in no acute distress.  HEAD: Normocephalic and atraumatic.  ENT: External ears and nose grossly normal.  EYES: EOMI bilaterally  PULMONARY: Respirations are grossly even and non-labored.  NEURO: Awake, alert, and oriented x 3.  SKIN: No obvious rashes appreciated.  PSYCH: Mood & affect are appropriate.    Detailed MSK exam:     Back exam: sensation decreased on the left side. Pain worse with forward flexion, less with extension. TTP left SI joint. Positive straight leg raise left. Reflexes 2+.     Imaging:  X-Ray Lumbar Complete Including Flex And Ext  Narrative: EXAMINATION:  XR LUMBAR SPINE 5 VIEW WITH FLEX AND EXT    CLINICAL HISTORY:  Lumbago with sciatica, left side    TECHNIQUE:  Five views of the lumbar spine plus flexion extension views were performed.    COMPARISON:  None.    FINDINGS:  Vertebral body heights and alignment are within normal limits.  No change in spinal alignment  with flexion or extension to suggest instability.  Degenerative endplate spurring and possible mild disc height loss noted at T12-L1. No pars defects visualized.  Posterior elements appear grossly intact. No acute fractures or subluxations are demonstrated.  The remaining visualized osseous and soft tissue structures demonstrate no appreciable abnormality.  Impression: Degenerative findings as above    Electronically signed by: Mina Colorado MD  Date:    12/09/2022  Time:    16:48        Relevant imaging results were reviewed and interpreted by me and per my read shows degenerative findings.  This was discussed with the patient and / or family today.     Assessment:  Georgia Garcia is a 42 y.o. female presenting with low back pain.   History, physical and radiographs are consistent with a likely diagnosis of left sided sciatica and low back pain.   Plan: trial gabapentin. Switch flexeril to robaxin. Lidocaine patches, heat and voltaren gel. Back and spine referral. MRI completed this morning pending final read. Continue PT. Continue conservative management for pain.   Follow up with Dr Valadez next week for gel injection. If no significant benefit from gel injections, consider repeat steroid injection in the future since patient has responded really well to these in the past (10 years and 2 years). All questions answered.      Left sided sciatica  -     gabapentin (NEURONTIN) 100 MG capsule; Take 1 capsule (100 mg total) by mouth 3 (three) times daily.  Dispense: 90 capsule; Refill: 1  -     methocarbamoL (ROBAXIN) 500 MG Tab; Take 1 tablet (500 mg total) by mouth 4 (four) times daily as needed.  Dispense: 40 tablet; Refill: 1  -     Ambulatory referral/consult to Back & Spine Clinic; Future; Expected date: 01/04/2023         A copy of today's visit note has been sent to the referring provider.     Electronically signed:  Oswaldo Nova MD, MPH  12/28/2022  8:55 AM

## 2022-12-29 ENCOUNTER — TELEPHONE (OUTPATIENT)
Dept: PAIN MEDICINE | Facility: CLINIC | Age: 42
End: 2022-12-29
Payer: COMMERCIAL

## 2022-12-29 NOTE — TELEPHONE ENCOUNTER
Reached out to patient to ask about MRI. MRI was done on yesterday, results just populated. Patient was confused as to whom Dr. Nova had referred her to, and did not realize it was Dr Olmos. She states she really was not interested in following up with Dr. Olmos and wanted to continue with Dr. Nova. I suggested she reach out to [facility] about the MRI results since Dr. Olmos ordered it because of progessive changes that were occurring. Patient agreed and stated she would and see what his recs would be and make f/u appt if warranted. Will remove referral out of [facility].    Olya Maradiaga RN

## 2022-12-29 NOTE — TELEPHONE ENCOUNTER
Patient has B&S referral ordered by Dr. Nova. Patient just seen by Dr. Olmos 12/7/22. Patient was told that MRI was ordered d/t progressive changes and to call to schedule. No MRI scheduled yet to review. Will defer referral and contact patient about MRI.    Olya Maradiaga RN

## 2023-01-01 ENCOUNTER — PATIENT MESSAGE (OUTPATIENT)
Dept: FAMILY MEDICINE | Facility: CLINIC | Age: 43
End: 2023-01-01
Payer: COMMERCIAL

## 2023-01-03 RX ORDER — HYDROCODONE BITARTRATE AND ACETAMINOPHEN 7.5; 325 MG/1; MG/1
1 TABLET ORAL EVERY 8 HOURS PRN
Qty: 12 TABLET | Refills: 0 | Status: SHIPPED | OUTPATIENT
Start: 2023-01-03 | End: 2023-03-16 | Stop reason: SDUPTHER

## 2023-01-04 ENCOUNTER — OFFICE VISIT (OUTPATIENT)
Dept: ORTHOPEDICS | Facility: CLINIC | Age: 43
End: 2023-01-04
Payer: COMMERCIAL

## 2023-01-04 VITALS — WEIGHT: 293 LBS | BODY MASS INDEX: 55.32 KG/M2 | HEIGHT: 61 IN

## 2023-01-04 DIAGNOSIS — M17.0 PRIMARY OSTEOARTHRITIS OF BOTH KNEES: Primary | ICD-10-CM

## 2023-01-04 PROCEDURE — 1159F PR MEDICATION LIST DOCUMENTED IN MEDICAL RECORD: ICD-10-PCS | Mod: CPTII,S$GLB,, | Performed by: STUDENT IN AN ORGANIZED HEALTH CARE EDUCATION/TRAINING PROGRAM

## 2023-01-04 PROCEDURE — 99999 PR PBB SHADOW E&M-EST. PATIENT-LVL IV: ICD-10-PCS | Mod: PBBFAC,,, | Performed by: STUDENT IN AN ORGANIZED HEALTH CARE EDUCATION/TRAINING PROGRAM

## 2023-01-04 PROCEDURE — 1159F MED LIST DOCD IN RCRD: CPT | Mod: CPTII,S$GLB,, | Performed by: STUDENT IN AN ORGANIZED HEALTH CARE EDUCATION/TRAINING PROGRAM

## 2023-01-04 PROCEDURE — 99999 PR PBB SHADOW E&M-EST. PATIENT-LVL IV: CPT | Mod: PBBFAC,,, | Performed by: STUDENT IN AN ORGANIZED HEALTH CARE EDUCATION/TRAINING PROGRAM

## 2023-01-04 PROCEDURE — 3008F BODY MASS INDEX DOCD: CPT | Mod: CPTII,S$GLB,, | Performed by: STUDENT IN AN ORGANIZED HEALTH CARE EDUCATION/TRAINING PROGRAM

## 2023-01-04 PROCEDURE — 20610 DRAIN/INJ JOINT/BURSA W/O US: CPT | Mod: 50,S$GLB,, | Performed by: STUDENT IN AN ORGANIZED HEALTH CARE EDUCATION/TRAINING PROGRAM

## 2023-01-04 PROCEDURE — 3008F PR BODY MASS INDEX (BMI) DOCUMENTED: ICD-10-PCS | Mod: CPTII,S$GLB,, | Performed by: STUDENT IN AN ORGANIZED HEALTH CARE EDUCATION/TRAINING PROGRAM

## 2023-01-04 PROCEDURE — 99499 NO LOS: ICD-10-PCS | Mod: S$GLB,,, | Performed by: STUDENT IN AN ORGANIZED HEALTH CARE EDUCATION/TRAINING PROGRAM

## 2023-01-04 PROCEDURE — 20610 LARGE JOINT ASPIRATION/INJECTION: BILATERAL KNEE: ICD-10-PCS | Mod: 50,S$GLB,, | Performed by: STUDENT IN AN ORGANIZED HEALTH CARE EDUCATION/TRAINING PROGRAM

## 2023-01-04 PROCEDURE — 99499 UNLISTED E&M SERVICE: CPT | Mod: S$GLB,,, | Performed by: STUDENT IN AN ORGANIZED HEALTH CARE EDUCATION/TRAINING PROGRAM

## 2023-01-04 NOTE — PROCEDURES
Large Joint Aspiration/Injection: bilateral knee    Date/Time: 1/4/2023 9:00 AM  Performed by: Oswaldo Nova MD  Authorized by: Oswaldo Nova MD     Consent Done?:  Yes (Verbal)  Indications:  Arthritis and pain  Site marked: the procedure site was marked    Timeout: prior to procedure the correct patient, procedure, and site was verified    Prep: patient was prepped and draped in usual sterile fashion      Details:  Needle Size:  22 G  Approach:  Anterolateral  Location:  Knee  Laterality:  Bilateral  Site:  Bilateral knee  Medications (Right):  48 mg hylan g-f 20 48 mg/6 mL  Medications (Left):  48 mg hylan g-f 20 48 mg/6 mL  Patient tolerance:  Patient tolerated the procedure well with no immediate complications

## 2023-01-04 NOTE — PATIENT INSTRUCTIONS
Assessment:  Georgia Garcia is a 42 y.o. female   Chief Complaint   Patient presents with    Left Knee - Pain    Right Knee - Pain    Follow-up     naveed SYNVISC one       Encounter Diagnosis   Name Primary?    Primary osteoarthritis of both knees Yes        Plan:  Bilateral Synvisc 1 injections today.  We discussed the proper protocols after the injection such as no submerging pools, baths tubs, or hot tubs for 24 hr.  Showering is okay today.  We also discussed that blood sugars can be elevated after an injection and asked patient to properly checked her sugars over the next few days and contact their PCP if there are any concerns.  We discussed red flags such as fevers, chills, red, warm, tender joint at the area of injection to please seek medical care immediately.    Follow up as needed.    Follow-up: If you feel like you need us feel free to reach out through [facility] or feel free to contact us at [phone] or sooner if there are any problems between now and then.    Thank you for choosing [facility] and Dr. Oswaldo Nova for your orthopedic & sports medicine care. It is our goal to provide you with exceptional care that will help keep you healthy, active, and get you back in the game.    Please do not hesitate to reach out to us via email, phone, or [employer] with any questions, concerns, or feedback.    If you felt that you received exemplary care today, please consider leaving us feedback on [email or web address] at:  [email or web address]    If you are experiencing pain/discomfort ,or have questions after 5pm and would like to be connected to the [facility] on-call team, please call this number and specify which Sports Medicine provider is treating you: [phone]

## 2023-01-11 ENCOUNTER — PATIENT MESSAGE (OUTPATIENT)
Dept: FAMILY MEDICINE | Facility: CLINIC | Age: 43
End: 2023-01-11
Payer: COMMERCIAL

## 2023-03-16 ENCOUNTER — PATIENT MESSAGE (OUTPATIENT)
Dept: FAMILY MEDICINE | Facility: CLINIC | Age: 43
End: 2023-03-16
Payer: COMMERCIAL

## 2023-03-16 RX ORDER — HYDROCODONE BITARTRATE AND ACETAMINOPHEN 7.5; 325 MG/1; MG/1
1 TABLET ORAL EVERY 8 HOURS PRN
Qty: 12 TABLET | Refills: 0 | Status: SHIPPED | OUTPATIENT
Start: 2023-03-16 | End: 2023-11-10

## 2023-03-27 RX ORDER — NAPROXEN 500 MG/1
500 TABLET ORAL 2 TIMES DAILY
COMMUNITY
Start: 2023-02-17 | End: 2023-03-27 | Stop reason: SDUPTHER

## 2023-03-27 RX ORDER — NAPROXEN 500 MG/1
500 TABLET ORAL 2 TIMES DAILY
Qty: 30 TABLET | Refills: 3 | Status: SHIPPED | OUTPATIENT
Start: 2023-03-27 | End: 2023-04-18

## 2023-04-14 PROBLEM — K62.5 RECTAL BLEEDING: Status: RESOLVED | Noted: 2022-07-07 | Resolved: 2023-04-14

## 2023-04-14 PROBLEM — G43.009 MIGRAINE WITHOUT AURA AND WITHOUT STATUS MIGRAINOSUS, NOT INTRACTABLE: Status: RESOLVED | Noted: 2022-07-08 | Resolved: 2023-04-14

## 2023-04-14 NOTE — PROGRESS NOTES
"Subjective:      Georgia Garcia is a 43 y.o. female, here today with C/C of:  Headaches, back pain      HPI:    Georgia is here with issues related to HA and back pain.    Migraine headaches:  Chronic issue, grad worse over time.  Current HA pain 9/10.  Tx with Fioricet and Maxalt, taking frequently.  Now occurring more frequently, longer duration.  States "my headaches are out of control".  HA now lasting for about 2 to 3 weeks at a time.  Has seen Neuro prev, dx with migraines.  Severe HA ---> dizziness, photophobia, confusion, brain fog, weakness, nausea.  She is UTD on eye exams.      Back pain:  Reports chronic lower back pain, last eval by Dr. Olmos 12/2022.  Describes her pain as a "toothache", catching at times with spasms.  Pain in lower back radiating down LLE and upwards.  Current back pain 10/10.  Imaging done previously, did complete a course of PT.  She has tried M/R, marcial and Voltaren gel but has not helped.      Review of Systems   Constitutional:  Positive for activity change. Negative for appetite change and unexpected weight change.   HENT:  Negative for hearing loss, rhinorrhea and trouble swallowing.    Eyes:  Positive for photophobia. Negative for discharge and visual disturbance.   Respiratory:  Negative for chest tightness and wheezing.    Cardiovascular:  Negative for chest pain and palpitations.   Gastrointestinal:  Positive for nausea. Negative for constipation, diarrhea and vomiting.   Endocrine: Negative for polydipsia and polyuria.   Genitourinary:  Negative for difficulty urinating, dysuria, hematuria and menstrual problem.   Musculoskeletal:  Positive for back pain. Negative for arthralgias, gait problem, joint swelling and neck pain.   Neurological:  Positive for light-headedness and headaches. Negative for weakness.   Psychiatric/Behavioral:  Positive for confusion and sleep disturbance. Negative for dysphoric mood.        Review of patient's allergies indicates:  No Known " Allergies      Patient Active Problem List   Diagnosis    Migraine headache    Iron deficiency anemia    Insomnia    Essential hypertension    Morbid obesity    Osteoarthritis of both knees    PREMA (generalized anxiety disorder)    Major depressive disorder with single episode, in full remission    Left sided sciatica    Chronic pain of both knees         Current Outpatient Medications:     ALPRAZolam (XANAX) 0.25 MG tablet, TAKE 1 TO 2 TABLETS BY MOUTH THREE TIMES DAILY AS NEEDED FOR ANXIETY, Disp: 90 tablet, Rfl: 3    azelastine (ASTELIN) 137 mcg (0.1 %) nasal spray, SMARTSIG:Both Nares, Disp: , Rfl:     buPROPion (WELLBUTRIN) 100 MG tablet, Take 1 tablet (100 mg total) by mouth 2 (two) times daily., Disp: 180 tablet, Rfl: 3    butalbital-acetaminophen-caffeine -40 mg (FIORICET, ESGIC) -40 mg per tablet, Take 1-2 tablets every 6 hr as needed for headache.  Not to exceed 6 tablets in 24 hr., Disp: 30 tablet, Rfl: 2    cetirizine (ZYRTEC) 10 MG tablet, Take 10 mg by mouth once daily., Disp: , Rfl:     EScitalopram oxalate (LEXAPRO) 20 MG tablet, TAKE 1 TABLET(20 MG) BY MOUTH EVERY DAY, Disp: 90 tablet, Rfl: 3    gabapentin (NEURONTIN) 100 MG capsule, TAKE 1 CAPSULE(100 MG) BY MOUTH THREE TIMES DAILY, Disp: 90 capsule, Rfl: 1    hydroCHLOROthiazide (HYDRODIURIL) 12.5 MG Tab, Take 1 tablet (12.5 mg total) by mouth once daily., Disp: 90 tablet, Rfl: 3    HYDROcodone-acetaminophen (NORCO) 7.5-325 mg per tablet, Take 1 tablet by mouth every 8 (eight) hours as needed for Pain., Disp: 12 tablet, Rfl: 0    losartan (COZAAR) 100 MG tablet, Take 1 tablet (100 mg total) by mouth once daily., Disp: 90 tablet, Rfl: 3    meclizine (ANTIVERT) 25 mg tablet, Take 1-2 tablets (25-50 mg total) by mouth every 4 to 6 hours as needed for Dizziness., Disp: 30 tablet, Rfl: 1    metoprolol succinate (TOPROL-XL) 50 MG 24 hr tablet, Take 1 tablet (50 mg total) by mouth once daily., Disp: 90 tablet, Rfl: 3    naproxen (NAPROSYN) 500  "MG tablet, Take 1 tablet (500 mg total) by mouth 2 (two) times daily., Disp: 30 tablet, Rfl: 3    NIFEdipine (PROCARDIA-XL) 60 MG (OSM) 24 hr tablet, Take 1 tablet (60 mg total) by mouth once daily., Disp: 90 tablet, Rfl: 3    rizatriptan (MAXALT) 10 MG tablet, Take 1 tablet (10 mg total) by mouth as needed., Disp: 6 tablet, Rfl: 11    topiramate (TOPAMAX) 50 MG tablet, Take 1 tablet (50 mg total) by mouth 2 (two) times daily., Disp: 60 tablet, Rfl: 11    zolpidem (AMBIEN) 5 MG Tab, Take 1 tablet (5 mg total) by mouth nightly as needed., Disp: 30 tablet, Rfl: 5      Past medical, surgical, family and social histories have been reviewed today.      Objective:     Vitals:    04/18/23 0850   BP: 124/80   Pulse: 98   Resp: (!) 22   Temp: 98.5 °F (36.9 °C)   Weight: 128.8 kg (283 lb 13.5 oz)   Height: 5' 1" (1.549 m)       Physical Exam  Vitals reviewed.   Constitutional:       General: She is not in acute distress.  HENT:      Head: Normocephalic and atraumatic.   Eyes:      Extraocular Movements: Extraocular movements intact.      Pupils: Pupils are equal, round, and reactive to light.   Neck:      Vascular: No carotid bruit.   Cardiovascular:      Rate and Rhythm: Normal rate and regular rhythm.      Pulses: Normal pulses.      Heart sounds: Normal heart sounds. No murmur heard.    No gallop.   Pulmonary:      Effort: Pulmonary effort is normal.      Breath sounds: Normal breath sounds.   Musculoskeletal:         General: No swelling or deformity.      Cervical back: Normal range of motion and neck supple. No rigidity. No muscular tenderness.      Lumbar back: Spasms and tenderness present. No swelling, edema or deformity. Decreased range of motion.      Comments: Frequent twitching/jumping in exam room 2/t muscle spasms.   Skin:     General: Skin is warm and dry.      Capillary Refill: Capillary refill takes less than 2 seconds.      Findings: No rash.   Neurological:      General: No focal deficit present.      " Mental Status: She is alert and oriented to person, place, and time. Mental status is at baseline.      Cranial Nerves: No cranial nerve deficit.      Sensory: No sensory deficit.      Motor: No weakness.      Coordination: Coordination normal.      Gait: Gait normal.      Deep Tendon Reflexes: Reflexes normal.   Psychiatric:         Mood and Affect: Mood normal.         Behavior: Behavior normal.         Thought Content: Thought content normal.         Judgment: Judgment normal.       Diagnosis/Assessment:     1. Chronic back pain, unspecified back location, unspecified back pain laterality  Ambulatory referral[facility]    ketorolac injection 60 mg    methylPREDNISolone (MEDROL DOSEPACK) 4 mg tablet      2. Muscle spasm  Ambulatory referral/consult to [facility]    ketorolac injection 60 mg    methylPREDNISolone (MEDROL DOSEPACK) 4 mg tablet      3. Intractable migraine without status migrainosus, unspecified migraine type  ketorolac injection 60 mg    naproxen sodium (ANAPROX) 550 MG tablet    tiZANidine (ZANAFLEX) 2 MG tablet    methylPREDNISolone (MEDROL DOSEPACK) 4 mg tablet      4. Analgesic overuse headache  ketorolac injection 60 mg    naproxen sodium (ANAPROX) 550 MG tablet    tiZANidine (ZANAFLEX) 2 MG tablet    methylPREDNISolone (MEDROL DOSEPACK) 4 mg tablet          Plan:     Toradol IM today.  Zanaflex for headache and back pain as ordered.  Continue marcial.  Update referral to Back/Spine.  To Neuro if needed.    Follow-up:     RTC as directed and/or prn.        HERBERT Kwon  [facility]       30 minutes of total time spent on the encounter, which includes face to face time and non-face to face time preparing to see the patient.  This includes obtaining and/or reviewing separately obtained history, performing a medically appropriate examination and/or evaluation, and counseling and educating the patient/family/caregiver.  Includes  documenting clinical information in the electronic or other health record, independently interpreting results (not separately reported) and communicating results to the patient/family/caregiver, with care coordination (not separately reported).  Medications, tests and/or procedures ordered as necessary along with referring and communicating with other health professionals (when not separately reported).

## 2023-04-17 ENCOUNTER — PATIENT MESSAGE (OUTPATIENT)
Dept: PAIN MEDICINE | Facility: CLINIC | Age: 43
End: 2023-04-17
Payer: COMMERCIAL

## 2023-04-18 ENCOUNTER — OFFICE VISIT (OUTPATIENT)
Dept: FAMILY MEDICINE | Facility: CLINIC | Age: 43
End: 2023-04-18
Payer: COMMERCIAL

## 2023-04-18 ENCOUNTER — PATIENT MESSAGE (OUTPATIENT)
Dept: FAMILY MEDICINE | Facility: CLINIC | Age: 43
End: 2023-04-18

## 2023-04-18 ENCOUNTER — TELEPHONE (OUTPATIENT)
Dept: PAIN MEDICINE | Facility: CLINIC | Age: 43
End: 2023-04-18
Payer: COMMERCIAL

## 2023-04-18 VITALS
WEIGHT: 283.81 LBS | DIASTOLIC BLOOD PRESSURE: 80 MMHG | RESPIRATION RATE: 22 BRPM | BODY MASS INDEX: 53.58 KG/M2 | TEMPERATURE: 99 F | HEIGHT: 61 IN | HEART RATE: 98 BPM | SYSTOLIC BLOOD PRESSURE: 124 MMHG

## 2023-04-18 DIAGNOSIS — M54.9 CHRONIC BACK PAIN, UNSPECIFIED BACK LOCATION, UNSPECIFIED BACK PAIN LATERALITY: Primary | ICD-10-CM

## 2023-04-18 DIAGNOSIS — M62.838 MUSCLE SPASM: ICD-10-CM

## 2023-04-18 DIAGNOSIS — G43.919 INTRACTABLE MIGRAINE WITHOUT STATUS MIGRAINOSUS, UNSPECIFIED MIGRAINE TYPE: ICD-10-CM

## 2023-04-18 DIAGNOSIS — G44.40 ANALGESIC OVERUSE HEADACHE: ICD-10-CM

## 2023-04-18 DIAGNOSIS — T39.95XA ANALGESIC OVERUSE HEADACHE: ICD-10-CM

## 2023-04-18 DIAGNOSIS — G89.29 CHRONIC BACK PAIN, UNSPECIFIED BACK LOCATION, UNSPECIFIED BACK PAIN LATERALITY: Primary | ICD-10-CM

## 2023-04-18 PROCEDURE — 3074F PR MOST RECENT SYSTOLIC BLOOD PRESSURE < 130 MM HG: ICD-10-PCS | Mod: CPTII,S$GLB,, | Performed by: REGISTERED NURSE

## 2023-04-18 PROCEDURE — 99999 PR PBB SHADOW E&M-EST. PATIENT-LVL IV: ICD-10-PCS | Mod: PBBFAC,,, | Performed by: REGISTERED NURSE

## 2023-04-18 PROCEDURE — 99214 OFFICE O/P EST MOD 30 MIN: CPT | Mod: 25,S$GLB,, | Performed by: REGISTERED NURSE

## 2023-04-18 PROCEDURE — 3079F PR MOST RECENT DIASTOLIC BLOOD PRESSURE 80-89 MM HG: ICD-10-PCS | Mod: CPTII,S$GLB,, | Performed by: REGISTERED NURSE

## 2023-04-18 PROCEDURE — 99214 PR OFFICE/OUTPT VISIT, EST, LEVL IV, 30-39 MIN: ICD-10-PCS | Mod: 25,S$GLB,, | Performed by: REGISTERED NURSE

## 2023-04-18 PROCEDURE — 3074F SYST BP LT 130 MM HG: CPT | Mod: CPTII,S$GLB,, | Performed by: REGISTERED NURSE

## 2023-04-18 PROCEDURE — 3008F PR BODY MASS INDEX (BMI) DOCUMENTED: ICD-10-PCS | Mod: CPTII,S$GLB,, | Performed by: REGISTERED NURSE

## 2023-04-18 PROCEDURE — 1159F PR MEDICATION LIST DOCUMENTED IN MEDICAL RECORD: ICD-10-PCS | Mod: CPTII,S$GLB,, | Performed by: REGISTERED NURSE

## 2023-04-18 PROCEDURE — 96372 THER/PROPH/DIAG INJ SC/IM: CPT | Mod: S$GLB,,, | Performed by: REGISTERED NURSE

## 2023-04-18 PROCEDURE — 3079F DIAST BP 80-89 MM HG: CPT | Mod: CPTII,S$GLB,, | Performed by: REGISTERED NURSE

## 2023-04-18 PROCEDURE — 96372 PR INJECTION,THERAP/PROPH/DIAG2ST, IM OR SUBCUT: ICD-10-PCS | Mod: S$GLB,,, | Performed by: REGISTERED NURSE

## 2023-04-18 PROCEDURE — 99999 PR PBB SHADOW E&M-EST. PATIENT-LVL IV: CPT | Mod: PBBFAC,,, | Performed by: REGISTERED NURSE

## 2023-04-18 PROCEDURE — 4010F ACE/ARB THERAPY RXD/TAKEN: CPT | Mod: CPTII,S$GLB,, | Performed by: REGISTERED NURSE

## 2023-04-18 PROCEDURE — 3008F BODY MASS INDEX DOCD: CPT | Mod: CPTII,S$GLB,, | Performed by: REGISTERED NURSE

## 2023-04-18 PROCEDURE — 4010F PR ACE/ARB THEARPY RXD/TAKEN: ICD-10-PCS | Mod: CPTII,S$GLB,, | Performed by: REGISTERED NURSE

## 2023-04-18 PROCEDURE — 1159F MED LIST DOCD IN RCRD: CPT | Mod: CPTII,S$GLB,, | Performed by: REGISTERED NURSE

## 2023-04-18 RX ORDER — NAPROXEN SODIUM 550 MG/1
TABLET ORAL
Qty: 21 TABLET | Refills: 0 | Status: SHIPPED | OUTPATIENT
Start: 2023-04-18 | End: 2023-06-26 | Stop reason: SDUPTHER

## 2023-04-18 RX ORDER — TIZANIDINE 2 MG/1
2 TABLET ORAL NIGHTLY
Qty: 14 TABLET | Refills: 0 | Status: SHIPPED | OUTPATIENT
Start: 2023-04-18 | End: 2023-05-02

## 2023-04-18 RX ORDER — METHYLPREDNISOLONE 4 MG/1
TABLET ORAL
Qty: 1 EACH | Refills: 0 | Status: SHIPPED | OUTPATIENT
Start: 2023-04-18 | End: 2023-08-21

## 2023-04-18 RX ORDER — KETOROLAC TROMETHAMINE 30 MG/ML
60 INJECTION, SOLUTION INTRAMUSCULAR; INTRAVENOUS
Status: COMPLETED | OUTPATIENT
Start: 2023-04-18 | End: 2023-04-18

## 2023-04-18 RX ADMIN — KETOROLAC TROMETHAMINE 60 MG: 30 INJECTION, SOLUTION INTRAMUSCULAR; INTRAVENOUS at 10:04

## 2023-04-18 NOTE — PROGRESS NOTES
Established Patient Chronic Pain Note    Chief Complaint:   Chief Complaint   Patient presents with    Mid-back Pain        SUBJECTIVE:  Interval History (4/25/2023):  Georgia Garcia presents today for follow-up visit.  Patient was last seen on 12/7/2022. At that visit, the plan was to continue with activities as tolerated and obtain updated imaging of the lumbar spine. Patient reports pain as 8/10 today.        Patient denies night fever/night sweats, urinary incontinence, bowel incontinence, significant weight loss, significant motor weakness, and loss of sensations.    Pain Disability Index Review:  Last 3 PDI Scores 12/7/2022   Pain Disability Index (PDI) 58     Initial HPI (12/7/2022):  Georgia Garcia is a 43 y.o. female who presents to the clinic for the evaluation of lower back and leg pain.  She was referred by the Orthopedics Department for further evaluation and management of this pain.  She has past medical history of migraine, anxiety disorder, depression, hypertension, iron deficiency anemia, morbid obesity, polyarthritis, insomnia, multiple other medical comorbidities as listed in her chart.  The pain started 3 months ago and symptoms have been worsening.The pain is located in the lumbosacral area and radiates to the left lower extremity extending posterior laterally to the calf.  The pain is described as sharp, stabbing, aching and is rated as 7/10. The pain is rated with a score of  5/10 on the BEST day and a score of 9/10 on the WORST day.  Symptoms interfere with daily activity. The pain is exacerbated by activities.  The pain is mitigated by nothing currently.      Non-Pharmacologic Treatments:  Physical Therapy/Home Exercise: no  Ice/Heat:yes  TENS: no  Acupuncture: no  Massage: no  Chiropractic: no    Other: no      Pain Medications:  - Opioids:  Norco, tramadol  - Adjuvant Medications:  Alprazolam, Fioricet, Wellbutrin, Flexeril, Lexapro, NSAIDs, Topamax, Maxalt, Ambien  - Anti-Coagulants:   None     report:  Reviewed and consistent with medication use as prescribed.      Pain Procedures:   Denies      Imaging:     MRI lumbar spine 12/28/2022:  Vertebral body height is normal and alignment is maintained.  Single tiny focus of signal abnormality in the L5 vertebral body may reflect an atypical, lipid poor osseous hemangioma.  Marrow signal is otherwise within normal limits.  No evidence of acute fracture.  The conus medullaris terminates at the level of L2.  No abnormal signal within the conus.  Intervertebral disc levels are as follows:     T12-L1 Disc: Disc desiccation.  Minimal broad-based disc bulge.  No significant spinal canal stenosis or neural foraminal stenosis.     L1-L2 Disc:  No significant disc pathology.  No spinal canal or neural foraminal stenosis.     L2-L3 Disc:  No significant disc pathology.  No spinal canal or neural foraminal stenosis.     L3-L4 Disc:  No significant disc pathology.  No spinal canal or neural foraminal stenosis.     L4-L5 Disc:  Disc desiccation.  There appears to be tiny bilateral paracentral annular fissures.  Mild bilateral facet arthropathy.  There appears to be a tiny osteophyte projecting into the superior left neural foramen.  No appreciable nerve root contact.  No significant spinal canal stenosis or neural foraminal stenosis.     L5-S1 Disc:  Disc desiccation.  Tiny central disc bulge.  Bilateral facet arthropathy.  No significant spinal canal stenosis or neural foraminal stenosis.    X-ray lumbar spine 12/09/2022:   Vertebral body heights and alignment are within normal limits.  No change in spinal alignment with flexion or extension to suggest instability.  Degenerative endplate spurring and possible mild disc height loss noted at T12-L1. No pars defects visualized.  Posterior elements appear grossly intact. No acute fractures or subluxations are demonstrated.  The remaining visualized osseous and soft tissue structures demonstrate no appreciable  abnormality.       X-ray bilateral knee 11/10/2022:  There is equivocal mild joint space narrowing seen involving the medial compartment of either knee.  Mild degenerative change seen at either patellofemoral compartment.  No joint effusions are suggested.  No acute fracture or dislocation.       Past Medical History:   Diagnosis Date    Anemia     Cervical radiculopathy     COVID-19 virus infection 2021    Diverticulosis     Hypermenorrhea     Hypertension     Insomnia     Internal hemorrhoid     Migraine headache     Morbid obesity     Rectal bleeding     Uterine fibroid      Past Surgical History:   Procedure Laterality Date     SECTION, LOW TRANSVERSE      x1    COLONOSCOPY N/A 2022    Procedure: COLONOSCOPY;  Surgeon: Maxiums Lopes MD;  Location: [facility];  Service: Endoscopy;  Laterality: N/A;    TOTAL ABDOMINAL HYSTERECTOMY  14    TUBAL LIGATION       Social History     Socioeconomic History    Marital status: Single   Occupational History     Employer: [employer]   Tobacco Use    Smoking status: Never    Smokeless tobacco: Never   Substance and Sexual Activity    Alcohol use: No    Drug use: No    Sexual activity: Yes     Partners: Male     Birth control/protection: Surgical     Comment: hyst; mut monog   Social History Narrative    She is , has one daughter and 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              Social Determinants of Health     Financial Resource Strain: Low Risk     Difficulty of Paying Living Expenses: Not very hard   Food Insecurity: No Food Insecurity    Worried About Running Out of Food in the Last Year: Never true    Ran Out of Food in the Last Year: Never true   Transportation Needs: No Transportation Needs    Lack of Transportation (Medical): No    Lack of Transportation (Non-Medical): No   Physical Activity: Insufficiently Active    Days of Exercise per Week: 2 days    Minutes of Exercise per  Session: 20 min   Stress: Stress Concern Present    Feeling of Stress : Rather much   Social Connections: Unknown    Frequency of Communication with Friends and Family: More than three times a week    Frequency of Social Gatherings with Friends and Family: Patient refused    Active Member of Clubs or Organizations: Yes    Attends Club or Organization Meetings: More than 4 times per year    Marital Status: Patient refused   Housing Stability: Low Risk     Unable to Pay for Housing in the Last Year: No    Number of Places Lived in the Last Year: 1    Unstable Housing in the Last Year: No     Family History   Problem Relation Age of Onset    Hypertension Mother     Pulmonary embolism Mother     Deep vein thrombosis Mother     Thrombophilia Mother     Heart failure Mother     Pulmonary embolism Maternal Uncle     Deep vein thrombosis Maternal Uncle     Thrombophilia Maternal Uncle     Colon cancer Maternal Uncle     COPD Father     Diabetes Maternal Grandfather     Stroke Maternal Grandfather     Breast cancer Maternal Aunt     Lung cancer Paternal Uncle     Breast cancer Maternal Grandmother     Stroke Maternal Grandmother     ADD / ADHD Daughter        Review of patient's allergies indicates:  No Known Allergies    Current Outpatient Medications   Medication Sig    ALPRAZolam (XANAX) 0.25 MG tablet TAKE 1 TO 2 TABLETS BY MOUTH THREE TIMES DAILY AS NEEDED FOR ANXIETY    azelastine (ASTELIN) 137 mcg (0.1 %) nasal spray SMARTSIG:Both Nares    buPROPion (WELLBUTRIN) 100 MG tablet Take 1 tablet (100 mg total) by mouth 2 (two) times daily.    butalbital-acetaminophen-caffeine -40 mg (FIORICET, ESGIC) -40 mg per tablet Take 1-2 tablets every 6 hr as needed for headache.  Not to exceed 6 tablets in 24 hr.    cetirizine (ZYRTEC) 10 MG tablet Take 10 mg by mouth once daily.    EScitalopram oxalate (LEXAPRO) 20 MG tablet TAKE 1 TABLET(20 MG) BY MOUTH EVERY DAY    gabapentin (NEURONTIN) 100 MG capsule TAKE 1  CAPSULE(100 MG) BY MOUTH THREE TIMES DAILY    hydroCHLOROthiazide (HYDRODIURIL) 12.5 MG Tab Take 1 tablet (12.5 mg total) by mouth once daily.    losartan (COZAAR) 100 MG tablet Take 1 tablet (100 mg total) by mouth once daily.    meclizine (ANTIVERT) 25 mg tablet Take 1-2 tablets (25-50 mg total) by mouth every 4 to 6 hours as needed for Dizziness.    metoprolol succinate (TOPROL-XL) 50 MG 24 hr tablet Take 1 tablet (50 mg total) by mouth once daily.    naproxen sodium (ANAPROX) 550 MG tablet Take 1 tab by mouth bid x 1 week, then 1 tab by mouth daily x  1 week.    NIFEdipine (PROCARDIA-XL) 60 MG (OSM) 24 hr tablet Take 1 tablet (60 mg total) by mouth once daily.    rizatriptan (MAXALT) 10 MG tablet Take 1 tablet (10 mg total) by mouth as needed.    topiramate (TOPAMAX) 50 MG tablet Take 1 tablet (50 mg total) by mouth 2 (two) times daily.    HYDROcodone-acetaminophen (NORCO) 7.5-325 mg per tablet Take 1 tablet by mouth every 8 (eight) hours as needed for Pain. (Patient not taking: Reported on 4/19/2023)    methylPREDNISolone (MEDROL DOSEPACK) 4 mg tablet use as directed (Patient not taking: Reported on 4/19/2023)    tiZANidine (ZANAFLEX) 2 MG tablet Take 1 tablet (2 mg total) by mouth every evening. for 14 days (Patient not taking: Reported on 4/19/2023)    zolpidem (AMBIEN) 5 MG Tab Take 1 tablet (5 mg total) by mouth nightly as needed.     No current facility-administered medications for this visit.       Review of Systems     GENERAL:  No weight loss, malaise or fevers.  HEENT:   No recent changes in vision or hearing  NECK:  Negative for lumps, no difficulty with swallowing.  RESPIRATORY:  Negative for cough, wheezing or shortness of breath, patient denies any recent URI.  CARDIOVASCULAR:  Negative for chest pain, leg swelling or palpitations.  GI:  Negative for abdominal discomfort, blood in stools or black stools or change in bowel habits.  MUSCULOSKELETAL:  See HPI.  SKIN:  Negative for lesions, rash, and  "itching.  PSYCH:  No mood disorder or recent psychosocial stressors.  Patients sleep is not disturbed secondary to pain.  HEMATOLOGY/LYMPHOLOGY:  Negative for prolonged bleeding, bruising easily or swollen nodes.  Patient is not currently taking any anti-coagulants  NEURO:   No history of headaches, syncope, paralysis, seizures or tremors.  All other reviewed and negative other than HPI.    OBJECTIVE:    /78   Pulse 87   Ht 5' 1" (1.549 m)   Wt 130.7 kg (288 lb 2.3 oz)   LMP 09/09/2014   BMI 54.44 kg/m²         Physical Exam    GENERAL: Well appearing, in no acute distress, alert and oriented x3.  Morbidly obese  PSYCH:  Mood and affect appropriate.  SKIN: Skin color, texture, turgor normal, no rashes or lesions.  HEAD/FACE:  Normocephalic, atraumatic. Cranial nerves grossly intact.  CV: RRR with palpation of the radial artery.  PULM: No evidence of respiratory difficulty, symmetric chest rise.  GI:  Soft and non-tender.  BACK: Straight leg raising in the sitting and supine positions is equivocal to radicular pain.  Moderate pain to palpation over the facet joints of the lumbar spine and lumbar paraspinals.  Limited range of motion secondary to pain reproduction.  EXTREMITIES: No deformities, edema, or skin discoloration. Good capillary refill.  MUSCULOSKELETAL:  Hip and knee provocative maneuvers are unremarkable.  There is mild to moderate pain with palpation over the sacroiliac joints bilaterally.  FABERs test is equivocal.  FADIRs test is equivocal.   Bilateral upper and lower extremity strength is normal and symmetric.  No atrophy or tone abnormalities are noted.  NEURO: Bilateral upper and lower extremity coordination and muscle stretch reflexes are physiologic and symmetric.  Plantar response are downgoing. No clonus.  No loss of sensation is noted.  GAIT:  Slow, antalgic.      LABS:  Lab Results   Component Value Date    WBC 6.27 11/18/2022    HGB 12.4 11/18/2022    HCT 38.7 11/18/2022    MCV 93 " 11/18/2022     11/18/2022       CMP  Sodium   Date Value Ref Range Status   11/18/2022 139 136 - 145 mmol/L Final     Potassium   Date Value Ref Range Status   11/18/2022 3.8 3.5 - 5.1 mmol/L Final     Chloride   Date Value Ref Range Status   11/18/2022 104 95 - 110 mmol/L Final     CO2   Date Value Ref Range Status   11/18/2022 27 23 - 29 mmol/L Final     Glucose   Date Value Ref Range Status   11/18/2022 91 70 - 110 mg/dL Final     BUN   Date Value Ref Range Status   11/18/2022 13 6 - 20 mg/dL Final     Creatinine   Date Value Ref Range Status   11/18/2022 0.9 0.5 - 1.4 mg/dL Final     Calcium   Date Value Ref Range Status   11/18/2022 9.2 8.7 - 10.5 mg/dL Final     Total Protein   Date Value Ref Range Status   11/18/2022 7.7 6.0 - 8.4 g/dL Final     Albumin   Date Value Ref Range Status   11/18/2022 3.5 3.5 - 5.2 g/dL Final     Total Bilirubin   Date Value Ref Range Status   11/18/2022 0.3 0.1 - 1.0 mg/dL Final     Comment:     For infants and newborns, interpretation of results should be based  on gestational age, weight and in agreement with clinical  observations.    Premature Infant recommended reference ranges:  Up to 24 hours.............<8.0 mg/dL  Up to 48 hours............<12.0 mg/dL  3-5 days..................<15.0 mg/dL  6-29 days.................<15.0 mg/dL       Alkaline Phosphatase   Date Value Ref Range Status   11/18/2022 45 (L) 55 - 135 U/L Final     AST   Date Value Ref Range Status   11/18/2022 19 10 - 40 U/L Final     ALT   Date Value Ref Range Status   11/18/2022 15 10 - 44 U/L Final     Anion Gap   Date Value Ref Range Status   11/18/2022 8 8 - 16 mmol/L Final     eGFR if    Date Value Ref Range Status   07/06/2021 >60.0 >60 mL/min/1.73 m^2 Final     eGFR if non    Date Value Ref Range Status   07/06/2021 >60.0 >60 mL/min/1.73 m^2 Final     Comment:     Calculation used to obtain the estimated glomerular filtration  rate (eGFR) is the CKD-EPI equation.           No results found for: LABA1C, HGBA1C          ASSESSMENT: 43 y.o. year old female with lower back pain, consistent with     1. Chronic back pain, unspecified back location, unspecified back pain laterality  Ambulatory referral/consult to [facility]    Ambulatory referral/consult to Chiropractic      2. Muscle spasm  Ambulatory referral/consult to Back & Spine Clinic    Ambulatory referral/consult to Chiropractic              PLAN:   - Interventions:  None at this time .    - Anticoagulation use:  None    - Medications: I have stressed the importance of physical activity and a home exercise plan to help with pain and improve health. and Patient can continue with medications for now since they are providing benefits, using them appropriately, and without side effects.     - Therapy:  Advised patient continue with activities as tolerated    - Psychological:  Discussed coping mechanisms to help address chronic pain issues    - Labs:  Reviewed    - Imaging: Reviewed available imaging with patient and answered any questions they had regarding study.    - Consults/Referrals:  Chiropractor, external    - Records:  Reviewed/Obtain old records from outside physicians and imaging    - Follow up visit: return to clinic as needed    - Counseled patient regarding the importance of activity modification and physical therapy    - This condition does not require this patient to take time off of work, and the primary goal of our Pain Management services is to improve the patient's functional capacity.    - Patient Questions: Answered all of the patient's questions regarding diagnosis, therapy, and treatment        The above plan and management options were discussed at length with patient. Patient is in agreement with the above and verbalized understanding.    I discussed the goals of interventional chronic pain management with the patient on today's visit.  I explained the utility of injections for diagnostic and  therapeutic purposes.  We discussed a multimodal approach to pain including treating the patient's given worst pain at any given time.  We will use a systematic approach to addressing pain.  We will also adopt a multimodal approach that includes injections, adjuvant medications, physical therapy, at times psychiatry.  There may be a limited role for opioid use intermittently in the treatment of pain, more particularly for acute pain although no one approach can be used as a sole treatment modality.    I emphasized the importance of regular exercise, core strengthening and stretching, diet and weight loss as a cornerstone of long-term pain management.      Moise Olmos MD  Interventional Pain Management  [facility]    Disclaimer:  This note was prepared using voice recognition system and is likely to have sound alike errors that may have been overlooked even after proof reading.  Please call me with any questions

## 2023-04-19 ENCOUNTER — OFFICE VISIT (OUTPATIENT)
Dept: PAIN MEDICINE | Facility: CLINIC | Age: 43
End: 2023-04-19
Payer: COMMERCIAL

## 2023-04-19 VITALS
SYSTOLIC BLOOD PRESSURE: 137 MMHG | DIASTOLIC BLOOD PRESSURE: 78 MMHG | BODY MASS INDEX: 54.4 KG/M2 | WEIGHT: 288.13 LBS | HEART RATE: 87 BPM | HEIGHT: 61 IN

## 2023-04-19 DIAGNOSIS — M62.838 MUSCLE SPASM: ICD-10-CM

## 2023-04-19 DIAGNOSIS — G89.29 CHRONIC BACK PAIN, UNSPECIFIED BACK LOCATION, UNSPECIFIED BACK PAIN LATERALITY: ICD-10-CM

## 2023-04-19 DIAGNOSIS — M54.9 CHRONIC BACK PAIN, UNSPECIFIED BACK LOCATION, UNSPECIFIED BACK PAIN LATERALITY: ICD-10-CM

## 2023-04-19 PROCEDURE — 99999 PR PBB SHADOW E&M-EST. PATIENT-LVL III: ICD-10-PCS | Mod: PBBFAC,,, | Performed by: PHYSICAL MEDICINE & REHABILITATION

## 2023-04-19 PROCEDURE — 99213 OFFICE O/P EST LOW 20 MIN: CPT | Mod: S$GLB,,, | Performed by: PHYSICAL MEDICINE & REHABILITATION

## 2023-04-19 PROCEDURE — 3075F SYST BP GE 130 - 139MM HG: CPT | Mod: CPTII,S$GLB,, | Performed by: PHYSICAL MEDICINE & REHABILITATION

## 2023-04-19 PROCEDURE — 4010F PR ACE/ARB THEARPY RXD/TAKEN: ICD-10-PCS | Mod: CPTII,S$GLB,, | Performed by: PHYSICAL MEDICINE & REHABILITATION

## 2023-04-19 PROCEDURE — 99999 PR PBB SHADOW E&M-EST. PATIENT-LVL III: CPT | Mod: PBBFAC,,, | Performed by: PHYSICAL MEDICINE & REHABILITATION

## 2023-04-19 PROCEDURE — 3008F BODY MASS INDEX DOCD: CPT | Mod: CPTII,S$GLB,, | Performed by: PHYSICAL MEDICINE & REHABILITATION

## 2023-04-19 PROCEDURE — 3078F DIAST BP <80 MM HG: CPT | Mod: CPTII,S$GLB,, | Performed by: PHYSICAL MEDICINE & REHABILITATION

## 2023-04-19 PROCEDURE — 3008F PR BODY MASS INDEX (BMI) DOCUMENTED: ICD-10-PCS | Mod: CPTII,S$GLB,, | Performed by: PHYSICAL MEDICINE & REHABILITATION

## 2023-04-19 PROCEDURE — 99213 PR OFFICE/OUTPT VISIT, EST, LEVL III, 20-29 MIN: ICD-10-PCS | Mod: S$GLB,,, | Performed by: PHYSICAL MEDICINE & REHABILITATION

## 2023-04-19 PROCEDURE — 3075F PR MOST RECENT SYSTOLIC BLOOD PRESS GE 130-139MM HG: ICD-10-PCS | Mod: CPTII,S$GLB,, | Performed by: PHYSICAL MEDICINE & REHABILITATION

## 2023-04-19 PROCEDURE — 4010F ACE/ARB THERAPY RXD/TAKEN: CPT | Mod: CPTII,S$GLB,, | Performed by: PHYSICAL MEDICINE & REHABILITATION

## 2023-04-19 PROCEDURE — 3078F PR MOST RECENT DIASTOLIC BLOOD PRESSURE < 80 MM HG: ICD-10-PCS | Mod: CPTII,S$GLB,, | Performed by: PHYSICAL MEDICINE & REHABILITATION

## 2023-05-11 ENCOUNTER — PATIENT MESSAGE (OUTPATIENT)
Dept: FAMILY MEDICINE | Facility: CLINIC | Age: 43
End: 2023-05-11
Payer: COMMERCIAL

## 2023-06-26 DIAGNOSIS — G43.919 INTRACTABLE MIGRAINE WITHOUT STATUS MIGRAINOSUS, UNSPECIFIED MIGRAINE TYPE: ICD-10-CM

## 2023-06-26 DIAGNOSIS — G44.40 ANALGESIC OVERUSE HEADACHE: ICD-10-CM

## 2023-06-26 DIAGNOSIS — T39.95XA ANALGESIC OVERUSE HEADACHE: ICD-10-CM

## 2023-06-26 RX ORDER — NAPROXEN SODIUM 550 MG/1
550 TABLET ORAL 2 TIMES DAILY PRN
Qty: 30 TABLET | Refills: 0 | Status: SHIPPED | OUTPATIENT
Start: 2023-06-26 | End: 2023-07-13

## 2023-06-26 RX ORDER — ALPRAZOLAM 0.25 MG/1
TABLET ORAL
Qty: 90 TABLET | Refills: 3 | Status: SHIPPED | OUTPATIENT
Start: 2023-06-26 | End: 2023-12-07 | Stop reason: SDUPTHER

## 2023-07-13 DIAGNOSIS — G44.40 ANALGESIC OVERUSE HEADACHE: ICD-10-CM

## 2023-07-13 DIAGNOSIS — G43.919 INTRACTABLE MIGRAINE WITHOUT STATUS MIGRAINOSUS, UNSPECIFIED MIGRAINE TYPE: ICD-10-CM

## 2023-07-13 DIAGNOSIS — T39.95XA ANALGESIC OVERUSE HEADACHE: ICD-10-CM

## 2023-07-13 RX ORDER — NAPROXEN SODIUM 550 MG/1
TABLET ORAL
Qty: 30 TABLET | Refills: 3 | Status: SHIPPED | OUTPATIENT
Start: 2023-07-13 | End: 2023-11-10 | Stop reason: SDUPTHER

## 2023-07-24 LAB
CHOLEST SERPL-MSCNC: 154 MG/DL (ref 100–200)
HDL/CHOLESTEROL RATIO: 2.7 %
HDLC SERPL-MCNC: 57 MG/DL
LDLC SERPL CALC-MCNC: 81 MG/DL
TRIGL SERPL-MCNC: 83 MG/DL
VLDLC SERPL-MCNC: 17 MG/DL (ref 5–40)

## 2023-07-28 ENCOUNTER — OFFICE VISIT (OUTPATIENT)
Dept: SPORTS MEDICINE | Facility: CLINIC | Age: 43
End: 2023-07-28
Payer: COMMERCIAL

## 2023-07-28 DIAGNOSIS — M17.0 BILATERAL PRIMARY OSTEOARTHRITIS OF KNEE: Primary | ICD-10-CM

## 2023-07-28 PROCEDURE — 99213 PR OFFICE/OUTPT VISIT, EST, LEVL III, 20-29 MIN: ICD-10-PCS | Mod: 25,S$GLB,, | Performed by: STUDENT IN AN ORGANIZED HEALTH CARE EDUCATION/TRAINING PROGRAM

## 2023-07-28 PROCEDURE — 4010F ACE/ARB THERAPY RXD/TAKEN: CPT | Mod: CPTII,S$GLB,, | Performed by: STUDENT IN AN ORGANIZED HEALTH CARE EDUCATION/TRAINING PROGRAM

## 2023-07-28 PROCEDURE — 20610 LARGE JOINT ASPIRATION/INJECTION: BILATERAL KNEE: ICD-10-PCS | Mod: 50,S$GLB,, | Performed by: STUDENT IN AN ORGANIZED HEALTH CARE EDUCATION/TRAINING PROGRAM

## 2023-07-28 PROCEDURE — 20610 DRAIN/INJ JOINT/BURSA W/O US: CPT | Mod: 50,S$GLB,, | Performed by: STUDENT IN AN ORGANIZED HEALTH CARE EDUCATION/TRAINING PROGRAM

## 2023-07-28 PROCEDURE — 1159F MED LIST DOCD IN RCRD: CPT | Mod: CPTII,S$GLB,, | Performed by: STUDENT IN AN ORGANIZED HEALTH CARE EDUCATION/TRAINING PROGRAM

## 2023-07-28 PROCEDURE — 99999 PR PBB SHADOW E&M-EST. PATIENT-LVL III: ICD-10-PCS | Mod: PBBFAC,,, | Performed by: STUDENT IN AN ORGANIZED HEALTH CARE EDUCATION/TRAINING PROGRAM

## 2023-07-28 PROCEDURE — 1160F PR REVIEW ALL MEDS BY PRESCRIBER/CLIN PHARMACIST DOCUMENTED: ICD-10-PCS | Mod: CPTII,S$GLB,, | Performed by: STUDENT IN AN ORGANIZED HEALTH CARE EDUCATION/TRAINING PROGRAM

## 2023-07-28 PROCEDURE — 99213 OFFICE O/P EST LOW 20 MIN: CPT | Mod: 25,S$GLB,, | Performed by: STUDENT IN AN ORGANIZED HEALTH CARE EDUCATION/TRAINING PROGRAM

## 2023-07-28 PROCEDURE — 1160F RVW MEDS BY RX/DR IN RCRD: CPT | Mod: CPTII,S$GLB,, | Performed by: STUDENT IN AN ORGANIZED HEALTH CARE EDUCATION/TRAINING PROGRAM

## 2023-07-28 PROCEDURE — 1159F PR MEDICATION LIST DOCUMENTED IN MEDICAL RECORD: ICD-10-PCS | Mod: CPTII,S$GLB,, | Performed by: STUDENT IN AN ORGANIZED HEALTH CARE EDUCATION/TRAINING PROGRAM

## 2023-07-28 PROCEDURE — 4010F PR ACE/ARB THEARPY RXD/TAKEN: ICD-10-PCS | Mod: CPTII,S$GLB,, | Performed by: STUDENT IN AN ORGANIZED HEALTH CARE EDUCATION/TRAINING PROGRAM

## 2023-07-28 PROCEDURE — 99999 PR PBB SHADOW E&M-EST. PATIENT-LVL III: CPT | Mod: PBBFAC,,, | Performed by: STUDENT IN AN ORGANIZED HEALTH CARE EDUCATION/TRAINING PROGRAM

## 2023-07-28 RX ORDER — TRIAMCINOLONE ACETONIDE 40 MG/ML
40 INJECTION, SUSPENSION INTRA-ARTICULAR; INTRAMUSCULAR
Status: DISCONTINUED | OUTPATIENT
Start: 2023-07-28 | End: 2023-07-28 | Stop reason: HOSPADM

## 2023-07-28 RX ADMIN — TRIAMCINOLONE ACETONIDE 40 MG: 40 INJECTION, SUSPENSION INTRA-ARTICULAR; INTRAMUSCULAR at 03:07

## 2023-07-28 NOTE — PATIENT INSTRUCTIONS
Assessment:  Georgia Garcia is a 43 y.o. female No chief complaint on file.      No diagnosis found.     Plan:  Bilateral CSI to knees  We discussed the proper protocols after the injection such as no submerging pools, baths tubs, or hot tubs for 24 hr.  Showering is okay today.  We also discussed that blood sugars can be elevated after an injection and asked patient to properly checked her sugars over the next few days and contact their PCP if there are any concerns.  We discussed red flags such as fevers, chills, red, warm, tender joint at the area of injection to please seek medical care immediately.    Pre authorization for bilateral Synvisc One    Follow-up: 4 weeks or sooner if there are any problems between now and then.    Thank you for choosing [facility] and Dr. Oswaldo Nova for your orthopedic & sports medicine care. It is our goal to provide you with exceptional care that will help keep you healthy, active, and get you back in the game.    Please do not hesitate to reach out to us via email, phone, or MyChart with any questions, concerns, or feedback.    If you felt that you received exemplary care today, please consider leaving us feedback on [employer] at:  [email or web address]    If you are experiencing pain/discomfort ,or have questions after 5pm and would like to be connected to the [facility] on-call team, please call this number and specify which Sports Medicine provider is treating you: [phone]      - OOB to chair with assistance  - PT evaluation note appreciated  - DC planning to home with home PT when stable

## 2023-07-28 NOTE — PROGRESS NOTES
Patient ID: Georgia Garcia  YOB: 1980  MRN: [id number]    Chief Complaint: Pain of the Left Knee and Pain of the Right Knee      History of Present Illness:  Patient is being seen for bilateral knee pain that has been present for years, but recently worsened over the last couple of weeks. Rating pain 7/10 at today's visit. She had bilateral SYNVISC 1 in January.  She says that Visco helped with knee pain, She wants to discuss injections.            Past Medical History:   Past Medical History:   Diagnosis Date    Anemia     Cervical radiculopathy     COVID-19 virus infection 2021    Diverticulosis     Hypermenorrhea     Hypertension     Insomnia     Internal hemorrhoid     Migraine headache     Morbid obesity     Rectal bleeding     Uterine fibroid      Past Surgical History:   Procedure Laterality Date     SECTION, LOW TRANSVERSE      x1    COLONOSCOPY N/A 2022    Procedure: COLONOSCOPY;  Surgeon: Maximus Lopes MD;  Location: [facility];  Service: Endoscopy;  Laterality: N/A;    TOTAL ABDOMINAL HYSTERECTOMY  14    TUBAL LIGATION       Family History   Problem Relation Age of Onset    Hypertension Mother     Pulmonary embolism Mother     Deep vein thrombosis Mother     Thrombophilia Mother     Heart failure Mother     Pulmonary embolism Maternal Uncle     Deep vein thrombosis Maternal Uncle     Thrombophilia Maternal Uncle     Colon cancer Maternal Uncle     COPD Father     Diabetes Maternal Grandfather     Stroke Maternal Grandfather     Breast cancer Maternal Aunt     Lung cancer Paternal Uncle     Breast cancer Maternal Grandmother     Stroke Maternal Grandmother     ADD / ADHD Daughter      Social History     Socioeconomic History    Marital status: Single   Occupational History     Employer: [employer]   Tobacco Use    Smoking status: Never    Smokeless tobacco: Never   Substance and Sexual Activity    Alcohol use: No    Drug use: No    Sexual  activity: Yes     Partners: Male     Birth control/protection: Surgical     Comment: hyst; mut monog   Social History Narrative    She is , has one daughter and 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              Social Determinants of Health     Financial Resource Strain: Low Risk     Difficulty of Paying Living Expenses: Not very hard   Food Insecurity: No Food Insecurity    Worried About Running Out of Food in the Last Year: Never true    Ran Out of Food in the Last Year: Never true   Transportation Needs: No Transportation Needs    Lack of Transportation (Medical): No    Lack of Transportation (Non-Medical): No   Physical Activity: Insufficiently Active    Days of Exercise per Week: 2 days    Minutes of Exercise per Session: 20 min   Stress: Stress Concern Present    Feeling of Stress : Rather much   Social Connections: Unknown    Frequency of Communication with Friends and Family: More than three times a week    Frequency of Social Gatherings with Friends and Family: Patient refused    Active Member of Clubs or Organizations: Yes    Attends Club or Organization Meetings: More than 4 times per year    Marital Status: Patient refused   Housing Stability: Unknown    Unable to Pay for Housing in the Last Year: No    Unstable Housing in the Last Year: No     Medication List with Changes/Refills   Current Medications    ALPRAZOLAM (XANAX) 0.25 MG TABLET    TAKE 1 TO 2 TABLETS BY MOUTH THREE TIMES DAILY AS NEEDED FOR ANXIETY    AZELASTINE (ASTELIN) 137 MCG (0.1 %) NASAL SPRAY    SMARTSIG:Both Nares    BUPROPION (WELLBUTRIN) 100 MG TABLET    Take 1 tablet (100 mg total) by mouth 2 (two) times daily.    BUTALBITAL-ACETAMINOPHEN-CAFFEINE -40 MG (FIORICET, ESGIC) -40 MG PER TABLET    Take 1-2 tablets every 6 hr as needed for headache.  Not to exceed 6 tablets in 24 hr.    CETIRIZINE (ZYRTEC) 10 MG TABLET    Take 10 mg by mouth once daily.    ESCITALOPRAM OXALATE (LEXAPRO)  20 MG TABLET    TAKE 1 TABLET(20 MG) BY MOUTH EVERY DAY    GABAPENTIN (NEURONTIN) 100 MG CAPSULE    TAKE 1 CAPSULE(100 MG) BY MOUTH THREE TIMES DAILY    HYDROCHLOROTHIAZIDE (HYDRODIURIL) 12.5 MG TAB    Take 1 tablet (12.5 mg total) by mouth once daily.    HYDROCODONE-ACETAMINOPHEN (NORCO) 7.5-325 MG PER TABLET    Take 1 tablet by mouth every 8 (eight) hours as needed for Pain.    LOSARTAN (COZAAR) 100 MG TABLET    Take 1 tablet (100 mg total) by mouth once daily.    MECLIZINE (ANTIVERT) 25 MG TABLET    Take 1-2 tablets (25-50 mg total) by mouth every 4 to 6 hours as needed for Dizziness.    METHYLPREDNISOLONE (MEDROL DOSEPACK) 4 MG TABLET    use as directed    METOPROLOL SUCCINATE (TOPROL-XL) 50 MG 24 HR TABLET    Take 1 tablet (50 mg total) by mouth once daily.    NAPROXEN SODIUM (ANAPROX) 550 MG TABLET    TAKE 1 TABLET(550 MG) BY MOUTH TWICE DAILY AS NEEDED FOR PAIN    NIFEDIPINE (PROCARDIA-XL) 60 MG (OSM) 24 HR TABLET    Take 1 tablet (60 mg total) by mouth once daily.    RIZATRIPTAN (MAXALT) 10 MG TABLET    Take 1 tablet (10 mg total) by mouth as needed.    TOPIRAMATE (TOPAMAX) 50 MG TABLET    Take 1 tablet (50 mg total) by mouth 2 (two) times daily.    ZOLPIDEM (AMBIEN) 5 MG TAB    Take 1 tablet (5 mg total) by mouth nightly as needed.     Review of patient's allergies indicates:  No Known Allergies    Physical Exam:   There is no height or weight on file to calculate BMI.    GENERAL: Well appearing, in no acute distress.  HEAD: Normocephalic and atraumatic.  ENT: External ears and nose grossly normal.  EYES: EOMI bilaterally  PULMONARY: Respirations are grossly even and non-labored.  NEURO: Awake, alert, and oriented x 3.  SKIN: No obvious rashes appreciated.  PSYCH: Mood & affect are appropriate.    Detailed MSK exam:     Left knee exam:   -ROM: extension -5, flexion 120  -TTP: Medial joint line and Lateral joint line  -effusion: trace  -Patellar apprehension negative  -Arlette test negative  -stable to  varus and valgus stress tests  -Lachman test negative, anterior drawer test negative, posterior drawer test negative    Right knee exam:   -ROM: extension -5, flexion 120  -TTP: Medial joint line and Lateral joint line  -effusion: trace  -Patellar apprehension negative  -Arlette test negative  -stable to varus and valgus stress tests  -Lachman test negative, anterior drawer test negative, posterior drawer test negative      Imaging:  MRI Lumbar Spine Without Contrast  Narrative: EXAM: MRI LUMBAR SPINE WITHOUT CONTRAST    CLINICAL INDICATION: Low back pain, progressive neurologic deficit.    TECHNIQUE: MR lumbar spine without contrast.  Sagittal T1, T2, and STIR.  Axial T1 and T2.    COMPARISON: There are no available comparison studies.    FINDINGS: Vertebral body height is normal and alignment is maintained.  Single tiny focus of signal abnormality in the L5 vertebral body may reflect an atypical, lipid poor osseous hemangioma.  Marrow signal is otherwise within normal limits.  No evidence of acute fracture.  The conus medullaris terminates at the level of L2.  No abnormal signal within the conus.  Intervertebral disc levels are as follows:    T12-L1 Disc: Disc desiccation.  Minimal broad-based disc bulge.  No significant spinal canal stenosis or neural foraminal stenosis.    L1-L2 Disc:  No significant disc pathology.  No spinal canal or neural foraminal stenosis.    L2-L3 Disc:  No significant disc pathology.  No spinal canal or neural foraminal stenosis.    L3-L4 Disc:  No significant disc pathology.  No spinal canal or neural foraminal stenosis.    L4-L5 Disc:  Disc desiccation.  There appears to be tiny bilateral paracentral annular fissures.  Mild bilateral facet arthropathy.  There appears to be a tiny osteophyte projecting into the superior left neural foramen.  No appreciable nerve root contact.  No significant spinal canal stenosis or neural foraminal stenosis.    L5-S1 Disc:  Disc desiccation.  Tiny  central disc bulge.  Bilateral facet arthropathy.  No significant spinal canal stenosis or neural foraminal stenosis.  Impression:  Multilevel mild degenerative changes of the lumbar spine without significant spinal canal stenosis or neural foraminal stenosis.    Finalized on: 12/28/2022 11:31 AM By:  Vlad Gonzalez MD  BRR# [id number]      2022-12-28 11:33:08.699    [facility]        Relevant imaging results were reviewed and interpreted by me and per my read shows mild medial compartment joint space narrowing bilaterally on knee radiographs.  This was discussed with the patient and / or family today.     Assessment:  Georgia Garcia is a 43 y.o. female following up for chronic bilateral knee pain. Steroid and gel injections have worked well for her in the past. She is interested in repeating them today.   Plan: Steroid injection given today (see separate procedure note for details). We discussed the proper protocols after the injection such as no submerging pools, baths tubs, or hot tubs for 24 hr.  Showering is okay today.  We also discussed that blood sugars can be elevated after an injection and asked patient to properly checked her sugars over the next few days and contact their PCP if there are any concerns.  We discussed red flags such as fevers, chills, red, warm, tender joint at the area of injection to please seek medical care immediately.   Prior auth for gel injections. Continue conservative management for pain.   Follow up for gel injections. All questions answered.     Bilateral primary osteoarthritis of knee  -     Prior authorization Order  -     Large Joint Aspiration/Injection: bilateral knee       MEDICAL NECESSITY FOR VISCOSUPPLEMENTATION: After thorough evaluation of the patient, I have determined that visco-supplementation is medically necessary. The patient has painful degenerative changes of the knee with failure of conservative treatments including lifestyle modifications and rehabilitation  exercises.  Oral analgesis/NSAIDs have not adequately controlled symptoms and there is radiographic evidence of Kellgren Sarkis grade 2 or greater osteoarthritic changes, or in lack of radiographic evidence, there is arthroscopic or other evidence of chondrosis.       Electronically signed:  Oswaldo Nova MD, MPH  07/28/2023  3:25 PM

## 2023-07-28 NOTE — PROCEDURES
Large Joint Aspiration/Injection: bilateral knee    Date/Time: 7/28/2023 3:20 PM  Performed by: Oswaldo Nova MD  Authorized by: Oswaldo Nova MD     Consent Done?:  Yes (Verbal)  Indications:  Arthritis and pain  Site marked: the procedure site was marked    Timeout: prior to procedure the correct patient, procedure, and site was verified    Prep: patient was prepped and draped in usual sterile fashion    Local anesthetic:  Bupivacaine 0.5% without epinephrine and lidocaine 1% without epinephrine    Details:  Needle Size:  22 G  Approach:  Anterolateral  Location:  Knee  Laterality:  Bilateral  Site:  Bilateral knee  Medications (Right):  40 mg triamcinolone acetonide 40 mg/mL  Medications (Left):  40 mg triamcinolone acetonide 40 mg/mL  Patient tolerance:  Patient tolerated the procedure well with no immediate complications

## 2023-08-07 ENCOUNTER — TELEPHONE (OUTPATIENT)
Dept: SPORTS MEDICINE | Facility: CLINIC | Age: 43
End: 2023-08-07
Payer: COMMERCIAL

## 2023-08-07 NOTE — TELEPHONE ENCOUNTER
Contacted patient to r/s appt due to provider being out of office on scheduled appt date.  Offered to r/s appt with another provider at earlier date and patient elected to r/s with original provider when he returns.

## 2023-08-08 ENCOUNTER — PATIENT OUTREACH (OUTPATIENT)
Dept: ADMINISTRATIVE | Facility: HOSPITAL | Age: 43
End: 2023-08-08
Payer: COMMERCIAL

## 2023-08-14 ENCOUNTER — PATIENT MESSAGE (OUTPATIENT)
Dept: ADMINISTRATIVE | Facility: HOSPITAL | Age: 43
End: 2023-08-14
Payer: COMMERCIAL

## 2023-08-17 ENCOUNTER — PATIENT OUTREACH (OUTPATIENT)
Dept: ADMINISTRATIVE | Facility: HOSPITAL | Age: 43
End: 2023-08-17
Payer: COMMERCIAL

## 2023-08-17 DIAGNOSIS — Z12.31 ENCOUNTER FOR SCREENING MAMMOGRAM FOR MALIGNANT NEOPLASM OF BREAST: Primary | ICD-10-CM

## 2023-08-18 NOTE — PROGRESS NOTES
SUBJECTIVE:     Georgia Garcia is a 43 y.o. female is here today for:  Muscle cramping    HPI:    Georgia is here with c/o leg pain and cramping ~ 1 month.  No injury or trauma reported.  Both legs effected from thigh to feet but not entire leg at all or same time.  Does take diuretic as ordered; takes daily although ordered prn for edema.  Has tried Mag and K supplements but has not helped.  Daily water intake ~ 1 bottle.  Low to moderate salt intake reported.      REVIEW OF SYSTEMS:    Review of Systems   Constitutional: Negative.    Respiratory: Negative.     Cardiovascular:  Positive for leg swelling (intermittent, on diuretic). Negative for chest pain and palpitations.   Musculoskeletal:  Positive for myalgias (leg pain/cramps). Negative for back pain, falls and neck pain.   Skin: Negative.    Neurological: Negative.    Psychiatric/Behavioral: Negative.     All other systems reviewed and are negative.        CURRENT ALLERGIES:    Review of patient's allergies indicates:  No Known Allergies    CURRENT PROBLEM LIST:    Patient Active Problem List   Diagnosis    Migraine headache    Iron deficiency anemia    Insomnia    Essential hypertension    Morbid obesity    Osteoarthritis of both knees    PREMA (generalized anxiety disorder)    Major depressive disorder with single episode, in full remission    Left sided sciatica    Chronic pain of both knees       CURRENT MEDICATION LIST:      Current Outpatient Medications:     ALPRAZolam (XANAX) 0.25 MG tablet, TAKE 1 TO 2 TABLETS BY MOUTH THREE TIMES DAILY AS NEEDED FOR ANXIETY, Disp: 90 tablet, Rfl: 3    azelastine (ASTELIN) 137 mcg (0.1 %) nasal spray, SMARTSIG:Both Nares, Disp: , Rfl:     buPROPion (WELLBUTRIN) 100 MG tablet, Take 1 tablet (100 mg total) by mouth 2 (two) times daily., Disp: 180 tablet, Rfl: 3    butalbital-acetaminophen-caffeine -40 mg (FIORICET, ESGIC) -40 mg per tablet, Take 1-2 tablets every 6 hr as needed for headache.  Not to exceed  6 tablets in 24 hr., Disp: 30 tablet, Rfl: 2    cetirizine (ZYRTEC) 10 MG tablet, Take 10 mg by mouth once daily., Disp: , Rfl:     EScitalopram oxalate (LEXAPRO) 20 MG tablet, TAKE 1 TABLET(20 MG) BY MOUTH EVERY DAY, Disp: 90 tablet, Rfl: 3    gabapentin (NEURONTIN) 100 MG capsule, TAKE 1 CAPSULE(100 MG) BY MOUTH THREE TIMES DAILY, Disp: 90 capsule, Rfl: 1    hydroCHLOROthiazide (HYDRODIURIL) 12.5 MG Tab, Take 1 tablet (12.5 mg total) by mouth once daily., Disp: 90 tablet, Rfl: 3    HYDROcodone-acetaminophen (NORCO) 7.5-325 mg per tablet, Take 1 tablet by mouth every 8 (eight) hours as needed for Pain. (Patient not taking: Reported on 4/19/2023), Disp: 12 tablet, Rfl: 0    losartan (COZAAR) 100 MG tablet, Take 1 tablet (100 mg total) by mouth once daily., Disp: 90 tablet, Rfl: 3    meclizine (ANTIVERT) 25 mg tablet, Take 1-2 tablets (25-50 mg total) by mouth every 4 to 6 hours as needed for Dizziness., Disp: 30 tablet, Rfl: 1    methylPREDNISolone (MEDROL DOSEPACK) 4 mg tablet, use as directed (Patient not taking: Reported on 4/19/2023), Disp: 1 each, Rfl: 0    metoprolol succinate (TOPROL-XL) 50 MG 24 hr tablet, Take 1 tablet (50 mg total) by mouth once daily., Disp: 90 tablet, Rfl: 3    naproxen sodium (ANAPROX) 550 MG tablet, TAKE 1 TABLET(550 MG) BY MOUTH TWICE DAILY AS NEEDED FOR PAIN, Disp: 30 tablet, Rfl: 3    NIFEdipine (PROCARDIA-XL) 60 MG (OSM) 24 hr tablet, Take 1 tablet (60 mg total) by mouth once daily., Disp: 90 tablet, Rfl: 3    rizatriptan (MAXALT) 10 MG tablet, Take 1 tablet (10 mg total) by mouth as needed., Disp: 6 tablet, Rfl: 11    topiramate (TOPAMAX) 50 MG tablet, Take 1 tablet (50 mg total) by mouth 2 (two) times daily., Disp: 60 tablet, Rfl: 11    zolpidem (AMBIEN) 5 MG Tab, Take 1 tablet (5 mg total) by mouth nightly as needed., Disp: 30 tablet, Rfl: 5      HISTORY:    Past medical, surgical, family and social histories have been reviewed today.      OBJECTIVE:     Vitals:    08/21/23 1501  "  BP: 112/80   Pulse: 79   Resp: 18   Temp: 98.1 °F (36.7 °C)   TempSrc: Tympanic   SpO2: 98%   Weight: 129.7 kg (285 lb 15 oz)   Height: 5' 1" (1.549 m)       Physical Exam  Vitals reviewed.   Constitutional:       General: She is not in acute distress.  Eyes:      Pupils: Pupils are equal, round, and reactive to light.   Cardiovascular:      Rate and Rhythm: Normal rate and regular rhythm.      Pulses: Normal pulses.      Heart sounds: Normal heart sounds.   Pulmonary:      Effort: Pulmonary effort is normal.      Breath sounds: Normal breath sounds.   Musculoskeletal:         General: No swelling, tenderness or deformity. Normal range of motion.      Cervical back: Normal range of motion and neck supple. No rigidity.      Right lower leg: No edema.      Left lower leg: No edema.   Skin:     Capillary Refill: Capillary refill takes less than 2 seconds.   Neurological:      Mental Status: She is alert and oriented to person, place, and time.      Motor: No weakness.      Gait: Gait normal.   Psychiatric:         Mood and Affect: Mood normal.         Behavior: Behavior normal.         Thought Content: Thought content normal.         Judgment: Judgment normal.         ASSESSMENT:     1. Essential hypertension  Chronic issue, stable on med as ordered.     hydroCHLOROthiazide (HYDRODIURIL) 12.5 MG Tab Take 1 tablet (12.5 mg total) by mouth once daily prn edema    losartan (COZAAR) 100 MG tablet Take 1 tablet (100 mg total) by mouth once daily.    metoprolol succinate (TOPROL-XL) 50 MG 24 hr tablet Take 1 tablet (50 mg total) by mouth once daily.    NIFEdipine (PROCARDIA-XL) 60 MG (OSM) 24 hr tablet Take 1 tablet (60 mg total) by mouth once daily.     2. Bilateral leg cramps  Check lab.  Exact trigger unclear ---- dehydration, diuretic vs other  -     Magnesium; Future; Expected date: 08/21/2023  -     CBC Auto Differential; Future; Expected date: 08/21/2023  -     COMPREHENSIVE METABOLIC PANEL; Future; Expected date: " 08/21/2023  -     Vitamin B12; Future; Expected date: 08/21/2023  -     Vitamin D; Future; Expected date: 08/21/2023  -     CK -ISOENZYMES(MB,MM,BB); Future; Expected date: 08/21/2023    3. Major depressive disorder with single episode, in full remission  Stable, on med as ordered    4. Morbid obesity with body mass index (BMI) of 50.0 to 59.9 in adult  Healthy diet, lifestyle changes discussed.    PLAN:     Lab pending.  RTC as directed and/or prn.        HERBERT Kwon  [facility]       35 minutes of total time spent on the encounter, which includes face to face time and non-face to face time preparing to see the patient.  This includes obtaining and/or reviewing separately obtained history, performing a medically appropriate examination and/or evaluation, and counseling and educating the patient/family/caregiver.  Includes documenting clinical information in the electronic or other health record, independently interpreting results (not separately reported) and communicating results to the patient/family/caregiver, with care coordination (not separately reported).  Medications, tests and/or procedures ordered as necessary along with referring and communicating with other health professionals (when not separately reported).

## 2023-08-21 ENCOUNTER — OFFICE VISIT (OUTPATIENT)
Dept: FAMILY MEDICINE | Facility: CLINIC | Age: 43
End: 2023-08-21
Payer: COMMERCIAL

## 2023-08-21 ENCOUNTER — LAB VISIT (OUTPATIENT)
Dept: LAB | Facility: HOSPITAL | Age: 43
End: 2023-08-21
Attending: REGISTERED NURSE
Payer: COMMERCIAL

## 2023-08-21 VITALS
RESPIRATION RATE: 18 BRPM | SYSTOLIC BLOOD PRESSURE: 112 MMHG | OXYGEN SATURATION: 98 % | DIASTOLIC BLOOD PRESSURE: 80 MMHG | HEIGHT: 61 IN | WEIGHT: 285.94 LBS | BODY MASS INDEX: 53.99 KG/M2 | HEART RATE: 79 BPM | TEMPERATURE: 98 F

## 2023-08-21 DIAGNOSIS — F32.5 MAJOR DEPRESSIVE DISORDER WITH SINGLE EPISODE, IN FULL REMISSION: ICD-10-CM

## 2023-08-21 DIAGNOSIS — I10 ESSENTIAL HYPERTENSION: Primary | Chronic | ICD-10-CM

## 2023-08-21 DIAGNOSIS — R25.2 BILATERAL LEG CRAMPS: ICD-10-CM

## 2023-08-21 DIAGNOSIS — E66.01 MORBID OBESITY WITH BODY MASS INDEX (BMI) OF 50.0 TO 59.9 IN ADULT: ICD-10-CM

## 2023-08-21 PROCEDURE — 99214 PR OFFICE/OUTPT VISIT, EST, LEVL IV, 30-39 MIN: ICD-10-PCS | Mod: S$GLB,,, | Performed by: REGISTERED NURSE

## 2023-08-21 PROCEDURE — 82306 VITAMIN D 25 HYDROXY: CPT | Performed by: REGISTERED NURSE

## 2023-08-21 PROCEDURE — 80053 COMPREHEN METABOLIC PANEL: CPT | Performed by: REGISTERED NURSE

## 2023-08-21 PROCEDURE — 1159F PR MEDICATION LIST DOCUMENTED IN MEDICAL RECORD: ICD-10-PCS | Mod: CPTII,S$GLB,, | Performed by: REGISTERED NURSE

## 2023-08-21 PROCEDURE — 85025 COMPLETE CBC W/AUTO DIFF WBC: CPT | Performed by: REGISTERED NURSE

## 2023-08-21 PROCEDURE — 3008F BODY MASS INDEX DOCD: CPT | Mod: CPTII,S$GLB,, | Performed by: REGISTERED NURSE

## 2023-08-21 PROCEDURE — 83735 ASSAY OF MAGNESIUM: CPT | Performed by: REGISTERED NURSE

## 2023-08-21 PROCEDURE — 99999 PR PBB SHADOW E&M-EST. PATIENT-LVL IV: CPT | Mod: PBBFAC,,, | Performed by: REGISTERED NURSE

## 2023-08-21 PROCEDURE — 82607 VITAMIN B-12: CPT | Performed by: REGISTERED NURSE

## 2023-08-21 PROCEDURE — 99214 OFFICE O/P EST MOD 30 MIN: CPT | Mod: S$GLB,,, | Performed by: REGISTERED NURSE

## 2023-08-21 PROCEDURE — 3079F PR MOST RECENT DIASTOLIC BLOOD PRESSURE 80-89 MM HG: ICD-10-PCS | Mod: CPTII,S$GLB,, | Performed by: REGISTERED NURSE

## 2023-08-21 PROCEDURE — 3079F DIAST BP 80-89 MM HG: CPT | Mod: CPTII,S$GLB,, | Performed by: REGISTERED NURSE

## 2023-08-21 PROCEDURE — 99999 PR PBB SHADOW E&M-EST. PATIENT-LVL IV: ICD-10-PCS | Mod: PBBFAC,,, | Performed by: REGISTERED NURSE

## 2023-08-21 PROCEDURE — 4010F ACE/ARB THERAPY RXD/TAKEN: CPT | Mod: CPTII,S$GLB,, | Performed by: REGISTERED NURSE

## 2023-08-21 PROCEDURE — 1159F MED LIST DOCD IN RCRD: CPT | Mod: CPTII,S$GLB,, | Performed by: REGISTERED NURSE

## 2023-08-21 PROCEDURE — 3074F SYST BP LT 130 MM HG: CPT | Mod: CPTII,S$GLB,, | Performed by: REGISTERED NURSE

## 2023-08-21 PROCEDURE — 3008F PR BODY MASS INDEX (BMI) DOCUMENTED: ICD-10-PCS | Mod: CPTII,S$GLB,, | Performed by: REGISTERED NURSE

## 2023-08-21 PROCEDURE — 82550 ASSAY OF CK (CPK): CPT | Performed by: REGISTERED NURSE

## 2023-08-21 PROCEDURE — 3074F PR MOST RECENT SYSTOLIC BLOOD PRESSURE < 130 MM HG: ICD-10-PCS | Mod: CPTII,S$GLB,, | Performed by: REGISTERED NURSE

## 2023-08-21 PROCEDURE — 4010F PR ACE/ARB THEARPY RXD/TAKEN: ICD-10-PCS | Mod: CPTII,S$GLB,, | Performed by: REGISTERED NURSE

## 2023-08-21 RX ORDER — METHOCARBAMOL 500 MG/1
500 TABLET, FILM COATED ORAL 4 TIMES DAILY
COMMUNITY

## 2023-08-22 LAB
25(OH)D3+25(OH)D2 SERPL-MCNC: 32 NG/ML (ref 30–96)
ALBUMIN SERPL BCP-MCNC: 2.8 G/DL (ref 3.5–5.2)
ALP SERPL-CCNC: 57 U/L (ref 55–135)
ALT SERPL W/O P-5'-P-CCNC: 19 U/L (ref 10–44)
ANION GAP SERPL CALC-SCNC: 8 MMOL/L (ref 8–16)
AST SERPL-CCNC: 29 U/L (ref 10–40)
BASOPHILS # BLD AUTO: 0.04 K/UL (ref 0–0.2)
BASOPHILS NFR BLD: 0.5 % (ref 0–1.9)
BILIRUB SERPL-MCNC: 0.2 MG/DL (ref 0.1–1)
BUN SERPL-MCNC: 13 MG/DL (ref 6–20)
CALCIUM SERPL-MCNC: 8.8 MG/DL (ref 8.7–10.5)
CHLORIDE SERPL-SCNC: 107 MMOL/L (ref 95–110)
CO2 SERPL-SCNC: 25 MMOL/L (ref 23–29)
CREAT SERPL-MCNC: 1 MG/DL (ref 0.5–1.4)
DIFFERENTIAL METHOD: ABNORMAL
EOSINOPHIL # BLD AUTO: 0.1 K/UL (ref 0–0.5)
EOSINOPHIL NFR BLD: 1 % (ref 0–8)
ERYTHROCYTE [DISTWIDTH] IN BLOOD BY AUTOMATED COUNT: 14.4 % (ref 11.5–14.5)
EST. GFR  (NO RACE VARIABLE): >60 ML/MIN/1.73 M^2
GLUCOSE SERPL-MCNC: 78 MG/DL (ref 70–110)
HCT VFR BLD AUTO: 38.2 % (ref 37–48.5)
HGB BLD-MCNC: 11.7 G/DL (ref 12–16)
IMM GRANULOCYTES # BLD AUTO: 0.02 K/UL (ref 0–0.04)
IMM GRANULOCYTES NFR BLD AUTO: 0.2 % (ref 0–0.5)
LYMPHOCYTES # BLD AUTO: 3.2 K/UL (ref 1–4.8)
LYMPHOCYTES NFR BLD: 39.2 % (ref 18–48)
MAGNESIUM SERPL-MCNC: 1.9 MG/DL (ref 1.6–2.6)
MCH RBC QN AUTO: 29.3 PG (ref 27–31)
MCHC RBC AUTO-ENTMCNC: 30.6 G/DL (ref 32–36)
MCV RBC AUTO: 96 FL (ref 82–98)
MONOCYTES # BLD AUTO: 0.7 K/UL (ref 0.3–1)
MONOCYTES NFR BLD: 7.9 % (ref 4–15)
NEUTROPHILS # BLD AUTO: 4.2 K/UL (ref 1.8–7.7)
NEUTROPHILS NFR BLD: 51.2 % (ref 38–73)
NRBC BLD-RTO: 0 /100 WBC
PLATELET # BLD AUTO: 295 K/UL (ref 150–450)
PMV BLD AUTO: 10.1 FL (ref 9.2–12.9)
POTASSIUM SERPL-SCNC: 4 MMOL/L (ref 3.5–5.1)
PROT SERPL-MCNC: 7.1 G/DL (ref 6–8.4)
RBC # BLD AUTO: 4 M/UL (ref 4–5.4)
SODIUM SERPL-SCNC: 140 MMOL/L (ref 136–145)
VIT B12 SERPL-MCNC: 487 PG/ML (ref 210–950)
WBC # BLD AUTO: 8.19 K/UL (ref 3.9–12.7)

## 2023-08-23 ENCOUNTER — PATIENT MESSAGE (OUTPATIENT)
Dept: FAMILY MEDICINE | Facility: CLINIC | Age: 43
End: 2023-08-23
Payer: COMMERCIAL

## 2023-08-25 LAB
CK BB CFR SERPL ELPH: 0 %
CK MB CFR SERPL ELPH: 0 % (ref 0–3.3)
CK MM CFR SERPL ELPH: 100 % (ref 96.7–100)
CK SERPL-CCNC: 112 U/L (ref 30–223)

## 2023-10-26 DIAGNOSIS — M54.32 LEFT SIDED SCIATICA: ICD-10-CM

## 2023-10-26 RX ORDER — GABAPENTIN 100 MG/1
CAPSULE ORAL
Qty: 90 CAPSULE | Refills: 1 | Status: SHIPPED | OUTPATIENT
Start: 2023-10-26 | End: 2023-11-10 | Stop reason: SDUPTHER

## 2023-11-09 RX ORDER — HYDROCHLOROTHIAZIDE 12.5 MG/1
12.5 TABLET ORAL
Qty: 90 TABLET | Refills: 0 | Status: SHIPPED | OUTPATIENT
Start: 2023-11-09 | End: 2023-11-10 | Stop reason: SDUPTHER

## 2023-11-09 RX ORDER — METOPROLOL SUCCINATE 50 MG/1
50 TABLET, EXTENDED RELEASE ORAL
Qty: 90 TABLET | Refills: 0 | Status: SHIPPED | OUTPATIENT
Start: 2023-11-09 | End: 2023-11-10 | Stop reason: SDUPTHER

## 2023-11-09 NOTE — TELEPHONE ENCOUNTER
Refill Decision Note   Georgia Garcia  is requesting a refill authorization.  Brief Assessment and Rationale for Refill:  Approve     Medication Therapy Plan:         Comments:     Note composed:12:20 PM 11/09/2023

## 2023-11-09 NOTE — TELEPHONE ENCOUNTER
No care due was identified.  [facility] Embedded Care Due Messages. Reference number: [id number].   11/09/2023 3:18:13 AM CST

## 2023-11-10 ENCOUNTER — TELEPHONE (OUTPATIENT)
Dept: FAMILY MEDICINE | Facility: CLINIC | Age: 43
End: 2023-11-10
Payer: COMMERCIAL

## 2023-11-10 ENCOUNTER — OFFICE VISIT (OUTPATIENT)
Dept: FAMILY MEDICINE | Facility: CLINIC | Age: 43
End: 2023-11-10
Payer: COMMERCIAL

## 2023-11-10 VITALS
RESPIRATION RATE: 13 BRPM | HEART RATE: 94 BPM | BODY MASS INDEX: 53.49 KG/M2 | DIASTOLIC BLOOD PRESSURE: 72 MMHG | WEIGHT: 283.31 LBS | TEMPERATURE: 99 F | HEIGHT: 61 IN | OXYGEN SATURATION: 97 % | SYSTOLIC BLOOD PRESSURE: 112 MMHG

## 2023-11-10 DIAGNOSIS — G43.919 INTRACTABLE MIGRAINE WITHOUT STATUS MIGRAINOSUS, UNSPECIFIED MIGRAINE TYPE: ICD-10-CM

## 2023-11-10 DIAGNOSIS — Z00.00 PREVENTATIVE HEALTH CARE: Primary | ICD-10-CM

## 2023-11-10 DIAGNOSIS — E66.01 MORBID OBESITY: Chronic | ICD-10-CM

## 2023-11-10 DIAGNOSIS — M54.32 LEFT SIDED SCIATICA: ICD-10-CM

## 2023-11-10 DIAGNOSIS — F32.5 MAJOR DEPRESSIVE DISORDER WITH SINGLE EPISODE, IN FULL REMISSION: ICD-10-CM

## 2023-11-10 DIAGNOSIS — I10 ESSENTIAL HYPERTENSION: Chronic | ICD-10-CM

## 2023-11-10 PROCEDURE — 3074F SYST BP LT 130 MM HG: CPT | Mod: CPTII,S$GLB,, | Performed by: FAMILY MEDICINE

## 2023-11-10 PROCEDURE — 3078F DIAST BP <80 MM HG: CPT | Mod: CPTII,S$GLB,, | Performed by: FAMILY MEDICINE

## 2023-11-10 PROCEDURE — 3008F BODY MASS INDEX DOCD: CPT | Mod: CPTII,S$GLB,, | Performed by: FAMILY MEDICINE

## 2023-11-10 PROCEDURE — 1160F PR REVIEW ALL MEDS BY PRESCRIBER/CLIN PHARMACIST DOCUMENTED: ICD-10-PCS | Mod: CPTII,S$GLB,, | Performed by: FAMILY MEDICINE

## 2023-11-10 PROCEDURE — 3008F PR BODY MASS INDEX (BMI) DOCUMENTED: ICD-10-PCS | Mod: CPTII,S$GLB,, | Performed by: FAMILY MEDICINE

## 2023-11-10 PROCEDURE — 1160F RVW MEDS BY RX/DR IN RCRD: CPT | Mod: CPTII,S$GLB,, | Performed by: FAMILY MEDICINE

## 2023-11-10 PROCEDURE — 99999 PR PBB SHADOW E&M-EST. PATIENT-LVL III: CPT | Mod: PBBFAC,,, | Performed by: FAMILY MEDICINE

## 2023-11-10 PROCEDURE — 4010F ACE/ARB THERAPY RXD/TAKEN: CPT | Mod: CPTII,S$GLB,, | Performed by: FAMILY MEDICINE

## 2023-11-10 PROCEDURE — 99396 PR PREVENTIVE VISIT,EST,40-64: ICD-10-PCS | Mod: S$GLB,,, | Performed by: FAMILY MEDICINE

## 2023-11-10 PROCEDURE — 1159F MED LIST DOCD IN RCRD: CPT | Mod: CPTII,S$GLB,, | Performed by: FAMILY MEDICINE

## 2023-11-10 PROCEDURE — 3078F PR MOST RECENT DIASTOLIC BLOOD PRESSURE < 80 MM HG: ICD-10-PCS | Mod: CPTII,S$GLB,, | Performed by: FAMILY MEDICINE

## 2023-11-10 PROCEDURE — 99396 PREV VISIT EST AGE 40-64: CPT | Mod: S$GLB,,, | Performed by: FAMILY MEDICINE

## 2023-11-10 PROCEDURE — 1159F PR MEDICATION LIST DOCUMENTED IN MEDICAL RECORD: ICD-10-PCS | Mod: CPTII,S$GLB,, | Performed by: FAMILY MEDICINE

## 2023-11-10 PROCEDURE — 4010F PR ACE/ARB THEARPY RXD/TAKEN: ICD-10-PCS | Mod: CPTII,S$GLB,, | Performed by: FAMILY MEDICINE

## 2023-11-10 PROCEDURE — 99999 PR PBB SHADOW E&M-EST. PATIENT-LVL III: ICD-10-PCS | Mod: PBBFAC,,, | Performed by: FAMILY MEDICINE

## 2023-11-10 PROCEDURE — 3074F PR MOST RECENT SYSTOLIC BLOOD PRESSURE < 130 MM HG: ICD-10-PCS | Mod: CPTII,S$GLB,, | Performed by: FAMILY MEDICINE

## 2023-11-10 RX ORDER — NAPROXEN SODIUM 550 MG/1
TABLET ORAL
Qty: 30 TABLET | Refills: 3 | Status: SHIPPED | OUTPATIENT
Start: 2023-11-10 | End: 2024-02-02

## 2023-11-10 RX ORDER — METOPROLOL SUCCINATE 50 MG/1
50 TABLET, EXTENDED RELEASE ORAL DAILY
Qty: 90 TABLET | Refills: 3 | Status: SHIPPED | OUTPATIENT
Start: 2023-11-10

## 2023-11-10 RX ORDER — LOSARTAN POTASSIUM 100 MG/1
100 TABLET ORAL DAILY
Qty: 90 TABLET | Refills: 3 | Status: SHIPPED | OUTPATIENT
Start: 2023-11-10

## 2023-11-10 RX ORDER — GABAPENTIN 100 MG/1
100 CAPSULE ORAL 3 TIMES DAILY
Qty: 90 CAPSULE | Refills: 5 | Status: SHIPPED | OUTPATIENT
Start: 2023-11-10

## 2023-11-10 RX ORDER — ESCITALOPRAM OXALATE 20 MG/1
TABLET ORAL
Qty: 90 TABLET | Refills: 3 | Status: SHIPPED | OUTPATIENT
Start: 2023-11-10

## 2023-11-10 RX ORDER — BUPROPION HYDROCHLORIDE 100 MG/1
100 TABLET ORAL 2 TIMES DAILY
Qty: 180 TABLET | Refills: 3 | Status: SHIPPED | OUTPATIENT
Start: 2023-11-10 | End: 2024-11-09

## 2023-11-10 RX ORDER — ZOLPIDEM TARTRATE 5 MG/1
5 TABLET ORAL NIGHTLY PRN
Qty: 30 TABLET | Refills: 5 | Status: SHIPPED | OUTPATIENT
Start: 2023-11-10 | End: 2024-05-10

## 2023-11-10 RX ORDER — HYDROCHLOROTHIAZIDE 12.5 MG/1
12.5 TABLET ORAL DAILY
Qty: 90 TABLET | Refills: 3 | Status: SHIPPED | OUTPATIENT
Start: 2023-11-10

## 2023-11-10 RX ORDER — ESTRADIOL 1 MG/1
1 TABLET ORAL DAILY
Qty: 90 TABLET | Refills: 3 | Status: SHIPPED | OUTPATIENT
Start: 2023-11-10 | End: 2024-11-09

## 2023-11-10 RX ORDER — RIZATRIPTAN BENZOATE 10 MG/1
10 TABLET ORAL
Qty: 6 TABLET | Refills: 11 | Status: SHIPPED | OUTPATIENT
Start: 2023-11-10

## 2023-11-10 RX ORDER — NIFEDIPINE 60 MG/1
60 TABLET, EXTENDED RELEASE ORAL DAILY
Qty: 90 TABLET | Refills: 3 | Status: SHIPPED | OUTPATIENT
Start: 2023-11-10

## 2023-11-10 NOTE — PROGRESS NOTES
CHIEF COMPLAINT:  This is a 43-year-old female here for preventive health exam.     SUBJECTIVE:  The patient is doing well without complaints except for hot flushes.  Patient is status post KHRIS at age 34 but ovaries remain.  She has hypertension for which she takes hydrochlorothiazide 12.5 mg daily, losartan 100 mg daily and metoprolol XL 25 mg daily. She has taken nifedipine 60 mg intermittently.  Blood pressure today is 112/72.   She's morbidly obese with a BMI of 53.53.  Patient's anxiety is controlled on Lexapro 20 mg daily.  She takes alprazolam 0.25 mg to 3 times daily as needed. She takes Wellbutrin  mg twice daily for major depression which is controlled.  She takes zolpidem as needed for sleeplessness.  Patient reports improvement with Fioricet when treating migraine headaches.  She continues to take Imitrex as needed but has discontinued Topamax.     Eye exam 2022. Pap smear September 2011 (status post hysterectomy).  Mammogram November 2022.  Colonoscopy July 2022 again July 2027.  Td vaccine November 2021.  Flu vaccine September 2023. COVID 19 vaccine March, November 2021, April, September 2022, September 2023.     ROS:  GENERAL: Patient denies fever, chills, night sweats. Patient denies weight gain or loss. Patient denies anorexia, weakness or swollen glands. Positive for fatigue and weight loss.  SKIN: Patient denies rash or hair loss.  HEENT: Patient denies sore throat, ear pain, hearing loss, nasal congestion, or runny nose. Patient denies visual disturbance, eye irritation or discharge.  LUNGS: Patient denies cough, wheeze or hemoptysis.  CARDIOVASCULAR: Patient denies chest pain, shortness of breath, palpitations, syncope or lower extremity edema.  GI: Patient denies abdominal pain, nausea, vomiting, diarrhea, constipation, blood in stool or melena.  GENITOURINARY: Patient denies pelvic pain, vaginal discharge, itch or odor.  Patient denies irregular vaginal bleeding. Patient denies dysuria,  frequency, hematuria, nocturia, urgency or incontinence.  BREASTS: Patient denies breast pain, mass or nipple discharge.  MUSCULOSKELETAL: Patient denies joint pain, swelling, redness or warmth.  NEUROLOGIC:  Positive for headaches.  Patient denies dizziness, numbness, tingling, weakness in limb, dysarthria, dysphagia or abnormality of gait.  PSYCHIATRIC: Patient denies depression, or memory loss.  Positive for anxiety.     OBJECTIVE:   GENERAL: Well-developed well-nourished, morbidly obese, black female alert and oriented x3, in no acute distress. Memory, judgment and cognition without deficit.  Normal affect.  SKIN: Clear without rash. Normal color and tone.  HEENT: Eyes: Clear conjunctivae. No scleral icterus. Pupils equal reactive to light and accommodation. Ears: Clear TMs. Clear canals. Nose: Without congestion. Pharynx: Without injection or exudates.  NECK: Supple, normal range of motion. No masses, lymphadenopathy or enlarged thyroid. No JVD. Carotids 2+ and equal. No bruits.  LUNGS: Clear to auscultation. Normal respiratory effort.  CARDIOVASCULAR: Regular rhythm, normal S1, S2 without murmur, gallop or rub.  BACK: No CVA or spinal tenderness.  BREASTS:  Deferred.  ABDOMEN:  Normal appearance. Active bowel sounds. Soft, nontender without mass or organomegaly. No rebound or guarding.  EXTREMITIES: Without cyanosis, clubbing or edema.  Distal pulses 2+ and equal. Normal range of motion in all extremities. No joint effusion, erythema or warmth.  NEUROLOGIC: Cranial nerves II through XII without deficit. Motor strength equal bilaterally. Sensation normal to touch. Deep tendon reflexes 2+ and equal. Gait without abnormality. No tremor. Negative cerebellar signs.  PELVIC:  Deferred.    ASSESSMENT:  1. Preventative health care    2. Essential hypertension    3. Morbid obesity    4. Intractable migraine without status migrainosus, unspecified migraine type    5. Major depressive disorder with single episode, in  full remission    6. Left sided sciatica      PLAN:  1. Weight reduction. Exercise regularly.  2. Age-appropriate counseling.  3. Fasting lab.  4. Mammogram.  5. Refill medications.  6. Follow-up annually.    This note is generated with speech recognition software and is subject to transcription error and sound alike phrases that may be missed by proofreading.

## 2023-11-10 NOTE — TELEPHONE ENCOUNTER
----- Message from Angelia Sully sent at 11/10/2023  1:06 PM CST -----  Contact: pt  Pt is calling in regard to her appt on today at 4:20pm and would like to come in earlier if possible.  Please call her back at [phone] thanks/mpd

## 2023-11-12 NOTE — TELEPHONE ENCOUNTER
No care due was identified.  [facility] Embedded Care Due Messages. Reference number: [id number].   11/12/2023 3:17:36 AM CST

## 2023-11-13 ENCOUNTER — HOSPITAL ENCOUNTER (OUTPATIENT)
Dept: RADIOLOGY | Facility: HOSPITAL | Age: 43
Discharge: HOME | End: 2023-11-13
Attending: FAMILY MEDICINE
Payer: COMMERCIAL

## 2023-11-13 DIAGNOSIS — Z12.31 ENCOUNTER FOR SCREENING MAMMOGRAM FOR MALIGNANT NEOPLASM OF BREAST: ICD-10-CM

## 2023-11-13 PROCEDURE — 77067 SCR MAMMO BI INCL CAD: CPT | Mod: TC

## 2023-11-13 PROCEDURE — 77067 MAMMO DIGITAL SCREENING BILAT WITH TOMO: ICD-10-PCS | Mod: 26,,, | Performed by: RADIOLOGY

## 2023-11-13 PROCEDURE — 77063 MAMMO DIGITAL SCREENING BILAT WITH TOMO: ICD-10-PCS | Mod: 26,,, | Performed by: RADIOLOGY

## 2023-11-13 PROCEDURE — 77063 BREAST TOMOSYNTHESIS BI: CPT | Mod: 26,,, | Performed by: RADIOLOGY

## 2023-11-13 PROCEDURE — 77067 SCR MAMMO BI INCL CAD: CPT | Mod: 26,,, | Performed by: RADIOLOGY

## 2023-11-13 RX ORDER — LOSARTAN POTASSIUM 100 MG/1
100 TABLET ORAL
Qty: 90 TABLET | Refills: 3 | OUTPATIENT
Start: 2023-11-13

## 2023-11-13 RX ORDER — NIFEDIPINE 60 MG/1
TABLET, EXTENDED RELEASE ORAL
Qty: 90 TABLET | Refills: 3 | OUTPATIENT
Start: 2023-11-13

## 2023-11-13 NOTE — TELEPHONE ENCOUNTER
Refill Decision Note   Georgia Garcia  is requesting a refill authorization.  Brief Assessment and Rationale for Refill:  Quick Discontinue     Medication Therapy Plan:  Receipt confirmed by pharmacy (11/10/2023  4:23 PM CST)      Comments:     Note composed:3:30 AM 11/13/2023

## 2023-12-01 ENCOUNTER — OFFICE VISIT (OUTPATIENT)
Dept: SPORTS MEDICINE | Facility: CLINIC | Age: 43
End: 2023-12-01
Payer: COMMERCIAL

## 2023-12-01 VITALS — BODY MASS INDEX: 53.43 KG/M2 | HEIGHT: 61 IN | WEIGHT: 283 LBS

## 2023-12-01 DIAGNOSIS — M17.0 BILATERAL PRIMARY OSTEOARTHRITIS OF KNEE: Primary | ICD-10-CM

## 2023-12-01 PROCEDURE — 99999 PR PBB SHADOW E&M-EST. PATIENT-LVL IV: ICD-10-PCS | Mod: PBBFAC,,, | Performed by: STUDENT IN AN ORGANIZED HEALTH CARE EDUCATION/TRAINING PROGRAM

## 2023-12-01 PROCEDURE — 20611 DRAIN/INJ JOINT/BURSA W/US: CPT | Mod: 50,S$GLB,, | Performed by: STUDENT IN AN ORGANIZED HEALTH CARE EDUCATION/TRAINING PROGRAM

## 2023-12-01 PROCEDURE — 20611 LARGE JOINT ASPIRATION/INJECTION: BILATERAL SUPRA PATELLAR BURSA: ICD-10-PCS | Mod: 50,S$GLB,, | Performed by: STUDENT IN AN ORGANIZED HEALTH CARE EDUCATION/TRAINING PROGRAM

## 2023-12-01 PROCEDURE — 99499 UNLISTED E&M SERVICE: CPT | Mod: S$GLB,,, | Performed by: STUDENT IN AN ORGANIZED HEALTH CARE EDUCATION/TRAINING PROGRAM

## 2023-12-01 PROCEDURE — 99999 PR PBB SHADOW E&M-EST. PATIENT-LVL IV: CPT | Mod: PBBFAC,,, | Performed by: STUDENT IN AN ORGANIZED HEALTH CARE EDUCATION/TRAINING PROGRAM

## 2023-12-01 PROCEDURE — 99499 NO LOS: ICD-10-PCS | Mod: S$GLB,,, | Performed by: STUDENT IN AN ORGANIZED HEALTH CARE EDUCATION/TRAINING PROGRAM

## 2023-12-01 NOTE — PROCEDURES
Large Joint Aspiration/Injection: bilateral supra patellar bursa    Date/Time: 12/1/2023 4:00 PM    Performed by: Oswaldo Nova MD  Authorized by: Oswaldo Nova MD    Consent Done?:  Yes (Verbal)  Indications:  Arthritis and pain  Site marked: the procedure site was marked    Timeout: prior to procedure the correct patient, procedure, and site was verified    Prep: patient was prepped and draped in usual sterile fashion      Details:  Needle Size:  22 G  Ultrasonic Guidance for needle placement?: Yes    Images are saved and documented.  Approach:  Anterolateral  Location:  Knee  Laterality:  Bilateral  Site:  Bilateral supra patellar bursa  Medications (Right):  48 mg hylan g-f 20 48 mg/6 mL  Medications (Left):  48 mg hylan g-f 20 48 mg/6 mL  Patient tolerance:  Patient tolerated the procedure well with no immediate complications     Ultrasound guidance was used for needle localization. Images were saved and stored for documentation. The appropriate structures were visualized. Dynamic visualization of the needle was continuous throughout the procedures and maintained good position.

## 2023-12-01 NOTE — PATIENT INSTRUCTIONS
Assessment:  Georgia Garcia is a 43 y.o. female   Chief Complaint   Patient presents with    Left Knee - Pain    Right Knee - Pain       Encounter Diagnosis   Name Primary?    Bilateral primary osteoarthritis of knee Yes        Plan:  Bilateral Synvisc One injections to knees  Today you received a gel injection. Unlike steroid injections, these gel injections are a lot thicker and can feel different at first.   It is normal to feel some soreness in the first few days because the gel is expanding that joint space.   It is also normal to experience some swelling in the first few days.   If you are feeling discomfort or having swelling, use ice and tylenol to control symptoms.   It is better to keep the knee joint moving so that the gel can get throughout the joint space.   Sometimes it can take a few weeks for the gel injection to start showing noticeable effects. This is normal.   These gel injections can be repeated every 6 months as needed.   Follow up as needed    Follow-up: as needed.    Thank you for choosing [facility] and Dr. Oswaldo Nova for your orthopedic & sports medicine care. It is our goal to provide you with exceptional care that will help keep you healthy, active, and get you back in the game.    Please do not hesitate to reach out to us via email, phone, or MyChart with any questions, concerns, or feedback.    If you felt that you received exemplary care today, please consider leaving us feedback on [employer] at:  [email or web address]    If you are experiencing pain/discomfort ,or have questions after 5pm and would like to be connected to the [facility] on-call team, please call this number and specify which Sports Medicine provider is treating you: [phone]

## 2023-12-01 NOTE — PROCEDURES
Sports Medicine US - Guidance for Needle Placement    Date/Time: 12/1/2023 4:00 PM    Performed by: Oswaldo Nova MD  Authorized by: Oswaldo Nova MD  Preparation: Patient was prepped and draped in the usual sterile fashion.  Local anesthesia used: no    Anesthesia:  Local anesthesia used: no    Sedation:  Patient sedated: no    Patient tolerance: patient tolerated the procedure well with no immediate complications  Comments: Ultrasound guidance was used for needle localization. Images were saved and stored for documentation. The appropriate structures were visualized. Dynamic visualization of the needle was continuous throughout the procedures and maintained good position.

## 2023-12-29 ENCOUNTER — PATIENT MESSAGE (OUTPATIENT)
Dept: FAMILY MEDICINE | Facility: CLINIC | Age: 43
End: 2023-12-29
Payer: COMMERCIAL

## 2023-12-29 DIAGNOSIS — G43.919 INTRACTABLE MIGRAINE WITHOUT STATUS MIGRAINOSUS, UNSPECIFIED MIGRAINE TYPE: Primary | ICD-10-CM

## 2023-12-29 RX ORDER — HYDROCODONE BITARTRATE AND ACETAMINOPHEN 7.5; 325 MG/1; MG/1
1 TABLET ORAL EVERY 8 HOURS PRN
Qty: 12 TABLET | Refills: 0 | Status: SHIPPED | OUTPATIENT
Start: 2023-12-29

## 2023-12-29 RX ORDER — HYDROCODONE BITARTRATE AND ACETAMINOPHEN 5; 325 MG/1; MG/1
1 TABLET ORAL EVERY 6 HOURS PRN
COMMUNITY
End: 2023-12-29

## 2024-02-02 DIAGNOSIS — G43.919 INTRACTABLE MIGRAINE WITHOUT STATUS MIGRAINOSUS, UNSPECIFIED MIGRAINE TYPE: ICD-10-CM

## 2024-02-02 RX ORDER — NAPROXEN SODIUM 550 MG/1
TABLET ORAL
Qty: 30 TABLET | Refills: 3 | Status: SHIPPED | OUTPATIENT
Start: 2024-02-02 | End: 2024-04-01

## 2024-02-02 NOTE — TELEPHONE ENCOUNTER
Lov: 11.10.23  Last Fill: 11.10.23  
No care due was identified.  Health [facility] Embedded Care Due Messages. Reference number: [id number].   2/02/2024 3:18:10 AM CST  
125

## 2024-02-14 ENCOUNTER — TELEPHONE (OUTPATIENT)
Dept: BARIATRICS | Facility: CLINIC | Age: 44
End: 2024-02-14
Payer: COMMERCIAL

## 2024-02-14 ENCOUNTER — DOCUMENTATION ONLY (OUTPATIENT)
Dept: BARIATRICS | Facility: CLINIC | Age: 44
End: 2024-02-14
Payer: COMMERCIAL

## 2024-02-14 NOTE — PROGRESS NOTES
Received a message from the [facility] , Rachel Guallpa, that patient has been waiting for a call back regarding bariatric surgery.  Sent message to Kaur Palomo, access navigator, to call the patient

## 2024-03-20 ENCOUNTER — PATIENT MESSAGE (OUTPATIENT)
Dept: FAMILY MEDICINE | Facility: CLINIC | Age: 44
End: 2024-03-20
Payer: COMMERCIAL

## 2024-03-20 ENCOUNTER — TELEPHONE (OUTPATIENT)
Dept: FAMILY MEDICINE | Facility: CLINIC | Age: 44
End: 2024-03-20
Payer: COMMERCIAL

## 2024-03-20 NOTE — TELEPHONE ENCOUNTER
Please excuse the last message.  Yes I was on Fioricet  for some time. And was advised by the NP last year that I needed to cut back on it.

## 2024-03-20 NOTE — TELEPHONE ENCOUNTER
LOV - 11/10/2023      Good morning     Would you please, please, please send in a refill for hydrocodone at your earliest convenience.   We have currently returned to work. And although I wear a mask all day, everyday, the different perfume scents are raking me over the coals.   The pain is excruciating!     Thanking you in advance,   Georgia Garcia   [phone]       Dr. Muse, I don't think that you will fill this script. However I had to send it to you to get a definite no.    Thx

## 2024-03-20 NOTE — TELEPHONE ENCOUNTER
Patient states the she understand about the narcotics. She c/o having a Migraine for the last 3 days. She wants to know if there is anything else she could take in addition with the Rizatriptan. Please advise.

## 2024-03-20 NOTE — TELEPHONE ENCOUNTER
I do not understand why she would need narcotics for inability to tolerate perfume. That is an inappropriate use of opioids.

## 2024-03-21 ENCOUNTER — PATIENT MESSAGE (OUTPATIENT)
Dept: FAMILY MEDICINE | Facility: CLINIC | Age: 44
End: 2024-03-21
Payer: COMMERCIAL

## 2024-03-30 DIAGNOSIS — G43.919 INTRACTABLE MIGRAINE WITHOUT STATUS MIGRAINOSUS, UNSPECIFIED MIGRAINE TYPE: ICD-10-CM

## 2024-03-30 NOTE — TELEPHONE ENCOUNTER
No care due was identified.  Health [facility] Embedded Care Due Messages. Reference number: [id number].   3/30/2024 3:17:38 AM CDT

## 2024-04-01 RX ORDER — NAPROXEN SODIUM 550 MG/1
TABLET ORAL
Qty: 30 TABLET | Refills: 3 | Status: SHIPPED | OUTPATIENT
Start: 2024-04-01 | End: 2024-06-03

## 2024-04-18 ENCOUNTER — OFFICE VISIT (OUTPATIENT)
Dept: FAMILY MEDICINE | Facility: CLINIC | Age: 44
End: 2024-04-18
Payer: COMMERCIAL

## 2024-04-18 VITALS
BODY MASS INDEX: 54.15 KG/M2 | WEIGHT: 286.81 LBS | HEIGHT: 61 IN | DIASTOLIC BLOOD PRESSURE: 80 MMHG | RESPIRATION RATE: 18 BRPM | HEART RATE: 100 BPM | TEMPERATURE: 98 F | OXYGEN SATURATION: 98 % | SYSTOLIC BLOOD PRESSURE: 100 MMHG

## 2024-04-18 DIAGNOSIS — F41.1 GAD (GENERALIZED ANXIETY DISORDER): ICD-10-CM

## 2024-04-18 DIAGNOSIS — F32.5 MAJOR DEPRESSIVE DISORDER WITH SINGLE EPISODE, IN FULL REMISSION: ICD-10-CM

## 2024-04-18 DIAGNOSIS — G43.919 INTRACTABLE MIGRAINE WITHOUT STATUS MIGRAINOSUS, UNSPECIFIED MIGRAINE TYPE: Primary | ICD-10-CM

## 2024-04-18 DIAGNOSIS — I10 ESSENTIAL HYPERTENSION: Chronic | ICD-10-CM

## 2024-04-18 PROCEDURE — 99999 PR PBB SHADOW E&M-EST. PATIENT-LVL IV: CPT | Mod: PBBFAC,,, | Performed by: FAMILY MEDICINE

## 2024-04-18 PROCEDURE — G2211 COMPLEX E/M VISIT ADD ON: HCPCS | Mod: S$GLB,,, | Performed by: FAMILY MEDICINE

## 2024-04-18 PROCEDURE — 1159F MED LIST DOCD IN RCRD: CPT | Mod: CPTII,S$GLB,, | Performed by: FAMILY MEDICINE

## 2024-04-18 PROCEDURE — 3079F DIAST BP 80-89 MM HG: CPT | Mod: CPTII,S$GLB,, | Performed by: FAMILY MEDICINE

## 2024-04-18 PROCEDURE — 99214 OFFICE O/P EST MOD 30 MIN: CPT | Mod: S$GLB,,, | Performed by: FAMILY MEDICINE

## 2024-04-18 PROCEDURE — 4010F ACE/ARB THERAPY RXD/TAKEN: CPT | Mod: CPTII,S$GLB,, | Performed by: FAMILY MEDICINE

## 2024-04-18 PROCEDURE — 3074F SYST BP LT 130 MM HG: CPT | Mod: CPTII,S$GLB,, | Performed by: FAMILY MEDICINE

## 2024-04-18 PROCEDURE — 3008F BODY MASS INDEX DOCD: CPT | Mod: CPTII,S$GLB,, | Performed by: FAMILY MEDICINE

## 2024-04-18 RX ORDER — HYDROCODONE BITARTRATE AND ACETAMINOPHEN 7.5; 325 MG/1; MG/1
1 TABLET ORAL EVERY 8 HOURS PRN
Qty: 12 TABLET | Refills: 0 | Status: CANCELLED | OUTPATIENT
Start: 2024-04-18

## 2024-04-18 RX ORDER — HYDROCODONE BITARTRATE AND ACETAMINOPHEN 7.5; 325 MG/1; MG/1
1 TABLET ORAL EVERY 8 HOURS PRN
Qty: 12 TABLET | Refills: 0 | Status: SHIPPED | OUTPATIENT
Start: 2024-04-18 | End: 2024-06-14 | Stop reason: ALTCHOICE

## 2024-04-18 RX ORDER — AMITRIPTYLINE HYDROCHLORIDE 10 MG/1
10 TABLET, FILM COATED ORAL NIGHTLY
Qty: 90 TABLET | Refills: 1 | Status: CANCELLED | OUTPATIENT
Start: 2024-04-18

## 2024-04-18 RX ORDER — AMITRIPTYLINE HYDROCHLORIDE 10 MG/1
10 TABLET, FILM COATED ORAL NIGHTLY
Qty: 90 TABLET | Refills: 1 | Status: SHIPPED | OUTPATIENT
Start: 2024-04-18

## 2024-04-18 NOTE — PROGRESS NOTES
CHIEF COMPLAINT:  This is a 43-year-old female here for follow-up migraine headaches.     SUBJECTIVE:  The patient reports escalation of migraine headaches.  She has been having migraines 2-3 times per week.  Migraines can last from 2-3 hours to 1-1/2 weeks.  Last migraine was 2 weeks ago.  The pain encompasses her entire head.  She has been on various medications both abortive and prophylactic with ineffectiveness.  Currently, Maxalt is not helping.  Fioricet caused worsening of headaches mostly from rebound effect.  Topamax was ineffective.      The patient has hypertension for which she takes hydrochlorothiazide 12.5 mg daily, losartan 100 mg daily and metoprolol XL 25 mg daily.  She takes nifedipine XL 60 mg intermittently.  Her blood pressure today is 100/80.   She's morbidly obese with a BMI of 54.19.  Patient's anxiety is controlled on Lexapro 20 mg daily.  She takes alprazolam 0.25 mg to 3 times daily as needed. She takes Wellbutrin  mg twice daily for major depression which is controlled.  She takes zolpidem as needed for sleeplessness.    ROS:  GENERAL: Patient denies fever, chills, night sweats. Patient denies weight gain or loss. Patient denies anorexia, weakness or swollen glands. Positive for fatigue and weight loss.  SKIN: Patient denies rash or hair loss.  HEENT: Patient denies sore throat, ear pain, hearing loss, nasal congestion, or runny nose. Patient denies visual disturbance, eye irritation or discharge.  LUNGS: Patient denies cough, wheeze or hemoptysis.  CARDIOVASCULAR: Patient denies chest pain, shortness of breath, palpitations, syncope or lower extremity edema.  GI: Patient denies abdominal pain, nausea, vomiting, diarrhea, constipation, blood in stool or melena.  GENITOURINARY: Patient denies pelvic pain, vaginal discharge, itch or odor.  Patient denies irregular vaginal bleeding. Patient denies dysuria, frequency, hematuria, nocturia, urgency or incontinence.  BREASTS: Patient  denies breast pain, mass or nipple discharge.  MUSCULOSKELETAL: Patient denies joint pain, swelling, redness or warmth.  NEUROLOGIC: Patient denies dizziness, numbness, tingling, weakness in limb, dysarthria, dysphagia or abnormality of gait.  Positive for headaches.    PSYCHIATRIC: Patient denies depression, or memory loss.  Positive for anxiety.     OBJECTIVE:   GENERAL: Well-developed well-nourished, morbidly obese, black female alert and oriented x3, in no acute distress. Memory, judgment and cognition without deficit.  Normal affect.  SKIN: Clear without rash. Normal color and tone.  HEENT: Eyes: Clear conjunctivae. No scleral icterus. Pupils equal reactive to light and accommodation.  Extraocular movements intact.  No nystagmus.  Fundi not visualized.  Ears: Clear TMs. Clear canals. Nose: Without congestion. Pharynx: Without injection or exudates.  NECK: Supple, normal range of motion. No masses, lymphadenopathy or enlarged thyroid. No JVD. Carotids 2+ and equal. No bruits.  LUNGS: Clear to auscultation. Normal respiratory effort.  CARDIOVASCULAR: Regular rhythm, normal S1, S2 without murmur, gallop or rub.  EXTREMITIES: Without cyanosis, clubbing or edema.  Distal pulses 2+ and equal. Normal range of motion in all extremities. No joint effusion, erythema or warmth.  NEUROLOGIC: Cranial nerves II through XII without deficit. Motor strength equal bilaterally. Sensation normal to touch. Deep tendon reflexes 2+ and equal. Gait without abnormality. No tremor. Negative cerebellar signs.  Negative Romberg.    ASSESSMENT:  1. Intractable migraine without status migrainosus, unspecified migraine type    2. PREMA (generalized anxiety disorder)    3. Major depressive disorder with single episode, in full remission    4. Essential hypertension      PLAN:  1. Trial of amitriptyline 10 mg at bedtime.  Increase dose as needed.    2.  Hydrocodone APAP 7.5-325 mg 1 tablet every 8 hours as needed for pain.  Dispense # 12.  No  refills.  3.  FMLA paperwork filled out for intermittent leave.  4.  Follow-up if no improvement worsening symptoms.      30 minutes of total time spent on the encounter, which includes face to face time and non-face to face time preparing to see the patient (eg, review of tests), Obtaining and/or reviewing separately obtained history, Documenting clinical information in the electronic or other health record, Independently interpreting results (not separately reported) and communicating results to the patient/family/caregiver, or Care coordination (not separately reported).     This note is generated with speech recognition software and is subject to transcription error and sound alike phrases that may be missed by proofreading.

## 2024-06-02 DIAGNOSIS — G43.919 INTRACTABLE MIGRAINE WITHOUT STATUS MIGRAINOSUS, UNSPECIFIED MIGRAINE TYPE: ICD-10-CM

## 2024-06-02 NOTE — TELEPHONE ENCOUNTER
No care due was identified.  [facility] Embedded Care Due Messages. Reference number: [id number].   6/02/2024 3:17:35 AM CDT

## 2024-06-03 RX ORDER — NAPROXEN SODIUM 550 MG/1
TABLET ORAL
Qty: 30 TABLET | Refills: 3 | Status: SHIPPED | OUTPATIENT
Start: 2024-06-03

## 2024-06-04 RX ORDER — ALPRAZOLAM 0.25 MG/1
TABLET ORAL
Qty: 90 TABLET | Refills: 0 | Status: SHIPPED | OUTPATIENT
Start: 2024-06-04

## 2024-06-04 NOTE — TELEPHONE ENCOUNTER
No care due was identified.  Health [facility] Embedded Care Due Messages. Reference number: [id number].   6/04/2024 12:17:16 PM CDT

## 2024-06-11 ENCOUNTER — OFFICE VISIT (OUTPATIENT)
Dept: INTERNAL MEDICINE | Facility: CLINIC | Age: 44
End: 2024-06-11
Payer: COMMERCIAL

## 2024-06-11 VITALS
OXYGEN SATURATION: 98 % | DIASTOLIC BLOOD PRESSURE: 82 MMHG | HEART RATE: 71 BPM | BODY MASS INDEX: 54.07 KG/M2 | HEIGHT: 61 IN | TEMPERATURE: 99 F | WEIGHT: 286.38 LBS | SYSTOLIC BLOOD PRESSURE: 127 MMHG

## 2024-06-11 DIAGNOSIS — F41.1 GAD (GENERALIZED ANXIETY DISORDER): ICD-10-CM

## 2024-06-11 DIAGNOSIS — E66.01 MORBID OBESITY WITH BMI OF 50.0-59.9, ADULT: ICD-10-CM

## 2024-06-11 DIAGNOSIS — I10 ESSENTIAL HYPERTENSION: Chronic | ICD-10-CM

## 2024-06-11 DIAGNOSIS — G43.719 INTRACTABLE CHRONIC MIGRAINE WITHOUT AURA AND WITHOUT STATUS MIGRAINOSUS: Primary | ICD-10-CM

## 2024-06-11 DIAGNOSIS — F32.5 MAJOR DEPRESSIVE DISORDER WITH SINGLE EPISODE, IN FULL REMISSION: ICD-10-CM

## 2024-06-11 PROBLEM — M25.561 CHRONIC PAIN OF BOTH KNEES: Status: RESOLVED | Noted: 2022-11-25 | Resolved: 2024-06-11

## 2024-06-11 PROBLEM — M25.562 CHRONIC PAIN OF BOTH KNEES: Status: RESOLVED | Noted: 2022-11-25 | Resolved: 2024-06-11

## 2024-06-11 PROBLEM — G89.29 CHRONIC PAIN OF BOTH KNEES: Status: RESOLVED | Noted: 2022-11-25 | Resolved: 2024-06-11

## 2024-06-11 LAB
ANION GAP SERPL CALC-SCNC: 11 MMOL/L (ref 8–16)
BUN SERPL-MCNC: 9 MG/DL (ref 6–20)
CALCIUM SERPL-MCNC: 9 MG/DL (ref 8.7–10.5)
CHLORIDE SERPL-SCNC: 106 MMOL/L (ref 95–110)
CO2 SERPL-SCNC: 25 MMOL/L (ref 23–29)
CREAT SERPL-MCNC: 1.1 MG/DL (ref 0.5–1.4)
EST. GFR  (NO RACE VARIABLE): >60 ML/MIN/1.73 M^2
GLUCOSE SERPL-MCNC: 81 MG/DL (ref 70–110)
POTASSIUM SERPL-SCNC: 3.8 MMOL/L (ref 3.5–5.1)
SODIUM SERPL-SCNC: 142 MMOL/L (ref 136–145)

## 2024-06-11 PROCEDURE — 3074F SYST BP LT 130 MM HG: CPT | Mod: CPTII,S$GLB,, | Performed by: FAMILY MEDICINE

## 2024-06-11 PROCEDURE — 80048 BASIC METABOLIC PNL TOTAL CA: CPT | Performed by: FAMILY MEDICINE

## 2024-06-11 PROCEDURE — 99214 OFFICE O/P EST MOD 30 MIN: CPT | Mod: S$GLB,,, | Performed by: FAMILY MEDICINE

## 2024-06-11 PROCEDURE — 1159F MED LIST DOCD IN RCRD: CPT | Mod: CPTII,S$GLB,, | Performed by: FAMILY MEDICINE

## 2024-06-11 PROCEDURE — 3079F DIAST BP 80-89 MM HG: CPT | Mod: CPTII,S$GLB,, | Performed by: FAMILY MEDICINE

## 2024-06-11 PROCEDURE — 85025 COMPLETE CBC W/AUTO DIFF WBC: CPT | Performed by: FAMILY MEDICINE

## 2024-06-11 PROCEDURE — 3008F BODY MASS INDEX DOCD: CPT | Mod: CPTII,S$GLB,, | Performed by: FAMILY MEDICINE

## 2024-06-11 PROCEDURE — 1160F RVW MEDS BY RX/DR IN RCRD: CPT | Mod: CPTII,S$GLB,, | Performed by: FAMILY MEDICINE

## 2024-06-11 PROCEDURE — 4010F ACE/ARB THERAPY RXD/TAKEN: CPT | Mod: CPTII,S$GLB,, | Performed by: FAMILY MEDICINE

## 2024-06-11 PROCEDURE — 99999 PR PBB SHADOW E&M-EST. PATIENT-LVL V: CPT | Mod: PBBFAC,,, | Performed by: FAMILY MEDICINE

## 2024-06-11 RX ORDER — RIMEGEPANT SULFATE 75 MG/75MG
75 TABLET, ORALLY DISINTEGRATING ORAL DAILY PRN
Qty: 12 TABLET | Refills: 1 | Status: SHIPPED | OUTPATIENT
Start: 2024-06-11

## 2024-06-11 NOTE — PROGRESS NOTES
"Subjective:       Patient ID: Georgia Garcia is a 44 y.o. female.    Chief Complaint: Establish Care (Pt present today to establish care)    44-year-old  female patient previously seen by Dr. Alex here to establish care  Patient with Patient Active Problem List:     Migraine headache     Insomnia     Essential hypertension     Osteoarthritis of both knees     PREMA (generalized anxiety disorder)     Major depressive disorder with single episode, in full remission     Left sided sciatica  Here reports that she has been having frequent migraine headaches for which patient has tried Maxalt, Fioricet and Topamax with no relief.   Patient was prescribed amitriptyline but has not been taking it regularly.   Reports having frequent migraine headaches having every day or every other day lately associated with nausea and vomiting  Denies any tingling or numbness sensation to extremities  Secondary to underlying anxiety and depression patient has been not taking Lexapro and Wellbutrin as prescribed daily and has been taking Xanax on a daily basis which helps with anxiety      Review of Systems   Constitutional:  Negative for fatigue.   Eyes:  Positive for visual disturbance.   Respiratory:  Negative for shortness of breath.    Cardiovascular:  Negative for chest pain and leg swelling.   Gastrointestinal:  Positive for nausea and vomiting. Negative for abdominal pain.   Musculoskeletal:  Negative for myalgias.   Skin:  Negative for rash.   Neurological:  Positive for headaches. Negative for weakness, light-headedness and numbness.   Psychiatric/Behavioral:  Positive for dysphoric mood and sleep disturbance. Negative for self-injury and suicidal ideas. The patient is nervous/anxious.          /82 (BP Location: Left arm, Patient Position: Sitting, BP Method: Large (Manual))   Pulse 71   Temp 98.8 °F (37.1 °C) (Tympanic)   Ht 5' 1" (1.549 m)   Wt 129.9 kg (286 lb 6 oz)   LMP 09/09/2014   SpO2 98%   " BMI 54.11 kg/m²   Objective:      Physical Exam  Constitutional:       Appearance: She is well-developed.   HENT:      Head: Normocephalic and atraumatic.   Cardiovascular:      Rate and Rhythm: Normal rate and regular rhythm.      Heart sounds: Normal heart sounds. No murmur heard.  Pulmonary:      Effort: Pulmonary effort is normal.      Breath sounds: Normal breath sounds. No wheezing.   Abdominal:      General: Bowel sounds are normal.      Palpations: Abdomen is soft.      Tenderness: There is no abdominal tenderness.   Skin:     General: Skin is warm and dry.      Findings: No rash.   Neurological:      General: No focal deficit present.      Mental Status: She is alert and oriented to person, place, and time.   Psychiatric:         Mood and Affect: Mood normal.           Assessment/Plan:   1. Intractable chronic migraine without aura and without status migrainosus  -     rimegepant (NURTEC) 75 mg odt; Take 1 tablet (75 mg total) by mouth daily as needed for Migraine. Place ODT tablet on the tongue; alternatively the ODT tablet may be placed under the tongue  Dispense: 12 tablet; Refill: 1  -     Ambulatory referral/consult to Neurology; Future; Expected date: 06/18/2024  -     CBC Auto Differential; Future; Expected date: 06/11/2024  -     Basic Metabolic Panel; Future; Expected date: 06/11/2024  Will plan to check labs and will do a trial of Nurtec to be taken as needed for migraine headache and patient to take Elavil for prevention  Will refer to Neurology secondary to worsening migraine headaches with no response to Maxalt, Fioricet and Topamax in the past    2. Essential hypertension  Blood pressure is stable currently on hydrochlorothiazide 12.5 mg losartan 100 mg metoprolol 50 mg and Procardia 60 mg daily    3. PREMA (generalized anxiety disorder)  4. Major depressive disorder with single episode, in full remission  Patient has not been taking Wellbutrin 100 mg twice daily and Lexapro 20 mg daily as  prescribed in the past  Advised to get back on Wellbutrin 100 mg twice daily and Xanax as as needed medication and not a daily medication and was educated about taking Xanax only when having a bad panic attack  If continues to have worsening anxiety and depression with taking Wellbutrin 100 mg twice daily will consider adding back Lexapro  Patient verbalized understanding    5. Morbid obesity with BMI of 50.0-59.9, adult  Strict lifestyle changes recommended with diet and exercise to lose weight with BMI 54

## 2024-06-12 LAB
BASOPHILS # BLD AUTO: 0.04 K/UL (ref 0–0.2)
BASOPHILS NFR BLD: 0.6 % (ref 0–1.9)
DIFFERENTIAL METHOD BLD: ABNORMAL
EOSINOPHIL # BLD AUTO: 0.2 K/UL (ref 0–0.5)
EOSINOPHIL NFR BLD: 2.1 % (ref 0–8)
ERYTHROCYTE [DISTWIDTH] IN BLOOD BY AUTOMATED COUNT: 14.4 % (ref 11.5–14.5)
HCT VFR BLD AUTO: 36.1 % (ref 37–48.5)
HGB BLD-MCNC: 11.6 G/DL (ref 12–16)
IMM GRANULOCYTES # BLD AUTO: 0.02 K/UL (ref 0–0.04)
IMM GRANULOCYTES NFR BLD AUTO: 0.3 % (ref 0–0.5)
LYMPHOCYTES # BLD AUTO: 3.2 K/UL (ref 1–4.8)
LYMPHOCYTES NFR BLD: 43.8 % (ref 18–48)
MCH RBC QN AUTO: 30.6 PG (ref 27–31)
MCHC RBC AUTO-ENTMCNC: 32.1 G/DL (ref 32–36)
MCV RBC AUTO: 95 FL (ref 82–98)
MONOCYTES # BLD AUTO: 0.7 K/UL (ref 0.3–1)
MONOCYTES NFR BLD: 9.1 % (ref 4–15)
NEUTROPHILS # BLD AUTO: 3.2 K/UL (ref 1.8–7.7)
NEUTROPHILS NFR BLD: 44.1 % (ref 38–73)
NRBC BLD-RTO: 0 /100 WBC
PLATELET # BLD AUTO: 318 K/UL (ref 150–450)
PMV BLD AUTO: 10.2 FL (ref 9.2–12.9)
RBC # BLD AUTO: 3.79 M/UL (ref 4–5.4)
WBC # BLD AUTO: 7.23 K/UL (ref 3.9–12.7)

## 2024-06-12 NOTE — PROGRESS NOTES
"Subjective:      Patient ID: Georgia Garcia is a 44 y.o. female.    Chief Complaint:  "Headaches"    History of Present Illness  HPI 44 years old AA Female with PMHx of  HTN / Oa's / Migraines HA's and others Medical issues   came for the evaluation and recommendation of "Headaches"    Patient referred by PCP. Patient saw a neurologist about 9 years ago, but has not seen one since.     Started: 10 years ago, worse in the last few years  Describes: Pounding, some slowly build up but some can come on all at once / The headaches are progressively getting worse and interfering with daily activity.  Timing: Duration of 2 weeks for migraine type headaches / Duration of 10 hours for regular type headaches / wakes up with migraines / migraines can wake her up from sleep  Frequency: Daily  Pain: Regular headaches: 7-8/10 / Migraines: 10/10  Location: Bilateral temporal, parietal, and occipital regions  Family: No known family history of migraines or headaches in the family.   Medications: Norco / Elavil / Wellbutrin / Gabapentin / Nutrec /   Worsen: Stress / The patient cannot think of food that aggravates the headache.     Alleviated: Sleeping / dark and quiet room /   Associated symptoms: Nausea / Vomiting / Trouble keeping up with tasks due to pain / light and sound sensitivity / dizziness (Light-headed) / blurry vision / double vision / bilateral floaters / Neck muscular pain with radiation to scapular area-is stress related per patient    No tinnitus / not worse with postural changes or valsalva / no scalp sensitivity / numbness/tingling to extremities / bilateral or focal weakness / no trouble with balance      Triggers: Cigarette smoke / strong perfume smells / bright lights / eating citrus   Prodrome symptoms: patient reports she does have an auora of a small feeling prior to migraine onset. She expresses that she can't describe her auora.     Patient reports that she has previously sought ER evaluation for Headache " management who treated her with Naproxen. She felt like it was a waste of time and the medication was not effective for her.     Patient reports compliance with blood pressure medication and reports at home blood pressures run around 122/84.     Last eye clinic appointment was April 2024, no abnormalities were noted per patient. Her prescription has not changed within 3 years.        No TMJ problems. No history of head trauma. No history of seizures. No history of smoking. No caffeine overuse. No vertigo. No blacking out. No fever, chills, or rigors. No history of significant memory loss. No history of strokes. No falls.     Patient reports PCP recently prescribed Nutrec, but has not taken it yet due to PA being processed.     Abortive therapies (tried and failed):   -Maxalt- not effective - SE of dizziness / nausea / feeling like she was going to pass out /      Preventative therapies (tried and failed):     -Topamax- not effective (took for about 8-9 years)      Review of Systems  Review of Systems   Eyes:  Positive for photophobia and visual disturbance.   Gastrointestinal:  Positive for nausea and vomiting.   Musculoskeletal:  Positive for myalgias and neck pain.   Neurological:  Positive for dizziness, light-headedness and headaches.     Objective:     Neurologic Exam     Mental Status   Oriented to person, place, and time.   Oriented to person.   Oriented to place. Oriented to country, city and area.   Oriented to time. Oriented to year, month, date, day and season.   Registration: recalls 3 of 3 objects. Recall at 5 minutes: recalls 3 of 3 objects. Follows 3 step commands.   Attention: normal. Concentration: normal.   Speech: speech is normal   Level of consciousness: alert  Knowledge: good. Able to perform simple calculations.   Able to name object. Able to read. Able to repeat. Able to write. Normal comprehension.     Cranial Nerves     CN II   Visual fields full to confrontation.   Visual acuity:  normal  Right visual field deficit: none  Left visual field deficit: none     CN III, IV, VI   Pupils are equal, round, and reactive to light.  Extraocular motions are normal.   Right pupil: Size: 2 mm. Shape: regular. Reactivity: brisk. Consensual response: intact. Accommodation: intact.   Left pupil: Size: 2 mm. Shape: regular. Reactivity: brisk. Consensual response: intact. Accommodation: intact.   CN III: no CN III palsy  CN VI: no CN VI palsy  Nystagmus: none   Diplopia: none  Ophthalmoparesis: none  Upgaze: normal  Downgaze: normal  Conjugate gaze: present  Vestibulo-ocular reflex: present    CN V   Facial sensation intact.   Right facial sensation deficit: none  Left facial sensation deficit: none    CN VII   Facial expression full, symmetric.   Right facial weakness: none  Left facial weakness: none    CN VIII   CN VIII normal.   Hearing: intact  Right Rinne: AC > BC  Left Rinne: AC > BC  Kim: does not lateralize     CN IX, X   CN IX normal.   CN X normal.   Palate: symmetric    CN XI   CN XI normal.   Right sternocleidomastoid strength: normal  Left sternocleidomastoid strength: normal  Right trapezius strength: normal  Left trapezius strength: normal    CN XII   CN XII normal.   Tongue: not atrophic  Fasciculations: absent  Tongue deviation: none    Motor Exam   Muscle bulk: normal  Overall muscle tone: normal  Right arm pronator drift: absent  Left arm pronator drift: absent    Strength   Strength 5/5 throughout.   Right neck flexion: 5/5  Left neck flexion: 5/5  Right neck extension: 5/5  Left neck extension: 5/5  Right deltoid: 5/5  Left deltoid: 5/5  Right biceps: 5/5  Left biceps: 5/5  Right triceps: 5/5  Left triceps: 5/5  Right wrist flexion: 5/5  Left wrist flexion: 5/5  Right wrist extension: 5/5  Left wrist extension: 5/5  Right interossei: 5/5  Left interossei: 5/5  Right abdominals: 5/5  Left abdominals: 5/5  Right iliopsoas: 5/5  Left iliopsoas: 5/5  Right quadriceps: 5/5  Left quadriceps:  5/5  Right hamstrin/5  Left hamstrin/5  Right glutei: 5/5  Left glutei: 5/5  Right anterior tibial: 5/5  Left anterior tibial: 5/5  Right posterior tibial: 5/5  Left posterior tibial: 5/5  Right peroneal: 5/5  Left peroneal: 5/5  Right gastroc: 5/5  Left gastroc: 5/5    Sensory Exam   Light touch normal.   Vibration normal.   Proprioception normal.   Pinprick normal.   Graphesthesia: normal  Stereognosis: normal        Tenderness noted to bilateral temporal / parietal / occipital areas and neck during palpation.      Gait, Coordination, and Reflexes     Gait  Gait: normal    Coordination   Romberg: negative  Finger to nose coordination: normal  Heel to shin coordination: normal  Tandem walking coordination: normal    Tremor   Resting tremor: absent  Intention tremor: absent  Action tremor: absent    Reflexes   Reflexes 2+ except as noted.   Right brachioradialis: 2+  Left brachioradialis: 2+  Right biceps: 2+  Left biceps: 2+  Right triceps: 2+  Left triceps: 2+  Right patellar: 2+  Left patellar: 2+  Right achilles: 2+  Left achilles: 2+  Right : 2+  Left : 2+  Right plantar: normal  Left plantar: normal  Right Rowe: absent  Left Rowe: absent  Right ankle clonus: absent  Left ankle clonus: absent  Right pendular knee jerk: absent  Left pendular knee jerk: absent      Physical Exam  Vitals and nursing note reviewed.   Constitutional:       General: She is awake. She is not in acute distress.     Appearance: She is obese. She is not ill-appearing, toxic-appearing or diaphoretic.   HENT:      Head: Normocephalic and atraumatic.      Jaw: There is normal jaw occlusion. No tenderness, swelling or pain on movement.      Right Ear: Hearing, tympanic membrane, ear canal and external ear normal. No decreased hearing noted.      Left Ear: Hearing, tympanic membrane, ear canal and external ear normal. No decreased hearing noted.      Ears:      Kim exam findings: Does not lateralize.     Right Rinne:  AC > BC.     Left Rinne: AC > BC.     Nose: Nose normal. No congestion or rhinorrhea.      Mouth/Throat:      Lips: Pink. No lesions.      Mouth: Mucous membranes are moist.      Tongue: No lesions. Tongue does not deviate from midline.      Pharynx: Oropharynx is clear. Uvula midline. No oropharyngeal exudate or posterior oropharyngeal erythema.      Tonsils: No tonsillar exudate or tonsillar abscesses.   Eyes:      General: Lids are normal. Vision grossly intact. Gaze aligned appropriately.      Extraocular Movements: Extraocular movements intact and EOM normal.      Right eye: No nystagmus.      Left eye: No nystagmus.      Conjunctiva/sclera: Conjunctivae normal.      Pupils: Pupils are equal, round, and reactive to light.      Visual Fields: Right eye visual fields normal and left eye visual fields normal.   Neck:      Thyroid: No thyroid mass, thyromegaly or thyroid tenderness.      Trachea: Trachea and phonation normal.      Meningeal: Brudzinski's sign and Kernig's sign absent.   Cardiovascular:      Rate and Rhythm: Normal rate and regular rhythm.      Pulses: Normal pulses.           Carotid pulses are 2+ on the right side and 2+ on the left side.       Radial pulses are 2+ on the right side and 2+ on the left side.        Femoral pulses are 2+ on the right side and 2+ on the left side.       Popliteal pulses are 2+ on the right side and 2+ on the left side.        Dorsalis pedis pulses are 2+ on the right side and 2+ on the left side.        Posterior tibial pulses are 2+ on the right side and 2+ on the left side.      Heart sounds: Normal heart sounds, S1 normal and S2 normal. No murmur heard.  Pulmonary:      Effort: Pulmonary effort is normal. No respiratory distress.      Breath sounds: Normal breath sounds.   Chest:      Chest wall: No swelling or tenderness.   Abdominal:      General: Abdomen is flat. Bowel sounds are normal. There is no distension.      Palpations: Abdomen is soft.      Tenderness:  There is no abdominal tenderness.   Musculoskeletal:         General: No swelling, tenderness, deformity or signs of injury. Normal range of motion.      Right shoulder: Normal.      Left shoulder: Normal.      Right upper arm: Normal.      Left upper arm: Normal.      Right elbow: Normal.      Left elbow: Normal.      Right forearm: Normal.      Left forearm: Normal.      Right wrist: Normal.      Left wrist: Normal.      Right hand: Normal.      Left hand: Normal.      Cervical back: Full passive range of motion without pain, normal range of motion and neck supple. No rigidity or tenderness. No pain with movement, spinous process tenderness or muscular tenderness.      Right lower leg: No edema.      Left lower leg: No edema.   Feet:      Right foot:      Skin integrity: Skin integrity normal.      Left foot:      Skin integrity: Skin integrity normal.   Lymphadenopathy:      Cervical: No cervical adenopathy.   Skin:     General: Skin is warm and dry.      Capillary Refill: Capillary refill takes less than 2 seconds.      Coloration: Skin is not jaundiced or pale.      Findings: No bruising, erythema, lesion or rash.   Neurological:      General: No focal deficit present.      Mental Status: She is alert and oriented to person, place, and time.      Motor: Motor strength is normal.     Coordination: Finger-Nose-Finger Test, Heel to Shin Test and Romberg Test normal.      Gait: Gait is intact. Tandem walk normal.      Deep Tendon Reflexes:      Reflex Scores:       Tricep reflexes are 2+ on the right side and 2+ on the left side.       Bicep reflexes are 2+ on the right side and 2+ on the left side.       Brachioradialis reflexes are 2+ on the right side and 2+ on the left side.       Patellar reflexes are 2+ on the right side and 2+ on the left side.       Achilles reflexes are 2+ on the right side and 2+ on the left side.  Psychiatric:         Attention and Perception: Attention and perception normal.          "Mood and Affect: Mood and affect normal.         Speech: Speech normal.         Behavior: Behavior normal. Behavior is cooperative.         Thought Content: Thought content normal.         Cognition and Memory: Cognition and memory normal.         Judgment: Judgment normal.          Assessment:   44 Years old AA Female with PMHX as above came of the evaluation of "headaches"    Chronic migraine with aura without status migrainosus, not intractable     Chronic tension-type headache, not intractable     Nausea and vomiting, unspecified vomiting type     Dizziness and giddiness     HTN    Plan:   Patient Neurological Assessment is remarkable for tenderness noted to bilateral temporal / parietal / occipital areas and neck during palpation.   Patient's symptoms only occur with Migraines. No neurological deficits noted.      -Discontinue Norco 7.5-325 Q8H PRN. Patient was taking a temporary supply for migraine related pain. She reported side effects of excessive fatigue and dizziness.   She is out of the medication. Instructed patient to discontinue and the need for her to be on preventative migraine therapy. She verbalized understanding.     -Discontinue Metoprolol Succinate/Toprol-XL 50 mg PO Daily due to starting another BB for Migraine Prevention therapy.   Patient is instructed to contact PCP for approval with this medication change. She verbalizes understanding.     -Start Inderal-LA 60 mg PO Daily for migraine prevention therapy. Side effects discussed. Pamphlet given.   Patient is instructed to check blood pressure and heart rate regularly and to bring a log in to her neck visit.   She verbalized understanding. No history of Asthma.     -Continue working with PCP to get Nurtec PA approved.     -Offered pharmacological therapy for N/V associated with Migraines- Patient declines- has Zofran at home.     -Order Brain MRI Without Contrast to assess for structural abnormalities associated with migraines.     -Order ESR " / ROSE / RPR for neurological evaluation of Migraines.     -Continue following ophthalmology routinely.     Labs: CBC / CMP / TSH / Hgb A 1 C / HIV / Lipids panel / Vit D / B12 / - 2021 / 2024 - non significant abnormalities.     Personally reviewed MRI Lumbar spine - 2022 - DGD's changes / non significant stenosis.     Please do not hesitate to contact me with any updates, questions or concerns.    Jose Roberto Gamble MD.  General Neurologist.

## 2024-06-14 ENCOUNTER — OFFICE VISIT (OUTPATIENT)
Dept: NEUROLOGY | Facility: CLINIC | Age: 44
End: 2024-06-14
Payer: COMMERCIAL

## 2024-06-14 ENCOUNTER — LAB VISIT (OUTPATIENT)
Dept: LAB | Facility: HOSPITAL | Age: 44
End: 2024-06-14
Payer: COMMERCIAL

## 2024-06-14 VITALS
DIASTOLIC BLOOD PRESSURE: 100 MMHG | BODY MASS INDEX: 53.9 KG/M2 | SYSTOLIC BLOOD PRESSURE: 154 MMHG | WEIGHT: 285.5 LBS | HEIGHT: 61 IN | HEART RATE: 72 BPM | OXYGEN SATURATION: 99 % | RESPIRATION RATE: 16 BRPM

## 2024-06-14 DIAGNOSIS — R11.2 NAUSEA AND VOMITING, UNSPECIFIED VOMITING TYPE: ICD-10-CM

## 2024-06-14 DIAGNOSIS — R42 DIZZINESS AND GIDDINESS: ICD-10-CM

## 2024-06-14 DIAGNOSIS — G44.229 CHRONIC TENSION-TYPE HEADACHE, NOT INTRACTABLE: ICD-10-CM

## 2024-06-14 DIAGNOSIS — I10 HYPERTENSION, UNSPECIFIED TYPE: ICD-10-CM

## 2024-06-14 DIAGNOSIS — G43.E09 CHRONIC MIGRAINE WITH AURA WITHOUT STATUS MIGRAINOSUS, NOT INTRACTABLE: Primary | ICD-10-CM

## 2024-06-14 DIAGNOSIS — G43.E09 CHRONIC MIGRAINE WITH AURA WITHOUT STATUS MIGRAINOSUS, NOT INTRACTABLE: ICD-10-CM

## 2024-06-14 LAB
ERYTHROCYTE [SEDIMENTATION RATE] IN BLOOD BY PHOTOMETRIC METHOD: 31 MM/HR (ref 0–36)
TREPONEMA PALLIDUM IGG+IGM AB [PRESENCE] IN SERUM OR PLASMA BY IMMUNOASSAY: NONREACTIVE

## 2024-06-14 PROCEDURE — 1159F MED LIST DOCD IN RCRD: CPT | Mod: CPTII,S$GLB,, | Performed by: PSYCHIATRY & NEUROLOGY

## 2024-06-14 PROCEDURE — 4010F ACE/ARB THERAPY RXD/TAKEN: CPT | Mod: CPTII,S$GLB,, | Performed by: PSYCHIATRY & NEUROLOGY

## 2024-06-14 PROCEDURE — 3077F SYST BP >= 140 MM HG: CPT | Mod: CPTII,S$GLB,, | Performed by: PSYCHIATRY & NEUROLOGY

## 2024-06-14 PROCEDURE — 3008F BODY MASS INDEX DOCD: CPT | Mod: CPTII,S$GLB,, | Performed by: PSYCHIATRY & NEUROLOGY

## 2024-06-14 PROCEDURE — 86038 ANTINUCLEAR ANTIBODIES: CPT

## 2024-06-14 PROCEDURE — 99205 OFFICE O/P NEW HI 60 MIN: CPT | Mod: S$GLB,,, | Performed by: PSYCHIATRY & NEUROLOGY

## 2024-06-14 PROCEDURE — 36415 COLL VENOUS BLD VENIPUNCTURE: CPT

## 2024-06-14 PROCEDURE — 3080F DIAST BP >= 90 MM HG: CPT | Mod: CPTII,S$GLB,, | Performed by: PSYCHIATRY & NEUROLOGY

## 2024-06-14 PROCEDURE — 1160F RVW MEDS BY RX/DR IN RCRD: CPT | Mod: CPTII,S$GLB,, | Performed by: PSYCHIATRY & NEUROLOGY

## 2024-06-14 PROCEDURE — 85652 RBC SED RATE AUTOMATED: CPT

## 2024-06-14 PROCEDURE — 86593 SYPHILIS TEST NON-TREP QUANT: CPT

## 2024-06-14 PROCEDURE — 99999 PR PBB SHADOW E&M-EST. PATIENT-LVL V: CPT | Mod: PBBFAC,,, | Performed by: PSYCHIATRY & NEUROLOGY

## 2024-06-14 RX ORDER — PROPRANOLOL HYDROCHLORIDE 60 MG/1
60 CAPSULE, EXTENDED RELEASE ORAL DAILY
Qty: 30 CAPSULE | Refills: 0 | Status: SHIPPED | OUTPATIENT
Start: 2024-06-14 | End: 2024-07-14

## 2024-06-17 LAB — ANA SER QL IF: NORMAL

## 2024-06-26 ENCOUNTER — HOSPITAL ENCOUNTER (OUTPATIENT)
Dept: RADIOLOGY | Facility: HOSPITAL | Age: 44
Discharge: HOME | End: 2024-06-26
Payer: COMMERCIAL

## 2024-06-26 DIAGNOSIS — G43.E09 CHRONIC MIGRAINE WITH AURA WITHOUT STATUS MIGRAINOSUS, NOT INTRACTABLE: ICD-10-CM

## 2024-06-26 DIAGNOSIS — R42 DIZZINESS AND GIDDINESS: ICD-10-CM

## 2024-06-26 PROCEDURE — 70551 MRI BRAIN STEM W/O DYE: CPT | Mod: 26,,, | Performed by: RADIOLOGY

## 2024-06-26 PROCEDURE — 70551 MRI BRAIN STEM W/O DYE: CPT | Mod: TC

## 2024-07-10 NOTE — PROGRESS NOTES
"Subjective:       Patient ID: Georgia Garcia is a 44 y.o. female.    PMH: Sciatica / Depression / PREMA / OA / HTN / Insomnia / Migraines / and other medical conditions.     Chief Complaint: "Headaches"     HPI    The patient presented on 06/2024 for evaluation of "Headaches". Patient presents today via virtual visit for a follow-up evaluation and recommendation of "Headaches".     Patient is new to me, but known to Dr. Gamble.       The originating site (patient location) is: Home.      The distant site (neurologist location) is: Neurology Clinic at [facility].      The chief complaint leading to consultation is: "Headaches"       Visit type: Virtual visit with synchronous audio and video.      Consent: The patient verbally consented to participating in the video visit and informed that may decline to receive medical services by telemedicine and may withdraw from such care at any time.      I discussed with the patient the nature of our telemedicine visits, that:      I  would evaluate the patient and recommend diagnostics and treatments based on my assessment.    Our sessions are not being recorded and that personal health information is protected.    Our team would provide follow up care in person if/when the patient needs it.    Virtual (video/telemedicine) visits have significant limitations. A telemedicine exam is primarily focused on the history and what I can observe. Several critical parts of the neurological exam cannot be performed.         Interval History: (06/14/2024) - Norco 7.5-325 Q8H PRN and Metoprolol Succinate/Toprol-XL 50 mg PO Daily Discontinued. Patient started on Inderal-LA 60 mg PO Daily. Continue Nurtec. Diagnostic evaluation included Brain MRI / ESR / ROSE / RPR.     Started: 10 years ago, worse in the last few years  Describes: Pounding, some slowly build up but some can come on all at once / The headaches are progressively getting worse and interfering with daily " activity.  Timing: Duration of 2 weeks for migraine type headaches / Duration of 10 hours for regular type headaches / wakes up with migraines / migraines can wake her up from sleep  Frequency: Daily  Pain: Regular headaches: 7-8/10 / Migraines: 10/10  Location: Bilateral temporal, parietal, and occipital regions  Family: No known family history of migraines or headaches in the family.   Medications: Norco / Elavil / Wellbutrin / Gabapentin / Nutrec / Patient reports PCP recently prescribed Nutrec, but has not taken it yet due to PA being processed.   Worsen: Stress / The patient cannot think of food that aggravates the headache.      Alleviated: Sleeping / dark and quiet room /   Associated symptoms: Nausea / Vomiting / Trouble keeping up with tasks due to pain / light and sound sensitivity / dizziness (Light-headed) / blurry vision / double vision / bilateral floaters / Neck muscular pain with radiation to scapular area-is stress related per patient     No tinnitus / not worse with postural changes or valsalva / no scalp sensitivity / numbness/tingling to extremities / bilateral or focal weakness / no trouble with balance        Triggers: Cigarette smoke / strong perfume smells / bright lights / eating citrus   Prodrome symptoms: patient reports she does have an auora of a small feeling prior to migraine onset. She expresses that she can't describe her auora.      Patient reports that she has previously sought ER evaluation for Headache management who treated her with Naproxen. She felt like it was a waste of time and the medication was not effective for her.      Patient reports compliance with blood pressure medication and reports at home blood pressures run around 122/84.      No TMJ problems. No history of head trauma. No history of seizures. No history of smoking. No caffeine overuse. No vertigo. No blacking out. No fever, chills, or rigors. No history of significant memory loss. No history of strokes. No  "falls.           New Issues: (07/12/2024) -     Patient reports that headaches are still severe, debilitating, and interfering with daily functioning. Patient states, "I am unable to get out of bed most mornings due to severe Migraine attacks". Patient reports that since starting Inderal-LA she has been able to get out of bed at least 1 day per week.     Medications:     -Inderal-LA 60 mg PO Daily - tolerating well without side effects. Patient reports taking it as prescribed. Patient reports at home blood pressure runs 120's/80's. No orthostatic hypotension.     -Nurtec 75 mg PO Daily PRN Migraine- Patient reports that she never obtained Nurtec due to pending PA process. Will have the nursing staff call the pharmacy to receive an update on this process to see how we can assist.     Anxiety:    Patient reports chronic Anxiety / Depression. Patient reports that her anxiety and stress have slightly worsened since last visit due to unrelieved Migraines. Patient expresses that she is currently being seen by psychiatrist for Anxiety management, but has not noticed much improvement. Patient reports that she stopped taking Elavil 10 mg tab QHS x 1 month ago due to the initiation of Xanax - 06/2024. Patient denies mediation side effects. Patient expresses that she is interested in additional pharmacological therapy for Anxiety management. Denies SI.       Headaches:    -Headaches have not improved since last visit.   -Location is still Bilateral temporal, parietal, and occipital regions.   -Positive Headache auras - Patient unable to describe feeling.   -Frequency of tension type is daily and migraine type is daily.  -Duration of tension type is 10 hours and migraine type is 2 weeks   -Still "Pounding" in nature  -Pain intensity is decreased from 15/10 to 10/10 per patient.   -Worsened by Stress.   -Triggered by Cigarette smoke / strong perfume smells / bright lights / eating citrus  -Alleviated with sleeping / dark and " quiet room   -Associated symptoms include Nausea / Vomiting / Trouble keeping up with tasks due to pain / light and sound sensitivity / dizziness (Light-headed) / blurry vision / double vision / bilateral floaters / Neck muscular pain with radiation to scapular area-is stress related per patient / tinnitus to bilateral ears / feeling off-balance  -Patient does report changes in vision. Last eye clinic appointment was April 2024, no abnormalities were noted per patient. Her prescription has not changed within 3 years.     -No falls     Abortive therapies (tried and failed): Nurtec - Current  -Maxalt- not effective - SE of dizziness / nausea / feeling like she was going to pass out /      Preventative therapies (tried and failed): Inderal-LA -Current  -Topamax- not effective (took for about 8-9 years)          Review of Systems   Constitutional:  Negative for activity change, appetite change, chills, diaphoresis, fatigue, fever and unexpected weight change.   HENT:  Positive for tinnitus. Negative for congestion, dental problem, drooling, ear discharge, ear pain, facial swelling, hearing loss, mouth sores, nosebleeds, postnasal drip, rhinorrhea, sinus pressure, sinus pain, sneezing, sore throat, trouble swallowing and voice change.    Eyes:  Positive for photophobia and visual disturbance. Negative for pain, discharge, redness and itching.   Respiratory:  Negative for cough, chest tightness, shortness of breath and wheezing.    Cardiovascular:  Negative for chest pain, palpitations and leg swelling.   Gastrointestinal:  Positive for nausea and vomiting. Negative for abdominal distention, abdominal pain, blood in stool, constipation and diarrhea.   Endocrine: Negative for cold intolerance, heat intolerance, polydipsia, polyphagia and polyuria.   Genitourinary:  Negative for decreased urine volume, difficulty urinating, dysuria, flank pain, frequency, hematuria, pelvic pain, urgency and vaginal discharge.    Musculoskeletal:  Positive for myalgias and neck pain. Negative for arthralgias, back pain, gait problem, joint swelling and neck stiffness.   Skin:  Negative for color change and rash.   Allergic/Immunologic: Negative for immunocompromised state.   Neurological:  Positive for dizziness, light-headedness and headaches. Negative for tremors, seizures, syncope, facial asymmetry, speech difficulty, weakness and numbness.        Patient reports phonophobia and balance issues associated with migraines.    Hematological:  Negative for adenopathy. Does not bruise/bleed easily.   Psychiatric/Behavioral:  Negative for agitation, behavioral problems, confusion, decreased concentration, dysphoric mood, hallucinations, self-injury, sleep disturbance and suicidal ideas. The patient is not nervous/anxious and is not hyperactive.    All other systems reviewed and are negative.                Current Outpatient Medications:     ALPRAZolam (XANAX) 0.25 MG tablet, TAKE 1 TO 2 TABLETS BY MOUTH THREE TIMES DAILY AS NEEDED FOR ANXIETY, Disp: 90 tablet, Rfl: 0    amitriptyline (ELAVIL) 10 MG tablet, Take 1 tablet (10 mg total) by mouth every evening., Disp: 90 tablet, Rfl: 1    azelastine (ASTELIN) 137 mcg (0.1 %) nasal spray, SMARTSIG:Both Nares, Disp: , Rfl:     buPROPion (WELLBUTRIN) 100 MG tablet, Take 1 tablet (100 mg total) by mouth 2 (two) times daily., Disp: 180 tablet, Rfl: 3    estradioL (ESTRACE) 1 MG tablet, Take 1 tablet (1 mg total) by mouth once daily., Disp: 90 tablet, Rfl: 3    gabapentin (NEURONTIN) 100 MG capsule, Take 1 capsule (100 mg total) by mouth 3 (three) times daily., Disp: 90 capsule, Rfl: 5    hydroCHLOROthiazide (HYDRODIURIL) 12.5 MG Tab, Take 1 tablet (12.5 mg total) by mouth once daily., Disp: 90 tablet, Rfl: 3    losartan (COZAAR) 100 MG tablet, Take 1 tablet (100 mg total) by mouth once daily., Disp: 90 tablet, Rfl: 3    meclizine (ANTIVERT) 25 mg tablet, Take 1-2 tablets (25-50 mg total) by mouth  every 4 to 6 hours as needed for Dizziness., Disp: 30 tablet, Rfl: 1    methocarbamoL (ROBAXIN) 500 MG Tab, Take 500 mg by mouth 4 (four) times daily., Disp: , Rfl:     naproxen sodium (ANAPROX) 550 MG tablet, TAKE 1 TABLET(550 MG) BY MOUTH TWICE DAILY AS NEEDED FOR PAIN, Disp: 30 tablet, Rfl: 3    NIFEdipine (PROCARDIA-XL) 60 MG (OSM) 24 hr tablet, Take 1 tablet (60 mg total) by mouth once daily., Disp: 90 tablet, Rfl: 3    propranoloL (INDERAL LA) 60 MG 24 hr capsule, Take 1 capsule (60 mg total) by mouth once daily., Disp: 30 capsule, Rfl: 0    rimegepant (NURTEC) 75 mg odt, Take 1 tablet (75 mg total) by mouth daily as needed for Migraine. Place ODT tablet on the tongue; alternatively the ODT tablet may be placed under the tongue, Disp: 12 tablet, Rfl: 1    Past Medical History:   Diagnosis Date    Anemia     Cervical radiculopathy     COVID-19 virus infection 2021    Diverticulosis     Hypermenorrhea     Hypertension     Insomnia     Internal hemorrhoid     Migraine headache     Morbid obesity     Rectal bleeding     Uterine fibroid        Past Surgical History:   Procedure Laterality Date     SECTION, LOW TRANSVERSE      x1    COLONOSCOPY N/A 2022    Procedure: COLONOSCOPY;  Surgeon: Maximus Lopes MD;  Location: [facility];  Service: Endoscopy;  Laterality: N/A;    TOTAL ABDOMINAL HYSTERECTOMY  14    TUBAL LIGATION         Social History     Socioeconomic History    Marital status: Single   Occupational History     Employer: [employer]   Tobacco Use    Smoking status: Never    Smokeless tobacco: Never   Substance and Sexual Activity    Alcohol use: No    Drug use: No    Sexual activity: Yes     Partners: Male     Birth control/protection: Surgical     Comment: hyst; mut monog   Social History Narrative    She is , has one daughter and 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              Social Determinants of Health      Financial Resource Strain: Medium Risk (4/17/2024)    Overall Financial Resource Strain (CARDIA)     Difficulty of Paying Living Expenses: Somewhat hard   Food Insecurity: No Food Insecurity (4/17/2024)    Hunger Vital Sign     Worried About Running Out of Food in the Last Year: Never true     Ran Out of Food in the Last Year: Never true   Transportation Needs: No Transportation Needs (4/17/2024)    PRAPARE - Transportation     Lack of Transportation (Medical): No     Lack of Transportation (Non-Medical): No   Physical Activity: Insufficiently Active (4/17/2024)    Exercise Vital Sign     Days of Exercise per Week: 2 days     Minutes of Exercise per Session: 30 min   Stress: Stress Concern Present (4/17/2024)    [employer] [place] [employer] - Occupational Stress Questionnaire     Feeling of Stress : Very much   Housing Stability: Low Risk  (11/7/2023)    Housing Stability Vital Sign     Unable to Pay for Housing in the Last Year: No     Number of Places Lived in the Last Year: 1     Unstable Housing in the Last Year: No       Past/Current Medical/Surgical History, Past/Current Social History, Past/Current Family History and Past/Current Medications were reviewed in detail.      Objective:     GENERAL APPEARANCE:     The patient looks comfortable.    No signs of respiratory distress.    Normal breathing pattern.    No dysmorphic features    Normal eye contact.     GENERAL MEDICAL EXAM:    HEENT:  Head is atraumatic normocephalic.      Neck and Axillae: No JVD. No visible lesions.    Cardiopulmonary: No cyanosis. No tachypnea. Normal respiratory effort.    Gastrointestinal/Urogenital:  No jaundice. No stomas or lesions. No visible hernias. No catheters.     Skin, Hair and Nails: No pathognonomic skin rash. No neurofibromatosis. No visible lesions.No stigmata of autoimmune disease. No clubbing.    Limbs: No varicose veins. No visible swelling.    Muskoskeletal: No visible deformities.No visible  lesions.      Neurologic Exam     Mental Status   Oriented to person, place, and time.   Oriented to person.   Oriented to place. Oriented to country, city and area.   Oriented to time. Oriented to year, month, date, day and season.   Registration: recalls 3 of 3 objects. Recall at 5 minutes: recalls 3 of 3 objects. Follows 3 step commands.   Attention: normal. Concentration: normal.   Speech: speech is normal   Level of consciousness: alert  Knowledge: good. Able to perform simple calculations.   Able to name object. Able to read. Able to repeat. Able to write. Normal comprehension.     Cranial Nerves     CN II   Visual fields full to confrontation.   Visual acuity: normal  Right visual field deficit: none  Left visual field deficit: none     CN III, IV, VI   Extraocular motions are normal.   Right pupil: Consensual response: intact. Accommodation: intact.   Left pupil: Consensual response: intact. Accommodation: intact.   CN III: no CN III palsy  CN VI: no CN VI palsy  Nystagmus: none   Diplopia: none  Ophthalmoparesis: none  Upgaze: normal  Downgaze: normal  Conjugate gaze: present  Vestibulo-ocular reflex: present    CN V   Facial sensation intact.   Right facial sensation deficit: none  Left facial sensation deficit: none    CN VII   Facial expression full, symmetric.   Right facial weakness: none  Left facial weakness: none    CN VIII   CN VIII normal.   Hearing: intact    CN IX, X   CN IX normal.   CN X normal.   Palate: symmetric    CN XII   CN XII normal.   Tongue: not atrophic  Fasciculations: absent  Tongue deviation: none    Motor Exam   Muscle bulk: normal  Overall muscle tone: normal  Right arm pronator drift: absent  Left arm pronator drift: absent    Sensory Exam   Graphesthesia: normal  Stereognosis: normal    Gait, Coordination, and Reflexes     Gait  Gait: normal    Tremor   Resting tremor: absent  Intention tremor: absent  Action tremor: absent        Lab Results   Component Value Date    WBC  7.23 06/11/2024    HGB 11.6 (L) 06/11/2024    HCT 36.1 (L) 06/11/2024    MCV 95 06/11/2024     06/11/2024       Sodium   Date Value Ref Range Status   06/11/2024 142 136 - 145 mmol/L Final     Potassium   Date Value Ref Range Status   06/11/2024 3.8 3.5 - 5.1 mmol/L Final     Chloride   Date Value Ref Range Status   06/11/2024 106 95 - 110 mmol/L Final     CO2   Date Value Ref Range Status   06/11/2024 25 23 - 29 mmol/L Final     Glucose   Date Value Ref Range Status   06/11/2024 81 70 - 110 mg/dL Final     BUN   Date Value Ref Range Status   06/11/2024 9 6 - 20 mg/dL Final     Creatinine   Date Value Ref Range Status   06/11/2024 1.1 0.5 - 1.4 mg/dL Final     Calcium   Date Value Ref Range Status   06/11/2024 9.0 8.7 - 10.5 mg/dL Final     Total Protein   Date Value Ref Range Status   11/13/2023 6.9 6.0 - 8.4 g/dL Final     Albumin   Date Value Ref Range Status   11/13/2023 3.1 (L) 3.5 - 5.2 g/dL Final     Total Bilirubin   Date Value Ref Range Status   11/13/2023 0.2 0.1 - 1.0 mg/dL Final     Comment:     For infants and newborns, interpretation of results should be based  on gestational age, weight and in agreement with clinical  observations.    Premature Infant recommended reference ranges:  Up to 24 hours.............<8.0 mg/dL  Up to 48 hours............<12.0 mg/dL  3-5 days..................<15.0 mg/dL  6-29 days.................<15.0 mg/dL       Alkaline Phosphatase   Date Value Ref Range Status   11/13/2023 40 (L) 55 - 135 U/L Final     AST   Date Value Ref Range Status   11/13/2023 18 10 - 40 U/L Final     ALT   Date Value Ref Range Status   11/13/2023 13 10 - 44 U/L Final     Anion Gap   Date Value Ref Range Status   06/11/2024 11 8 - 16 mmol/L Final     eGFR if    Date Value Ref Range Status   07/06/2021 >60.0 >60 mL/min/1.73 m^2 Final     eGFR if non    Date Value Ref Range Status   07/06/2021 >60.0 >60 mL/min/1.73 m^2 Final     Comment:     Calculation used to  "obtain the estimated glomerular filtration  rate (eGFR) is the CKD-EPI equation.          Lab Results   Component Value Date    [id number] 487 08/21/2023       Lab Results   Component Value Date    TSH 0.971 11/13/2023    FREET4 0.93 10/25/2019       No results found in the last 24 hours.    No results found in the last 24 hours.    Reviewed the neuroimaging independently     Assessment:   44 Years old Female with PMH as above came for a follow-up evaluation of  "Headaches"     Intractable chronic migraine with aura and without status migrainosus     Chronic tension-type headache, not intractable     PREMA (generalized anxiety disorder)     Nausea and vomiting, unspecified vomiting type     Dizziness and giddiness     Hypertension, unspecified type   .   Plan:   Patient Neurological Assessment is non-focal. Patient reports a headache during exam. Patient's symptoms of bilateral tinnitus, dizziness, balance issues, nausea, and vomiting point to rule out Vestibular Migraine / BPPV.      - Continue Inderal-LA 60 mg PO Daily for migraine prevention therapy and HTN management. Side effects discussed. Pamphlet given. Plan for slow titration to 80 mg BID which can cause low blood pressure, slow heart rate, erectile dysfunction, depression, airway obstruction and heart failure exacerbations. Cannot be used with migraine associate with focal neurological deficits. Instructed patient to keep a BP log and bring to next visit for provider review. No history of Asthma. Patient verbalized understanding. Refills sent.     - Continue Nurtec 75 mg PO Daily PRN Migraine for abortive therapy. Side effects discussed. Patient verbalized understanding. Refills Sent. Had nursing staff call the patient's pharmacy - pharmacy states that a another PA will need to be completed with new RX. Will monitor for PA documentation and complete if needed.     -Continue Zofran-ODT 4 mg PO Q8H PRN Nausea / Vomiting associated with Migraines. Side effects " discussed. Patient verbalized understanding. Refills Sent.    -Re-start Elavil and increase from Elavil 10 mg PO QHS to 25 mg PO QHS for migraine prevention therapy and Anxiety/Depression Management. Side effects discussed (sleepiness, dry eyes, dry mouth, urinary retention, and rarely cardiac arrhythmias). Patient verbalized understanding. Refills Sent.    -Reviewed results of Brain MRI / ESR / ROSE / RPR with patient in detail. She verbalized understanding.      -Instructed patient to continue following ophthalmology routinely.     -Order PT for vestibular therapy.              LABORATORY EVALUATION    Labs: ([id number]) RPR / ROSE / Sed Rate / CBC / BMP / CMP / A1C / TSH / Lipid Panel / Vitamin D / B-12 / Magnesium  -personally reviewed -non-significant abnormalities except     RADIOLOGY EVALUATION     Brain MRI Without Contrast- done 06/2024- personally reviewed -no acute abnormalities     MRI Lumbar Spine Without Contrast - done 12/2022- personally reviewed - Multilevel mild degenerative changes          MANAGEMENT     HEADACHE DIARY     DISCUSSED THE THREE-FOLD MANAGEMENT OF MIGRAINE:      LIFESTYLE CHANGES:       Good sleep hygiene  Avoid general triggers like lack of sleep/too much sleep, prolonged sun exposure, excessive screen time and specific triggers based on you own diary   Minimize physical and emotional stress  Smoking avoidance and cessation  Limit caffeine drinks to 1-2 a day   Good hydration   Small frequent meals and avoid skipping meals   Moderate 30-minute-long aerobic exercises 3 times/week. Avoid strenuous exercise         ABORTIVE MEDICATIONS (ACUTE-RESCUE MEDICATIONS):     Should only be taken 2-3 times/week to avoid rebound and overuse headaches.    I-explained to the patient that pain meds especially triptans should NOT be taken daily to avoid Rebound Headache and Overuse Headache.    Take at the ONSET of the headache sumatriptan 100 mg PO  (or other Triptan) in combination with naproxen 500  mg PO or Ibuprofen 800 mg PO for headache without nausea or vomiting.  This regimen can be repeated only once in 24 hours after 2 hours.    Side effects of triptans were discussed and include rare cardiac and cerebral ischemia and cannot be used with migraine associate with focal neurological deficits (complicated migraine) in addition to drowsiness and potential impairment of driving ability. The patient verbalized understanding.    NSAIDs can cause peptic ulcers, renal insufficiency and may increase the risk of cardiovascular diseases.  SEs were discussed with the patient. The patient verbalized understanding.    Triptans have shown to be more effective than Gepatns with more SE/AE.      AVOID NARCOTICS (OPIATES)      1. No randomized controlled study shows pain-free results with opioids in the treatment of migraine.     2. The physiologic consequences of opioid use are adverse, occur quickly, and can be permanent. Decreased gray matter, release of calcitonin gene-related peptide, dynorphin, and pro-inflammatory peptides, and activation of excitatory glutamate receptors are all associated with opioid exposure.     3. Opioids are pro-nociceptive, prevent reversal of migraine central sensitization, and interfere with triptan effectiveness.     4.Opioids precipitate bad clinical outcomes, especially transformation to daily headache.     5. They cause disease progression, comorbidity, and excessive health care consumption.           NEXT OPTIONS:    Gepants: Nuretc (rimegepant)75 mg >Ubrelvy (ubrogepant) 100 mg    Ditans: Reyvow (lasmiditan) 100 mg (No driving due to sedation)    Fioricet without codeine with Reglan.      Prednisone with Reglan.      LAST RESORT:     DHE NS Trudhesa (Max 2 a week)     C/I: concomitant use of vasoconstrictors like Triptans, strong CY inhibitors such as HAART PIs (eg, ritonavir, nelfinavir, or indinavir) and Macrolides (eg, erythromycin or clarithromycin), CAD, PVD, Stroke/TIA and  Uncontrolled HTN.  Serious SEs include Vasospasm and Fibrosis (chronic use).       IMPENDING STATUS: Prednisone and Vistaril.    STATUS MIGRAINOSUS: ED-Infusion for Status Protocol.        PREVENTATIVE (MORE ACCURATELY MIGRAINE REDUCTION) MEDICATIONS:       Since the patient's headache is very frequent a lengthy discussion about preventative medications was carried out.The patient understands that prevention means DECREASING frequency and severity and NOT elimination.The patient was made aware that any new medication can cause serious allergic reaction.The medication is considered failure only if a therapeutic dose reached and maintained for 6-8 weeks.        HELPFUL SUPPLEMENTS:     Helpful supplements include Co-Q 10, B2, Mg, Feverfew (Dolovent combination) and butterbur (Petadolex)        NEUROPHARMACOLOGY     NEXT OPTIONS:     Zonisamide/Zonegran (ZNS) 100-400 mg QHS is a good alternative to TPM in case of SE/AE.     Lamotrigine/Lamictal  (LTG)slow titration to 100 mg BID which can cause serious skin rash and rare cardiac arrhythmias. LTG is superior to other therapies for specifically reducing migraine aura.     ANTI-CGRP AGENTS: Qulipta (alogepant) 60 mg QD, Erenumab (Aimovig) 140 mg SQ Pen monthly (Reported cases of Constipation and BP elevation) , Galcanezumab (Emgality) 120 mg SQ Pen monthly after a loading dose of 240 mg  and Fremnezumab (Ajovy) (Ligand Blocker): 225 mg SQ monthly or 675 mg every 3 months     Botox 200 units every 3 months.         LAST RESORT OPTIONS:      Namenda 10 mg BID     Valproic acid/ Depakote         NEUROMODULATION     Cefaly, Relivion, Nerivio and GammaCore (VNS)         SCHOOL AND WORK ACCOMMODATIONS        Allow the patient to wear sunglasses or a cap and switch out fluorescent bulbs.    Allow the patient to arrive 5 minutes later and leave 5 minutes earlier to avoid noisy traffic.    Allow the patient to carry a water bottle and refill as needed.    Allow the patient to  snack whenever is needed.    Allow the patient to decrease the computer brightness.    Allow the patient to take breaks as needed and extra time for assignments and deadlines.    Allow the patient to avoid strenuous activity as needed.         MEDICAL/SURGICAL COMORBIDITIES     All relevant medical comorbidities noted and managed by primary care physician and medical care team.          HEALTHY LIFESTYLE AND PREVENTATIVE CARE    The patient to adhere to the age-appropriate health maintenance guidelines including screening tests and vaccinations. The patient to adhere to  healthy lifestyle, optimal weight, exercise, healthy diet, good sleep hygiene and avoiding drugs including smoking, alcohol and recreational drugs.    I spent a total of 87 minutes on the day of the visit.This includes face to face time and non-face to face time preparing to see the patient (eg, review of tests), obtaining and/or reviewing separately obtained history, documenting clinical information in the electronic or other health record, independently interpreting results and communicating results to the patient/family/caregiver, or care coordinator.     Please do not hesitate to contact me with any updates, questions or concerns.    [id number]    Nkechi Soliman, MSN, FNP-C    General Neurology

## 2024-07-12 ENCOUNTER — OFFICE VISIT (OUTPATIENT)
Dept: NEUROLOGY | Facility: CLINIC | Age: 44
End: 2024-07-12
Payer: COMMERCIAL

## 2024-07-12 DIAGNOSIS — R42 DIZZINESS AND GIDDINESS: ICD-10-CM

## 2024-07-12 DIAGNOSIS — G44.229 CHRONIC TENSION-TYPE HEADACHE, NOT INTRACTABLE: ICD-10-CM

## 2024-07-12 DIAGNOSIS — I10 HYPERTENSION, UNSPECIFIED TYPE: ICD-10-CM

## 2024-07-12 DIAGNOSIS — R11.2 NAUSEA AND VOMITING, UNSPECIFIED VOMITING TYPE: ICD-10-CM

## 2024-07-12 DIAGNOSIS — G43.E19 INTRACTABLE CHRONIC MIGRAINE WITH AURA AND WITHOUT STATUS MIGRAINOSUS: Primary | ICD-10-CM

## 2024-07-12 DIAGNOSIS — F41.1 GAD (GENERALIZED ANXIETY DISORDER): ICD-10-CM

## 2024-07-12 RX ORDER — PROPRANOLOL HYDROCHLORIDE 60 MG/1
60 CAPSULE, EXTENDED RELEASE ORAL DAILY
Qty: 30 CAPSULE | Refills: 0 | Status: SHIPPED | OUTPATIENT
Start: 2024-07-12 | End: 2024-08-11

## 2024-07-12 RX ORDER — AMITRIPTYLINE HYDROCHLORIDE 25 MG/1
25 TABLET, FILM COATED ORAL NIGHTLY
Qty: 30 TABLET | Refills: 0 | Status: SHIPPED | OUTPATIENT
Start: 2024-07-12 | End: 2024-08-11

## 2024-07-12 RX ORDER — RIMEGEPANT SULFATE 75 MG/75MG
75 TABLET, ORALLY DISINTEGRATING ORAL DAILY PRN
Qty: 12 TABLET | Refills: 1 | Status: SHIPPED | OUTPATIENT
Start: 2024-07-12 | End: 2024-08-11

## 2024-07-12 RX ORDER — ONDANSETRON 4 MG/1
4 TABLET, ORALLY DISINTEGRATING ORAL EVERY 8 HOURS PRN
Qty: 30 TABLET | Refills: 1 | Status: SHIPPED | OUTPATIENT
Start: 2024-07-12 | End: 2024-08-11

## 2024-08-02 DIAGNOSIS — G43.E19 INTRACTABLE CHRONIC MIGRAINE WITH AURA AND WITHOUT STATUS MIGRAINOSUS: ICD-10-CM

## 2024-08-02 DIAGNOSIS — I10 HYPERTENSION, UNSPECIFIED TYPE: ICD-10-CM

## 2024-08-02 DIAGNOSIS — G44.229 CHRONIC TENSION-TYPE HEADACHE, NOT INTRACTABLE: ICD-10-CM

## 2024-08-02 RX ORDER — PROPRANOLOL HYDROCHLORIDE 60 MG/1
60 CAPSULE, EXTENDED RELEASE ORAL DAILY
Qty: 30 CAPSULE | Refills: 2 | Status: SHIPPED | OUTPATIENT
Start: 2024-08-02 | End: 2024-10-31

## 2024-08-08 NOTE — PROGRESS NOTES
"Subjective:       Patient ID: Georgia Garcia is a 44 y.o. female.    PMH: Sciatica / Depression / PREMA / OA / HTN / Insomnia / Migraines / and other medical conditions.     Chief Complaint: "Headaches"     HPI    The patient presented on 06/2024 / 07/2024 for evaluation of "Headaches". Patient presents today for a follow-up evaluation and recommendation of "Headaches".       The originating site (patient location) is: Home.      The distant site (neurologist location) is: Neurology Clinic at [facility].      The chief complaint leading to consultation is: "Headaches"       Visit type: Virtual visit with synchronous audio and video.      Consent: The patient verbally consented to participating in the video visit and informed that may decline to receive medical services by telemedicine and may withdraw from such care at any time.      I discussed with the patient the nature of our telemedicine visits, that:      I  would evaluate the patient and recommend diagnostics and treatments based on my assessment.    Our sessions are not being recorded and that personal health information is protected.    Our team would provide follow up care in person if/when the patient needs it.    Virtual (video/telemedicine) visits have significant limitations. A telemedicine exam is primarily focused on the history and what I can observe. Several critical parts of the neurological exam cannot be performed.         Interval History: (06/14/2024) - Norco 7.5-325 Q8H PRN and Metoprolol Succinate /Toprol-XL 50 mg PO Daily Discontinued. Patient started on Inderal-LA 60 mg PO Daily. Continue Nurtec. Diagnostic evaluation included Brain MRI / ESR / ROSE / RPR.     Started: 10 years ago, worse in the last few years  Describes: Pounding, some slowly build up but some can come on all at once / The headaches are progressively getting worse and interfering with daily activity.  Timing: Duration of 2 weeks for migraine type headaches / " Duration of 10 hours for regular type headaches / wakes up with migraines / migraines can wake her up from sleep  Frequency: Daily  Pain: Regular headaches: 7-8/10 / Migraines: 10/10  Location: Bilateral temporal, parietal, and occipital regions  Family: No known family history of migraines or headaches in the family.   Medications: Norco / Elavil / Wellbutrin / Gabapentin / Nutrec / Patient reports PCP recently prescribed Nutrec, but has not taken it yet due to PA being processed.   Worsen: Stress / The patient cannot think of food that aggravates the headache.      Alleviated: Sleeping / dark and quiet room /   Associated symptoms: Nausea / Vomiting / Trouble keeping up with tasks due to pain / light and sound sensitivity / dizziness (Light-headed) / blurry vision / double vision / bilateral floaters / Neck muscular pain with radiation to scapular area-is stress related per patient     No tinnitus / not worse with postural changes or valsalva / no scalp sensitivity / numbness/tingling to extremities / bilateral or focal weakness / no trouble with balance        Triggers: Cigarette smoke / strong perfume smells / bright lights / eating citrus   Prodrome symptoms: patient reports she does have an auora of a small feeling prior to migraine onset. She expresses that she can't describe her auora.      Patient reports that she has previously sought ER evaluation for Headache management who treated her with Naproxen. She felt like it was a waste of time and the medication was not effective for her.      Patient reports compliance with blood pressure medication and reports at home blood pressures run around 122/84.      No TMJ problems. No history of head trauma. No history of seizures. No history of smoking. No caffeine overuse. No vertigo. No blacking out. No fever, chills, or rigors. No history of significant memory loss. No history of strokes. No falls.           Interval History: (07/12/2024) - Continued Inderal-LA  "60 mg PO Daily /  Nurtec 75 mg PO Daily PRN / Zofran-ODT 4 mg PO Q8H PRN. Started Elavil 25 mg PO QHS. Continued PT for vestibular therapy.     Patient reports that headaches are still severe, debilitating, and interfering with daily functioning. Frequency of tension type is daily and migraine type is daily. Duration of tension type is 10 hours and migraine type is 2 weeks. Still "Pounding" in nature. Pain intensity is decreased from 15/10 to 10/10 per patient. Associated symptoms include Nausea / Vomiting / Trouble keeping up with tasks due to pain / light and sound sensitivity / dizziness (Light-headed) / blurry vision / double vision / bilateral floaters / Neck muscular pain with radiation to scapular area-is stress related per patient / tinnitus to bilateral ears / feeling off-balance.     Patient reports chronic Anxiety / Depression. Patient reports that her anxiety and stress have slightly worsened since last visit due to unrelieved Migraines. Patient expresses that she is interested in additional pharmacological therapy for Anxiety management. Denies SI.         New Issues: (08/12/2024) -     No new issues per patient.     Headaches:    -Headaches have significantly improved since last visit. Patient reports an increased quality of life and a 95% improvement in headache frequency, duration, pain intensity, and symptom severity.   -Location is still Bilateral temporal, parietal, and occipital regions.   -Positive Headache auras - Patient unable to describe feeling.   -Frequency decreased - tension type is 2 per week and migraine type is 2-3 times per week.  -Duration decreased - tension type is decreased from 10 hours to 3 hours / and migraine type decreased from 2 weeks to 3 days.   -Still "Pounding" in nature  -Pain intensity is decreased from 10/10 to 4/10 per patient.   -Worsened by Stress.   -Triggered by Cigarette smoke / strong perfume smells / bright lights / eating citrus  -Alleviated with sleeping / " dark and quiet room   -Associated symptoms include Nausea / Vomiting / Trouble keeping up with tasks due to pain / light and sound sensitivity / dizziness (Light-headed) / blurry vision / double vision / bilateral floaters / Neck muscular pain with radiation to scapular area-is stress related per patient / tinnitus to bilateral ears / feeling off-balance - significantly decreased intensity of symptoms per patient  -Patient does report changes in vision. Last eye clinic appointment was April 2024, no abnormalities were noted per patient. Her prescription has not changed within 3 years.     -No falls   -Vestibular therapy with PT - not started due to work scheduling conflicts per patient.       Abortive therapies (tried and failed): Nurtec 75 mg PO Daily PRN - Current    -Maxalt- not effective - SE of dizziness / nausea / feeling like she was going to pass out /      Preventative therapies (tried and failed): Inderal-LA 60 mg PO Daily / Elavil 25 mg PO QHS - Current    -Topamax- not effective (took for about 8-9 years)       Anxiety:    Patient reports that her chronic anxiety and stress have slightly improved due to Migraine alleviation with current medication regimen. Denies SI. Currently managed by psychiatrist. Elavil 25 mg PO QHS - has helped to improve per patient.     -Patient reports tolerating medications well without side effects. She would like to stay on current medication regimen.        Review of Systems   Constitutional:  Negative for activity change, appetite change, chills, diaphoresis, fatigue, fever and unexpected weight change.   HENT:  Positive for tinnitus. Negative for congestion, dental problem, drooling, ear discharge, ear pain, facial swelling, hearing loss, mouth sores, nosebleeds, postnasal drip, rhinorrhea, sinus pressure, sinus pain, sneezing, sore throat, trouble swallowing and voice change.    Eyes:  Positive for photophobia and visual disturbance. Negative for pain, discharge, redness  and itching.   Respiratory:  Negative for cough, chest tightness, shortness of breath and wheezing.    Cardiovascular:  Negative for chest pain, palpitations and leg swelling.   Gastrointestinal:  Positive for nausea and vomiting. Negative for abdominal distention, abdominal pain, blood in stool, constipation and diarrhea.   Endocrine: Negative for cold intolerance, heat intolerance, polydipsia, polyphagia and polyuria.   Genitourinary:  Negative for decreased urine volume, difficulty urinating, dysuria, flank pain, frequency, hematuria, pelvic pain, urgency and vaginal discharge.   Musculoskeletal:  Positive for myalgias and neck pain. Negative for arthralgias, back pain, gait problem, joint swelling and neck stiffness.   Skin:  Negative for color change and rash.   Allergic/Immunologic: Negative for immunocompromised state.   Neurological:  Positive for dizziness, light-headedness and headaches. Negative for tremors, seizures, syncope, facial asymmetry, speech difficulty, weakness and numbness.        Patient reports phonophobia and balance issues associated with migraines.    Hematological:  Negative for adenopathy. Does not bruise/bleed easily.   Psychiatric/Behavioral:  Positive for decreased concentration. Negative for agitation, behavioral problems, confusion, dysphoric mood, hallucinations, self-injury, sleep disturbance and suicidal ideas. The patient is not nervous/anxious and is not hyperactive.    All other systems reviewed and are negative.                Current Outpatient Medications:     ALPRAZolam (XANAX) 0.25 MG tablet, TAKE 1 TO 2 TABLETS BY MOUTH THREE TIMES DAILY AS NEEDED FOR ANXIETY, Disp: 90 tablet, Rfl: 0    amitriptyline (ELAVIL) 25 MG tablet, Take 1 tablet (25 mg total) by mouth every evening., Disp: 30 tablet, Rfl: 0    azelastine (ASTELIN) 137 mcg (0.1 %) nasal spray, SMARTSIG:Both Nares, Disp: , Rfl:     buPROPion (WELLBUTRIN) 100 MG tablet, Take 1 tablet (100 mg total) by mouth 2 (two)  times daily., Disp: 180 tablet, Rfl: 3    estradioL (ESTRACE) 1 MG tablet, Take 1 tablet (1 mg total) by mouth once daily., Disp: 90 tablet, Rfl: 3    gabapentin (NEURONTIN) 100 MG capsule, Take 1 capsule (100 mg total) by mouth 3 (three) times daily., Disp: 90 capsule, Rfl: 5    hydroCHLOROthiazide (HYDRODIURIL) 12.5 MG Tab, Take 1 tablet (12.5 mg total) by mouth once daily., Disp: 90 tablet, Rfl: 3    losartan (COZAAR) 100 MG tablet, Take 1 tablet (100 mg total) by mouth once daily., Disp: 90 tablet, Rfl: 3    methocarbamoL (ROBAXIN) 500 MG Tab, Take 500 mg by mouth 4 (four) times daily., Disp: , Rfl:     naproxen sodium (ANAPROX) 550 MG tablet, TAKE 1 TABLET(550 MG) BY MOUTH TWICE DAILY AS NEEDED FOR PAIN, Disp: 30 tablet, Rfl: 3    NIFEdipine (PROCARDIA-XL) 60 MG (OSM) 24 hr tablet, Take 1 tablet (60 mg total) by mouth once daily., Disp: 90 tablet, Rfl: 3    ondansetron (ZOFRAN-ODT) 4 MG TbDL, Take 1 tablet (4 mg total) by mouth every 8 (eight) hours as needed (Nausea / Vomiting)., Disp: 30 tablet, Rfl: 1    propranoloL (INDERAL LA) 60 MG 24 hr capsule, Take 1 capsule (60 mg total) by mouth once daily., Disp: 30 capsule, Rfl: 2    rimegepant (NURTEC) 75 mg odt, Take 1 tablet (75 mg total) by mouth daily as needed for Migraine. Place ODT tablet on the tongue; alternatively the ODT tablet may be placed under the tongue. Maximum: 75 mg per 24 hours., Disp: 12 tablet, Rfl: 1    Past Medical History:   Diagnosis Date    Anemia     Cervical radiculopathy     COVID-19 virus infection 2021    Diverticulosis     Hypermenorrhea     Hypertension     Insomnia     Internal hemorrhoid     Migraine headache     Morbid obesity     Rectal bleeding     Uterine fibroid        Past Surgical History:   Procedure Laterality Date     SECTION, LOW TRANSVERSE      x1    COLONOSCOPY N/A 2022    Procedure: COLONOSCOPY;  Surgeon: Maximus Lopes MD;  Location: [facility];  Service: Endoscopy;  Laterality: N/A;    TOTAL  ABDOMINAL HYSTERECTOMY  9/30/14    TUBAL LIGATION         Social History     Socioeconomic History    Marital status: Single   Occupational History     [employer]er: [employer]   Tobacco Use    Smoking status: Never    Smokeless tobacco: Never   Substance and Sexual Activity    Alcohol use: No    Drug use: No    Sexual activity: Yes     Partners: Male     Birth control/protection: Surgical     Comment: hyst; mut monog   Social History Narrative    She is , has one daughter and 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              Social Determinants of Health     Financial Resource Strain: Medium Risk (4/17/2024)    Overall Financial Resource Strain (CARDIA)     Difficulty of Paying Living Expenses: Somewhat hard   Food Insecurity: No Food Insecurity (4/17/2024)    Hunger Vital Sign     Worried About Running Out of Food in the Last Year: Never true     Ran Out of Food in the Last Year: Never true   Transportation Needs: No Transportation Needs (4/17/2024)    PRAPARE - Transportation     Lack of Transportation (Medical): No     Lack of Transportation (Non-Medical): No   Physical Activity: Insufficiently Active (4/17/2024)    Exercise Vital Sign     Days of Exercise per Week: 2 days     Minutes of Exercise per Session: 30 min   Stress: Stress Concern Present (4/17/2024)    [place] [employer] - Occupational Stress Questionnaire     Feeling of Stress : Very much   Housing Stability: Low Risk  (11/7/2023)    Housing Stability Vital Sign     Unable to Pay for Housing in the Last Year: No     Number of Places Lived in the Last Year: 1     Unstable Housing in the Last Year: No       Past/Current Medical/Surgical History, Past/Current Social History, Past/Current Family History and Past/Current Medications were reviewed in detail.      Objective:     GENERAL APPEARANCE:     The patient looks comfortable.    No signs of respiratory distress.    Normal  breathing pattern.    No dysmorphic features    Normal eye contact.     GENERAL MEDICAL EXAM:    HEENT:  Head is atraumatic normocephalic.      Neck and Axillae: No JVD. No visible lesions.    Cardiopulmonary: No cyanosis. No tachypnea. Normal respiratory effort.    Gastrointestinal/Urogenital:  No jaundice. No stomas or lesions. No visible hernias. No catheters.     Skin, Hair and Nails: No pathognonomic skin rash. No neurofibromatosis. No visible lesions.No stigmata of autoimmune disease. No clubbing.    Limbs: No varicose veins. No visible swelling.    Muskoskeletal: No visible deformities.No visible lesions.      Neurologic Exam     Mental Status   Oriented to person, place, and time.   Oriented to person.   Oriented to place. Oriented to country, city and area.   Oriented to time. Oriented to year, month, date, day and season.   Registration: recalls 3 of 3 objects. Recall at 5 minutes: recalls 3 of 3 objects. Follows 3 step commands.   Attention: normal. Concentration: normal.   Speech: speech is normal   Level of consciousness: alert  Knowledge: good. Able to perform simple calculations.   Able to name object. Able to read. Able to repeat. Able to write. Normal comprehension.     Cranial Nerves     CN II   Visual fields full to confrontation.   Visual acuity: normal  Right visual field deficit: none  Left visual field deficit: none     CN III, IV, VI   Extraocular motions are normal.   Right pupil: Consensual response: intact. Accommodation: intact.   Left pupil: Consensual response: intact. Accommodation: intact.   CN III: no CN III palsy  CN VI: no CN VI palsy  Nystagmus: none   Diplopia: none  Ophthalmoparesis: none  Upgaze: normal  Downgaze: normal  Conjugate gaze: present  Vestibulo-ocular reflex: present    CN V   Facial sensation intact.   Right facial sensation deficit: none  Left facial sensation deficit: none    CN VII   Facial expression full, symmetric.   Right facial weakness: none  Left facial  weakness: none    CN VIII   CN VIII normal.   Hearing: intact    CN IX, X   CN IX normal.   CN X normal.   Palate: symmetric    CN XII   CN XII normal.   Tongue: not atrophic  Fasciculations: absent  Tongue deviation: none    Motor Exam   Muscle bulk: normal  Overall muscle tone: normal  Right arm pronator drift: absent  Left arm pronator drift: absent    Sensory Exam   Graphesthesia: normal  Stereognosis: normal    Gait, Coordination, and Reflexes     Gait  Gait: normal    Tremor   Resting tremor: absent  Intention tremor: absent  Action tremor: absent        Lab Results   Component Value Date    WBC 7.23 06/11/2024    HGB 11.6 (L) 06/11/2024    HCT 36.1 (L) 06/11/2024    MCV 95 06/11/2024     06/11/2024       Sodium   Date Value Ref Range Status   06/11/2024 142 136 - 145 mmol/L Final     Potassium   Date Value Ref Range Status   06/11/2024 3.8 3.5 - 5.1 mmol/L Final     Chloride   Date Value Ref Range Status   06/11/2024 106 95 - 110 mmol/L Final     CO2   Date Value Ref Range Status   06/11/2024 25 23 - 29 mmol/L Final     Glucose   Date Value Ref Range Status   06/11/2024 81 70 - 110 mg/dL Final     BUN   Date Value Ref Range Status   06/11/2024 9 6 - 20 mg/dL Final     Creatinine   Date Value Ref Range Status   06/11/2024 1.1 0.5 - 1.4 mg/dL Final     Calcium   Date Value Ref Range Status   06/11/2024 9.0 8.7 - 10.5 mg/dL Final     Total Protein   Date Value Ref Range Status   11/13/2023 6.9 6.0 - 8.4 g/dL Final     Albumin   Date Value Ref Range Status   11/13/2023 3.1 (L) 3.5 - 5.2 g/dL Final     Total Bilirubin   Date Value Ref Range Status   11/13/2023 0.2 0.1 - 1.0 mg/dL Final     Comment:     For infants and newborns, interpretation of results should be based  on gestational age, weight and in agreement with clinical  observations.    Premature Infant recommended reference ranges:  Up to 24 hours.............<8.0 mg/dL  Up to 48 hours............<12.0 mg/dL  3-5 days..................<15.0  "mg/dL  6-29 days.................<15.0 mg/dL       Alkaline Phosphatase   Date Value Ref Range Status   11/13/2023 40 (L) 55 - 135 U/L Final     AST   Date Value Ref Range Status   11/13/2023 18 10 - 40 U/L Final     ALT   Date Value Ref Range Status   11/13/2023 13 10 - 44 U/L Final     Anion Gap   Date Value Ref Range Status   06/11/2024 11 8 - 16 mmol/L Final     eGFR if    Date Value Ref Range Status   07/06/2021 >60.0 >60 mL/min/1.73 m^2 Final     eGFR if non    Date Value Ref Range Status   07/06/2021 >60.0 >60 mL/min/1.73 m^2 Final     Comment:     Calculation used to obtain the estimated glomerular filtration  rate (eGFR) is the CKD-EPI equation.          Lab Results   Component Value Date    [id number] 487 08/21/2023       Lab Results   Component Value Date    TSH 0.971 11/13/2023    FREET4 0.93 10/25/2019       No results found in the last 24 hours.    No results found in the last 24 hours.    Reviewed the neuroimaging independently     Assessment:   44 Years old Female with PMH as above came for a follow-up evaluation of  "Headaches"     Intractable chronic migraine with aura and without status migrainosus     Chronic tension-type headache, intractable     PREMA (generalized anxiety disorder)     Nausea and vomiting, unspecified vomiting type     Dizziness and giddiness     Hypertension, unspecified type   .   Plan:   Patient Neurological Assessment is non-focal. Patient reports an increased quality of life and a 95% improvement in headache frequency, duration, pain intensity, and symptom severity.   Patient's symptoms of bilateral tinnitus, dizziness, balance issues, nausea, and vomiting still point to rule out Vestibular Migraine / BPPV.      - Continue Inderal-LA 60 mg PO Daily for migraine prevention therapy and HTN management. Side effects discussed. Pamphlet given. Plan for slow titration to 80 mg BID which can cause low blood pressure, slow heart rate, erectile " dysfunction, depression, airway obstruction and heart failure exacerbations. Cannot be used with migraine associate with focal neurological deficits. Instructed patient to keep a BP log and bring to next visit for provider review. No history of Asthma. Patient verbalized understanding. No refills needed.     -Continue Nurtec 75 mg PO Daily PRN Migraine for abortive therapy. Side effects discussed. Patient verbalized understanding. No refills needed.     -Continue Zofran-ODT 4 mg PO Q8H PRN Nausea / Vomiting associated with Migraines. Side effects discussed. Patient verbalized understanding. No refills needed.     -Continue Elavil 25 mg PO QHS for migraine prevention therapy and Anxiety/Depression Management. Side effects discussed (sleepiness, dry eyes, dry mouth, urinary retention, and rarely cardiac arrhythmias). Patient verbalized understanding. Refills Sent.      -Instructed patient to continue following ophthalmology routinely.     -Continue PT for vestibular therapy.          LABORATORY EVALUATION    Labs: ([id number]) RPR / ROSE / Sed Rate / CBC / BMP / CMP / A1C / TSH / Lipid Panel / Vitamin D / B-12 / Magnesium  -personally reviewed -non-significant abnormalities     RADIOLOGY EVALUATION     Brain MRI Without Contrast- done 06/2024- personally reviewed -no acute abnormalities     MRI Lumbar Spine Without Contrast - done 12/2022- personally reviewed - Multilevel mild degenerative changes          MANAGEMENT     HEADACHE DIARY     DISCUSSED THE THREE-FOLD MANAGEMENT OF MIGRAINE:      LIFESTYLE CHANGES:       Good sleep hygiene  Avoid general triggers like lack of sleep/too much sleep, prolonged sun exposure, excessive screen time and specific triggers based on you own diary   Minimize physical and emotional stress  Smoking avoidance and cessation  Limit caffeine drinks to 1-2 a day   Good hydration   Small frequent meals and avoid skipping meals   Moderate 30-minute-long aerobic exercises 3 times/week. Avoid  strenuous exercise         ABORTIVE MEDICATIONS (ACUTE-RESCUE MEDICATIONS):     Should only be taken 2-3 times/week to avoid rebound and overuse headaches.    I-explained to the patient that pain meds especially triptans should NOT be taken daily to avoid Rebound Headache and Overuse Headache.    Take at the ONSET of the headache sumatriptan 100 mg PO  (or other Triptan) in combination with naproxen 500 mg PO or Ibuprofen 800 mg PO for headache without nausea or vomiting.  This regimen can be repeated only once in 24 hours after 2 hours.    Side effects of triptans were discussed and include rare cardiac and cerebral ischemia and cannot be used with migraine associate with focal neurological deficits (complicated migraine) in addition to drowsiness and potential impairment of driving ability. The patient verbalized understanding.    NSAIDs can cause peptic ulcers, renal insufficiency and may increase the risk of cardiovascular diseases.  SEs were discussed with the patient. The patient verbalized understanding.    Triptans have shown to be more effective than Gepatns with more SE/AE.      AVOID NARCOTICS (OPIATES)      1. No randomized controlled study shows pain-free results with opioids in the treatment of migraine.     2. The physiologic consequences of opioid use are adverse, occur quickly, and can be permanent. Decreased gray matter, release of calcitonin gene-related peptide, dynorphin, and pro-inflammatory peptides, and activation of excitatory glutamate receptors are all associated with opioid exposure.     3. Opioids are pro-nociceptive, prevent reversal of migraine central sensitization, and interfere with triptan effectiveness.     4.Opioids precipitate bad clinical outcomes, especially transformation to daily headache.     5. They cause disease progression, comorbidity, and excessive health care consumption.           NEXT OPTIONS:    Gepants: Nuretc (rimegepant)75 mg >Ubrelvy (ubrogepant) 100  mg    Ditans: Reyvow (lasmiditan) 100 mg (No driving due to sedation)    Fioricet without codeine with Reglan.      Prednisone with Reglan.      LAST RESORT:     DHE NS Trudhesa (Max 2 a week)     C/I: concomitant use of vasoconstrictors like Triptans, strong CY inhibitors such as HAART PIs (eg, ritonavir, nelfinavir, or indinavir) and Macrolides (eg, erythromycin or clarithromycin), CAD, PVD, Stroke/TIA and Uncontrolled HTN.  Serious SEs include Vasospasm and Fibrosis (chronic use).       IMPENDING STATUS: Prednisone and Vistaril.    STATUS MIGRAINOSUS: ED-Infusion for Status Protocol.        PREVENTATIVE (MORE ACCURATELY MIGRAINE REDUCTION) MEDICATIONS:       Since the patient's headache is very frequent a lengthy discussion about preventative medications was carried out.The patient understands that prevention means DECREASING frequency and severity and NOT elimination.The patient was made aware that any new medication can cause serious allergic reaction.The medication is considered failure only if a therapeutic dose reached and maintained for 6-8 weeks.        HELPFUL SUPPLEMENTS:     Helpful supplements include Co-Q 10, B2, Mg, Feverfew (Dolovent combination) and butterbur (Petadolex)        NEUROPHARMACOLOGY     NEXT OPTIONS:     Zonisamide/Zonegran (ZNS) 100-400 mg QHS is a good alternative to TPM in case of SE/AE.     Lamotrigine/Lamictal  (LTG)slow titration to 100 mg BID which can cause serious skin rash and rare cardiac arrhythmias. LTG is superior to other therapies for specifically reducing migraine aura.     ANTI-CGRP AGENTS: Qulipta (alogepant) 60 mg QD, Erenumab (Aimovig) 140 mg SQ Pen monthly (Reported cases of Constipation and BP elevation) , Galcanezumab (Emgality) 120 mg SQ Pen monthly after a loading dose of 240 mg  and Fremnezumab (Ajovy) (Ligand Blocker): 225 mg SQ monthly or 675 mg every 3 months     Botox 200 units every 3 months.         LAST RESORT OPTIONS:      Namenda 10 mg BID      Valproic acid/ Depakote         NEUROMODULATION     Cefaly, Relivion, Nerivio and GammaCore (VNS)         SCHOOL AND WORK ACCOMMODATIONS        Allow the patient to wear sunglasses or a cap and switch out fluorescent bulbs.    Allow the patient to arrive 5 minutes later and leave 5 minutes earlier to avoid noisy traffic.    Allow the patient to carry a water bottle and refill as needed.    Allow the patient to snack whenever is needed.    Allow the patient to decrease the computer brightness.    Allow the patient to take breaks as needed and extra time for assignments and deadlines.    Allow the patient to avoid strenuous activity as needed.         MEDICAL/SURGICAL COMORBIDITIES     All relevant medical comorbidities noted and managed by primary care physician and medical care team.              HEALTHY LIFESTYLE AND PREVENTATIVE CARE    The patient to adhere to the age-appropriate health maintenance guidelines including screening tests and vaccinations. The patient to adhere to  healthy lifestyle, optimal weight, exercise, healthy diet, good sleep hygiene and avoiding drugs including smoking, alcohol and recreational drugs.    I spent a total of 47 minutes on the day of the visit.This includes face to face time and non-face to face time preparing to see the patient (eg, review of tests), obtaining and/or reviewing separately obtained history, documenting clinical information in the electronic or other health record, independently interpreting results and communicating results to the patient/family/caregiver, or care coordinator.     Please do not hesitate to contact me with any updates, questions or concerns.    [id number]    Nkechi Soliman, MSN, FNP-C    General Neurology

## 2024-08-12 ENCOUNTER — OFFICE VISIT (OUTPATIENT)
Dept: NEUROLOGY | Facility: CLINIC | Age: 44
End: 2024-08-12
Payer: COMMERCIAL

## 2024-08-12 DIAGNOSIS — G44.221 CHRONIC TENSION-TYPE HEADACHE, INTRACTABLE: ICD-10-CM

## 2024-08-12 DIAGNOSIS — G43.E19 INTRACTABLE CHRONIC MIGRAINE WITH AURA AND WITHOUT STATUS MIGRAINOSUS: Primary | ICD-10-CM

## 2024-08-12 DIAGNOSIS — R11.2 NAUSEA AND VOMITING, UNSPECIFIED VOMITING TYPE: ICD-10-CM

## 2024-08-12 DIAGNOSIS — R42 DIZZINESS AND GIDDINESS: ICD-10-CM

## 2024-08-12 DIAGNOSIS — F41.1 GAD (GENERALIZED ANXIETY DISORDER): ICD-10-CM

## 2024-08-12 DIAGNOSIS — I10 HYPERTENSION, UNSPECIFIED TYPE: ICD-10-CM

## 2024-08-12 DIAGNOSIS — G44.229 CHRONIC TENSION-TYPE HEADACHE, NOT INTRACTABLE: ICD-10-CM

## 2024-08-12 PROCEDURE — 99215 OFFICE O/P EST HI 40 MIN: CPT | Mod: 95,,,

## 2024-08-12 PROCEDURE — 1159F MED LIST DOCD IN RCRD: CPT | Mod: CPTII,95,,

## 2024-08-12 PROCEDURE — 4010F ACE/ARB THERAPY RXD/TAKEN: CPT | Mod: CPTII,95,,

## 2024-08-12 PROCEDURE — 1160F RVW MEDS BY RX/DR IN RCRD: CPT | Mod: CPTII,95,,

## 2024-08-12 RX ORDER — AMITRIPTYLINE HYDROCHLORIDE 25 MG/1
25 TABLET, FILM COATED ORAL NIGHTLY
Qty: 30 TABLET | Refills: 2 | Status: SHIPPED | OUTPATIENT
Start: 2024-08-12 | End: 2024-11-10

## 2024-08-20 ENCOUNTER — PATIENT MESSAGE (OUTPATIENT)
Dept: ADMINISTRATIVE | Facility: HOSPITAL | Age: 44
End: 2024-08-20
Payer: COMMERCIAL

## 2024-08-22 ENCOUNTER — PATIENT OUTREACH (OUTPATIENT)
Dept: ADMINISTRATIVE | Facility: HOSPITAL | Age: 44
End: 2024-08-22
Payer: COMMERCIAL

## 2024-08-22 DIAGNOSIS — Z12.31 ENCOUNTER FOR SCREENING MAMMOGRAM FOR MALIGNANT NEOPLASM OF BREAST: Primary | ICD-10-CM

## 2024-08-22 NOTE — PROGRESS NOTES
Replying to Campaign Questionnaire for Overdue HM: Mammogram    Order placed per [facility]. Scheduled 11/14/2024. Portal message sent to pt.

## 2024-08-26 ENCOUNTER — PATIENT MESSAGE (OUTPATIENT)
Dept: OBSTETRICS AND GYNECOLOGY | Facility: CLINIC | Age: 44
End: 2024-08-26
Payer: COMMERCIAL

## 2024-09-03 DIAGNOSIS — F41.1 GAD (GENERALIZED ANXIETY DISORDER): Primary | ICD-10-CM

## 2024-09-03 RX ORDER — ALPRAZOLAM 0.25 MG/1
0.25 TABLET ORAL NIGHTLY PRN
Qty: 30 TABLET | Refills: 0 | Status: SHIPPED | OUTPATIENT
Start: 2024-09-03

## 2024-09-04 DIAGNOSIS — G43.919 INTRACTABLE MIGRAINE WITHOUT STATUS MIGRAINOSUS, UNSPECIFIED MIGRAINE TYPE: ICD-10-CM

## 2024-09-04 RX ORDER — NAPROXEN SODIUM 550 MG/1
TABLET ORAL
Qty: 30 TABLET | Refills: 3 | Status: SHIPPED | OUTPATIENT
Start: 2024-09-04

## 2024-09-04 NOTE — TELEPHONE ENCOUNTER
No care due was identified.  [facility] Embedded Care Due Messages. Reference number: [id number].   9/04/2024 1:42:51 PM CDT

## 2024-09-06 ENCOUNTER — TELEPHONE (OUTPATIENT)
Dept: PHARMACY | Facility: CLINIC | Age: 44
End: 2024-09-06
Payer: COMMERCIAL

## 2024-09-06 NOTE — TELEPHONE ENCOUNTER
[facility]/Population Health Chart Review & Patient Outreach Details For Medication Adherence Project    Reason for Outreach Encounter: 3rd Party payor non-compliance report ([employer], [employer]BS, [employer], etc)    2.  Patient Outreach Method: Reviewed patient chart     3.   Medication in question:   Hypertension Medications               hydroCHLOROthiazide (HYDRODIURIL) 12.5 MG Tab Take 1 tablet (12.5 mg total) by mouth once daily.    losartan (COZAAR) 100 MG tablet Take 1 tablet (100 mg total) by mouth once daily.    propranoloL (INDERAL LA) 60 MG 24 hr capsule Take 1 capsule (60 mg total) by mouth once daily.               LAST FILLED: 8/30/24 for 90 day supply    4.  Reviewed and or Updates Made To: Patient Chart    5. Outreach Outcomes and/or actions taken: Patient filled medication and is on track to be adherent    Additional Notes:

## 2024-10-01 ENCOUNTER — PATIENT MESSAGE (OUTPATIENT)
Dept: NEUROLOGY | Facility: CLINIC | Age: 44
End: 2024-10-01
Payer: COMMERCIAL

## 2024-10-01 ENCOUNTER — TELEPHONE (OUTPATIENT)
Dept: NEUROLOGY | Facility: CLINIC | Age: 44
End: 2024-10-01
Payer: COMMERCIAL

## 2024-10-01 DIAGNOSIS — G43.E19 INTRACTABLE CHRONIC MIGRAINE WITH AURA AND WITHOUT STATUS MIGRAINOSUS: Primary | ICD-10-CM

## 2024-10-01 DIAGNOSIS — G44.221 CHRONIC TENSION-TYPE HEADACHE, INTRACTABLE: ICD-10-CM

## 2024-10-01 RX ORDER — RIMEGEPANT SULFATE 75 MG/75MG
75 TABLET, ORALLY DISINTEGRATING ORAL DAILY PRN
Qty: 15 TABLET | Refills: 2 | Status: SHIPPED | OUTPATIENT
Start: 2024-10-01 | End: 2024-12-30

## 2024-10-25 ENCOUNTER — TELEPHONE (OUTPATIENT)
Dept: PHARMACY | Facility: CLINIC | Age: 44
End: 2024-10-25
Payer: COMMERCIAL

## 2024-10-25 NOTE — TELEPHONE ENCOUNTER
[facility]/Population Health Chart Review & Patient Outreach Details For Medication Adherence Project    Reason for Outreach Encounter: 3rd Party payor non-compliance report ([employer], BCBS, UHC, etc)  2.  Patient Outreach Method: Reviewed Patient Chart  3.   Medication in question: losartan   LAST FILLED: 8/31/24 for 90 day supply  Hypertension Medications               hydroCHLOROthiazide (HYDRODIURIL) 12.5 MG Tab Take 1 tablet (12.5 mg total) by mouth once daily.    losartan (COZAAR) 100 MG tablet Take 1 tablet (100 mg total) by mouth once daily.    propranoloL (INDERAL LA) 60 MG 24 hr capsule Take 1 capsule (60 mg total) by mouth once daily.              4.  Reviewed and or Updates Made To: Patient Chart  5. Outreach Outcomes and/or actions taken: Patient filled medication and is on track to be adherent

## 2024-10-31 ENCOUNTER — PATIENT MESSAGE (OUTPATIENT)
Dept: INTERNAL MEDICINE | Facility: CLINIC | Age: 44
End: 2024-10-31
Payer: COMMERCIAL

## 2024-10-31 DIAGNOSIS — U07.1 COVID: Primary | ICD-10-CM

## 2024-10-31 RX ORDER — NIRMATRELVIR AND RITONAVIR 300-100 MG
KIT ORAL
Qty: 30 TABLET | Refills: 0 | Status: SHIPPED | OUTPATIENT
Start: 2024-10-31

## 2024-11-02 DIAGNOSIS — G43.919 INTRACTABLE MIGRAINE WITHOUT STATUS MIGRAINOSUS, UNSPECIFIED MIGRAINE TYPE: ICD-10-CM

## 2024-11-02 NOTE — TELEPHONE ENCOUNTER
No care due was identified.  [facility] Embedded Care Due Messages. Reference number: [id number].   11/02/2024 3:19:16 AM CDT

## 2024-11-03 RX ORDER — NAPROXEN SODIUM 550 MG/1
TABLET ORAL
Qty: 30 TABLET | Refills: 3 | Status: SHIPPED | OUTPATIENT
Start: 2024-11-03

## 2024-11-14 ENCOUNTER — OFFICE VISIT (OUTPATIENT)
Dept: INTERNAL MEDICINE | Facility: CLINIC | Age: 44
End: 2024-11-14
Payer: COMMERCIAL

## 2024-11-14 ENCOUNTER — HOSPITAL ENCOUNTER (OUTPATIENT)
Dept: RADIOLOGY | Facility: HOSPITAL | Age: 44
Discharge: HOME | End: 2024-11-14
Attending: FAMILY MEDICINE
Payer: COMMERCIAL

## 2024-11-14 VITALS — HEIGHT: 61 IN | WEIGHT: 285.5 LBS | BODY MASS INDEX: 53.9 KG/M2

## 2024-11-14 VITALS
SYSTOLIC BLOOD PRESSURE: 128 MMHG | DIASTOLIC BLOOD PRESSURE: 84 MMHG | TEMPERATURE: 98 F | OXYGEN SATURATION: 99 % | BODY MASS INDEX: 55.61 KG/M2 | HEART RATE: 76 BPM | WEIGHT: 293 LBS

## 2024-11-14 DIAGNOSIS — E66.01 MORBID OBESITY WITH BMI OF 50.0-59.9, ADULT: ICD-10-CM

## 2024-11-14 DIAGNOSIS — G43.719 INTRACTABLE CHRONIC MIGRAINE WITHOUT AURA AND WITHOUT STATUS MIGRAINOSUS: ICD-10-CM

## 2024-11-14 DIAGNOSIS — Z12.31 ENCOUNTER FOR SCREENING MAMMOGRAM FOR MALIGNANT NEOPLASM OF BREAST: ICD-10-CM

## 2024-11-14 DIAGNOSIS — F41.1 GAD (GENERALIZED ANXIETY DISORDER): ICD-10-CM

## 2024-11-14 DIAGNOSIS — N89.8 VAGINAL ODOR: ICD-10-CM

## 2024-11-14 DIAGNOSIS — M17.0 PRIMARY OSTEOARTHRITIS OF BOTH KNEES: ICD-10-CM

## 2024-11-14 DIAGNOSIS — G47.00 INSOMNIA, UNSPECIFIED TYPE: ICD-10-CM

## 2024-11-14 DIAGNOSIS — I10 ESSENTIAL HYPERTENSION: ICD-10-CM

## 2024-11-14 DIAGNOSIS — F32.5 MAJOR DEPRESSIVE DISORDER WITH SINGLE EPISODE, IN FULL REMISSION: ICD-10-CM

## 2024-11-14 DIAGNOSIS — Z00.00 ROUTINE GENERAL MEDICAL EXAMINATION AT A HEALTH CARE FACILITY: Primary | ICD-10-CM

## 2024-11-14 PROBLEM — M54.32 LEFT SIDED SCIATICA: Status: RESOLVED | Noted: 2022-11-10 | Resolved: 2024-11-14

## 2024-11-14 PROCEDURE — 77067 SCR MAMMO BI INCL CAD: CPT | Mod: TC

## 2024-11-14 PROCEDURE — 77067 SCR MAMMO BI INCL CAD: CPT | Mod: 26,,, | Performed by: RADIOLOGY

## 2024-11-14 PROCEDURE — 77063 BREAST TOMOSYNTHESIS BI: CPT | Mod: 26,,, | Performed by: RADIOLOGY

## 2024-11-14 PROCEDURE — 99999 PR PBB SHADOW E&M-EST. PATIENT-LVL IV: CPT | Mod: PBBFAC,,, | Performed by: FAMILY MEDICINE

## 2024-11-14 NOTE — PROGRESS NOTES
Subjective:       Patient ID: Georgia Garcia is a 44 y.o. female presents with   Patient Active Problem List   Diagnosis    Migraine headache    Insomnia    Essential hypertension    Osteoarthritis of both knees    PREMA (generalized anxiety disorder)    Major depressive disorder with single episode, in full remission        Chief Complaint: Annual Exam (No complaints voiced, medication refills)    History of Present Illness    Georgia presents today for routine annual physicals and  follow up. She reports experiencing generalized pain throughout her body, which she describes as more intense than ever before. She reports ongoing struggle with anxiety and depression. She reports a history of chronic bacterial vaginosis (BV). She previously consulted with an OB/GYN over a year ago but did not return due to an unsatisfactory appointment experience.  She takes Xanax as needed and amitriptyline (Elavil) for sleep. Wellbutrin is used for anxiety and Estrace for hormones. She reports taking gabapentin (Neurontin), but not always 3 times daily as prescribed. For blood pressure management, she takes hydrochlorothiazide and losartan. Propranolol is used for both migraines and blood pressure control. She uses Robaxin (muscle relaxant) as needed. Nurtec is taken for migraines, which she reports has been more effective       ROS:  General: -fever, -chills, -fatigue, -weight gain, +weight loss, -loss of appetite, +change in weight  Eyes: -vision changes, -blurry vision, -eye pain, -eye discharge  ENT: -ear pain, -hearing loss, -tinnitus, -nasal congestion, -sore throat  Cardiovascular: -chest pain, -palpitations, -lower extremity edema  Respiratory: -cough, -shortness of breath, -wheezing, -sputum production  Endocrine: -polyuria, -polydipsia, -heat intolerance, -cold intolerance  Gastrointestinal: -abdominal pain, -heartburn, -nausea, -vomiting, -diarrhea, -constipation, -blood in stool  Genitourinary: -dysuria, -urgency,  -frequency, -hematuria, -nocturia, -incontinence  Heme & Lymphatic: -easy or excessive bleeding, -easy bruising, -swollen lymph nodes  Musculoskeletal: +muscle pain, -back pain, +joint pain, -joint swelling  Skin: -rash, -lesion, -itching, -skin texture changes, -skin color changes  Neurological: -headache, -dizziness, -numbness, -tingling, -seizure activity, -speech difficulty, -memory loss, -confusion  Psychiatric: +anxiety, +depression, -sleep difficulty  Female Genitourinary: -vaginal discharge , + vaginal odor               /84   Pulse 76   Temp 97.6 °F (36.4 °C) (Tympanic)   Wt 133.5 kg (294 lb 5 oz)   LMP 09/09/2014   SpO2 99%   BMI 55.61 kg/m²   Objective:      Physical Exam  Constitutional:       Appearance: She is well-developed. She is obese.   HENT:      Head: Normocephalic and atraumatic.   Cardiovascular:      Rate and Rhythm: Normal rate and regular rhythm.      Heart sounds: Normal heart sounds. No murmur heard.  Pulmonary:      Effort: Pulmonary effort is normal.      Breath sounds: Normal breath sounds. No wheezing.   Abdominal:      General: Bowel sounds are normal.      Palpations: Abdomen is soft.      Tenderness: There is no abdominal tenderness.   Musculoskeletal:         General: Tenderness present.      Comments: Positive for tenderness to multiple joints   Skin:     General: Skin is warm and dry.      Findings: No rash.   Neurological:      Mental Status: She is alert and oriented to person, place, and time.   Psychiatric:         Mood and Affect: Mood normal.           Assessment/Plan:   1. Routine general medical examination at a health care facility  -     Urinalysis, Reflex to Urine Culture Urine, Clean Catch; Future; Expected date: 11/14/2024  -     Hemoglobin A1C; Future; Expected date: 11/14/2024  -     TSH; Future; Expected date: 11/14/2024  -     Lipid Panel; Future; Expected date: 11/14/2024  -     Comprehensive Metabolic Panel; Future; Expected date: 11/14/2024  -      CBC Auto Differential; Future; Expected date: 11/14/2024  -     Vitamin D; Future; Expected date: 11/14/2024  Vital signs stable today.  Clinical exam stable  Continue lifestyle modifications with low-fat and low-cholesterol diet and exercise 30 minutes daily    2. PREMA (generalized anxiety disorder)  5. Major depressive disorder with single episode, in full remission  -     TSH; Future; Expected date: 11/14/2024  Currently taking Wellbutrin 100 mg twice daily and Xanax as needed    3. Intractable chronic migraine without aura and without status migrainosus  -     CBC Auto Differential; Future; Expected date: 11/14/2024  Stable on Propanol and Nurtec as needed    4. Essential hypertension  -     Urinalysis, Reflex to Urine Culture Urine, Clean Catch; Future; Expected date: 11/14/2024  -     TSH; Future; Expected date: 11/14/2024  -     Lipid Panel; Future; Expected date: 11/14/2024  -     Comprehensive Metabolic Panel; Future; Expected date: 11/14/2024  -     Aldosterone/Renin Activity Ratio; Future; Expected date: 11/14/2024  Blood pressure was initially elevated but repeat blood pressure was stable currently taking losartan 100 mg and hydrochlorothiazide 12.5 mg  Will check further labs  Encouraged to monitor blood pressure trends and restrict salt intake      6. Morbid obesity with BMI of 50.0-59.9, adult  -     Ambulatory referral/consult to [facility]; Future; Expected date: 11/21/2024  Lifestyle modifications recommended to lose weight with BMI 55  Will refer to lifestyle and wellness clinic as patient is interested to consider medical management for weight loss    7. Primary osteoarthritis of both knees  Graded exercise regimen recommended    8. Vaginal odor  -     C. trachomatis/N. gonorrhoeae by AMP [facility]; Urine; Future; Expected date: 11/14/2024  Will check further labs  Encouraged to drink adequate fluids    9. Insomnia, unspecified type  Stable on Elavil as needed         This note  was generated with the assistance of ambient listening technology. Verbal consent was obtained by the patient and accompanying visitor(s) for the recording of patient appointment to facilitate this note. I attest to having reviewed and edited the generated note for accuracy, though some syntax or spelling errors may persist. Please contact the author of this note for any clarification.

## 2024-11-15 ENCOUNTER — PATIENT MESSAGE (OUTPATIENT)
Dept: INTERNAL MEDICINE | Facility: CLINIC | Age: 44
End: 2024-11-15
Payer: COMMERCIAL

## 2024-11-21 DIAGNOSIS — M54.32 LEFT SIDED SCIATICA: ICD-10-CM

## 2024-11-21 DIAGNOSIS — F41.1 GAD (GENERALIZED ANXIETY DISORDER): ICD-10-CM

## 2024-11-21 RX ORDER — BUPROPION HYDROCHLORIDE 100 MG/1
100 TABLET ORAL 2 TIMES DAILY
Qty: 180 TABLET | Refills: 1 | Status: SHIPPED | OUTPATIENT
Start: 2024-11-21 | End: 2025-11-21

## 2024-11-21 RX ORDER — LOSARTAN POTASSIUM 100 MG/1
100 TABLET ORAL DAILY
Qty: 90 TABLET | Refills: 1 | Status: SHIPPED | OUTPATIENT
Start: 2024-11-21

## 2024-11-21 RX ORDER — HYDROCHLOROTHIAZIDE 12.5 MG/1
12.5 TABLET ORAL DAILY
Qty: 90 TABLET | Refills: 1 | Status: SHIPPED | OUTPATIENT
Start: 2024-11-21

## 2024-11-21 RX ORDER — GABAPENTIN 100 MG/1
100 CAPSULE ORAL 3 TIMES DAILY
Qty: 90 CAPSULE | Refills: 5 | Status: SHIPPED | OUTPATIENT
Start: 2024-11-21

## 2024-11-21 RX ORDER — ALPRAZOLAM 0.25 MG/1
0.25 TABLET ORAL NIGHTLY PRN
Qty: 30 TABLET | Refills: 0 | Status: SHIPPED | OUTPATIENT
Start: 2024-11-21

## 2024-11-21 RX ORDER — ESTRADIOL 1 MG/1
1 TABLET ORAL DAILY
Qty: 90 TABLET | Refills: 1 | Status: SHIPPED | OUTPATIENT
Start: 2024-11-21 | End: 2025-11-21

## 2024-11-21 NOTE — TELEPHONE ENCOUNTER
No care due was identified.  [facility] Embedded Care Due Messages. Reference number: [id number].   11/21/2024 9:21:03 AM CST

## 2024-11-21 NOTE — TELEPHONE ENCOUNTER
No care due was identified.  [facility] Embedded Care Due Messages. Reference number: [id number].   11/21/2024 9:21:40 AM CST

## 2024-11-21 NOTE — TELEPHONE ENCOUNTER
Refill Routing Note   Medication(s) are not appropriate for processing by [facility] for the following reason(s):        Outside of protocol  No active prescription written by provider    [facility] action(s):  Defer  Route      Medication Therapy Plan: GABAPENTIN-OOP      Appointments  past 12m or future 3m with PCP    Date Provider   Last Visit   11/14/2024 Tami Ward MD   Next Visit   11/21/2024 Tami Ward MD   ED visits in past 90 days: 0        Note composed:4:47 PM 11/21/2024

## 2024-11-29 ENCOUNTER — OFFICE VISIT (OUTPATIENT)
Dept: OBSTETRICS AND GYNECOLOGY | Facility: CLINIC | Age: 44
End: 2024-11-29
Payer: COMMERCIAL

## 2024-11-29 VITALS
HEIGHT: 61 IN | BODY MASS INDEX: 55.32 KG/M2 | SYSTOLIC BLOOD PRESSURE: 116 MMHG | DIASTOLIC BLOOD PRESSURE: 80 MMHG | WEIGHT: 293 LBS

## 2024-11-29 DIAGNOSIS — Z01.419 ENCOUNTER FOR ANNUAL ROUTINE GYNECOLOGICAL EXAMINATION: Primary | ICD-10-CM

## 2024-11-29 DIAGNOSIS — N89.8 VAGINAL ODOR: ICD-10-CM

## 2024-11-29 DIAGNOSIS — N76.0 RECURRENT VAGINITIS: ICD-10-CM

## 2024-11-29 PROCEDURE — 0352U VAGINOSIS SCREEN BY DNA PROBE: CPT

## 2024-11-29 PROCEDURE — 99999 PR PBB SHADOW E&M-EST. PATIENT-LVL IV: CPT | Mod: PBBFAC,,,

## 2024-11-29 NOTE — PATIENT INSTRUCTIONS
[facility] - [place], [place] - [address]        Vaginitis prevention includes:   a. avoiding feminine products such as deoderant soaps, body wash, bubble bath, douches, scented toilet paper, deoderant tampons or pads, feminine wipes, chronic pad use, etc.   b. avoiding other vulvovaginal irritants such as long hot baths, humidity, tight, synthetic clothing, chlorine and sitting around in wet bathing suits   c. wearing cotton underwear, avoiding thong underwear and no underwear to bed   d. taking showers instead of baths and use a hair dryer on cool setting afterwards to dry   e. wearing cotton to exercise and shower immediately after exercise and change clothes   f. using polyurethane condoms without spermicide if sexually active and symptoms are triggered by intercourse      I also recommend taking a daily Probiotic (Renew Life Woman's Daily Probiotic) and Vit D supplement.   You can use a vaginal Boric Acid weekly for 3 months

## 2024-11-29 NOTE — PROGRESS NOTES
Subjective:       Patient ID: Georgia Garcia is a 44 y.o. female.    Chief Complaint:  Well Woman      History of Present Illness  Gynecologic Exam  The patient's primary symptoms include a genital odor. The patient's pertinent negatives include no genital itching, genital lesions, genital rash, missed menses, pelvic pain, vaginal bleeding or vaginal discharge. This is a recurrent problem. The current episode started more than 1 year ago. The problem occurs constantly. The problem has been unchanged. The patient is experiencing no pain. The problem affects both sides. She is not pregnant. Associated symptoms include headaches. Pertinent negatives include no abdominal pain, anorexia, back pain, chills, constipation, diarrhea, discolored urine, dysuria, fever, flank pain, frequency, hematuria, nausea, painful intercourse, rash, urgency or vomiting. There has been no bleeding. She has not been passing clots. She has not been passing tissue. Nothing aggravates the symptoms. She has tried antibiotics for the symptoms. The treatment provided no relief. She is sexually active. No, her partner does not have an STD. Her past medical history is significant for an abdominal surgery, a  section and a gynecological surgery. There is no history of an ectopic pregnancy, endometriosis, herpes simplex, menorrhagia, metrorrhagia, miscarriage, ovarian cysts, perineal abscess, PID, an STD, a terminated pregnancy or vaginosis.     Annual Exam-Premenopausal  Patient presents for annual exam. The patient has complaints today of vaginal odor. The patient is sexually active, MM with . GYN screening history: last pap: approximate date 214 and was normal, last mammogram: approximate date 24 and was normal, and hysterectomy for benign indication, ovaries remain. She has been on 1mg Estradiol PO and doing well . The patient wears seatbelts: yes. The patient participates in regular exercise: no. Has the patient ever been  transfused or tattooed?: not asked. The patient reports that there is not domestic violence in her life.    Patient reports recurrent vaginitis symptoms since hysterectomy in    She always feels she has a vaginal odor, despite following all recommendations it continues to be bothersome for her   Previous treatment with flagyl, Metrogel, boric acids PRN  Washing with [employer] products    GYN & OB History  Patient's last menstrual period was 2014.   Date of Last Pap: No result found    OB History    Para Term  AB Living   2 2 1     1   SAB IAB Ectopic Multiple Live Births                  # Outcome Date GA Lbr Jimmie/2nd Weight Sex Type Anes PTL Lv   2 Term            1 Para      CS-Unspec          Review of Systems  Review of Systems   Constitutional:  Negative for chills and fever.   Gastrointestinal:  Negative for abdominal pain, anorexia, constipation, diarrhea, nausea and vomiting.   Genitourinary:  Positive for vaginal odor. Negative for dysuria, flank pain, frequency, hematuria, menorrhagia, missed menses, pelvic pain, urgency, vaginal discharge and vaginal pain.   Musculoskeletal:  Negative for back pain.   Integumentary:  Negative for rash.   Neurological:  Positive for headaches.           Objective:      Physical Exam:   Constitutional: She is oriented to person, place, and time. She appears well-developed and well-nourished.    HENT:   Head: Normocephalic and atraumatic.   Nose: Nose normal.    Eyes: Pupils are equal, round, and reactive to light. Conjunctivae and EOM are normal.     Cardiovascular:  Normal rate and regular rhythm.             Pulmonary/Chest: Effort normal. She has no rhonchi. Right breast exhibits no inverted nipple, no mass, no nipple discharge, no tenderness and no bleeding. Left breast exhibits no inverted nipple, no mass, no nipple discharge, no tenderness and no bleeding. Breasts are symmetrical.        Abdominal: Soft. There is no abdominal tenderness. There  is no rebound and no guarding.     Genitourinary:    Inguinal canal, vagina, right adnexa and left adnexa normal.      Pelvic exam was performed with patient supine.   The external female genitalia was normal.   No external genitalia lesions identified,Genitalia hair distrobution normal .     Labial bartholins normal.No vaginal discharge, tenderness or bleeding in the vagina. Cervix is absent.Uterus is absent. Normal urethral meatus.          Musculoskeletal: Normal range of motion and moves all extremeties.       Neurological: She is alert and oriented to person, place, and time.    Skin: Skin is warm and dry.    Psychiatric: She has a normal mood and affect. Her speech is normal and behavior is normal. Mood, judgment and thought content normal.             Assessment:        1. Encounter for annual routine gynecological examination    2. Vaginal odor    3. Recurrent vaginitis               Plan:   Continue annual well woman exam.  Reviewed updated recommendations for pap smears (no pap smear needed) in patients with a hysterectomy for benign indications.   Patient needs a pelvic exam every year.  Reviewed recommendations for annual CBE and annual MMG.   Vaginitis prevention discussed:   a. avoiding feminine products such as deoderant soaps, body wash, bubble bath, douches, scented toilet paper, deoderant tampons or pads, feminine wipes, chronic pad use, etc.   b. avoiding other vulvovaginal irritants such as long hot baths, humidity, tight, synthetic clothing, chlorine and sitting around in wet bathing suits   c. wearing cotton underwear, avoiding thong underwear and no underwear to bed   d. taking showers instead of baths and use a hair dryer on cool setting afterwards to dry   e. wearing cotton to exercise and shower immediately after exercise and change clothes   f. using polyurethane condoms without spermicide if sexually active and symptoms are triggered by intercourse    I also recommend taking a daily  Probiotic (Renew Life Woman's Daily Probiotic) and Vit D supplement.     Patient excepts trial of Secnidazole for treatment   Will send Boric Acid to compound pharmacy, patient instructed to insert vaginally once a week for 3 months.       Diagnosis and orders this visit:  Encounter for annual routine gynecological examination    Vaginal odor  -     Vaginosis Screen by DNA Probe  -     secnidazole (SOLOSEC) 2 gram grdp; Take 2 g by mouth once. for 1 dose  Dispense: 1 packet; Refill: 0    Recurrent vaginitis  -     boric acid (BORIC ACID) vaginal suppository; Place 1 each (650 mg total) vaginally once a week.  Dispense: 12 each; Refill: 0         Jaclyn River NP

## 2024-12-16 DIAGNOSIS — M17.0 BILATERAL PRIMARY OSTEOARTHRITIS OF KNEE: Primary | ICD-10-CM

## 2024-12-26 ENCOUNTER — PATIENT MESSAGE (OUTPATIENT)
Dept: OBSTETRICS AND GYNECOLOGY | Facility: CLINIC | Age: 44
End: 2024-12-26
Payer: COMMERCIAL

## 2024-12-26 DIAGNOSIS — N76.0 RECURRENT VAGINITIS: Primary | ICD-10-CM

## 2024-12-31 DIAGNOSIS — F41.1 GAD (GENERALIZED ANXIETY DISORDER): ICD-10-CM

## 2024-12-31 NOTE — TELEPHONE ENCOUNTER
No care due was identified.  [facility] Embedded Care Due Messages. Reference number: [id number].   12/31/2024 3:28:19 PM CST

## 2025-01-02 ENCOUNTER — OFFICE VISIT (OUTPATIENT)
Dept: SPORTS MEDICINE | Facility: CLINIC | Age: 45
End: 2025-01-02
Payer: COMMERCIAL

## 2025-01-02 ENCOUNTER — HOSPITAL ENCOUNTER (OUTPATIENT)
Dept: RADIOLOGY | Facility: HOSPITAL | Age: 45
Discharge: HOME | End: 2025-01-02
Attending: STUDENT IN AN ORGANIZED HEALTH CARE EDUCATION/TRAINING PROGRAM
Payer: COMMERCIAL

## 2025-01-02 VITALS — HEIGHT: 61 IN | WEIGHT: 293 LBS | BODY MASS INDEX: 55.32 KG/M2

## 2025-01-02 DIAGNOSIS — M17.0 BILATERAL PRIMARY OSTEOARTHRITIS OF KNEE: Primary | ICD-10-CM

## 2025-01-02 DIAGNOSIS — M17.0 BILATERAL PRIMARY OSTEOARTHRITIS OF KNEE: ICD-10-CM

## 2025-01-02 PROCEDURE — 99999 PR PBB SHADOW E&M-EST. PATIENT-LVL III: CPT | Mod: PBBFAC,,, | Performed by: STUDENT IN AN ORGANIZED HEALTH CARE EDUCATION/TRAINING PROGRAM

## 2025-01-02 PROCEDURE — 73564 X-RAY EXAM KNEE 4 OR MORE: CPT | Mod: TC,50

## 2025-01-02 PROCEDURE — 73564 X-RAY EXAM KNEE 4 OR MORE: CPT | Mod: 26,,, | Performed by: RADIOLOGY

## 2025-01-02 RX ORDER — TRIAMCINOLONE ACETONIDE 40 MG/ML
40 INJECTION, SUSPENSION INTRA-ARTICULAR; INTRAMUSCULAR
Status: DISCONTINUED | OUTPATIENT
Start: 2025-01-02 | End: 2025-01-02 | Stop reason: HOSPADM

## 2025-01-02 RX ORDER — ALPRAZOLAM 0.25 MG/1
TABLET ORAL
Qty: 30 TABLET | Refills: 0 | Status: SHIPPED | OUTPATIENT
Start: 2025-01-02

## 2025-01-02 RX ADMIN — TRIAMCINOLONE ACETONIDE 40 MG: 40 INJECTION, SUSPENSION INTRA-ARTICULAR; INTRAMUSCULAR at 11:01

## 2025-01-02 NOTE — PATIENT INSTRUCTIONS
Assessment:  Georgia Garcia is a 44 y.o. female   Chief Complaint   Patient presents with    Right Knee - Pain    Left Knee - Pain       Encounter Diagnosis   Name Primary?    Bilateral primary osteoarthritis of knee Yes        Plan:  Ultrasound guided cortisone injections to bilateral knees  We discussed the proper protocols after the injection such as no submerging pools, baths tubs, or hot tubs for 24 hr.  Showering is okay today.  We also discussed that blood sugars can be elevated after an injection and asked patient to properly checked her sugars over the next few days and contact their PCP if there are any concerns.  We discussed red flags such as fevers, chills, red, warm, tender joint at the area of injection to please seek medical care immediately.    Follow up as needed    General Arthritis info:    -shiny white stuff at end of a chicken bones is cartilage    -arthritis is wearing away of the cartilage that lines the end of your bones    -osteoarthritis is thought to be a wear and tear phenomenon    -symptoms are due to inflammation of joint causing stiffness, aching, and sometimes swelling    -occasionally sharp pain will occur causing a give way sensation    -Risk factors: genetic, weight, female > male, age    Treatment options:    -maintain healthy weight (every pound is 4 pounds of pressure on the knee)    -daily moderate exercise (walk, bike, swim 30 minutes per day) to keep joints moving    -daily strengthening exercises (through therapy or on own) to keep muscles supporting joint healthy and strong    -glucosamine 1500mg daily (look for USP label on bottle)    -tylenol as needed for pain (follow directions on the bottle)    -anti-inflammatory medication such as alleve may be helpful- take 1-2 tabs twice daily for 7 days. If it helps your pain, continue. If you do not feel any change, you may stop and then take it as needed.    (you may be given a once daily anti-inflammatory such as MOBIC. If  given, avoid other anti-inflammatory medications such as advil, ibuprofen, motrin, naprosyn, alleve, etc)    -if swollen and painful, ice, decrease activity, and take anti-inflammatory daily for 5-7 days and if no relief call your doctor for further options    -consider cortisone injection (every 3-4 months at most)- anti- inflammatory steroid medication that can be injected directly into the joint to reduce inflammation    -consider hyaluronic acid injections (eufflexxa, hyalgan, synvisc, supartz) (every 6 months at most)- protein injection that helps decrease pain and irritability in the joint. It is best used to help prolong intervals between cortisone injections to minimize steroid injections. These are currently approved for knee injections. Discuss with your doctor if other joint involved. Call to seek approval prior to the injections.    -long-term treatment may include a total joint replacement (keep diary of good days and bad days, then evaluate as to when you are ready)        Follow-up: as needed.    Thank you for choosing [facility] and Dr. Oswaldo Nova for your orthopedic & sports medicine care. It is our goal to provide you with exceptional care that will help keep you healthy, active, and get you back in the game.    Please do not hesitate to reach out to us via email, phone, or MyChart with any questions, concerns, or feedback.    If you felt that you received exemplary care today, please consider leaving us feedback on [facility] at:  [email or web address]    If you are experiencing pain/discomfort ,or have questions after 5pm and would like to be connected to the [facility] on-call team, please call this number and specify which Sports Medicine provider is treating you: [phone]

## 2025-01-02 NOTE — PROGRESS NOTES
Patient ID: Georgia Garcia  YOB: 1980  MRN: [id number]    Chief Complaint: Pain of the Right Knee and Pain of the Left Knee          History of Present Illness:  Here for bilateral knee followup, her last visit was 2023, she received CSI in both knees. Injections lasted  for some time. She has had pain in both knees for the last 2l months and would like bilateral cortisone knee injections today.  No total locking or giving way but has weakness in the legs and pain in the wore that she walks with a more that she stands legs. Pain 8/10 today.     2023 Interval History of Present Illness:  Patient is being seen for bilateral knee pain that has been present for years, but recently worsened over the last couple of weeks. Rating pain 7/10 at today's visit. She had bilateral SYNVISC 1 in January.  She says that Visco helped with knee pain, She wants to discuss injections.            Past Medical History:   Past Medical History:   Diagnosis Date    Anemia     Cervical radiculopathy     COVID-19 virus infection 2021    Diverticulosis     Hypermenorrhea     Hypertension     Insomnia     Internal hemorrhoid     Migraine headache     Morbid obesity     Rectal bleeding     Uterine fibroid      Past Surgical History:   Procedure Laterality Date     SECTION, LOW TRANSVERSE      x1    COLONOSCOPY N/A 2022    Procedure: COLONOSCOPY;  Surgeon: Maximus Lopes MD;  Location: [facility];  Service: Endoscopy;  Laterality: N/A;    TOTAL ABDOMINAL HYSTERECTOMY  14    TUBAL LIGATION       Family History   Problem Relation Name Age of Onset    Hypertension Mother      Pulmonary embolism Mother      Deep vein thrombosis Mother      Thrombophilia Mother      Heart failure Mother      Pulmonary embolism Maternal Uncle      Deep vein thrombosis Maternal Uncle      Thrombophilia Maternal Uncle      Colon cancer Maternal Uncle      COPD Father      Diabetes Maternal Grandfather      Stroke  Maternal Grandfather      Breast cancer Maternal Aunt      Lung cancer Paternal Uncle      Breast cancer Maternal Grandmother      Stroke Maternal Grandmother      ADD / ADHD Daughter       Social History     Socioeconomic History    Marital status: Single   Occupational History     Employer: [employer]   Tobacco Use    Smoking status: Never    Smokeless tobacco: Never   Substance and Sexual Activity    Alcohol use: No    Drug use: No    Sexual activity: Yes     Partners: Male     Birth control/protection: Surgical     Comment: hyst; mut monog   Social History Narrative    She is , has one daughter and 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              Social Drivers of Health     Financial Resource Strain: Medium Risk (4/17/2024)    Overall Financial Resource Strain (CARDIA)     Difficulty of Paying Living Expenses: Somewhat hard   Food Insecurity: No Food Insecurity (4/17/2024)    Hunger Vital Sign     Worried About Running Out of Food in the Last Year: Never true     Ran Out of Food in the Last Year: Never true   Transportation Needs: No Transportation Needs (4/17/2024)    PRAPARE - Transportation     Lack of Transportation (Medical): No     Lack of Transportation (Non-Medical): No   Physical Activity: Insufficiently Active (4/17/2024)    Exercise Vital Sign     Days of Exercise per Week: 2 days     Minutes of Exercise per Session: 30 min   Stress: Stress Concern Present (4/17/2024)    [employer] - Occupational Stress Questionnaire     Feeling of Stress : Very much   Housing Stability: Low Risk  (11/7/2023)    Housing Stability Vital Sign     Unable to Pay for Housing in the Last Year: No     Number of Places Lived in the Last Year: 1     Unstable Housing in the Last Year: No     Medication List with Changes/Refills   Current Medications    AMITRIPTYLINE (ELAVIL) 25 MG TABLET    Take 1 tablet (25 mg total) by mouth every  evening.    AZELASTINE (ASTELIN) 137 MCG (0.1 %) NASAL SPRAY    SMARTSIG:Both Nares    BORIC ACID (BORIC ACID) VAGINAL SUPPOSITORY    Place 1 each (650 mg total) vaginally once a week.    BUPROPION (WELLBUTRIN) 100 MG TABLET    Take 1 tablet (100 mg total) by mouth 2 (two) times daily.    ESTRADIOL (ESTRACE) 1 MG TABLET    Take 1 tablet (1 mg total) by mouth once daily.    GABAPENTIN (NEURONTIN) 100 MG CAPSULE    Take 1 capsule (100 mg total) by mouth 3 (three) times daily.    HYDROCHLOROTHIAZIDE (HYDRODIURIL) 12.5 MG TAB    Take 1 tablet (12.5 mg total) by mouth once daily.    LOSARTAN (COZAAR) 100 MG TABLET    Take 1 tablet (100 mg total) by mouth once daily.    METHOCARBAMOL (ROBAXIN) 500 MG TAB    Take 500 mg by mouth 4 (four) times daily.    NAPROXEN SODIUM (ANAPROX) 550 MG TABLET    TAKE 1 TABLET(550 MG) BY MOUTH TWICE DAILY AS NEEDED FOR PAIN    PROPRANOLOL (INDERAL LA) 60 MG 24 HR CAPSULE    Take 1 capsule (60 mg total) by mouth once daily.   Changed and/or Refilled Medications    Modified Medication Previous Medication    ALPRAZOLAM (XANAX) 0.25 MG TABLET ALPRAZolam (XANAX) 0.25 MG tablet       TAKE 1 TABLET BY MOUTH AT NIGHT AS NEEDED FOR ANXIETY    Take 1 tablet (0.25 mg total) by mouth nightly as needed for Anxiety.     Review of patient's allergies indicates:  No Known Allergies    Physical Exam:   Body mass index is 55.57 kg/m².    GENERAL: Well appearing, in no acute distress.  HEAD: Normocephalic and atraumatic.  ENT: External ears and nose grossly normal.  EYES: EOMI bilaterally  PULMONARY: Respirations are grossly even and non-labored.  NEURO: Awake, alert, and oriented x 3.  SKIN: No obvious rashes appreciated.  PSYCH: Mood & affect are appropriate.    Detailed MSK exam:     Left knee exam:   -ROM: extension -5, flexion 120  -TTP: Medial joint line and Lateral joint line  -effusion: trace  -Patellar apprehension negative  -Arlette test negative  -stable to varus and valgus stress tests  -Lachman  test negative, anterior drawer test negative, posterior drawer test negative    Right knee exam:   -ROM: extension -5, flexion 120  -TTP: Medial joint line and Lateral joint line  -effusion: trace  -Patellar apprehension negative  -Arlette test negative  -stable to varus and valgus stress tests  -Lachman test negative, anterior drawer test negative, posterior drawer test negative      Imaging:  Mammo Digital Screening Bilat w/ Roderick  Narrative: Facility:  [facility] [address]  [place] [id number] [phone]    Name: Georgia Garcia    MRN: [id number]    Result:  Mammo Digital Screening Bilat w/ Roderick    History:  Patient is 44 y.o. and is seen for a screening mammogram.    Films Compared:  Compared to: 11/13/2023 Mammo Digital Screening Bilat w/ Roderick, 11/11/2022   Mammo Digital Screening Bilat w/ Roderick, 11/02/2021 Mammo Digital Screening   Bilat w/ Roderick, and 10/31/2020 Mammo Digital Screening Bilat w/ Roderick     Findings:  This procedure was performed using tomosynthesis.   Computer-aided detection was utilized in the interpretation of this   examination.    There are scattered areas of fibroglandular density. There is no evidence   of suspicious masses, microcalcifications or architectural distortion.  Impression:    No mammographic evidence of malignancy.    BI-RADS Category 1: Negative    Recommendation:  Routine screening mammogram in 1 year is recommended.    Your estimated lifetime risk of breast cancer (to age 85) based on   Tyrer-Cuzick risk assessment model is 11.19%.  According to the American   Cancer Society, patients with a lifetime breast cancer risk of 20% or   higher might benefit from supplemental screening tests, such as screening   breast MRI.    Rell Ward,         Relevant imaging results were reviewed and interpreted by me and per my read shows mild medial compartment joint space narrowing bilaterally on knee radiographs.  This was discussed  with the patient and / or family today.     Assessment:  Georgia Garcia is a 44 y.o. female following up for chronic bilateral knee pain. Steroid and gel injections have worked well for her in the past. Interested in repeating steroid injection today.   Plan: Steroid injection given today (see separate procedure note for details). We discussed the proper protocols after the injection such as no submerging pools, baths tubs, or hot tubs for 24 hr.  Showering is okay today.  We also discussed that blood sugars can be elevated after an injection and asked patient to properly checked her sugars over the next few days and contact their PCP if there are any concerns.  We discussed red flags such as fevers, chills, red, warm, tender joint at the area of injection to please seek medical care immediately.   Consider repeat gel injections if not improving. Continue conservative management for pain.   Follow up as needed. All questions answered.     Bilateral primary osteoarthritis of knee  -     Sports Medicine US - Guidance for Needle Placement  -     Large Joint Aspiration/Injection: bilateral knee       Ultrasound guidance was used for needle localization. Images were saved and stored for documentation. The appropriate structures were visualized. Dynamic visualization of the needle was continuous throughout the procedures and maintained good position.     MEDICAL NECESSITY FOR VISCOSUPPLEMENTATION: After thorough evaluation of the patient, I have determined that visco-supplementation is medically necessary. The patient has painful degenerative changes of the knee with failure of conservative treatments including lifestyle modifications and rehabilitation exercises.  Oral analgesis/NSAIDs have not adequately controlled symptoms and there is radiographic evidence of Kellgren Sarkis grade 2 or greater osteoarthritic changes, or in lack of radiographic evidence, there is arthroscopic or other evidence of chondrosis.        Electronically signed:  Oswaldo Nova MD, MPH  01/02/2025  3:25 PM

## 2025-01-02 NOTE — PROCEDURES
Large Joint Aspiration/Injection: bilateral knee    Date/Time: 1/2/2025 11:40 AM    Performed by: Oswaldo Nova MD  Authorized by: Oswaldo Nova MD    Consent Done?:  Yes (Verbal)  Indications:  Arthritis and pain  Site marked: the procedure site was marked    Timeout: prior to procedure the correct patient, procedure, and site was verified    Prep: patient was prepped and draped in usual sterile fashion    Local anesthetic:  Bupivacaine 0.5% without epinephrine and lidocaine 1% without epinephrine    Details:  Needle Size:  21 G  Ultrasonic Guidance for needle placement?: Yes    Images are saved and documented.  Approach:  Lateral (superior)  Location:  Knee  Laterality:  Bilateral  Site:  Bilateral knee  Medications (Right):  40 mg triamcinolone acetonide 40 mg/mL  Medications (Left):  40 mg triamcinolone acetonide 40 mg/mL  Patient tolerance:  Patient tolerated the procedure well with no immediate complications     Ultrasound guidance was used for needle localization. Images were saved and stored for documentation. The appropriate structures were visualized. Dynamic visualization of the needle was continuous throughout the procedures and maintained good position.

## 2025-01-02 NOTE — PROCEDURES
Sports Medicine US - Guidance for Needle Placement    Date/Time: 1/2/2025 11:40 AM    Performed by: Oswaldo Nova MD  Authorized by: Oswaldo Nova MD  Preparation: Patient was prepped and draped in the usual sterile fashion.  Local anesthesia used: no    Anesthesia:  Local anesthesia used: no    Sedation:  Patient sedated: no    Patient tolerance: patient tolerated the procedure well with no immediate complications  Comments: Ultrasound guidance was used for needle localization. Images were saved and stored for documentation. The appropriate structures were visualized. Dynamic visualization of the needle was continuous throughout the procedures and maintained good position.

## 2025-01-02 NOTE — PROGRESS NOTES
Patient ID: [id number]     Chief Complaint: Obesity    HPI:     Georgia Garcia is a 44 y.o. female with diagnosis of HTN, Obesity, Migraines, Anxiety, Depression. Patient referred by PCP. Patient's PCP is Dr. Ward. Patient seen in clinic today for Obesity.     Weight history   States always had increased weight, even as a child. Patient states more prevalent on her mom's side of the family. Patient interested in compounded version of GLP-1 due to no insurance coverage for weight loss GLP-1.   Highest: 290 lbs  Lowest: 230 lbs   Here are some differences between compounded GLP-1 and pharmacy GLP-1:   Cost: Compounded GLP-1 is often cheaper than brand-name GLP-1, but lower prices may indicate lower quality ingredients.   Availability: GLP-1 is available at pharmacies that have it in stock, or through online pharmacies and [employer].   Safety: There are risks and unknowns associated with compounded GLP-1, including the possibility of lower quality ingredients.    Regulation: Compounding pharmacies are regulated by the Drug Quality and Security Act (DQSA) and state boards of pharmacy. The FDA oversees some compounding pharmacies, while state boards of pharmacy are responsible for others.     Previous Obesity Treatment:   Diet/Exercise - unsuccessful  Phentermine - unsuccessful   Wellbutrin - for depression, didn't help with weight loss    BMI:  54.8 (01/03/2025)  55.6 (11/14/2024)  54.1 (06/11/2024)    Weight:  Wt Readings from Last 6 Encounters:   01/03/25 131.6 kg (290 lb 3.2 oz)   01/02/25 133.4 kg (294 lb 1.5 oz)   11/29/24 133.4 kg (294 lb 1.5 oz)   11/14/24 129.5 kg (285 lb 7.9 oz)   11/14/24 133.5 kg (294 lb 5 oz)   06/14/24 129.5 kg (285 lb 7.9 oz)     Ideal/Adjusted Body Weight:  Ideal: 105 lbs  Adjusted: 179 lbs    Neck Circumference:   17 in    Waist Circumference:  50 in    Percent Body Fat:   55% (01/03/2025)  [email or web address].calculator.net/body-fat-calculator.[email or web address]    InBody:   Date: 01/03/2025, scanned to  media, reviewed  SMM: 73.4 lbs   Body Fat Mass: 159.5 lbs  Visceral Fat Level: 27  Basal Metabolic Rate: 1,650 kcal    Nutrition history (24 hour food recall)  Breakfast: meal replacement shake   Lunch: none   Dinner: cabbage  Snack: none   Water: 48 oz/day  Sugar Sweetened beverages: none   Etoh: none   Satiety cues: feels full after consuming a meal, denies having to consume large portions to feel full   Cravings: denies   Food noise:denies   Dietician: previously     Eating Disorders:   Denies     Current physical activity:   Walks most days/week  Barriers: time, knee pain    Stressors/Mental Health   Work   Over the last two weeks how often have you been bothered by little interest or pleasure in doing things: 0  Over the last two weeks how often have you been bothered by feeling down, depressed or hopeless: 2  PHQ-2 Total Score: 2    Sleep habits   Feels like she can sleep all the time, sleeps well, denies sleep apnea symptoms    CVD screening  The 10-year ASCVD risk score (Thuy SENA, et al., 2019) is: 2.5%    Values used to calculate the score:      Age: 44 years      Sex: Female      Is Non- : Yes      Diabetic: No      Tobacco smoker: No      Systolic Blood Pressure: 130 mmHg      Is BP treated: Yes      HDL Cholesterol: 52 mg/dL      Total Cholesterol: 168 mg/dL    LMP: Hysterectomy     Contraception: Hysterectomy       I spent 15 minutes counseling patient on diet, physical activity and reviewing labs.     Review of patient's allergies indicates:  No Known Allergies  Current Outpatient Medications   Medication Instructions    ALPRAZolam (XANAX) 0.25 MG tablet TAKE 1 TABLET BY MOUTH AT NIGHT AS NEEDED FOR ANXIETY    amitriptyline (ELAVIL) 25 mg, Oral, Nightly    azelastine (ASTELIN) 137 mcg (0.1 %) nasal spray SMARTSIG:Both Nares    boric acid (BORIC ACID) 650 mg, Vaginal, Weekly    buPROPion (WELLBUTRIN) 100 mg, Oral, 2 times daily    estradioL (ESTRACE) 1 mg, Oral, Daily     gabapentin (NEURONTIN) 100 mg, Oral, 3 times daily    hydroCHLOROthiazide (HYDRODIURIL) 12.5 mg, Oral, Daily    losartan (COZAAR) 100 mg, Oral, Daily    methocarbamoL (ROBAXIN) 500 mg, 4 times daily    naproxen sodium (ANAPROX) 550 MG tablet TAKE 1 TABLET(550 MG) BY MOUTH TWICE DAILY AS NEEDED FOR PAIN    propranoloL (INDERAL LA) 60 mg, Oral, Daily    semaglutide (OZEMPIC) 0.25 mg, Subcutaneous, Every 7 days     Past Surgical History:   Procedure Laterality Date     SECTION, LOW TRANSVERSE      x1    COLONOSCOPY N/A 2022    Procedure: COLONOSCOPY;  Surgeon: Maximus Lopes MD;  Location: [facility];  Service: Endoscopy;  Laterality: N/A;    TOTAL ABDOMINAL HYSTERECTOMY  14    TUBAL LIGATION       family history includes ADD / ADHD in her daughter; Breast cancer in her maternal aunt and maternal grandmother; COPD in her father; Colon cancer in her maternal uncle; Deep vein thrombosis in her maternal uncle and mother; Diabetes in her maternal grandfather; Heart failure in her mother; Hypertension in her mother; Lung cancer in her paternal uncle; Pulmonary embolism in her maternal uncle and mother; Stroke in her maternal grandfather and maternal grandmother; Thrombophilia in her maternal uncle and mother.    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              Social Drivers of Health     Financial Resource Strain: Medium Risk (2024)    Overall Financial Resource Strain (CARDIA)     Difficulty of Paying Living Expenses:  "Somewhat hard   Food Insecurity: No Food Insecurity (4/17/2024)    Hunger Vital Sign     Worried About Running Out of Food in the Last Year: Never true     Ran Out of Food in the Last Year: Never true   Transportation Needs: No Transportation Needs (4/17/2024)    PRAPARE - Transportation     Lack of Transportation (Medical): No     Lack of Transportation (Non-Medical): No   Physical Activity: Insufficiently Active (4/17/2024)    Exercise Vital Sign     Days of Exercise per Week: 2 days     Minutes of Exercise per Session: 30 min   Stress: Stress Concern Present (4/17/2024)    [employer] - Occupational Stress Questionnaire     Feeling of Stress : Very much   Housing Stability: Low Risk  (11/7/2023)    Housing Stability Vital Sign     Unable to Pay for Housing in the Last Year: No     Number of Places Lived in the Last Year: 1     Unstable Housing in the Last Year: No       Subjective:     Review of Systems   Constitutional: Negative.    HENT: Negative.     Eyes: Negative.    Respiratory: Negative.     Cardiovascular: Negative.    Gastrointestinal: Negative.    Endocrine: Negative.    Genitourinary: Negative.    Musculoskeletal: Negative.    Skin: Negative.    Allergic/Immunologic: Negative.    Neurological: Negative.    Hematological: Negative.    Psychiatric/Behavioral: Negative.       Objective:     Visit Vitals  /72 (BP Location: Right arm, Patient Position: Sitting)   Pulse 84   Temp 98.2 °F (36.8 °C) (Tympanic)   Ht 5' 1" (1.549 m)   Wt 131.6 kg (290 lb 3.2 oz)   LMP 09/09/2014   SpO2 98%   BMI 54.83 kg/m²       Physical Exam  Vitals reviewed.   Constitutional:       Appearance: Normal appearance. She is obese.   HENT:      Head: Normocephalic and atraumatic.   Eyes:      Extraocular Movements: Extraocular movements intact.      Conjunctiva/sclera: Conjunctivae normal.      Pupils: Pupils are equal, round, and reactive to light.   Cardiovascular:      Rate and Rhythm: Normal " rate and regular rhythm.      Heart sounds: Normal heart sounds.   Pulmonary:      Effort: Pulmonary effort is normal.      Breath sounds: Normal breath sounds.   Abdominal:      General: Bowel sounds are normal.   Musculoskeletal:         General: Normal range of motion.      Cervical back: Normal range of motion.   Skin:     General: Skin is warm and dry.   Neurological:      Mental Status: She is alert and oriented to person, place, and time.   Psychiatric:         Mood and Affect: Mood normal.         Behavior: Behavior normal.       Labs Reviewed:     Hematology:  Lab Results   Component Value Date    WBC 6.30 11/14/2024    HGB 11.0 (L) 11/14/2024    HCT 34.7 (L) 11/14/2024     11/14/2024     Chemistry:  Lab Results   Component Value Date     11/14/2024    K 3.5 11/14/2024    BUN 12 11/14/2024    CREATININE 0.8 11/14/2024    EGFRNORACEVR >60.0 11/14/2024    CALCIUM 8.5 (L) 11/14/2024    ALKPHOS 44 11/14/2024    ALBUMIN 2.9 (L) 11/14/2024    AST 20 11/14/2024    ALT 20 11/14/2024    MG 1.9 08/21/2023    USCYWYUD86FH 38 11/14/2024      Lab Results   Component Value Date    HGBA1C 5.6 11/14/2024      Lipid Panel:  Lab Results   Component Value Date    CHOL 168 11/14/2024    HDL 52 11/14/2024    HDL 57 07/24/2023    TRIG 88 11/14/2024    TRIG 83 07/24/2023    TOTALCHOLEST 3.2 11/14/2024      Thyroid:  Lab Results   Component Value Date    TSH 0.860 11/14/2024      Urine:  Lab Results   Component Value Date    APPEARANCEUA Clear 11/14/2024    PROTEINUA Negative 11/14/2024    LEUKOCYTESUR Negative 11/14/2024        Assessment:       ICD-10-CM ICD-9-CM   1. Essential hypertension  I10 401.9   2. Class 3 severe obesity due to excess calories with serious comorbidity and body mass index (BMI) of 50.0 to 59.9 in adult  E66.813 278.01    Z68.43 V85.43    E66.01    3. Intractable chronic migraine without aura and without status migrainosus  G43.719 346.71   4. Morbid obesity with BMI of 50.0-59.9, adult   E66.01 278.01    Z68.43 V85.43        Plan:     1. Essential hypertension (Primary)  Vitals:    01/03/25 1026   BP: 130/72   Pulse: 84   Temp: 98.2 °F (36.8 °C)      No results found for this or any previous visit.  Continue HCTZ, Losartan, Propranolol  DASH diet  Encouraged home blood pressure monitoring    2. Class 3 severe obesity due to excess calories with serious comorbidity and body mass index (BMI) of 50.0 to 59.9 in adult  Body mass index is 54.83 kg/m².  Wt Readings from Last 1 Encounters:   01/03/25 131.6 kg (290 lb 3.2 oz)   Waist Circumference: 50 in  TSH   Date Value Ref Range Status   11/14/2024 0.860 0.400 - 4.000 uIU/mL Final     Vit D, 25-Hydroxy   Date Value Ref Range Status   11/14/2024 38 30 - 96 ng/mL Final     Comment:     Vitamin D deficiency.........<10 ng/mL                              Vitamin D insufficiency......10-29 ng/mL       Vitamin D sufficiency........> or equal to 30 ng/mL  Vitamin D toxicity............>100 ng/mL       A modest (5-10%) weight loss improves health and quality of life.     Obesity Complication % Weight Loss for Therapeutic Benefit   Diabetes Prevention 5% to 10%   Diabetes Remission 20%   Hypertension 5% to 15%   CVD Risk Reduction >10%   MASLD/MASH 5% to 10%/ >10%   Sleep Apnea 10%   Osteoarthritis 5% to 10%   Urinary Stress Incontinence  5% to 10%   GERD Women: 5% to 10%, Men 10%   PCOS 5% to 10%         Goal: to weigh <200 lbs  Short-term: lose 10 lbs in 12 weeks  Long-term: lose 40 lbs in 1 year  *Keep in mind that, on average, you may lose 1-2 lbs per week*    Willingness to change:     Patient qualifies for anti-obesity medications due to BMI 54 with comorbidity. Anti-obesity medications are an adjunct to nutritional, physical activity, and behavioral therapies. Medication alone cannot treat obesity. I recommend starting medication to significantly improve patient's risk factor/s.   Medication: (dot MKMEDS)    Nutrition: Calories per day.      Protein: goal  is 121 g/day. Protein has a slower rate of digestion = greater satiety (fullness).        Aim for <25g of added sugar per day.      Recommend to increase fiber intake. USDA guidelines: Women <49 y/o - 25g/day.  www.fullplateliving.org      Eat Fit Shopping List.      Prepare meals in advance.      Eat breakfast within 2 hours of waking up.       Track what you eat. Research shows that people who track their food intake are more successful with weight management. This creates awareness of what you are eating/drinking and can help you see where to make changes.       Get to know your plate.  www.myplate.gov      Stay hydrated.     Activity: 2-3 days of strength training. This can be body weight, light weight or elastic bands exercises. [employer] and vpod.tv are great sources for free workout plans/videos.     Start slowly with an activity you enjoy and make it a habit.     Ask a family member or friend to get active with you.     Aim for 5,000 - 10,000 steps per day     Schedule time to make physical activity a part of your daily routine.     Exercise prescription:     Frequency: 2-3 days/week    Intensity: low  Low: walking, swimming, cycling at a leisurely pace, yoga, crystal chi, light weight training, stretching, slow dancing, leisurely sports like table tennis, and light yard work     Type: walk at home video on [employer]    Time: 15 minutes    Enjoyment: (make it fun)    Sleep: 7-8 hours of sleep a night    *Recognize your progress and remember that each day is a new day*  *Stay on track, even when you feel like you're not making progress*  *Sit Less, Stand Often, Move More*  *You don't have to be perfect; be persistent*    Avoiding Weight Regain:  - continued with modified food intake (changed eating habits)  - eat breakfast every day  - weigh yourself at least once a week  - watch less than 10 hours of TV per week  - continue with increased physical activity  - physical activity, on average, about 1 hour per  day    3. Intractable chronic migraine without aura and without status migrainosus  stable      Follow up in about 4 weeks (around 1/31/2025) for Virtual Visit. In addition to their scheduled follow up, the patient has also been instructed to follow up on as needed basis.     Mady Hull, CONCEPCIÓNP

## 2025-01-03 ENCOUNTER — OFFICE VISIT (OUTPATIENT)
Dept: PRIMARY CARE CLINIC | Facility: CLINIC | Age: 45
End: 2025-01-03
Payer: COMMERCIAL

## 2025-01-03 VITALS
HEART RATE: 84 BPM | DIASTOLIC BLOOD PRESSURE: 72 MMHG | BODY MASS INDEX: 54.79 KG/M2 | WEIGHT: 290.19 LBS | OXYGEN SATURATION: 98 % | SYSTOLIC BLOOD PRESSURE: 130 MMHG | TEMPERATURE: 98 F | HEIGHT: 61 IN

## 2025-01-03 DIAGNOSIS — E66.813 CLASS 3 SEVERE OBESITY DUE TO EXCESS CALORIES WITH SERIOUS COMORBIDITY AND BODY MASS INDEX (BMI) OF 50.0 TO 59.9 IN ADULT: ICD-10-CM

## 2025-01-03 DIAGNOSIS — E66.01 CLASS 3 SEVERE OBESITY DUE TO EXCESS CALORIES WITH SERIOUS COMORBIDITY AND BODY MASS INDEX (BMI) OF 50.0 TO 59.9 IN ADULT: ICD-10-CM

## 2025-01-03 DIAGNOSIS — E66.01 MORBID OBESITY WITH BMI OF 50.0-59.9, ADULT: ICD-10-CM

## 2025-01-03 DIAGNOSIS — I10 ESSENTIAL HYPERTENSION: Primary | Chronic | ICD-10-CM

## 2025-01-03 DIAGNOSIS — G43.719 INTRACTABLE CHRONIC MIGRAINE WITHOUT AURA AND WITHOUT STATUS MIGRAINOSUS: ICD-10-CM

## 2025-01-03 PROCEDURE — 99999 PR PBB SHADOW E&M-EST. PATIENT-LVL V: CPT | Mod: PBBFAC,,, | Performed by: NURSE PRACTITIONER

## 2025-01-03 NOTE — PATIENT INSTRUCTIONS
Protein goal: 121 g/day    Protein sources: meat (chicken, turkey, fish), dairy (cow/soy milk, cheese, greek yogurt), eggs, nuts/seeds, beans, protein powders, protein shakes, protein supplements.     Protein is approximated.  1 cup chopped chicken breast: 43 g; 1 chicken thigh (without skin): 28 g; 1 chicken drumstick: 23 g  3.5 oz roasted turkey breast (bone removed): 29 g, 3.5 oz roasted turkey le g, 1/2 fillet of salmon: 39 g, 1 fillet tilapia: 23 g, 3.5 oz shrimp: 24 g, 5 oz crawfish tails: 25 g  1 cup Cow/Soy milk: 8 g  1 container (5.3 oz) zero sugar greek yogurt: 12 g  1 oz cheddar cheese: 7 g; 1 oz american cheese: 5 g; 1 oz provolone cheese: 7 g; 1 oz gouda cheese: 7 g; 1 oz swiss cheese: 8 g  5.3 oz (1 container) low-fat zero sugar greek yogurt: 12 g  1 eg g  1 oz peanuts: 7 g, 1 oz almonds: 6 g; 1 oz pistachios: 6 g; 1 oz cashews: 5 g; 1 oz walnuts: 4 g; 1 oz hazelnuts: 4 g; 2 tbsp rosio seeds: 5 g, 1/4 cup sunflower seeds: 6 g  1 cup boateng beans (raw): 41 g; 1 cup baked beans: 14 g; 1 cup chickpeas: 39 g; 1 cup kidney beans (raw): 43 g; 1 cup large boiled lima beans: 15 g; 1 cup sprouted navy beans (raw): 6 g      Protein powders:   [email or web address]/best-protein-powders/    Protein shakes:   [email or web address]/protein-shakes/    Protein bars:   [email or web address]-[email or web address]-bars/

## 2025-01-04 DIAGNOSIS — G43.919 INTRACTABLE MIGRAINE WITHOUT STATUS MIGRAINOSUS, UNSPECIFIED MIGRAINE TYPE: ICD-10-CM

## 2025-01-06 RX ORDER — NAPROXEN SODIUM 550 MG/1
TABLET ORAL
Qty: 30 TABLET | Refills: 3 | Status: SHIPPED | OUTPATIENT
Start: 2025-01-06

## 2025-01-17 ENCOUNTER — PATIENT MESSAGE (OUTPATIENT)
Dept: PRIMARY CARE CLINIC | Facility: CLINIC | Age: 45
End: 2025-01-17
Payer: COMMERCIAL

## 2025-01-29 NOTE — PROGRESS NOTES
Patient ID: [id number]     Chief Complaint: Follow-up    HPI:     The patient location is: work  The chief complaint leading to consultation is: follow up    Visit type: audiovisual    Face to Face time with patient: 9 minutes  15 minutes of total time spent on the encounter, which includes face to face time and non-face to face time preparing to see the patient (eg, review of tests), Obtaining and/or reviewing separately obtained history, Documenting clinical information in the electronic or other health record, Independently interpreting results (not separately reported) and communicating results to the patient/family/caregiver, or Care coordination (not separately reported).     Each patient to whom he or she provides medical services by telemedicine is:  (1) informed of the relationship between the physician and patient and the respective role of any other health care provider with respect to management of the patient; and (2) notified that he or she may decline to receive medical services by telemedicine and may withdraw from such care at any time.      Georgia Garcia is a 44 y.o. female with diagnosis of HTN, Obesity, Migraines, Anxiety, Depression. Patient referred by PCP. Patient's PCP is Dr. Ward. Patient seen in clinic today for Obesity. Patient last seen on 01/03/2025.     Weight history   States always had increased weight, even as a child. Patient states more prevalent on her mom's side of the family. Patient interested in compounded version of GLP-1 due to no insurance coverage for weight loss GLP-1.   Highest: 290 lbs  Lowest: 230 lbs   Here are some differences between compounded GLP-1 and pharmacy GLP-1:   Cost: Compounded GLP-1 is often cheaper than brand-name GLP-1, but lower prices may indicate lower quality ingredients.   Availability: GLP-1 is available at pharmacies that have it in stock, or through online pharmacies and [employer].   Safety: There are risks and unknowns associated with  compounded GLP-1, including the possibility of lower quality ingredients.    Regulation: Compounding pharmacies are regulated by the Drug Quality and Security Act (DQSA) and state boards of pharmacy. The FDA oversees some compounding pharmacies, while state boards of pharmacy are responsible for others.   01/31/2024: at previous appt, patient started semaglutide (compound) 0.25 mg weekly. Patient states tolerating well, does feel more tired on the medication. Patient currently prescribed Wellbutrin 100 mg bid per PCP, states doesn't take medication consistently. Does aim for protein goal but has a hard time reaching goal due to decreased appetite with medication. Patient's weight at home today 277 lbs.     Previous Obesity Treatment:   Diet/Exercise - unsuccessful  Phentermine - unsuccessful   Wellbutrin - for depression, didn't help with weight loss    BMI:  54.8 (01/03/2025)  55.6 (11/14/2024)  54.1 (06/11/2024)    Weight:  Wt Readings from Last 6 Encounters:   01/03/25 131.6 kg (290 lb 3.2 oz)   01/02/25 133.4 kg (294 lb 1.5 oz)   11/29/24 133.4 kg (294 lb 1.5 oz)   11/14/24 129.5 kg (285 lb 7.9 oz)   11/14/24 133.5 kg (294 lb 5 oz)   06/14/24 129.5 kg (285 lb 7.9 oz)     Ideal/Adjusted Body Weight:  Ideal: 105 lbs  Adjusted: 179 lbs    Neck Circumference:   17 in    Waist Circumference:  50 in    Percent Body Fat:   55% (01/03/2025)  [email or web address]://www.calculator.net/body-fat-calculator.[email or web address]    InBody:   Date: 01/03/2025, scanned to media, reviewed  SMM: 73.4 lbs   Body Fat Mass: 159.5 lbs  Visceral Fat Level: 27  Basal Metabolic Rate: 1,650 kcal    Nutrition history (24 hour food recall)  Breakfast: meal replacement shake   Lunch: none   Dinner: baked chicken and broccoli   Snack: none   Water: 48 oz/day  Sugar Sweetened beverages: none   Etoh: none   Satiety cues: feels full after consuming a meal, denies having to consume large portions to feel full   Cravings: denies   Food noise:denies   Dietician: previously      Eating Disorders:   Denies     Current physical activity:   Walks most days/week  Barriers: time, knee pain    Stressors/Mental Health   Work   Over the last two weeks how often have you been bothered by little interest or pleasure in doing things: 0  Over the last two weeks how often have you been bothered by feeling down, depressed or hopeless: 2  PHQ-2 Total Score: 2    Sleep habits   Feels like she can sleep all the time, sleeps well, denies sleep apnea symptoms    CVD screening  The 10-year ASCVD risk score (Thuy SENA, et al., 2019) is: 2.5%    Values used to calculate the score:      Age: 44 years      Sex: Female      Is Non- : Yes      Diabetic: No      Tobacco smoker: No      Systolic Blood Pressure: 130 mmHg      Is BP treated: Yes      HDL Cholesterol: 52 mg/dL      Total Cholesterol: 168 mg/dL    LMP: Hysterectomy     Contraception: Hysterectomy       I spent 5 minutes counseling patient on diet, physical activity and reviewing labs.     Review of patient's allergies indicates:  No Known Allergies  Current Outpatient Medications   Medication Instructions    ALPRAZolam (XANAX) 0.25 MG tablet TAKE 1 TABLET BY MOUTH AT NIGHT AS NEEDED FOR ANXIETY    amitriptyline (ELAVIL) 25 mg, Oral, Nightly    azelastine (ASTELIN) 137 mcg (0.1 %) nasal spray SMARTSIG:Both Nares    boric acid (BORIC ACID) 650 mg, Vaginal, Weekly    buPROPion (WELLBUTRIN) 100 mg, Oral, 2 times daily    estradioL (ESTRACE) 1 mg, Oral, Daily    gabapentin (NEURONTIN) 100 mg, Oral, 3 times daily    hydroCHLOROthiazide 12.5 mg, Oral, Daily    losartan (COZAAR) 100 mg, Oral, Daily    methocarbamoL (ROBAXIN) 500 mg, 4 times daily    naproxen sodium (ANAPROX) 550 MG tablet TAKE 1 TABLET(550 MG) BY MOUTH TWICE DAILY AS NEEDED FOR PAIN    propranoloL (INDERAL LA) 60 mg, Oral, Daily    semaglutide (OZEMPIC) 0.5 mg, Subcutaneous, Every 7 days     Past Surgical History:   Procedure Laterality Date     SECTION, LOW  TRANSVERSE      x1    COLONOSCOPY N/A 7/7/2022    Procedure: COLONOSCOPY;  Surgeon: Maximus Lopes MD;  Location: [facility];  Service: Endoscopy;  Laterality: N/A;    TOTAL ABDOMINAL HYSTERECTOMY  9/30/14    TUBAL LIGATION       family history includes ADD / ADHD in her daughter; Breast cancer in her maternal aunt and maternal grandmother; COPD in her father; Colon cancer in her maternal uncle; Deep vein thrombosis in her maternal uncle and mother; Diabetes in her maternal grandfather; Heart failure in her mother; Hypertension in her mother; Lung cancer in her paternal uncle; Pulmonary embolism in her maternal uncle and mother; Stroke in her maternal grandfather and maternal grandmother; Thrombophilia in her maternal uncle and mother.    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              Social Drivers of Health     Financial Resource Strain: Medium Risk (4/17/2024)    Overall Financial Resource Strain (CARDIA)     Difficulty of Paying Living Expenses: Somewhat hard   Food Insecurity: No Food Insecurity (4/17/2024)    Hunger Vital Sign     Worried About Running Out of Food in the Last Year: Never true     Ran Out of Food in the Last Year: Never true   Transportation Needs: No Transportation Needs (4/17/2024)    PRAPARE - Transportation     Lack of Transportation (Medical): No     Lack of Transportation (Non-Medical): No   Physical Activity: Insufficiently Active (4/17/2024)    Exercise Vital Sign     Days of Exercise per Week: 2 days      Minutes of Exercise per Session: 30 min   Stress: Stress Concern Present (4/17/2024)    [employer] - Occupational Stress Questionnaire     Feeling of Stress : Very much   Housing Stability: Low Risk  (11/7/2023)    Housing Stability Vital Sign     Unable to Pay for Housing in the Last Year: No     Number of Places Lived in the Last Year: 1     Unstable Housing in the Last Year: No       Subjective:     Review of Systems   Constitutional: Negative.    HENT: Negative.     Eyes: Negative.    Respiratory: Negative.     Cardiovascular: Negative.    Gastrointestinal: Negative.    Endocrine: Negative.    Genitourinary: Negative.    Musculoskeletal: Negative.    Skin: Negative.    Allergic/Immunologic: Negative.    Neurological: Negative.    Hematological: Negative.    Psychiatric/Behavioral: Negative.       Objective:     Visit Vitals  LMP 09/09/2014       Physical Exam  Neurological:      Mental Status: She is alert and oriented to person, place, and time.   Psychiatric:         Mood and Affect: Mood normal.       Labs Reviewed:     Hematology:  Lab Results   Component Value Date    WBC 6.30 11/14/2024    HGB 11.0 (L) 11/14/2024    HCT 34.7 (L) 11/14/2024     11/14/2024     Chemistry:  Lab Results   Component Value Date     11/14/2024    K 3.5 11/14/2024    BUN 12 11/14/2024    CREATININE 0.8 11/14/2024    EGFRNORACEVR >60.0 11/14/2024    CALCIUM 8.5 (L) 11/14/2024    ALKPHOS 44 11/14/2024    ALBUMIN 2.9 (L) 11/14/2024    AST 20 11/14/2024    ALT 20 11/14/2024    MG 1.9 08/21/2023    HPLJXYDS11JM 38 11/14/2024      Lab Results   Component Value Date    HGBA1C 5.6 11/14/2024      Lipid Panel:  Lab Results   Component Value Date    CHOL 168 11/14/2024    HDL 52 11/14/2024    HDL 57 07/24/2023    TRIG 88 11/14/2024    TRIG 83 07/24/2023    TOTALCHOLEST 3.2 11/14/2024      Thyroid:  Lab Results   Component Value Date    TSH 0.860 11/14/2024      Urine:  Lab Results   Component Value  Date    APPEARANCEUA Clear 11/14/2024    PROTEINUA Negative 11/14/2024    LEUKOCYTESUR Negative 11/14/2024        Assessment:       ICD-10-CM ICD-9-CM   1. Essential hypertension  I10 401.9   2. Class 3 severe obesity due to excess calories with serious comorbidity and body mass index (BMI) of 50.0 to 59.9 in adult  E66.813 278.01    Z68.43 V85.43    E66.01    3. Intractable chronic migraine without aura and without status migrainosus  G43.719 346.71       Plan:     1. Essential hypertension (Primary)  There were no vitals filed for this visit.   No results found for this or any previous visit.  Continue HCTZ, Losartan, Propranolol  DASH diet  Encouraged home blood pressure monitoring    2. Class 3 severe obesity due to excess calories with serious comorbidity and body mass index (BMI) of 50.0 to 59.9 in adult  There is no height or weight on file to calculate BMI.  Wt Readings from Last 1 Encounters:   01/03/25 131.6 kg (290 lb 3.2 oz)   Waist Circumference: 50 in  TSH   Date Value Ref Range Status   11/14/2024 0.860 0.400 - 4.000 uIU/mL Final     Vit D, 25-Hydroxy   Date Value Ref Range Status   11/14/2024 38 30 - 96 ng/mL Final     Comment:     Vitamin D deficiency.........<10 ng/mL                              Vitamin D insufficiency......10-29 ng/mL       Vitamin D sufficiency........> or equal to 30 ng/mL  Vitamin D toxicity............>100 ng/mL       A modest (5-10%) weight loss improves health and quality of life.     Obesity Complication % Weight Loss for Therapeutic Benefit   Diabetes Prevention 5% to 10%   Diabetes Remission 20%   Hypertension 5% to 15%   CVD Risk Reduction >10%   MASLD/MASH 5% to 10%/ >10%   Sleep Apnea 10%   Osteoarthritis 5% to 10%   Urinary Stress Incontinence  5% to 10%   GERD Women: 5% to 10%, Men 10%   PCOS 5% to 10%         Goal: to weigh <200 lbs  Short-term: lose 10 lbs in 12 weeks  Long-term: lose 40 lbs in 1 year  *Keep in mind that, on average, you may lose 1-2 lbs per  week*    Willingness to change:     Patient qualifies for anti-obesity medications due to BMI 54 with comorbidity. Anti-obesity medications are an adjunct to nutritional, physical activity, and behavioral therapies. Medication alone cannot treat obesity. I recommend starting medication to significantly improve patient's risk factor/s.   Medication:   Increase Semaglutide (compounded) to 0.5 mg SC weekly.   Here are some differences between compounded GLP-1 and pharmacy GLP-1:   Cost: Compounded GLP-1 is often cheaper than brand-name GLP-1, but lower prices may indicate lower quality ingredients.   Availability: GLP-1 is available at pharmacies that have it in stock, or through online pharmacies and [employer].   Safety: There are risks and unknowns associated with compounded GLP-1, including the possibility of lower quality ingredients.    Regulation: Compounding pharmacies are regulated by the Drug Quality and Security Act (DQSA) and state boards of pharmacy. The [facility] oversees some compounding pharmacies, while state boards of pharmacy are responsible for others.    Nutrition: Calories per day.      Protein: goal is 121 g/day. Protein has a slower rate of digestion = greater satiety (fullness).        Aim for <25g of added sugar per day.      Recommend to increase fiber intake. USDA guidelines: Women <51 y/o - 25g/day.  www.fullplateliving.org      Eat Fit Shopping List.      Prepare meals in advance.      Eat breakfast within 2 hours of waking up.       Track what you eat. Research shows that people who track their food intake are more successful with weight management. This creates awareness of what you are eating/drinking and can help you see where to make changes.       Get to know your plate.  www.myplate.gov      Stay hydrated.     Activity: 2-3 days of strength training. This can be body weight, light weight or elastic bands exercises. [employer] and [employer] are great sources for free workout plans/videos.      Start slowly with an activity you enjoy and make it a habit.     Ask a family member or friend to get active with you.     Aim for 5,000 - 10,000 steps per day     Schedule time to make physical activity a part of your daily routine.     Exercise prescription:     Frequency: 2-3 days/week    Intensity: low  Low: walking, swimming, cycling at a leisurely pace, yoga, crystal chi, light weight training, stretching, slow dancing, leisurely sports like table tennis, and light yard work     Type: walk at home video on [employer]    Time: 15 minutes    Enjoyment: (make it fun)    Sleep: 7-8 hours of sleep a night    *Recognize your progress and remember that each day is a new day*  *Stay on track, even when you feel like you're not making progress*  *Sit Less, Stand Often, Move More*  *You don't have to be perfect; be persistent*    Avoiding Weight Regain:  - continued with modified food intake (changed eating habits)  - eat breakfast every day  - weigh yourself at least once a week  - watch less than 10 hours of TV per week  - continue with increased physical activity  - physical activity, on average, about 1 hour per day    3. Intractable chronic migraine without aura and without status migrainosus  stable      Follow up in about 4 weeks (around 2/28/2025). In addition to their scheduled follow up, the patient has also been instructed to follow up on as needed basis.     Mady Hull, EITAN

## 2025-01-31 ENCOUNTER — OFFICE VISIT (OUTPATIENT)
Dept: PRIMARY CARE CLINIC | Facility: CLINIC | Age: 45
End: 2025-01-31
Payer: COMMERCIAL

## 2025-01-31 DIAGNOSIS — I10 ESSENTIAL HYPERTENSION: Primary | Chronic | ICD-10-CM

## 2025-01-31 DIAGNOSIS — E66.01 CLASS 3 SEVERE OBESITY DUE TO EXCESS CALORIES WITH SERIOUS COMORBIDITY AND BODY MASS INDEX (BMI) OF 50.0 TO 59.9 IN ADULT: ICD-10-CM

## 2025-01-31 DIAGNOSIS — G43.719 INTRACTABLE CHRONIC MIGRAINE WITHOUT AURA AND WITHOUT STATUS MIGRAINOSUS: ICD-10-CM

## 2025-01-31 DIAGNOSIS — E66.813 CLASS 3 SEVERE OBESITY DUE TO EXCESS CALORIES WITH SERIOUS COMORBIDITY AND BODY MASS INDEX (BMI) OF 50.0 TO 59.9 IN ADULT: ICD-10-CM

## 2025-01-31 PROCEDURE — 98005 SYNCH AUDIO-VIDEO EST LOW 20: CPT | Mod: 95,,, | Performed by: NURSE PRACTITIONER

## 2025-01-31 PROCEDURE — 1159F MED LIST DOCD IN RCRD: CPT | Mod: CPTII,95,, | Performed by: NURSE PRACTITIONER

## 2025-01-31 PROCEDURE — 1160F RVW MEDS BY RX/DR IN RCRD: CPT | Mod: CPTII,95,, | Performed by: NURSE PRACTITIONER

## 2025-01-31 NOTE — PATIENT INSTRUCTIONS
10 Eating Tips for Patients on GLP-1 Medications     Eat high protein foods first.  Animal based proteins - beef, chicken, fish, shellfish, turkey, pork, eggs, cheese (do not rely too heavily on cheese since it is high in fat), low carb Greek yogurt, cottage cheese, whey protein powder or drinks, protein bars.  Plant based proteins - tofu, tempeh, seitan, Quorn, Beyond Meat, pea protein, brown rice protein, pea protein powder or drinks, protein bars.    Eat non-starchy vegetables next.   Artichokes, asparagus, broccoli, brussels sprouts, cabbage, carrots, cauliflower, celery, bg greens, cucumber, eggplant, green beans, hearts of palm, kale, letuce (all varieties), mushrooms (all varieties), okra (not breaded), onions, parsley, peppers (all varieties), pickles (dill only), radishes, sauerkraut, seaweed, snap and snow peas, spinach, sprouts, spaghetti squash, yellow squash, zucchini, tomatoes, turnip greens.     Allow some healthy fats.   Nuts, seeds, olive oil, avocado and avocado oil, peanut oil, buter, coconut oil, rosio seeds, flax seeds.   Try to limit processed seed oils like canola, corn, and soybean oil. Many are found in mayonnaise, salad dressing, margarine, and processed foods.     Eat fruit and/or starch last if you are still hungry.  Best fruits (not juices) - berries, apples, oranges, peaches pears, kiwi.   Best complex starches - ½ cup beans (like boateng, lima, navy, chili, black, etc.), low carb tortillas or zero net carb street tacos, ½ cup cooked chickpea or lentil pasta, ½ cup sweet potatoes, flourless or keto bread, ½ cup cooked old fashioned or steel cut oats, ½ cup cooked parboiled rice, ½ cup cooked brown rice, ½ cup cooked wild rice, ½ cup cooked basmati rice, 3 cups popped popcorn.     Avoid high fat and fried foods.     Avoid processed meats.     Avoid refined carbohydrates and sugar.  White bread, white rice, pasta   Desserts - cookies, pastries, ice cream   Most breakfast cereals    Sugar-sweetened beverages, candies, chocolate     Limit carbonated beverages - can contribute to bloating.     Limit alcohol intake.     Eat slowly and avoid feeling too full.

## 2025-02-11 ENCOUNTER — PATIENT MESSAGE (OUTPATIENT)
Dept: NEUROLOGY | Facility: CLINIC | Age: 45
End: 2025-02-11
Payer: COMMERCIAL

## 2025-02-12 ENCOUNTER — PATIENT MESSAGE (OUTPATIENT)
Dept: NEUROLOGY | Facility: CLINIC | Age: 45
End: 2025-02-12

## 2025-02-12 ENCOUNTER — OFFICE VISIT (OUTPATIENT)
Dept: NEUROLOGY | Facility: CLINIC | Age: 45
End: 2025-02-12
Payer: COMMERCIAL

## 2025-02-12 VITALS
SYSTOLIC BLOOD PRESSURE: 135 MMHG | HEART RATE: 78 BPM | DIASTOLIC BLOOD PRESSURE: 97 MMHG | HEIGHT: 61 IN | BODY MASS INDEX: 54.78 KG/M2 | WEIGHT: 290.13 LBS

## 2025-02-12 DIAGNOSIS — R11.2 NAUSEA AND VOMITING, UNSPECIFIED VOMITING TYPE: ICD-10-CM

## 2025-02-12 DIAGNOSIS — R42 DIZZINESS AND GIDDINESS: ICD-10-CM

## 2025-02-12 DIAGNOSIS — G44.221 CHRONIC TENSION-TYPE HEADACHE, INTRACTABLE: ICD-10-CM

## 2025-02-12 DIAGNOSIS — I10 HYPERTENSION, UNSPECIFIED TYPE: ICD-10-CM

## 2025-02-12 DIAGNOSIS — F41.1 GAD (GENERALIZED ANXIETY DISORDER): ICD-10-CM

## 2025-02-12 DIAGNOSIS — G43.E19 INTRACTABLE CHRONIC MIGRAINE WITH AURA AND WITHOUT STATUS MIGRAINOSUS: Primary | ICD-10-CM

## 2025-02-12 PROCEDURE — 99999 PR PBB SHADOW E&M-EST. PATIENT-LVL V: CPT | Mod: PBBFAC,,,

## 2025-02-12 PROCEDURE — 99215 OFFICE O/P EST HI 40 MIN: CPT | Mod: S$GLB,,,

## 2025-02-12 PROCEDURE — 1160F RVW MEDS BY RX/DR IN RCRD: CPT | Mod: CPTII,S$GLB,,

## 2025-02-12 PROCEDURE — 3080F DIAST BP >= 90 MM HG: CPT | Mod: CPTII,S$GLB,,

## 2025-02-12 PROCEDURE — 1159F MED LIST DOCD IN RCRD: CPT | Mod: CPTII,S$GLB,,

## 2025-02-12 PROCEDURE — 3075F SYST BP GE 130 - 139MM HG: CPT | Mod: CPTII,S$GLB,,

## 2025-02-12 PROCEDURE — 3008F BODY MASS INDEX DOCD: CPT | Mod: CPTII,S$GLB,,

## 2025-02-12 RX ORDER — PROPRANOLOL HYDROCHLORIDE 60 MG/1
60 CAPSULE, EXTENDED RELEASE ORAL DAILY
Qty: 30 CAPSULE | Refills: 2 | Status: SHIPPED | OUTPATIENT
Start: 2025-02-12 | End: 2025-05-13

## 2025-02-12 RX ORDER — ONDANSETRON 4 MG/1
4 TABLET, ORALLY DISINTEGRATING ORAL EVERY 8 HOURS PRN
Qty: 30 TABLET | Refills: 0 | Status: SHIPPED | OUTPATIENT
Start: 2025-02-12 | End: 2025-03-14

## 2025-02-12 RX ORDER — AMITRIPTYLINE HYDROCHLORIDE 25 MG/1
25 TABLET, FILM COATED ORAL NIGHTLY
Qty: 30 TABLET | Refills: 2 | Status: SHIPPED | OUTPATIENT
Start: 2025-02-12 | End: 2025-05-13

## 2025-02-12 NOTE — PROGRESS NOTES
"Subjective:       Patient ID: Georgia Garcia is a 44 y.o. female.    PMH: Sciatica / Depression / PREMA / OA / HTN / Insomnia / Migraines / and other medical conditions.     Chief Complaint: "Headaches"     HPI    The patient presented on 06/2024 / 07/2024 / 08/2024 for evaluation of "Headaches". Patient presents today for a follow-up evaluation and recommendation of "Headaches".       Interval History: (06/14/2024) - Norco 7.5-325 Q8H PRN and Metoprolol Succinate /Toprol-XL 50 mg PO Daily Discontinued. Patient started on Inderal-LA 60 mg PO Daily. Continue Nurtec. Diagnostic evaluation included Brain MRI / ESR / ROSE / RPR.     Started: 10 years ago, worse in the last few years  Describes: Pounding, some slowly build up but some can come on all at once / The headaches are progressively getting worse and interfering with daily activity.  Timing: Duration of 2 weeks for migraine type headaches / Duration of 10 hours for regular type headaches / wakes up with migraines / migraines can wake her up from sleep  Frequency: Daily  Pain: Regular headaches: 7-8/10 / Migraines: 10/10  Location: Bilateral temporal, parietal, and occipital regions  Family: No known family history of migraines or headaches in the family.   Medications: Norco / Elavil / Wellbutrin / Gabapentin / Nutrec / Patient reports PCP recently prescribed Nutrec, but has not taken it yet due to PA being processed.   Worsen: Stress / The patient cannot think of food that aggravates the headache.      Alleviated: Sleeping / dark and quiet room /   Associated symptoms: Nausea / Vomiting / Trouble keeping up with tasks due to pain / light and sound sensitivity / dizziness (Light-headed) / blurry vision / double vision / bilateral floaters / Neck muscular pain with radiation to scapular area-is stress related per patient     No tinnitus / not worse with postural changes or valsalva / no scalp sensitivity / numbness/tingling to extremities / bilateral or focal " "weakness / no trouble with balance        Triggers: Cigarette smoke / strong perfume smells / bright lights / eating citrus   Prodrome symptoms: patient reports she does have an auora of a small feeling prior to migraine onset. She expresses that she can't describe her auora.      Patient reports that she has previously sought ER evaluation for Headache management who treated her with Naproxen. She felt like it was a waste of time and the medication was not effective for her.      Patient reports compliance with blood pressure medication and reports at home blood pressures run around 122/84.      No TMJ problems. No history of head trauma. No history of seizures. No history of smoking. No caffeine overuse. No vertigo. No blacking out. No fever, chills, or rigors. No history of significant memory loss. No history of strokes. No falls.        Interval History: (07/12/2024) - Continued Inderal-LA 60 mg PO Daily /  Nurtec 75 mg PO Daily PRN / Zofran-ODT 4 mg PO Q8H PRN. Started Elavil 25 mg PO QHS. Continued PT for vestibular therapy.     Patient reports that headaches are still severe, debilitating, and interfering with daily functioning. Frequency of tension type is daily and migraine type is daily. Duration of tension type is 10 hours and migraine type is 2 weeks. Still "Pounding" in nature. Pain intensity is decreased from 15/10 to 10/10 per patient. Associated symptoms include Nausea / Vomiting / Trouble keeping up with tasks due to pain / light and sound sensitivity / dizziness (Light-headed) / blurry vision / double vision / bilateral floaters / Neck muscular pain with radiation to scapular area-is stress related per patient / tinnitus to bilateral ears / feeling off-balance.     Patient reports chronic Anxiety / Depression. Patient reports that her anxiety and stress have slightly worsened since last visit due to unrelieved Migraines. Patient expresses that she is interested in additional pharmacological therapy " "for Anxiety management. Denies SI.         Interval History: (08/12/2024) -  Continued Inderal-LA 60 mg PO Daily /  Nurtec 75 mg PO Daily PRN / Zofran-ODT 4 mg PO Q8H PRN / Elavil 25 mg PO QHS.     -Headaches have significantly improved since last visit. Patient reports an increased quality of life and a 95% improvement in headache frequency, duration, pain intensity, and symptom severity.   -Location is still Bilateral temporal, parietal, and occipital regions.   -Positive Headache auras - Patient unable to describe feeling.   -Frequency decreased - tension type is 2 per week and migraine type is 2-3 times per week.  -Duration decreased - tension type is decreased from 10 hours to 3 hours / and migraine type decreased from 2 weeks to 3 days.   -Still "Pounding" in nature  -Pain intensity is decreased from 10/10 to 4/10 per patient.   -Worsened by Stress.   -Triggered by Cigarette smoke / strong perfume smells / bright lights / eating citrus  -Alleviated with sleeping / dark and quiet room   -Associated symptoms include Nausea / Vomiting / Trouble keeping up with tasks due to pain / light and sound sensitivity / dizziness (Light-headed) / blurry vision / double vision / bilateral floaters / Neck muscular pain with radiation to scapular area-is stress related per patient / tinnitus to bilateral ears / feeling off-balance - significantly decreased intensity of symptoms per patient  -Patient does report changes in vision. Last eye clinic appointment was April 2024, no abnormalities were noted per patient. Her prescription has not changed within 3 years.             New Issues: (2/12/2025) -     No new issues per patient. Patient reports that she ran out of Inderal-LA since 5 months ago.     Headaches:    -Headaches have significantly improved since last visit. Patient reports an increased quality of life and a 95% improvement in headache frequency, duration, pain intensity, and symptom severity.   -Location is still " "Bilateral temporal, parietal, and occipital regions.   -Positive Headache auras - Patient unable to describe feeling. She reports Nausea prior to Migraine onset.  -Frequency decreased - tension type is 2-3 per week and migraine type is 2-3 times per month.  -Duration decreased - tension type is 1 hour / and migraine type decreased from 2 weeks to 20 min - 3 days. Migraines alleviated 20 minutes after Nurtec.   -Still "Pounding" in nature.  -Pain intensity is 5-10/10 per patient.   -Worsened by Stress  -Triggered by Cigarette smoke / strong perfume smells / bright lights / eating citrus  -Alleviated with sleeping / dark and quiet room   -Associated symptoms include Nausea / Vomiting / Trouble keeping up with tasks due to pain / light and sound sensitivity / dizziness (Light-headed) / blurry vision  / bilateral floaters / Neck muscular pain with radiation to scapular area-is stress related per patient / tinnitus to bilateral ears / feeling off-balance - significantly decreased intensity of symptoms per patient      -Double vision - resolved per patient    -Patient does report changes in vision. Last eye clinic appointment was January 2025, no abnormalities were noted per patient. Her prescription for eye glasses did not change.     -No falls     -Vestibular therapy with PT - not started due to work scheduling conflicts per patient.       Abortive therapies (tried and failed): Nurtec 75 mg PO Daily PRN - Current    -Maxalt- not effective - SE of dizziness / nausea / feeling like she was going to pass out /      Preventative therapies (tried and failed): Inderal-LA 60 mg PO Daily / Elavil 25 mg PO QHS - Current    -Topamax- not effective (took for about 8-9 years)       Anxiety:    Patient reports that her chronic anxiety and stress have slightly improved due to Migraine alleviation with current medication regimen. Denies SI. Currently managed by psychiatrist. Elavil 25 mg PO QHS - has helped to improve per patient. "     -Patient reports tolerating medications well without side effects. She would like to stay on current medication regimen.      Review of Systems   Constitutional:  Negative for activity change, appetite change, chills, diaphoresis, fatigue, fever and unexpected weight change.   HENT:  Positive for tinnitus. Negative for congestion, dental problem, drooling, ear discharge, ear pain, facial swelling, hearing loss, mouth sores, nosebleeds, postnasal drip, rhinorrhea, sinus pressure, sinus pain, sneezing, sore throat, trouble swallowing and voice change.    Eyes:  Positive for photophobia and visual disturbance. Negative for pain, discharge, redness and itching.   Respiratory:  Negative for cough, chest tightness, shortness of breath and wheezing.    Cardiovascular:  Negative for chest pain, palpitations and leg swelling.   Gastrointestinal:  Positive for nausea and vomiting. Negative for abdominal distention, abdominal pain, blood in stool, constipation and diarrhea.   Endocrine: Negative for cold intolerance, heat intolerance, polydipsia, polyphagia and polyuria.   Genitourinary:  Negative for decreased urine volume, difficulty urinating, dysuria, flank pain, frequency, hematuria, pelvic pain, urgency and vaginal discharge.   Musculoskeletal:  Positive for myalgias and neck pain. Negative for arthralgias, back pain, gait problem, joint swelling and neck stiffness.   Skin:  Negative for color change and rash.   Allergic/Immunologic: Negative for immunocompromised state.   Neurological:  Positive for dizziness, light-headedness and headaches. Negative for tremors, seizures, syncope, facial asymmetry, speech difficulty, weakness and numbness.        Patient reports phonophobia and balance issues associated with migraines.    Hematological:  Negative for adenopathy. Does not bruise/bleed easily.   Psychiatric/Behavioral:  Positive for decreased concentration. Negative for agitation, behavioral problems, confusion,  dysphoric mood, hallucinations, self-injury, sleep disturbance and suicidal ideas. The patient is not nervous/anxious and is not hyperactive.    All other systems reviewed and are negative.                Current Outpatient Medications:     ALPRAZolam (XANAX) 0.25 MG tablet, TAKE 1 TABLET BY MOUTH AT NIGHT AS NEEDED FOR ANXIETY, Disp: 30 tablet, Rfl: 0    amitriptyline (ELAVIL) 25 MG tablet, Take 1 tablet (25 mg total) by mouth every evening., Disp: 30 tablet, Rfl: 2    azelastine (ASTELIN) 137 mcg (0.1 %) nasal spray, SMARTSIG:Both Nares, Disp: , Rfl:     boric acid (BORIC ACID) vaginal suppository, Place 1 each (650 mg total) vaginally once a week., Disp: 12 each, Rfl: 0    buPROPion (WELLBUTRIN) 100 MG tablet, Take 1 tablet (100 mg total) by mouth 2 (two) times daily., Disp: 180 tablet, Rfl: 1    estradioL (ESTRACE) 1 MG tablet, Take 1 tablet (1 mg total) by mouth once daily., Disp: 90 tablet, Rfl: 1    gabapentin (NEURONTIN) 100 MG capsule, Take 1 capsule (100 mg total) by mouth 3 (three) times daily., Disp: 90 capsule, Rfl: 5    hydroCHLOROthiazide (HYDRODIURIL) 12.5 MG Tab, Take 1 tablet (12.5 mg total) by mouth once daily., Disp: 90 tablet, Rfl: 1    losartan (COZAAR) 100 MG tablet, Take 1 tablet (100 mg total) by mouth once daily., Disp: 90 tablet, Rfl: 1    methocarbamoL (ROBAXIN) 500 MG Tab, Take 500 mg by mouth 4 (four) times daily., Disp: , Rfl:     naproxen sodium (ANAPROX) 550 MG tablet, TAKE 1 TABLET(550 MG) BY MOUTH TWICE DAILY AS NEEDED FOR PAIN, Disp: 30 tablet, Rfl: 3    propranoloL (INDERAL LA) 60 MG 24 hr capsule, Take 1 capsule (60 mg total) by mouth once daily., Disp: 30 capsule, Rfl: 2    semaglutide (OZEMPIC) 0.25 mg or 0.5 mg (2 mg/3 mL) pen injector, Inject 0.5 mg into the skin every 7 days., Disp: , Rfl:     Past Medical History:   Diagnosis Date    Anemia     Cervical radiculopathy     COVID-19 virus infection 01/2021    Diverticulosis     Hypermenorrhea     Hypertension     Insomnia      Internal hemorrhoid     Migraine headache     Morbid obesity     Rectal bleeding     Uterine fibroid        Past Surgical History:   Procedure Laterality Date     SECTION, LOW TRANSVERSE      x1    COLONOSCOPY N/A 2022    Procedure: COLONOSCOPY;  Surgeon: Maximus Lopes MD;  Location: [facility];  Service: Endoscopy;  Laterality: N/A;    TOTAL ABDOMINAL HYSTERECTOMY  14    TUBAL LIGATION         Social History     Socioeconomic History    Marital status: Single   Occupational History     Employer: [employer]   Tobacco Use    Smoking status: Never    Smokeless tobacco: Never   Substance and Sexual Activity    Alcohol use: No    Drug use: No    Sexual activity: Yes     Partners: Male     Birth control/protection: Surgical     Comment: hyst; mut monog   Social History Narrative    She is , has one daughter and 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              Social Drivers of Health     Financial Resource Strain: Medium Risk (2024)    Overall Financial Resource Strain (CARDIA)     Difficulty of Paying Living Expenses: Somewhat hard   Food Insecurity: No Food Insecurity (2024)    Hunger Vital Sign     Worried About Running Out of Food in the Last Year: Never true     Ran Out of Food in the Last Year: Never true   Transportation Needs: No Transportation Needs (2024)    PRAPARE - Transportation     Lack of Transportation (Medical): No     Lack of Transportation (Non-Medical): No   Physical Activity: Insufficiently Active (2024)    Exercise Vital Sign     Days of Exercise per Week: 2 days     Minutes of Exercise per Session: 30 min   Stress: Stress Concern Present (2024)    [employer] - Occupational Stress Questionnaire     Feeling of Stress : Very much   Housing Stability: Low Risk  (2023)    Housing Stability Vital Sign     Unable to Pay for Housing in the Last Year: No      Number of Places Lived in the Last Year: 1     Unstable Housing in the Last Year: No       Past/Current Medical/Surgical History, Past/Current Social History, Past/Current Family History and Past/Current Medications were reviewed in detail.      Objective:     VITAL SIGNS WERE REVIEWED      GENERAL APPEARANCE:     The patient looks comfortable.    BMI    No signs of respiratory distress.    Normal breathing pattern.    No dysmorphic features    Normal eye contact.       GENERAL MEDICAL EXAM:    HEENT:  Head is atraumatic normocephalic.     FUNDUSCOPIC (OPHTHALMOSCOPIC) EXAMINATION showed no disc edema (papilledema).      NECK: No JVD. No visible lesions or goiters.     CHEST-CARDIOPULMONARY: No cyanosis. No tachypnea. Normal respiratory effort.    OGUAYGA-[id number]-[id number]: No jaundice. No stomas or lesions. No visible hernias. No catheters.     SKIN, HAIR, NAILS: No pathognomonic skin rash.No neurofibromatosis. No visible lesions.No stigmata of autoimmune disease. No clubbing.    LIMBS: No varicose veins. No visible swelling.    MUSCULOSKELETAL: No visible deformities.No visible lesions.    Neurologic Exam     Mental Status   Oriented to person, place, and time.   Oriented to person.   Oriented to place. Oriented to country, city and area.   Oriented to time. Oriented to year, month, date, day and season.   Registration: recalls 3 of 3 objects. Recall at 5 minutes: recalls 3 of 3 objects. Follows 3 step commands.   Attention: normal. Concentration: normal.   Speech: speech is normal   Level of consciousness: alert  Knowledge: good. Able to perform simple calculations.   Able to name object. Able to read. Able to repeat. Able to write. Normal comprehension.     Cranial Nerves     CN II   Visual fields full to confrontation.   Visual acuity: normal  Right visual field deficit: none  Left visual field deficit: none     CN III, IV, VI   Pupils are equal, round, and reactive to light.  Extraocular motions are  normal.   Right pupil: Size: 2 mm. Shape: regular. Reactivity: brisk. Consensual response: intact. Accommodation: intact.   Left pupil: Size: 2 mm. Shape: regular. Reactivity: brisk. Consensual response: intact. Accommodation: intact.   CN III: no CN III palsy  CN VI: no CN VI palsy  Nystagmus: none   Diplopia: none  Ophthalmoparesis: none  Upgaze: normal  Downgaze: normal  Conjugate gaze: present  Vestibulo-ocular reflex: present    CN V   Facial sensation intact.   Right facial sensation deficit: none  Left facial sensation deficit: none    CN VII   Facial expression full, symmetric.   Right facial weakness: none  Left facial weakness: none    CN VIII   CN VIII normal.   Hearing: intact    CN IX, X   CN IX normal.   CN X normal.   Palate: symmetric    CN XI   CN XI normal.   Right sternocleidomastoid strength: normal  Left sternocleidomastoid strength: normal  Right trapezius strength: normal  Left trapezius strength: normal    CN XII   CN XII normal.   Tongue: not atrophic  Fasciculations: absent  Tongue deviation: none    Motor Exam   Muscle bulk: normal  Overall muscle tone: normal  Right arm pronator drift: absent  Left arm pronator drift: absent    Strength   Strength 5/5 throughout.   Right neck flexion: 5/5  Left neck flexion: 5/5  Right neck extension: 5/5  Left neck extension: 5/5  Right deltoid: 5/5  Left deltoid: 5/5  Right biceps: 5/5  Left biceps: 5/5  Right triceps: 5/5  Left triceps: 5/5  Right wrist flexion: 5/5  Left wrist flexion: 5/5  Right wrist extension: 5/5  Left wrist extension: 5/5  Right interossei: 5/5  Left interossei: 5/5  Right abdominals: 5/5  Left abdominals: 5/5  Right iliopsoas: 5/5  Left iliopsoas: 5/5  Right quadriceps: 5/5  Left quadriceps: 5/5  Right hamstrin/5  Left hamstrin/5  Right glutei: 5/5  Left glutei: 5/5  Right anterior tibial: 5/5  Left anterior tibial: 5/5  Right posterior tibial: 5/5  Left posterior tibial: 5/5  Right peroneal: 5/5  Left peroneal:  5/5  Right gastroc: 5/5  Left gastroc: 5/5    Sensory Exam   Light touch normal.   Vibration normal.   Proprioception normal.   Pinprick normal.   Graphesthesia: normal  Stereognosis: normal  Mild tenderness noted to generalized scalp and neck during palpation.      Gait, Coordination, and Reflexes     Gait  Gait: normal    Coordination   Romberg: negative  Finger to nose coordination: normal  Heel to shin coordination: normal  Tandem walking coordination: normal    Tremor   Resting tremor: absent  Intention tremor: absent  Action tremor: absent    Reflexes   Reflexes 2+ except as noted.   Right brachioradialis: 2+  Left brachioradialis: 2+  Right biceps: 2+  Left biceps: 2+  Right triceps: 2+  Left triceps: 2+  Right patellar: 2+  Left patellar: 2+  Right achilles: 2+  Left achilles: 2+  Right : 2+  Left : 2+  Right plantar: normal  Left plantar: normal  Right Rowe: absent  Left Rowe: absent  Right ankle clonus: absent  Left ankle clonus: absent  Right pendular knee jerk: absent  Left pendular knee jerk: absent        Lab Results   Component Value Date    WBC 6.30 11/14/2024    HGB 11.0 (L) 11/14/2024    HCT 34.7 (L) 11/14/2024    MCV 95 11/14/2024     11/14/2024       Sodium   Date Value Ref Range Status   11/14/2024 138 136 - 145 mmol/L Final     Potassium   Date Value Ref Range Status   11/14/2024 3.5 3.5 - 5.1 mmol/L Final     Chloride   Date Value Ref Range Status   11/14/2024 107 95 - 110 mmol/L Final     CO2   Date Value Ref Range Status   11/14/2024 23 23 - 29 mmol/L Final     Glucose   Date Value Ref Range Status   11/14/2024 86 70 - 110 mg/dL Final     BUN   Date Value Ref Range Status   11/14/2024 12 6 - 20 mg/dL Final     Creatinine   Date Value Ref Range Status   11/14/2024 0.8 0.5 - 1.4 mg/dL Final     Calcium   Date Value Ref Range Status   11/14/2024 8.5 (L) 8.7 - 10.5 mg/dL Final     Total Protein   Date Value Ref Range Status   11/14/2024 7.0 6.0 - 8.4 g/dL Final     Albumin  "  Date Value Ref Range Status   11/14/2024 2.9 (L) 3.5 - 5.2 g/dL Final     Total Bilirubin   Date Value Ref Range Status   11/14/2024 0.3 0.1 - 1.0 mg/dL Final     Comment:     For infants and newborns, interpretation of results should be based  on gestational age, weight and in agreement with clinical  observations.    Premature Infant recommended reference ranges:  Up to 24 hours.............<8.0 mg/dL  Up to 48 hours............<12.0 mg/dL  3-5 days..................<15.0 mg/dL  6-29 days.................<15.0 mg/dL       Alkaline Phosphatase   Date Value Ref Range Status   11/14/2024 44 40 - 150 U/L Final     AST   Date Value Ref Range Status   11/14/2024 20 10 - 40 U/L Final     ALT   Date Value Ref Range Status   11/14/2024 20 10 - 44 U/L Final     Anion Gap   Date Value Ref Range Status   11/14/2024 8 8 - 16 mmol/L Final     eGFR if    Date Value Ref Range Status   07/06/2021 >60.0 >60 mL/min/1.73 m^2 Final     eGFR if non    Date Value Ref Range Status   07/06/2021 >60.0 >60 mL/min/1.73 m^2 Final     Comment:     Calculation used to obtain the estimated glomerular filtration  rate (eGFR) is the CKD-EPI equation.          Lab Results   Component Value Date    [id number] 487 08/21/2023       Lab Results   Component Value Date    TSH 0.860 11/14/2024    FREET4 0.93 10/25/2019       No results found in the last 24 hours.    No results found in the last 24 hours.    Reviewed the neuroimaging independently     Assessment:   44 Years old Female with PMH as above came for a follow-up evaluation of  "Headaches"     Intractable chronic migraine with aura and without status migrainosus     Chronic tension-type headache, intractable     PREMA (generalized anxiety disorder)     Nausea and vomiting, unspecified vomiting type     Dizziness and giddiness     Hypertension, unspecified type   .   Plan:   Patient Neurological Assessment is remarkable for Mild tenderness noted to generalized scalp " and neck during palpation. Patient reports an increased quality of life and a 95% improvement in headache frequency, duration, pain intensity, and symptom severity.   Patient's symptoms of bilateral tinnitus, dizziness, balance issues, nausea, and vomiting still point to rule out Vestibular Migraine / BPPV.      - Continue Inderal-LA 60 mg PO Daily for migraine prevention therapy and HTN management. Side effects discussed. Pamphlet given. Plan for slow titration to 80 mg BID which can cause low blood pressure, slow heart rate, erectile dysfunction, depression, airway obstruction and heart failure exacerbations. Cannot be used with migraine associate with focal neurological deficits. Instructed patient to keep a BP log and bring to next visit for provider review. No history of Asthma. Patient verbalized understanding. No refills needed.     -Continue Nurtec 75 mg PO Daily PRN Migraine for abortive therapy. Side effects discussed. Patient verbalized understanding. No refills needed.     -Continue Zofran-ODT 4 mg PO Q8H PRN Nausea / Vomiting associated with Migraines. Side effects discussed. Patient verbalized understanding. No refills needed.     -Continue Elavil 25 mg PO QHS for migraine prevention therapy and Anxiety/Depression Management. Side effects discussed (sleepiness, dry eyes, dry mouth, urinary retention, and rarely cardiac arrhythmias). Patient verbalized understanding. Refills Sent.      -Instructed patient to continue following ophthalmology routinely.     -Continue PT for vestibular therapy.     -Patient is requesting FMLA forms to be completed for Migraine Exacerbations and PT Sessions.      1. Intractable chronic migraine with aura and without status migrainosus  - propranoloL (INDERAL LA) 60 MG 24 hr capsule; Take 1 capsule (60 mg total) by mouth once daily.  Dispense: 30 capsule; Refill: 2  - amitriptyline (ELAVIL) 25 MG tablet; Take 1 tablet (25 mg total) by mouth every evening.  Dispense: 30  tablet; Refill: 2  - Ambulatory Referral/Consult to Physical Therapy/Occupational Therapy; Future    2. Chronic tension-type headache, intractable    3. PREMA (generalized anxiety disorder)    4. Nausea and vomiting, unspecified vomiting type  - ondansetron (ZOFRAN-ODT) 4 MG TbDL; Take 1 tablet (4 mg total) by mouth every 8 (eight) hours as needed (Nausea).  Dispense: 30 tablet; Refill: 0    5. Dizziness and giddiness  - Ambulatory Referral/Consult to Physical Therapy/Occupational Therapy; Future    6. Hypertension, unspecified type  - propranoloL (INDERAL LA) 60 MG 24 hr capsule; Take 1 capsule (60 mg total) by mouth once daily.  Dispense: 30 capsule; Refill: 2       LABORATORY EVALUATION    Labs: ([id number]) Aldosterone / RPR / ROSE / Sed Rate / CBC / BMP / CMP / A1C / TSH / Lipid Panel / Vitamin D / B-12 / Magnesium  -personally reviewed -non-significant abnormalities     RADIOLOGY EVALUATION     Brain MRI Without Contrast- done 06/2024- personally reviewed -no acute abnormalities     MRI Lumbar Spine Without Contrast - done 12/2022- personally reviewed - Multilevel mild degenerative changes          MANAGEMENT     HEADACHE DIARY     DISCUSSED THE THREE-FOLD MANAGEMENT OF MIGRAINE:      LIFESTYLE CHANGES:       Good sleep hygiene  Avoid general triggers like lack of sleep/too much sleep, prolonged sun exposure, excessive screen time and specific triggers based on you own diary   Minimize physical and emotional stress  Smoking avoidance and cessation  Limit caffeine drinks to 1-2 a day   Good hydration   Small frequent meals and avoid skipping meals   Moderate 30-minute-long aerobic exercises 3 times/week. Avoid strenuous exercise         ABORTIVE MEDICATIONS (ACUTE-RESCUE MEDICATIONS):     Should only be taken 2-3 times/week to avoid rebound and overuse headaches.    I-explained to the patient that pain meds especially triptans should NOT be taken daily to avoid Rebound Headache and Overuse Headache.    Take at the  ONSET of the headache sumatriptan 100 mg PO  (or other Triptan) in combination with naproxen 500 mg PO or Ibuprofen 800 mg PO for headache without nausea or vomiting.  This regimen can be repeated only once in 24 hours after 2 hours.    Side effects of triptans were discussed and include rare cardiac and cerebral ischemia and cannot be used with migraine associate with focal neurological deficits (complicated migraine) in addition to drowsiness and potential impairment of driving ability. The patient verbalized understanding.    NSAIDs can cause peptic ulcers, renal insufficiency and may increase the risk of cardiovascular diseases.  SEs were discussed with the patient. The patient verbalized understanding.    Triptans have shown to be more effective than Gepatns with more SE/AE.      AVOID NARCOTICS (OPIATES)      1. No randomized controlled study shows pain-free results with opioids in the treatment of migraine.     2. The physiologic consequences of opioid use are adverse, occur quickly, and can be permanent. Decreased gray matter, release of calcitonin gene-related peptide, dynorphin, and pro-inflammatory peptides, and activation of excitatory glutamate receptors are all associated with opioid exposure.     3. Opioids are pro-nociceptive, prevent reversal of migraine central sensitization, and interfere with triptan effectiveness.     4.Opioids precipitate bad clinical outcomes, especially transformation to daily headache.     5. They cause disease progression, comorbidity, and excessive health care consumption.           NEXT OPTIONS:    Gepants: Nuretc (rimegepant)75 mg >Ubrelvy (ubrogepant) 100 mg    Ditans: Reyvow (lasmiditan) 100 mg (No driving due to sedation)    Fioricet without codeine with Reglan.      Prednisone with Reglan.      LAST RESORT:     DHE NS Trudhesa (Max 2 a week)     C/I: concomitant use of vasoconstrictors like Triptans, strong CY inhibitors such as HAART PIs (eg, ritonavir,  nelfinavir, or indinavir) and Macrolides (eg, erythromycin or clarithromycin), CAD, PVD, Stroke/TIA and Uncontrolled HTN.  Serious SEs include Vasospasm and Fibrosis (chronic use).       IMPENDING STATUS: Prednisone and Vistaril.    STATUS MIGRAINOSUS: ED-Infusion for Status Protocol.        PREVENTATIVE (MORE ACCURATELY MIGRAINE REDUCTION) MEDICATIONS:       Since the patient's headache is very frequent a lengthy discussion about preventative medications was carried out.The patient understands that prevention means DECREASING frequency and severity and NOT elimination.The patient was made aware that any new medication can cause serious allergic reaction.The medication is considered failure only if a therapeutic dose reached and maintained for 6-8 weeks.        HELPFUL SUPPLEMENTS:     Helpful supplements include Co-Q 10, B2, Mg, Feverfew (Dolovent combination) and butterbur (Petadolex)        NEUROPHARMACOLOGY     NEXT OPTIONS:     Zonisamide/Zonegran (ZNS) 100-400 mg QHS is a good alternative to TPM in case of SE/AE.     Lamotrigine/Lamictal  (LTG)slow titration to 100 mg BID which can cause serious skin rash and rare cardiac arrhythmias. LTG is superior to other therapies for specifically reducing migraine aura.     ANTI-CGRP AGENTS: Qulipta (alogepant) 60 mg QD, Erenumab (Aimovig) 140 mg SQ Pen monthly (Reported cases of Constipation and BP elevation) , Galcanezumab (Emgality) 120 mg SQ Pen monthly after a loading dose of 240 mg  and Fremnezumab (Ajovy) (Ligand Blocker): 225 mg SQ monthly or 675 mg every 3 months     Botox 200 units every 3 months.         LAST RESORT OPTIONS:      Namenda 10 mg BID     Valproic acid/ Depakote         NEUROMODULATION     Cefaly, Relivion, Nerivio and GammaCore (VNS)         SCHOOL AND WORK ACCOMMODATIONS        Allow the patient to wear sunglasses or a cap and switch out fluorescent bulbs.    Allow the patient to arrive 5 minutes later and leave 5 minutes earlier to avoid noisy  traffic.    Allow the patient to carry a water bottle and refill as needed.    Allow the patient to snack whenever is needed.    Allow the patient to decrease the computer brightness.    Allow the patient to take breaks as needed and extra time for assignments and deadlines.    Allow the patient to avoid strenuous activity as needed.         MEDICAL/SURGICAL COMORBIDITIES     All relevant medical comorbidities noted and managed by primary care physician and medical care team.              HEALTHY LIFESTYLE AND PREVENTATIVE CARE    The patient to adhere to the age-appropriate health maintenance guidelines including screening tests and vaccinations. The patient to adhere to  healthy lifestyle, optimal weight, exercise, healthy diet, good sleep hygiene and avoiding drugs including smoking, alcohol and recreational drugs.    I spent a total of 48 minutes on the day of the visit.This includes face to face time and non-face to face time preparing to see the patient (eg, review of tests), obtaining and/or reviewing separately obtained history, documenting clinical information in the electronic or other health record, independently interpreting results and communicating results to the patient/family/caregiver, or care coordinator.     Please do not hesitate to contact me with any updates, questions or concerns.    [id number]    Nkechi Soliman, MSN, FNP-C    General Neurology

## 2025-02-12 NOTE — PATIENT INSTRUCTIONS
Take Nurtec at the ONSET of the headache in combination with naproxen 550 mg PO or Ibuprofen 600 mg PO for headache. This regimen can be repeated only once in 24 hours after 2 hours.

## 2025-02-26 NOTE — PROGRESS NOTES
Patient ID: [id number]     Chief Complaint: Follow-up    HPI:     Georgia Garcia is a 45 y.o. female with diagnosis of HTN, Obesity, Migraines, Anxiety, Depression. Patient referred by PCP. Patient's PCP is Dr. Ward. Patient seen in clinic today for Obesity. Patient last seen on 01/31/2025.     Weight history   States always had increased weight, even as a child. Patient states more prevalent on her mom's side of the family. Patient interested in compounded version of GLP-1 due to no insurance coverage for weight loss GLP-1.   Highest: 290 lbs  Lowest: 230 lbs   Here are some differences between compounded GLP-1 and pharmacy GLP-1:   Cost: Compounded GLP-1 is often cheaper than brand-name GLP-1, but lower prices may indicate lower quality ingredients.   Availability: GLP-1 is available at pharmacies that have it in stock, or through online pharmacies and [employer].   Safety: There are risks and unknowns associated with compounded GLP-1, including the possibility of lower quality ingredients.    Regulation: Compounding pharmacies are regulated by the Drug Quality and Security Act (DQSA) and state boards of pharmacy. The FDA oversees some compounding pharmacies, while state boards of pharmacy are responsible for others.   01/31/2024: at previous appt, patient started semaglutide (compound) 0.25 mg weekly. Patient states tolerating well, does feel more tired on the medication. Patient currently prescribed Wellbutrin 100 mg bid per PCP, states doesn't take medication consistently. Does aim for protein goal but has a hard time reaching goal due to decreased appetite with medication. Patient's weight at home today 277 lbs.   02/27/2025: at previous appt, semaglutide (compound) increased to 0.5 mg weekly. Tolerating well. Does have fatigue at times. States the first 2 weeks is when the medication works the best and then it starts to level off and her appetite increases. State she is caring for her dad. Anxiety and  Refilled per Cardiology protocol depression have increased. Currently prescribed Wellbutrin 100 mg bid but takes medication once daily. Some constipation, taking stool softener and it helps.     Previous Obesity Treatment:   Diet/Exercise - unsuccessful  Phentermine - unsuccessful   Wellbutrin - for depression, didn't help with weight loss    BMI:  51.2 (02/27/2025)  54.8 (01/03/2025)  55.6 (11/14/2024)  54.1 (06/11/2024)    Weight:  Wt Readings from Last 6 Encounters:   02/27/25 123 kg (271 lb 2.7 oz)   02/12/25 131.6 kg (290 lb 2 oz)   01/03/25 131.6 kg (290 lb 3.2 oz)   01/02/25 133.4 kg (294 lb 1.5 oz)   11/29/24 133.4 kg (294 lb 1.5 oz)   11/14/24 129.5 kg (285 lb 7.9 oz)     Ideal/Adjusted Body Weight:  Ideal: 105 lbs  Adjusted: 179 lbs    Neck Circumference:   17 in    Waist Circumference:  50 in    Percent Body Fat:   55% (01/03/2025)  https://www.calculator.net/body-fat-calculator.html    InBody:   Date: 01/03/2025, scanned to media, reviewed  SMM: 73.4 lbs   Body Fat Mass: 159.5 lbs  Visceral Fat Level: 27  Basal Metabolic Rate: 1,650 kcal    Nutrition history (24 hour food recall)  Breakfast: none    Lunch: protein shake  Dinner: baked salmon and green beans   Snack: none   Water: 48 oz/day  Sugar Sweetened beverages: none   Etoh: none   Satiety cues: feels full after consuming a meal, denies having to consume large portions to feel full   Cravings: denies   Food noise:denies   Dietician: previously     Eating Disorders:   Denies     Current physical activity:   Walks most days/week  Barriers: time, knee pain    Stressors/Mental Health   Work   Over the last two weeks how often have you been bothered by little interest or pleasure in doing things: 0  Over the last two weeks how often have you been bothered by feeling down, depressed or hopeless: 0  PHQ-2 Total Score: 0    Sleep habits   Feels like she can sleep all the time, sleeps well, denies sleep apnea symptoms    CVD screening  The 10-year ASCVD risk score (Thuy SENA, et al., 2019)  is: 2.7%    Values used to calculate the score:      Age: 45 years      Sex: Female      Is Non- : Yes      Diabetic: No      Tobacco smoker: No      Systolic Blood Pressure: 130 mmHg      Is BP treated: Yes      HDL Cholesterol: 52 mg/dL      Total Cholesterol: 168 mg/dL    LMP: Hysterectomy     Contraception: Hysterectomy       I spent 10 minutes counseling patient on diet, physical activity and reviewing labs.     Review of patient's allergies indicates:  No Known Allergies  Current Outpatient Medications   Medication Instructions    ALPRAZolam (XANAX) 0.25 MG tablet TAKE 1 TABLET BY MOUTH AT NIGHT AS NEEDED FOR ANXIETY    amitriptyline (ELAVIL) 25 mg, Oral, Nightly    boric acid (BORIC ACID) 650 mg, Vaginal, Weekly    buPROPion (WELLBUTRIN SR) 150 mg, Oral, Daily    estradioL (ESTRACE) 1 mg, Oral, Daily    gabapentin (NEURONTIN) 100 mg, Oral, 3 times daily    hydroCHLOROthiazide 12.5 mg, Oral, Daily    losartan (COZAAR) 100 mg, Oral, Daily    methocarbamoL (ROBAXIN) 500 mg, 4 times daily    naproxen sodium (ANAPROX) 550 MG tablet TAKE 1 TABLET(550 MG) BY MOUTH TWICE DAILY AS NEEDED FOR PAIN    ondansetron (ZOFRAN-ODT) 4 mg, Oral, Every 8 hours PRN    propranoloL (INDERAL LA) 60 mg, Oral, Daily    semaglutide (OZEMPIC) 0.5 mg, Subcutaneous, Every 7 days     Past Surgical History:   Procedure Laterality Date     SECTION, LOW TRANSVERSE      x1    COLONOSCOPY N/A 2022    Procedure: COLONOSCOPY;  Surgeon: Maximus Lopes MD;  Location: [facility];  Service: Endoscopy;  Laterality: N/A;    TOTAL ABDOMINAL HYSTERECTOMY  14    TUBAL LIGATION       family history includes ADD / ADHD in her daughter; Breast cancer in her maternal aunt and maternal grandmother; COPD in her father; Colon cancer in her maternal uncle; Deep vein thrombosis in her maternal uncle and mother; Diabetes in her maternal grandfather; Heart failure in her mother; Hypertension in her mother; Lung cancer  in her paternal uncle; Pulmonary embolism in her maternal uncle and mother; Stroke in her maternal grandfather and maternal grandmother; Thrombophilia in her maternal uncle and mother.    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              Social Drivers of Health     Financial Resource Strain: Medium Risk (4/17/2024)    Overall Financial Resource Strain (CARDIA)     Difficulty of Paying Living Expenses: Somewhat hard   Food Insecurity: No Food Insecurity (4/17/2024)    Hunger Vital Sign     Worried About Running Out of Food in the Last Year: Never true     Ran Out of Food in the Last Year: Never true   Transportation Needs: No Transportation Needs (4/17/2024)    PRAPARE - Transportation     Lack of Transportation (Medical): No     Lack of Transportation (Non-Medical): No   Physical Activity: Insufficiently Active (4/17/2024)    Exercise Vital Sign     Days of Exercise per Week: 2 days     Minutes of Exercise per Session: 30 min   Stress: Stress Concern Present (4/17/2024)    [employer] - Occupational Stress Questionnaire     Feeling of Stress : Very much   Housing Stability: Low Risk  (11/7/2023)    Housing Stability Vital Sign     Unable to Pay for Housing in the Last Year: No     Number of Places Lived in the Last Year: 1     Unstable Housing in the Last Year: No       Subjective:     Review of Systems   Constitutional: Negative.    HENT: Negative.     Eyes: Negative.    Respiratory: Negative.     Cardiovascular:  "Negative.    Gastrointestinal: Negative.    Endocrine: Negative.    Genitourinary: Negative.    Musculoskeletal: Negative.    Skin: Negative.    Allergic/Immunologic: Negative.    Neurological: Negative.    Hematological: Negative.    Psychiatric/Behavioral: Negative.       Objective:     Visit Vitals  /80 (BP Location: Left forearm, Patient Position: Sitting)   Pulse 86   Resp 18   Ht 5' 1" (1.549 m)   Wt 123 kg (271 lb 2.7 oz)   LMP 09/09/2014   SpO2 98%   BMI 51.24 kg/m²       Physical Exam  Vitals reviewed.   Constitutional:       Appearance: Normal appearance. She is obese.   HENT:      Head: Normocephalic and atraumatic.   Eyes:      Extraocular Movements: Extraocular movements intact.      Conjunctiva/sclera: Conjunctivae normal.      Pupils: Pupils are equal, round, and reactive to light.   Cardiovascular:      Rate and Rhythm: Normal rate and regular rhythm.      Heart sounds: Normal heart sounds.   Pulmonary:      Effort: Pulmonary effort is normal.      Breath sounds: Normal breath sounds.   Abdominal:      General: Bowel sounds are normal.   Musculoskeletal:         General: Normal range of motion.      Cervical back: Normal range of motion.   Skin:     General: Skin is warm and dry.   Neurological:      Mental Status: She is alert and oriented to person, place, and time.   Psychiatric:         Mood and Affect: Mood normal.         Behavior: Behavior normal.       Labs Reviewed:     Hematology:  Lab Results   Component Value Date    WBC 6.30 11/14/2024    HGB 11.0 (L) 11/14/2024    HCT 34.7 (L) 11/14/2024     11/14/2024     Chemistry:  Lab Results   Component Value Date     11/14/2024    K 3.5 11/14/2024    BUN 12 11/14/2024    CREATININE 0.8 11/14/2024    EGFRNORACEVR >60.0 11/14/2024    CALCIUM 8.5 (L) 11/14/2024    ALKPHOS 44 11/14/2024    ALBUMIN 2.9 (L) 11/14/2024    AST 20 11/14/2024    ALT 20 11/14/2024    MG 1.9 08/21/2023    [id number] 38 11/14/2024      Lab Results "   Component Value Date    HGBA1C 5.6 11/14/2024      Lipid Panel:  Lab Results   Component Value Date    CHOL 168 11/14/2024    HDL 52 11/14/2024    HDL 57 07/24/2023    TRIG 88 11/14/2024    TRIG 83 07/24/2023    TOTALCHOLEST 3.2 11/14/2024      Thyroid:  Lab Results   Component Value Date    TSH 0.860 11/14/2024      Urine:  Lab Results   Component Value Date    APPEARANCEUA Clear 11/14/2024    PROTEINUA Negative 11/14/2024    LEUKOCYTESUR Negative 11/14/2024        Assessment:       ICD-10-CM ICD-9-CM   1. Essential hypertension  I10 401.9   2. Class 3 severe obesity due to excess calories with serious comorbidity and body mass index (BMI) of 50.0 to 59.9 in adult  E66.813 278.01    Z68.43 V85.43    E66.01    3. Intractable chronic migraine without aura and without status migrainosus  G43.719 346.71   4. PREMA (generalized anxiety disorder)  F41.1 300.02   5. Drug-induced constipation  K59.03 564.09     E980.5       Plan:     1. Essential hypertension (Primary)  Vitals:    02/27/25 0739   BP: 130/80   Pulse: 86   Resp: 18     No results found for this or any previous visit.  Continue HCTZ, Losartan, Propranolol  DASH diet  Encouraged home blood pressure monitoring    2. Class 3 severe obesity due to excess calories with serious comorbidity and body mass index (BMI) of 50.0 to 59.9 in adult  Body mass index is 51.24 kg/m².  Wt Readings from Last 1 Encounters:   02/27/25 123 kg (271 lb 2.7 oz)   Waist Circumference: 50 in  TSH   Date Value Ref Range Status   11/14/2024 0.860 0.400 - 4.000 uIU/mL Final     Vit D, 25-Hydroxy   Date Value Ref Range Status   11/14/2024 38 30 - 96 ng/mL Final     Comment:     Vitamin D deficiency.........<10 ng/mL                              Vitamin D insufficiency......10-29 ng/mL       Vitamin D sufficiency........> or equal to 30 ng/mL  Vitamin D toxicity............>100 ng/mL       A modest (5-10%) weight loss improves health and quality of life.     Obesity Complication % Weight  Loss for Therapeutic Benefit   Diabetes Prevention 5% to 10%   Diabetes Remission 20%   Hypertension 5% to 15%   CVD Risk Reduction >10%   MASLD/MASH 5% to 10%/ >10%   Sleep Apnea 10%   Osteoarthritis 5% to 10%   Urinary Stress Incontinence  5% to 10%   GERD Women: 5% to 10%, Men 10%   PCOS 5% to 10%         Goal: to weigh <200 lbs  Short-term: lose 10 lbs in 12 weeks  Long-term: lose 40 lbs in 1 year  *Keep in mind that, on average, you may lose 1-2 lbs per week*    Willingness to change:     Patient qualifies for anti-obesity medications due to BMI 54 with comorbidity. Anti-obesity medications are an adjunct to nutritional, physical activity, and behavioral therapies. Medication alone cannot treat obesity. I recommend starting medication to significantly improve patient's risk factor/s.   Medication:   Increase Wellbutrin SR to 150 mg daily  Continue Semaglutide (compounded) to 0.5 mg SC weekly.   Here are some differences between compounded GLP-1 and pharmacy GLP-1:   Cost: Compounded GLP-1 is often cheaper than brand-name GLP-1, but lower prices may indicate lower quality ingredients.   Availability: GLP-1 is available at pharmacies that have it in stock, or through online pharmacies and [employer].   Safety: There are risks and unknowns associated with compounded GLP-1, including the possibility of lower quality ingredients.    Regulation: Compounding pharmacies are regulated by the Drug Quality and Security Act (DQSA) and state boards of pharmacy. The FDA oversees some compounding pharmacies, while state boards of pharmacy are responsible for others.    Nutrition: Calories per day.      Protein: goal is 121 g/day. Protein has a slower rate of digestion = greater satiety (fullness).        Aim for <25g of added sugar per day.      Recommend to increase fiber intake. USDA guidelines: Women <51 y/o - 25g/day.  www.fullplateliving.org      Eat Fit Shopping List.      Prepare meals in advance.      Eat breakfast  within 2 hours of waking up.       Track what you eat. Research shows that people who track their food intake are more successful with weight management. This creates awareness of what you are eating/drinking and can help you see where to make changes.       Get to know your plate.  www.myplate.gov      Stay hydrated.     Activity: 2-3 days of strength training. This can be body weight, light weight or elastic bands exercises. [employer] and [employer] are great sources for free workout plans/videos.     Start slowly with an activity you enjoy and make it a habit.     Ask a family member or friend to get active with you.     Aim for 5,000 - 10,000 steps per day     Schedule time to make physical activity a part of your daily routine.     Exercise prescription:     Frequency: 2-3 days/week    Intensity: low  Low: walking, swimming, cycling at a leisurely pace, yoga, crystal chi, light weight training, stretching, slow dancing, leisurely sports like table tennis, and light yard work     Type: walk at home video on [employer]    Time: 15 minutes    Enjoyment: (make it fun)    Sleep: 7-8 hours of sleep a night    *Recognize your progress and remember that each day is a new day*  *Stay on track, even when you feel like you're not making progress*  *Sit Less, Stand Often, Move More*  *You don't have to be perfect; be persistent*    Avoiding Weight Regain:  - continued with modified food intake (changed eating habits)  - eat breakfast every day  - weigh yourself at least once a week  - watch less than 10 hours of TV per week  - continue with increased physical activity  - physical activity, on average, about 1 hour per day    3. Intractable chronic migraine without aura and without status migrainosus  Stable    4. PREMA (generalized anxiety disorder)  Increase Wellbutrin SR to 150 mg daily    5. Drug-induced constipation  Miralax daily as needed      Follow up in about 6 weeks (around 4/10/2025) for Virtual Visit. In addition to their  scheduled follow up, the patient has also been instructed to follow up on as needed basis.     Mady Hull, CONCEPCIÓNP

## 2025-02-27 ENCOUNTER — OFFICE VISIT (OUTPATIENT)
Dept: PRIMARY CARE CLINIC | Facility: CLINIC | Age: 45
End: 2025-02-27
Payer: COMMERCIAL

## 2025-02-27 VITALS
WEIGHT: 271.19 LBS | HEIGHT: 61 IN | RESPIRATION RATE: 18 BRPM | HEART RATE: 86 BPM | OXYGEN SATURATION: 98 % | BODY MASS INDEX: 51.2 KG/M2 | DIASTOLIC BLOOD PRESSURE: 80 MMHG | SYSTOLIC BLOOD PRESSURE: 130 MMHG

## 2025-02-27 DIAGNOSIS — G43.719 INTRACTABLE CHRONIC MIGRAINE WITHOUT AURA AND WITHOUT STATUS MIGRAINOSUS: ICD-10-CM

## 2025-02-27 DIAGNOSIS — I10 ESSENTIAL HYPERTENSION: Primary | Chronic | ICD-10-CM

## 2025-02-27 DIAGNOSIS — E66.813 CLASS 3 SEVERE OBESITY DUE TO EXCESS CALORIES WITH SERIOUS COMORBIDITY AND BODY MASS INDEX (BMI) OF 50.0 TO 59.9 IN ADULT: Chronic | ICD-10-CM

## 2025-02-27 DIAGNOSIS — F41.1 GAD (GENERALIZED ANXIETY DISORDER): ICD-10-CM

## 2025-02-27 DIAGNOSIS — K59.03 DRUG-INDUCED CONSTIPATION: ICD-10-CM

## 2025-02-27 DIAGNOSIS — E66.01 CLASS 3 SEVERE OBESITY DUE TO EXCESS CALORIES WITH SERIOUS COMORBIDITY AND BODY MASS INDEX (BMI) OF 50.0 TO 59.9 IN ADULT: Chronic | ICD-10-CM

## 2025-02-27 PROCEDURE — 3079F DIAST BP 80-89 MM HG: CPT | Mod: CPTII,S$GLB,, | Performed by: NURSE PRACTITIONER

## 2025-02-27 PROCEDURE — 3008F BODY MASS INDEX DOCD: CPT | Mod: CPTII,S$GLB,, | Performed by: NURSE PRACTITIONER

## 2025-02-27 PROCEDURE — 1160F RVW MEDS BY RX/DR IN RCRD: CPT | Mod: CPTII,S$GLB,, | Performed by: NURSE PRACTITIONER

## 2025-02-27 PROCEDURE — 3075F SYST BP GE 130 - 139MM HG: CPT | Mod: CPTII,S$GLB,, | Performed by: NURSE PRACTITIONER

## 2025-02-27 PROCEDURE — 4010F ACE/ARB THERAPY RXD/TAKEN: CPT | Mod: CPTII,S$GLB,, | Performed by: NURSE PRACTITIONER

## 2025-02-27 PROCEDURE — 1159F MED LIST DOCD IN RCRD: CPT | Mod: CPTII,S$GLB,, | Performed by: NURSE PRACTITIONER

## 2025-02-27 PROCEDURE — 99214 OFFICE O/P EST MOD 30 MIN: CPT | Mod: 25,S$GLB,, | Performed by: NURSE PRACTITIONER

## 2025-02-27 PROCEDURE — 99401 PREV MED CNSL INDIV APPRX 15: CPT | Mod: S$GLB,,, | Performed by: NURSE PRACTITIONER

## 2025-02-27 PROCEDURE — 99999 PR PBB SHADOW E&M-EST. PATIENT-LVL V: CPT | Mod: PBBFAC,,, | Performed by: NURSE PRACTITIONER

## 2025-02-27 RX ORDER — BUPROPION HYDROCHLORIDE 150 MG/1
150 TABLET, EXTENDED RELEASE ORAL DAILY
Qty: 30 TABLET | Refills: 1 | Status: SHIPPED | OUTPATIENT
Start: 2025-02-27

## 2025-02-27 NOTE — PATIENT INSTRUCTIONS
Nutritional Diets:  Mediterranean Diet  Therapeutic Lifestyle Diet  DASH (Dietary Approaches to Stop Hypertension)  Atkins Diet  Ornish Diet  Paleo Diet  Vegetarian Diet  Commercial Diet Programs      Websites for Recipe Inspiration:   PaxVax   https://www.[employer]/  Budget Bytes   https://www.[employer]/  Real Food Dietitians   https://Poly[employer]/  Eating Bird Food   https://www.[employer]/  Fit Foodie Finds   https://Midatech.[employer]/  Wholesome Yum   https://www.[employer]/  Oh Snap Macros   https://ohsSafetyCertified/  35 Macro Friendly Meal Prep Recipes   https://Melboss/  9 Meal Prep Success Tips   https://Protein Bar/      Social Media Recommendations:  Dietitian Kaitlynn Sharp Nutritionist  ERNST Ritchie RD Johnny Hadac Josh New, RD, CSSD, CSCS      Additional Recommendations:  Limit added sugars to <25 g per day.  Hydration: At least 1/2 your body weight in ounces per day + additional 20 ounces for each hour of activity or sweat loss.   1 serving of protein = 10 grams or 1 ounce  1 serving of carbs = 20 grams or approx 1/4 to 1/3 cup  1 serving of fat = 10 grams or about 1 tablespoon   When looking for a lean protein, look for protein > fat on the nutrition label. If fat > protein, it is considered a high fat meat and should be limited during times of weight loss.   Non-starchy vegetables include all vegetables except:   Potatoes/Sweet Potatoes  Corn  Peas   Castro Beans  Winter squash such as acor squash   High Fiber Carbohydrates sources:   Beans/lentils  Whole grains  Potatoes with skin on  Quinoa  Rice  Peas  Corn   Castro beans  Carb balance tortillas or flatout wraps  All fruits  Oatmeal   High Fiber Foods:  Raspberries, 1 cup, 8 grams  Pear, 1 medium, 5.5 grams  Apple with skin, 1 medium, 4.5 grams  Green peas, 1 cup, 9 grams  Broccoli, 1 cup chopped, 5 grams  [place] sprouts, 1 cup, 4 grams  Whole wheat pasta, 1 cup, 6  grams  Quinoa cooked, 1 cup, 5 grams  Plain popcorn,3 cups, 3.5 grams  Lentils, 1 cup, 15.5 grams  Gavino seeds, 1 tablespoons, 3 grams  Almonds, 1 ounce, 3.5 grams  Black beans, 1/2 cup, 7.5 grams  Kristina New Delightful bread, 2 slices, 5 grams  Carb blanace tortillas, 1 soft taco sized, 15 grams  Minimum goal is 25 grams of fiber per day for women and 35 grams per day for men.   Frozen Meal Guidelines:   <400 calories  20 grams or more of protein  Top with rotisserie chicken for extra protein  Pair with additional fiber on the side to make it filling   Piece of fruit, 1/2 a salad kit, bag of frozen vegetables

## 2025-03-10 ENCOUNTER — CLINICAL SUPPORT (OUTPATIENT)
Dept: REHABILITATION | Facility: HOSPITAL | Age: 45
End: 2025-03-10
Payer: COMMERCIAL

## 2025-03-10 DIAGNOSIS — R42 DIZZINESS AND GIDDINESS: ICD-10-CM

## 2025-03-10 DIAGNOSIS — G43.019 INTRACTABLE MIGRAINE WITHOUT AURA AND WITHOUT STATUS MIGRAINOSUS: Primary | ICD-10-CM

## 2025-03-10 DIAGNOSIS — G43.E19 INTRACTABLE CHRONIC MIGRAINE WITH AURA AND WITHOUT STATUS MIGRAINOSUS: ICD-10-CM

## 2025-03-10 PROCEDURE — 97530 THERAPEUTIC ACTIVITIES: CPT | Mod: PN

## 2025-03-10 PROCEDURE — 97161 PT EVAL LOW COMPLEX 20 MIN: CPT | Mod: PN

## 2025-03-10 NOTE — PROGRESS NOTES
Outpatient Rehab    Physical Therapy Evaluation    Date: 3/10/2025     Clinic Number: [id number]    Patient Name: Georgia Garcia  MRN: [id number]  YOB: 1980  Today's Date: 3/11/2025    Therapy Diagnosis:   Encounter Diagnoses   Name Primary?    Intractable chronic migraine with aura and without status migrainosus     Dizziness and giddiness     Intractable migraine without aura and without status migrainosus Yes       Physician: Nkechi Soliman NP    Physician Orders: Eval and Treat  Medical Diagnosis: Migraines     Visit # / Visits Authorized:  1/12  Date of Evaluation:  3/10/2025   Insurance Authorization Period: 2/12/2025 / 2/25/2026   Plan of Care Certification:  3/10/2025 to 5/23/025      Time In:  3:30p  Time Out:  4:15  Total Time:  45  Total Billable Time: 45      SUBJECTIVE     Date of onset: long standing history     History of current condition - Georgia reports long hx of migraines that typically last for approx 2-3 weeks at a time. She feels good today and reports her migraine shave much better and more controlled recently. She has no complaints today.     Per MD note:    Started: 10 years ago, worse in the last few years  Describes: Pounding, some slowly build up but some can come on all at once / The headaches are progressively getting worse and interfering with daily activity.  Timing: Duration of 2 weeks for migraine type headaches / Duration of 10 hours for regular type headaches / wakes up with migraines / migraines can wake her up from sleep  Frequency: Daily  Pain: Regular headaches: 7-8/10 / Migraines: 10/10  Location: Bilateral temporal, parietal, and occipital regions  Family: No known family history of migraines or headaches in the family.   Medications: Norco / Elavil / Wellbutrin / Gabapentin / Nutrec / Patient reports PCP recently prescribed Nutrec, but has not taken it yet due to PA being processed.   Worsen: Stress / The patient cannot think of food that aggravates the  headache.      Alleviated: Sleeping / dark and quiet room /   Associated symptoms: Nausea / Vomiting / Trouble keeping up with tasks due to pain / light and sound sensitivity / dizziness (Light-headed) / blurry vision / double vision / bilateral floaters / Neck muscular pain with radiation to scapular area-is stress related per patient       Falls: none    Imaging, none:     Prior Therapy: none  Social History:  lives with their spouse  Occupation: NA  Prior Level of Function: Independent   Current Level of Function: Independent     Pain:  Current 0/10, worst 6/10, best 0/10   Location: bilateral head    Description: Aching and Grabbing  Aggravating Factors: Cigarette smoke, perfume  Sunlight  Easing Factors: relaxation and pain medication    Patients goals: decrease migraine frequency      Medical History:   Past Medical History:   Diagnosis Date    Anemia     Cervical radiculopathy     COVID-19 virus infection 2021    Diverticulosis     Hypermenorrhea     Hypertension     Insomnia     Internal hemorrhoid     Migraine headache     Morbid obesity     Rectal bleeding     Uterine fibroid        Surgical History:   Georgia Garcia  has a past surgical history that includes Tubal ligation;  section, low transverse; Total abdominal hysterectomy (12); and Colonoscopy (N/A, 2022).    Medications:   Georgia has a current medication list which includes the following prescription(s): alprazolam, amitriptyline, bupropion, estradiol, gabapentin, hydrochlorothiazide, losartan, methocarbamol, naproxen sodium, ondansetron, propranolol, and semaglutide.    Allergies:   Review of patient's allergies indicates:  No Known Allergies       OBJECTIVE         CMS Impairment/Limitation/Restriction for FOTO Migraine Survey    Therapist reviewed FOTO scores for Georgia Garcia on 3/10/2025.   FOTO documents entered into [employer] - see Media section.    Limitation Score: 60%          CERVICAL  (single inclinometer at top of  head) AROM  (degrees/%) Pain/Dysfunction with Movement   Flexion 55    Extension 50    Right side bending 30    Left side bending 30    Right rotation 60     Left rotation 65        Vestibular Testing  Visual Fields: normal  Horizontal tracking: normal  Vertical Tracking: normal  Diagonal tracking:  normal  Lateral Visual acuity: line6  Dynamic Visual Acuity: 0 line loss  Palpation:   Patient with increased tone over bilateral  upper trapezial muscle, levator scapula, latissimus dorsi, teres minor, anterior  chest muscle, posterior cervical muscle, sternocleidomastoid, suboccipital muscle    Decreased strength noted: mid trap 3+/5 and lower trap 4/5  DNF: 22 sec      TREATMENT     Total Treatment time (time-based codes) separate from Evaluation: 12 minutes      Georgia received the treatments listed below:    Pt participated in dynamic functional therapeutic activities to improve functional performance for 10  minutes, including:  Education provided on involved anatomical structures, proper form and muscle engagement throughout session, as well as demonstration of self tissue massage for home performance, patient demonstrates good understanding.      HEP: open books: cervical retraction and SNAGS        PATIENT EDUCATION AND HOME EXERCISES     Education provided:   - Education provided: (included in treatment section) minutes  PURPOSE: Patient educated on the impairments noted above and the effects of physical therapy intervention to improve overall condition and QOL.   EXERCISE: Patient was educated on all the above exercise prior/during/after for proper posture, positioning, and execution for safe performance with home exercise program.   STRENGTH: Patient educated on the importance of improved core and extremity strength in order to improve alignment of the spine and extremities with static positions and dynamic movement.   POSTURE: Patient educated on postural awareness to reduce stress and maintain optimal  alignment of the spine with static positions and dynamic movement   SLEEPING POSITIONS: Patient educated on the use of pillows to aid in neutral alignment of spine and extremities when sleeping in supine or side lying.      Written Home Exercises Provided: yes. Exercises were reviewed and Georgia was able to demonstrate them prior to the end of the session.  Georgia demonstrated good  understanding of the education provided. See EMR under Patient Instructions for exercises provided during therapy sessions.    ASSESSMENT     Assessment  Georgia presents with a condition of Low complexity.   Presentation of Symptoms: Stable  Will Comorbidities Impact Care: No       Functional Limitations: Completing self-care activities, Completing work/school activities, Pain with ADLs/IADLs, Participating in leisure activities, Performing household chores  Impairments: Abnormal muscle tone, Abnormal or restricted range of motion, Impaired physical strength, Lack of appropriate home exercise program, Pain with functional activity    Patient Goal for Therapy (PT): address muscle tightness  Prognosis: Good    Plan  From a physical therapy perspective, the patient would benefit from: Skilled Rehab Services    Planned therapy interventions include: Therapeutic exercise, Therapeutic activities, Neuromuscular re-education, Manual therapy, ADLs/IADLs, and Cognitive functional training.    Planned modalities to include: Electrical stimulation - attended, Cryotherapy (cold pack), Electrical stimulation - passive/unattended, and Mechanical traction.        Visit Frequency: 1 times Per Week for 10 Weeks.       This plan was discussed with Patient.   Discussion participants: Agreed Upon Plan of Care             GOALS:   Active       Pain       Patient will report pain of 2/10 demonstrating a reduction of overall pain       Start:  03/11/25    Expected End:  05/23/25            Patient will report a 2 point reduction in pain while performing  work related tasks        Start:  03/11/25    Expected End:  05/23/25               Pt will report less than 1 week with migraine in a month        improve cervical rotation to 70 deg of free mobility        Start:  03/11/25    Expected End:  05/23/25                Teresa Laguerre, PT, DPT

## 2025-03-11 PROBLEM — G43.019 INTRACTABLE MIGRAINE WITHOUT AURA AND WITHOUT STATUS MIGRAINOSUS: Status: ACTIVE | Noted: 2025-03-11

## 2025-03-11 NOTE — PROGRESS NOTES
"Subjective:       Patient ID: Georgia Garcia is a 45 y.o. female.    PMH: Sciatica / Depression / PREMA / OA / HTN / Insomnia / Migraines / and other medical conditions.     Chief Complaint: "Headaches"     HPI    The patient presented on 06/2024 / 07/2024 / 08/2024 / 02/2025 for evaluation of "Headaches". Patient presents today for a follow-up evaluation and recommendation of "Headaches".     The originating site (patient location) is: Home.      The distant site (neurologist location) is: Neurology Clinic at [facility].      The chief complaint leading to consultation is: "Headaches"       Visit type: Virtual visit with synchronous audio and video.      Consent: The patient verbally consented to participating in the video visit and informed that may decline to receive medical services by telemedicine and may withdraw from such care at any time.      I discussed with the patient the nature of our telemedicine visits, that:      I  would evaluate the patient and recommend diagnostics and treatments based on my assessment.    Our sessions are not being recorded and that personal health information is protected.    Our team would provide follow up care in person if/when the patient needs it.    Virtual (video/telemedicine) visits have significant limitations. A telemedicine exam is primarily focused on the history and what I can observe. Several critical parts of the neurological exam cannot be performed.       Interval History: (06/14/2024) - Norco 7.5-325 Q8H PRN and Metoprolol Succinate /Toprol-XL 50 mg PO Daily Discontinued. Patient started on Inderal-LA 60 mg PO Daily. Continue Nurtec. Diagnostic evaluation included Brain MRI / ESR / ROSE / RPR.     Started: 10 years ago, worse in the last few years  Describes: Pounding, some slowly build up but some can come on all at once / The headaches are progressively getting worse and interfering with daily activity.  Timing: Duration of 2 weeks for migraine type " headaches / Duration of 10 hours for regular type headaches / wakes up with migraines / migraines can wake her up from sleep  Frequency: Daily  Pain: Regular headaches: 7-8/10 / Migraines: 10/10  Location: Bilateral temporal, parietal, and occipital regions  Family: No known family history of migraines or headaches in the family.   Medications: Norco / Elavil / Wellbutrin / Gabapentin / Nutrec / Patient reports PCP recently prescribed Nutrec, but has not taken it yet due to PA being processed.   Worsen: Stress / The patient cannot think of food that aggravates the headache.      Alleviated: Sleeping / dark and quiet room /   Associated symptoms: Nausea / Vomiting / Trouble keeping up with tasks due to pain / light and sound sensitivity / dizziness (Light-headed) / blurry vision / double vision / bilateral floaters / Neck muscular pain with radiation to scapular area-is stress related per patient     No tinnitus / not worse with postural changes or valsalva / no scalp sensitivity / numbness/tingling to extremities / bilateral or focal weakness / no trouble with balance        Triggers: Cigarette smoke / strong perfume smells / bright lights / eating citrus   Prodrome symptoms: patient reports she does have an auora of a small feeling prior to migraine onset. She expresses that she can't describe her auora.      Patient reports that she has previously sought ER evaluation for Headache management who treated her with Naproxen. She felt like it was a waste of time and the medication was not effective for her.      Patient reports compliance with blood pressure medication and reports at home blood pressures run around 122/84.      No TMJ problems. No history of head trauma. No history of seizures. No history of smoking. No caffeine overuse. No vertigo. No blacking out. No fever, chills, or rigors. No history of significant memory loss. No history of strokes. No falls.        Interval History: (07/12/2024) - Continued  "Inderal-LA 60 mg PO Daily /  Nurtec 75 mg PO Daily PRN / Zofran-ODT 4 mg PO Q8H PRN. Started Elavil 25 mg PO QHS. Continued PT for vestibular therapy.     Patient reports that headaches are still severe, debilitating, and interfering with daily functioning. Frequency of tension type is daily and migraine type is daily. Duration of tension type is 10 hours and migraine type is 2 weeks. Still "Pounding" in nature. Pain intensity is decreased from 15/10 to 10/10 per patient. Associated symptoms include Nausea / Vomiting / Trouble keeping up with tasks due to pain / light and sound sensitivity / dizziness (Light-headed) / blurry vision / double vision / bilateral floaters / Neck muscular pain with radiation to scapular area-is stress related per patient / tinnitus to bilateral ears / feeling off-balance.     Patient reports chronic Anxiety / Depression. Patient reports that her anxiety and stress have slightly worsened since last visit due to unrelieved Migraines. Patient expresses that she is interested in additional pharmacological therapy for Anxiety management. Denies SI.         Interval History: (08/12/2024) -  Continued Inderal-LA 60 mg PO Daily /  Nurtec 75 mg PO Daily PRN / Zofran-ODT 4 mg PO Q8H PRN / Elavil 25 mg PO QHS.     -Headaches have significantly improved since last visit. Patient reports an increased quality of life and a 95% improvement in headache frequency, duration, pain intensity, and symptom severity.   -Location is still Bilateral temporal, parietal, and occipital regions.   -Positive Headache auras - Patient unable to describe feeling.   -Frequency decreased - tension type is 2 per week and migraine type is 2-3 times per week.  -Duration decreased - tension type is decreased from 10 hours to 3 hours / and migraine type decreased from 2 weeks to 3 days.   -Still "Pounding" in nature  -Pain intensity is decreased from 10/10 to 4/10 per patient.   -Worsened by Stress.   -Triggered by Cigarette " "smoke / strong perfume smells / bright lights / eating citrus  -Alleviated with sleeping / dark and quiet room   -Associated symptoms include Nausea / Vomiting / Trouble keeping up with tasks due to pain / light and sound sensitivity / dizziness (Light-headed) / blurry vision / double vision / bilateral floaters / Neck muscular pain with radiation to scapular area-is stress related per patient / tinnitus to bilateral ears / feeling off-balance - significantly decreased intensity of symptoms per patient  -Patient does report changes in vision. Last eye clinic appointment was April 2024, no abnormalities were noted per patient. Her prescription has not changed within 3 years.             Interval History:  (02/12/2025) - Continued Inderal-LA 60 mg PO Daily /  Nurtec 75 mg PO Daily PRN / Zofran-ODT 4 mg PO Q8H PRN / Elavil 25 mg PO QHS / PT.     -Headaches have significantly improved since last visit. Patient reports an increased quality of life and a 95% improvement in headache frequency, duration, pain intensity, and symptom severity.   -Location is still Bilateral temporal, parietal, and occipital regions.   -Positive Headache auras - Patient unable to describe feeling. She reports Nausea prior to Migraine onset.  -Frequency decreased - tension type is 2-3 per week and migraine type is 2-3 times per month.  -Duration decreased - tension type is 1 hour / and migraine type decreased from 2 weeks to 20 min - 3 days. Migraines alleviated 20 minutes after Nurtec.   -Still "Pounding" in nature.  -Pain intensity is 5-10/10 per patient.   -Worsened by Stress  -Triggered by Cigarette smoke / strong perfume smells / bright lights / eating citrus  -Alleviated with sleeping / dark and quiet room   -Associated symptoms include Nausea / Vomiting / Trouble keeping up with tasks due to pain / light and sound sensitivity / dizziness (Light-headed) / blurry vision  / bilateral floaters / Neck muscular pain with radiation to scapular " "area-is stress related per patient / tinnitus to bilateral ears / feeling off-balance - significantly decreased intensity of symptoms per patient    Anxiety:    Patient reports that her chronic anxiety and stress have slightly improved due to Migraine alleviation with current medication regimen. Denies SI. Currently managed by psychiatrist. Elavil 25 mg PO QHS - has helped to improve per patient.     Patient reports tolerating medications well without side effects. She would like to stay on current medication regimen.            New Issues: (03/12/2025) -     No new issues per patient.      Patient reports increased stress levels related to life stressors. Home blood pressure readings consistently around 101/70 mmHg.    Headaches:    -Headaches are unchanged since last visit.   -Location is still Bilateral temporal, parietal, and occipital regions.   -Positive Headache auras - Patient unable to describe feeling. She reports Nausea prior to Migraine onset.  -Frequency  - tension type is 2-3 per week and migraine type is 2-3 times per month.  -Duration  - tension type is 1 hour / and migraine type decreased from 2 weeks to 20 min. Migraines alleviated 20 minutes after Nurtec.   -Still "Pounding" in nature.  -Pain intensity is 5-10/10 per patient.   -Worsened by Stress  -Triggered by Cigarette smoke / strong perfume smells / bright lights / eating citrus  -Alleviated with sleeping / dark and quiet room   -Associated symptoms include Nausea / Vomiting / Trouble keeping up with tasks due to pain / light and sound sensitivity / dizziness (Light-headed) / blurry vision  / bilateral floaters / Neck muscular pain with radiation to scapular area-is stress related per patient / tinnitus to bilateral ears / feeling off-balance - significantly decreased intensity of symptoms per patient      -Double vision - resolved per patient    -Patient does report changes in vision. Last eye clinic appointment was January 2025, no " abnormalities were noted per patient. Her prescription for eye glasses did not change.     -No falls     -Vestibular therapy with physical therapy (PT) - 1 session. No vestibular issues identified by PT.      Abortive therapies (tried and failed): Nurtec 75 mg PO Daily PRN - Current    -Maxalt- not effective - SE of dizziness / nausea / feeling like she was going to pass out /      Preventative therapies (tried and failed): Inderal-LA 60 mg PO Daily / Elavil 25 mg PO QHS - Current    -Topamax- not effective (took for about 8-9 years)       Anxiety:     Patient reports that her chronic anxiety and stress is slightly worse. Denies SI. Currently managed by psychiatrist. Elavil 25 mg PO QHS - has helped to improve per patient.     -Patient reports tolerating medications well without side effects. She would like to stay on current medication regimen.      Review of Systems   Constitutional:  Negative for activity change, appetite change, chills, diaphoresis, fatigue, fever and unexpected weight change.   HENT:  Positive for tinnitus. Negative for congestion, dental problem, drooling, ear discharge, ear pain, facial swelling, hearing loss, mouth sores, nosebleeds, postnasal drip, rhinorrhea, sinus pressure, sinus pain, sneezing, sore throat, trouble swallowing and voice change.    Eyes:  Positive for photophobia and visual disturbance. Negative for pain, discharge, redness and itching.   Respiratory:  Negative for cough, chest tightness, shortness of breath and wheezing.    Cardiovascular:  Negative for chest pain, palpitations and leg swelling.   Gastrointestinal:  Positive for nausea and vomiting. Negative for abdominal distention, abdominal pain, blood in stool, constipation and diarrhea.   Endocrine: Negative for cold intolerance, heat intolerance, polydipsia, polyphagia and polyuria.   Genitourinary:  Negative for decreased urine volume, difficulty urinating, dysuria, flank pain, frequency, hematuria, pelvic pain,  urgency and vaginal discharge.   Musculoskeletal:  Positive for myalgias and neck pain. Negative for arthralgias, back pain, gait problem, joint swelling and neck stiffness.   Skin:  Negative for color change and rash.   Allergic/Immunologic: Negative for immunocompromised state.   Neurological:  Positive for dizziness, light-headedness and headaches. Negative for tremors, seizures, syncope, facial asymmetry, speech difficulty, weakness and numbness.        Patient reports phonophobia and balance issues associated with migraines.    Hematological:  Negative for adenopathy. Does not bruise/bleed easily.   Psychiatric/Behavioral:  Positive for decreased concentration. Negative for agitation, behavioral problems, confusion, dysphoric mood, hallucinations, self-injury, sleep disturbance and suicidal ideas. The patient is not nervous/anxious and is not hyperactive.    All other systems reviewed and are negative.                Current Outpatient Medications:     ALPRAZolam (XANAX) 0.25 MG tablet, TAKE 1 TABLET BY MOUTH AT NIGHT AS NEEDED FOR ANXIETY, Disp: 30 tablet, Rfl: 0    amitriptyline (ELAVIL) 25 MG tablet, Take 1 tablet (25 mg total) by mouth every evening., Disp: 30 tablet, Rfl: 2    buPROPion (WELLBUTRIN SR) 150 MG TBSR 12 hr tablet, Take 1 tablet (150 mg total) by mouth once daily., Disp: 30 tablet, Rfl: 1    estradioL (ESTRACE) 1 MG tablet, Take 1 tablet (1 mg total) by mouth once daily., Disp: 90 tablet, Rfl: 1    gabapentin (NEURONTIN) 100 MG capsule, Take 1 capsule (100 mg total) by mouth 3 (three) times daily., Disp: 90 capsule, Rfl: 5    hydroCHLOROthiazide (HYDRODIURIL) 12.5 MG Tab, Take 1 tablet (12.5 mg total) by mouth once daily., Disp: 90 tablet, Rfl: 1    losartan (COZAAR) 100 MG tablet, Take 1 tablet (100 mg total) by mouth once daily., Disp: 90 tablet, Rfl: 1    methocarbamoL (ROBAXIN) 500 MG Tab, Take 500 mg by mouth 4 (four) times daily., Disp: , Rfl:     naproxen sodium (ANAPROX) 550 MG tablet,  TAKE 1 TABLET(550 MG) BY MOUTH TWICE DAILY AS NEEDED FOR PAIN, Disp: 30 tablet, Rfl: 3    ondansetron (ZOFRAN-ODT) 4 MG TbDL, Take 1 tablet (4 mg total) by mouth every 8 (eight) hours as needed (Nausea)., Disp: 30 tablet, Rfl: 0    propranoloL (INDERAL LA) 60 MG 24 hr capsule, Take 1 capsule (60 mg total) by mouth once daily., Disp: 30 capsule, Rfl: 2    semaglutide (OZEMPIC) 0.25 mg or 0.5 mg (2 mg/3 mL) pen injector, Inject 0.5 mg into the skin every 7 days., Disp: , Rfl:     Past Medical History:   Diagnosis Date    Anemia     Cervical radiculopathy     COVID-19 virus infection 2021    Diverticulosis     Hypermenorrhea     Hypertension     Insomnia     Internal hemorrhoid     Migraine headache     Morbid obesity     Rectal bleeding     Uterine fibroid        Past Surgical History:   Procedure Laterality Date     SECTION, LOW TRANSVERSE      x1    COLONOSCOPY N/A 2022    Procedure: COLONOSCOPY;  Surgeon: Maximus Lopes MD;  Location: [facility];  Service: Endoscopy;  Laterality: N/A;    TOTAL ABDOMINAL HYSTERECTOMY  14    TUBAL LIGATION         Social History     Socioeconomic History    Marital status: Single   Occupational History     Employer: [employer]   Tobacco Use    Smoking status: Never    Smokeless tobacco: Never   Substance and Sexual Activity    Alcohol use: No    Drug use: No    Sexual activity: Yes     Partners: Male     Birth control/protection: Surgical     Comment: hyst; mut monog   Social History Narrative    She is , has one daughter and 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              Social Drivers of Health     Financial Resource Strain: Medium Risk (2024)    Overall Financial Resource Strain (CARDIA)     Difficulty of Paying Living Expenses: Somewhat hard   Food Insecurity: No Food Insecurity (2024)    Hunger Vital Sign     Worried About Running Out of Food in the Last Year: Never true     Ran  Out of Food in the Last Year: Never true   Transportation Needs: No Transportation Needs (4/17/2024)    PRAPARE - Transportation     Lack of Transportation (Medical): No     Lack of Transportation (Non-Medical): No   Physical Activity: Insufficiently Active (4/17/2024)    Exercise Vital Sign     Days of Exercise per Week: 2 days     Minutes of Exercise per Session: 30 min   Stress: Stress Concern Present (4/17/2024)    [employer] - Occupational Stress Questionnaire     Feeling of Stress : Very much   Housing Stability: Low Risk  (11/7/2023)    Housing Stability Vital Sign     Unable to Pay for Housing in the Last Year: No     Number of Places Lived in the Last Year: 1     Unstable Housing in the Last Year: No       Past/Current Medical/Surgical History, Past/Current Social History, Past/Current Family History and Past/Current Medications were reviewed in detail.      Objective:     GENERAL APPEARANCE:     The patient looks comfortable.    No signs of respiratory distress.    Normal breathing pattern.    No dysmorphic features    Normal eye contact.     GENERAL MEDICAL EXAM:    HEENT:  Head is atraumatic normocephalic.      Neck and Axillae: No JVD. No visible lesions.    Cardiopulmonary: No cyanosis. No tachypnea. Normal respiratory effort.    Gastrointestinal/Urogenital:  No jaundice. No stomas or lesions. No visible hernias. No catheters.     Skin, Hair and Nails: No pathognonomic skin rash. No neurofibromatosis. No visible lesions.No stigmata of autoimmune disease. No clubbing.    Limbs: No varicose veins. No visible swelling.    Muskoskeletal: No visible deformities.No visible lesions.      Neurologic Exam      Mental Status   Oriented to person, place, and time.   Oriented to person.   Oriented to place. Oriented to country, city and area.   Oriented to time. Oriented to year, month, date, day and season.   Registration: recalls 3 of 3 objects. Recall at 5 minutes: recalls 3 of 3  objects. Follows 3 step commands.   Attention: normal. Concentration: normal.   Speech: speech is normal   Level of consciousness: alert  Knowledge: good. Able to perform simple calculations.   Able to name object. Able to read. Able to repeat. Able to write. Normal comprehension.      Cranial Nerves      CN II   Visual fields full to confrontation.   Visual acuity: normal  Right visual field deficit: none  Left visual field deficit: none      CN III, IV, VI   Extraocular motions are normal.   Right pupil: Consensual response: intact. Accommodation: intact.   Left pupil: Consensual response: intact. Accommodation: intact.   CN III: no CN III palsy  CN VI: no CN VI palsy  Nystagmus: none   Diplopia: none  Ophthalmoparesis: none  Upgaze: normal  Downgaze: normal  Conjugate gaze: present  Vestibulo-ocular reflex: present     CN V   Facial sensation intact.   Right facial sensation deficit: none  Left facial sensation deficit: none     CN VII   Facial expression full, symmetric.   Right facial weakness: none  Left facial weakness: none     CN VIII   CN VIII normal.   Hearing: intact     CN IX, X   CN IX normal.   CN X normal.   Palate: symmetric     CN XII   CN XII normal.   Tongue: not atrophic  Fasciculations: absent  Tongue deviation: none     Motor Exam   Muscle bulk: normal  Overall muscle tone: normal  Right arm pronator drift: absent  Left arm pronator drift: absent     Sensory Exam   Graphesthesia: normal  Stereognosis: normal     Gait, Coordination, and Reflexes      Gait  Gait: normal     Tremor   Resting tremor: absent  Intention tremor: absent  Action tremor: absent              Lab Results   Component Value Date    WBC 6.30 11/14/2024    HGB 11.0 (L) 11/14/2024    HCT 34.7 (L) 11/14/2024    MCV 95 11/14/2024     11/14/2024       Sodium   Date Value Ref Range Status   11/14/2024 138 136 - 145 mmol/L Final     Potassium   Date Value Ref Range Status   11/14/2024 3.5 3.5 - 5.1 mmol/L Final     Chloride    Date Value Ref Range Status   11/14/2024 107 95 - 110 mmol/L Final     CO2   Date Value Ref Range Status   11/14/2024 23 23 - 29 mmol/L Final     Glucose   Date Value Ref Range Status   11/14/2024 86 70 - 110 mg/dL Final     BUN   Date Value Ref Range Status   11/14/2024 12 6 - 20 mg/dL Final     Creatinine   Date Value Ref Range Status   11/14/2024 0.8 0.5 - 1.4 mg/dL Final     Calcium   Date Value Ref Range Status   11/14/2024 8.5 (L) 8.7 - 10.5 mg/dL Final     Total Protein   Date Value Ref Range Status   11/14/2024 7.0 6.0 - 8.4 g/dL Final     Albumin   Date Value Ref Range Status   11/14/2024 2.9 (L) 3.5 - 5.2 g/dL Final     Total Bilirubin   Date Value Ref Range Status   11/14/2024 0.3 0.1 - 1.0 mg/dL Final     Comment:     For infants and newborns, interpretation of results should be based  on gestational age, weight and in agreement with clinical  observations.    Premature Infant recommended reference ranges:  Up to 24 hours.............<8.0 mg/dL  Up to 48 hours............<12.0 mg/dL  3-5 days..................<15.0 mg/dL  6-29 days.................<15.0 mg/dL       Alkaline Phosphatase   Date Value Ref Range Status   11/14/2024 44 40 - 150 U/L Final     AST   Date Value Ref Range Status   11/14/2024 20 10 - 40 U/L Final     ALT   Date Value Ref Range Status   11/14/2024 20 10 - 44 U/L Final     Anion Gap   Date Value Ref Range Status   11/14/2024 8 8 - 16 mmol/L Final     eGFR if    Date Value Ref Range Status   07/06/2021 >60.0 >60 mL/min/1.73 m^2 Final     eGFR if non    Date Value Ref Range Status   07/06/2021 >60.0 >60 mL/min/1.73 m^2 Final     Comment:     Calculation used to obtain the estimated glomerular filtration  rate (eGFR) is the CKD-EPI equation.          Lab Results   Component Value Date    JEHVTMVD89 487 08/21/2023       Lab Results   Component Value Date    TSH 0.860 11/14/2024    FREET4 0.93 10/25/2019       No results found in the last 24 hours.    No  "results found in the last 24 hours.    Reviewed the neuroimaging independently     Assessment:   44 Years old Female with PMH as above came for a follow-up evaluation of  "Headaches"     Intractable chronic migraine with aura and without status migrainosus     Chronic tension-type headache, intractable     PREMA (generalized anxiety disorder)     Nausea and vomiting, unspecified vomiting type     Dizziness and giddiness     Hypertension, unspecified type   .   Plan:   Patient Neurological Assessment is non-focal via virtual visit.        - Continue Inderal-LA 60 mg PO Daily for migraine prevention therapy and HTN management. Side effects discussed. Pamphlet given. Plan for slow titration to 80 mg BID which can cause low blood pressure, slow heart rate, erectile dysfunction, depression, airway obstruction and heart failure exacerbations. Cannot be used with migraine associate with focal neurological deficits. Instructed patient to keep a BP log and bring to next visit for provider review. No history of Asthma. Patient verbalized understanding. No refills needed.          -Will not titrate Inderal-LA up at this time due to patient's reported at home blood pressure readings of 101/70 mmHg.    -Change Nurtec 75 mg PO Daily PRN to 75 mg PO every other day for Migraine Prevention Therapy. Side effects discussed. Patient verbalized understanding. Refills sent.     -Start butalbital-acetaminophen-caffeine -40 mg (FIORICET, ESGIC) -40 mg per tablet; Take 1 tablet by mouth every 4 (four) hours as needed for Headaches (Acute Migraine Treatment).    -Continue Zofran-ODT 4 mg PO Q8H PRN Nausea / Vomiting associated with Migraines. Side effects discussed. Patient verbalized understanding. No refills needed.     -Continue Elavil 25 mg PO QHS for migraine prevention therapy and Anxiety/Depression Management. Side effects discussed (sleepiness, dry eyes, dry mouth, urinary retention, and rarely cardiac arrhythmias). " Patient verbalized understanding. No refills needed.     -Patient is not receptive to Elavil increase at this time.     -Instructed patient to continue following ophthalmology routinely.     -Continue PT for vestibular therapy.     -Patient is requesting FMLA forms to be completed for Migraine Exacerbations and PT Sessions. Completed 02/2025.      -We have discussed the value in aggressively controlling vascular risk factors such as Hypertension, Hyperlipidemia, Obesity, and Diabetes. (SBP <120, LDL <100, A1C <7.0)  We discussed the need to optimize lifestyle choices including a heart healthy diet (Mediterranean or DASH Diet), increased cardiovascular exercise (goal of 150 minutes of moderate intensity per week), and stay cognitively and socially active.   We recommended the MIND Diet, a combination of (Mediterranean and DASH Diet) because it includes a variety of brain-friendly foods to optimize cognitive health and longevity. Patient verbalized understanding.      1. Intractable chronic migraine with aura and without status migrainosus  - rimegepant (NURTEC) 75 mg odt; Take 1 tablet (75 mg total) by mouth every other day. Place ODT tablet on the tongue; alternatively the ODT tablet may be placed under the tongue  Dispense: 16 tablet; Refill: 0  - butalbital-acetaminophen-caffeine -40 mg (FIORICET, ESGIC) -40 mg per tablet; Take 1 tablet by mouth every 4 (four) hours as needed for Headaches (Acute Migraine Treatment).  Dispense: 15 tablet; Refill: 0    2. Chronic tension-type headache, intractable    3. PREMA (generalized anxiety disorder)    4. Nausea and vomiting, unspecified vomiting type    5. Dizziness and giddiness    6. Hypertension, unspecified type       LABORATORY EVALUATION    Labs: ([id number]) Aldosterone / RPR / ROSE / Sed Rate / CBC / BMP / CMP / A1C / TSH / Lipid Panel / Vitamin D / B-12 / Magnesium  -personally reviewed -non-significant abnormalities     RADIOLOGY EVALUATION     Brain MRI  Without Contrast- done 06/2024- personally reviewed -no acute abnormalities     MRI Lumbar Spine Without Contrast - done 12/2022- personally reviewed - Multilevel mild degenerative changes          MANAGEMENT     HEADACHE DIARY     DISCUSSED THE THREE-FOLD MANAGEMENT OF MIGRAINE:      LIFESTYLE CHANGES:       Good sleep hygiene  Avoid general triggers like lack of sleep/too much sleep, prolonged sun exposure, excessive screen time and specific triggers based on you own diary   Minimize physical and emotional stress  Smoking avoidance and cessation  Limit caffeine drinks to 1-2 a day   Good hydration   Small frequent meals and avoid skipping meals   Moderate 30-minute-long aerobic exercises 3 times/week. Avoid strenuous exercise         ABORTIVE MEDICATIONS (ACUTE-RESCUE MEDICATIONS):     Should only be taken 2-3 times/week to avoid rebound and overuse headaches.    I-explained to the patient that pain meds especially triptans should NOT be taken daily to avoid Rebound Headache and Overuse Headache.    Take at the ONSET of the headache sumatriptan 100 mg PO  (or other Triptan) in combination with naproxen 500 mg PO or Ibuprofen 800 mg PO for headache without nausea or vomiting.  This regimen can be repeated only once in 24 hours after 2 hours.    Side effects of triptans were discussed and include rare cardiac and cerebral ischemia and cannot be used with migraine associate with focal neurological deficits (complicated migraine) in addition to drowsiness and potential impairment of driving ability. The patient verbalized understanding.    NSAIDs can cause peptic ulcers, renal insufficiency and may increase the risk of cardiovascular diseases.  SEs were discussed with the patient. The patient verbalized understanding.    Triptans have shown to be more effective than Gepatns with more SE/AE.      AVOID NARCOTICS (OPIATES)      1. No randomized controlled study shows pain-free results with opioids in the treatment of  migraine.     2. The physiologic consequences of opioid use are adverse, occur quickly, and can be permanent. Decreased gray matter, release of calcitonin gene-related peptide, dynorphin, and pro-inflammatory peptides, and activation of excitatory glutamate receptors are all associated with opioid exposure.     3. Opioids are pro-nociceptive, prevent reversal of migraine central sensitization, and interfere with triptan effectiveness.     4.Opioids precipitate bad clinical outcomes, especially transformation to daily headache.     5. They cause disease progression, comorbidity, and excessive health care consumption.           NEXT OPTIONS:    Gepants: Nuretc (rimegepant)75 mg >Ubrelvy (ubrogepant) 100 mg    Ditans: Reyvow (lasmiditan) 100 mg (No driving due to sedation)    Fioricet without codeine with Reglan.      Prednisone with Reglan.      LAST RESORT:     DHE NS Trudhesa (Max 2 a week)     C/I: concomitant use of vasoconstrictors like Triptans, strong CY inhibitors such as HAART PIs (eg, ritonavir, nelfinavir, or indinavir) and Macrolides (eg, erythromycin or clarithromycin), CAD, PVD, Stroke/TIA and Uncontrolled HTN.  Serious SEs include Vasospasm and Fibrosis (chronic use).       IMPENDING STATUS: Prednisone and Vistaril.    STATUS MIGRAINOSUS: ED-Infusion for Status Protocol.        PREVENTATIVE (MORE ACCURATELY MIGRAINE REDUCTION) MEDICATIONS:       Since the patient's headache is very frequent a lengthy discussion about preventative medications was carried out.The patient understands that prevention means DECREASING frequency and severity and NOT elimination.The patient was made aware that any new medication can cause serious allergic reaction.The medication is considered failure only if a therapeutic dose reached and maintained for 6-8 weeks.        HELPFUL SUPPLEMENTS:     Helpful supplements include Co-Q 10, B2, Mg, Feverfew (Dolovent combination) and butterbur (Petadolex)        NEUROPHARMACOLOGY      NEXT OPTIONS:     Zonisamide/Zonegran (ZNS) 100-400 mg QHS is a good alternative to TPM in case of SE/AE.     Lamotrigine/Lamictal  (LTG)slow titration to 100 mg BID which can cause serious skin rash and rare cardiac arrhythmias. LTG is superior to other therapies for specifically reducing migraine aura.     ANTI-CGRP AGENTS: Qulipta (alogepant) 60 mg QD, Erenumab (Aimovig) 140 mg SQ Pen monthly (Reported cases of Constipation and BP elevation) , Galcanezumab (Emgality) 120 mg SQ Pen monthly after a loading dose of 240 mg  and Fremnezumab (Ajovy) (Ligand Blocker): 225 mg SQ monthly or 675 mg every 3 months     Botox 200 units every 3 months.         LAST RESORT OPTIONS:      Namenda 10 mg BID     Valproic acid/ Depakote         NEUROMODULATION     Cefaly, Relivion, Nerivio and GammaCore (VNS)         SCHOOL AND WORK ACCOMMODATIONS        Allow the patient to wear sunglasses or a cap and switch out fluorescent bulbs.    Allow the patient to arrive 5 minutes later and leave 5 minutes earlier to avoid noisy traffic.    Allow the patient to carry a water bottle and refill as needed.    Allow the patient to snack whenever is needed.    Allow the patient to decrease the computer brightness.    Allow the patient to take breaks as needed and extra time for assignments and deadlines.    Allow the patient to avoid strenuous activity as needed.         MEDICAL/SURGICAL COMORBIDITIES     All relevant medical comorbidities noted and managed by primary care physician and medical care team.              HEALTHY LIFESTYLE AND PREVENTATIVE CARE    The patient to adhere to the age-appropriate health maintenance guidelines including screening tests and vaccinations. The patient to adhere to  healthy lifestyle, optimal weight, exercise, healthy diet, good sleep hygiene and avoiding drugs including smoking, alcohol and recreational drugs.    I spent a total of 32 minutes on the day of the visit.This includes face to face time and  non-face to face time preparing to see the patient (eg, review of tests), obtaining and/or reviewing separately obtained history, documenting clinical information in the electronic or other health record, independently interpreting results and communicating results to the patient/family/caregiver, or care coordinator.     Please do not hesitate to contact me with any updates, questions or concerns.    [id number]    Nkechi Soliman MSN, FNP-C    General Neurology

## 2025-03-12 ENCOUNTER — OFFICE VISIT (OUTPATIENT)
Dept: NEUROLOGY | Facility: CLINIC | Age: 45
End: 2025-03-12
Payer: COMMERCIAL

## 2025-03-12 DIAGNOSIS — G43.E19 INTRACTABLE CHRONIC MIGRAINE WITH AURA AND WITHOUT STATUS MIGRAINOSUS: Primary | ICD-10-CM

## 2025-03-12 DIAGNOSIS — R11.2 NAUSEA AND VOMITING, UNSPECIFIED VOMITING TYPE: ICD-10-CM

## 2025-03-12 DIAGNOSIS — F41.1 GAD (GENERALIZED ANXIETY DISORDER): ICD-10-CM

## 2025-03-12 DIAGNOSIS — G44.221 CHRONIC TENSION-TYPE HEADACHE, INTRACTABLE: ICD-10-CM

## 2025-03-12 DIAGNOSIS — I10 HYPERTENSION, UNSPECIFIED TYPE: ICD-10-CM

## 2025-03-12 DIAGNOSIS — R42 DIZZINESS AND GIDDINESS: ICD-10-CM

## 2025-03-12 RX ORDER — BUTALBITAL, ACETAMINOPHEN AND CAFFEINE 50; 325; 40 MG/1; MG/1; MG/1
1 TABLET ORAL EVERY 4 HOURS PRN
Qty: 15 TABLET | Refills: 0 | Status: SHIPPED | OUTPATIENT
Start: 2025-03-12 | End: 2025-04-11

## 2025-03-12 RX ORDER — RIMEGEPANT SULFATE 75 MG/75MG
75 TABLET, ORALLY DISINTEGRATING ORAL EVERY OTHER DAY
Qty: 16 TABLET | Refills: 0 | Status: SHIPPED | OUTPATIENT
Start: 2025-03-12 | End: 2025-04-13

## 2025-03-14 ENCOUNTER — TELEPHONE (OUTPATIENT)
Dept: BARIATRICS | Facility: CLINIC | Age: 45
End: 2025-03-14
Payer: COMMERCIAL

## 2025-03-15 DIAGNOSIS — G43.919 INTRACTABLE MIGRAINE WITHOUT STATUS MIGRAINOSUS, UNSPECIFIED MIGRAINE TYPE: ICD-10-CM

## 2025-03-15 NOTE — TELEPHONE ENCOUNTER
No care due was identified.  [facility] Embedded Care Due Messages. Reference number: [id number].   3/15/2025 8:23:08 AM CDT

## 2025-03-17 ENCOUNTER — PATIENT MESSAGE (OUTPATIENT)
Dept: PRIMARY CARE CLINIC | Facility: CLINIC | Age: 45
End: 2025-03-17
Payer: COMMERCIAL

## 2025-03-17 RX ORDER — NAPROXEN SODIUM 550 MG/1
TABLET ORAL
Qty: 30 TABLET | Refills: 3 | Status: SHIPPED | OUTPATIENT
Start: 2025-03-17

## 2025-03-17 NOTE — TELEPHONE ENCOUNTER
Refill Routing Note   Medication(s) are not appropriate for processing by [facility] for the following reason(s):        Outside of protocol    [facility] action(s):  Route             Appointments  past 12m or future 3m with PCP    Date Provider   Last Visit   11/14/2024 Tami Ward MD   Next Visit   Visit date not found Tami Ward MD   ED visits in past 90 days: 0        Note composed:10:19 AM 03/17/2025

## 2025-03-24 DIAGNOSIS — F41.1 GAD (GENERALIZED ANXIETY DISORDER): ICD-10-CM

## 2025-03-24 RX ORDER — ALPRAZOLAM 0.25 MG/1
0.25 TABLET ORAL NIGHTLY PRN
Qty: 30 TABLET | Refills: 0 | Status: SHIPPED | OUTPATIENT
Start: 2025-03-24

## 2025-03-24 NOTE — TELEPHONE ENCOUNTER
No care due was identified.  [facility] Embedded Care Due Messages. Reference number: [id number].   3/24/2025 12:28:23 PM CDT

## 2025-03-25 ENCOUNTER — TELEPHONE (OUTPATIENT)
Dept: BARIATRICS | Facility: CLINIC | Age: 45
End: 2025-03-25
Payer: COMMERCIAL

## 2025-03-25 NOTE — TELEPHONE ENCOUNTER
----- Message from Kaur sent at 3/14/2025  9:42 AM CDT -----  Regarding: check guarantor note   check guarantor note

## 2025-03-28 ENCOUNTER — TELEPHONE (OUTPATIENT)
Dept: BARIATRICS | Facility: CLINIC | Age: 45
End: 2025-03-28
Payer: COMMERCIAL

## 2025-03-28 NOTE — TELEPHONE ENCOUNTER
----- Message from Crystal sent at 3/28/2025  3:00 PM CDT -----  Regarding: pat advice  Contact: [phone]  Patient calling to advise the link with video is not in her portal. Pls call [phone]

## 2025-03-28 NOTE — TELEPHONE ENCOUNTER
Called and spoke with the pt.  Informed pt that I will send her the link on the portal to the [facility] and that if it does not allow her to input the code after to let us know by responding to my message.

## 2025-03-31 ENCOUNTER — TELEPHONE (OUTPATIENT)
Dept: SPORTS MEDICINE | Facility: CLINIC | Age: 45
End: 2025-03-31
Payer: COMMERCIAL

## 2025-03-31 DIAGNOSIS — M17.0 BILATERAL PRIMARY OSTEOARTHRITIS OF KNEE: Primary | ICD-10-CM

## 2025-03-31 NOTE — TELEPHONE ENCOUNTER
Called and spoke with patient regarding April 10 appt and patient wanting gel injections.  Let patient know that the authorization process for gel injections take 4 weeks and scheduled her for first available appt with provider in that period to allow for authorization.  Patient verbalized understanding of new appt date, time and location and agreed to cancel April 10 appt.

## 2025-04-10 NOTE — PROGRESS NOTES
Patient ID: [id number]     Chief Complaint: Follow-up    HPI:     The patient location is: Work  The chief complaint leading to consultation is: follow up    Visit type: audiovisual    Face to Face time with patient: 10 minutes  20 minutes of total time spent on the encounter, which includes face to face time and non-face to face time preparing to see the patient (eg, review of tests), Obtaining and/or reviewing separately obtained history, Documenting clinical information in the electronic or other health record, Independently interpreting results (not separately reported) and communicating results to the patient/family/caregiver, or Care coordination (not separately reported).     Each patient to whom he or she provides medical services by telemedicine is:  (1) informed of the relationship between the physician and patient and the respective role of any other health care provider with respect to management of the patient; and (2) notified that he or she may decline to receive medical services by telemedicine and may withdraw from such care at any time.      Georgia Garcia is a 45 y.o. female with diagnosis of HTN, Obesity, Migraines, Anxiety, Depression. Patient referred by PCP. Patient's PCP is Dr. Ward. Patient seen in clinic today for Obesity. Patient last seen on 02/27/2025.     Weight history   States always had increased weight, even as a child. Patient states more prevalent on her mom's side of the family. Patient interested in compounded version of GLP-1 due to no insurance coverage for weight loss GLP-1.   Highest: 290 lbs  Lowest: 230 lbs   Here are some differences between compounded GLP-1 and pharmacy GLP-1:   Cost: Compounded GLP-1 is often cheaper than brand-name GLP-1, but lower prices may indicate lower quality ingredients.   Availability: GLP-1 is available at pharmacies that have it in stock, or through online pharmacies and [employer].   Safety: There are risks and unknowns associated with  compounded GLP-1, including the possibility of lower quality ingredients.    Regulation: Compounding pharmacies are regulated by the Drug Quality and Security Act (DQSA) and state boards of pharmacy. The FDA oversees some compounding pharmacies, while state boards of pharmacy are responsible for others.   01/31/2024: at previous appt, patient started semaglutide (compound) 0.25 mg weekly. Patient states tolerating well, does feel more tired on the medication. Patient currently prescribed Wellbutrin 100 mg bid per PCP, states doesn't take medication consistently. Does aim for protein goal but has a hard time reaching goal due to decreased appetite with medication. Patient's weight at home today 277 lbs.   02/27/2025: at previous appt, semaglutide (compound) increased to 0.5 mg weekly. Tolerating well. Does have fatigue at times. States the first 2 weeks is when the medication works the best and then it starts to level off and her appetite increases. State she is caring for her dad. Anxiety and depression have increased. Currently prescribed Wellbutrin 100 mg bid but takes medication once daily. Some constipation, taking stool softener and it helps.   04/11/2025: at previous appt, semaglutide (compound) continued at 0.5 mg SC weekly and Wellbutrin SR increased to 150 mg daily. Tolerating well. Down approx another 11 lbs. Aiming for protein.     Previous Obesity Treatment:   Diet/Exercise - unsuccessful  Phentermine - unsuccessful   Wellbutrin - for depression, didn't help with weight loss    BMI:  51.2 (02/27/2025)  54.8 (01/03/2025)  55.6 (11/14/2024)    Weight:  Wt Readings from Last 6 Encounters:   02/27/25 123 kg (271 lb 2.7 oz)   02/12/25 131.6 kg (290 lb 2 oz)   01/03/25 131.6 kg (290 lb 3.2 oz)   01/02/25 133.4 kg (294 lb 1.5 oz)   11/29/24 133.4 kg (294 lb 1.5 oz)   11/14/24 129.5 kg (285 lb 7.9 oz)     Ideal/Adjusted Body Weight:  Ideal: 105 lbs  Adjusted: 168 lbs    Neck Circumference:   17 in    Waist  Circumference:  50 in    Percent Body Fat:   55% (01/03/2025)  https://www.calculator.net/body-fat-calculator.html    InBody:   Date: 01/03/2025, scanned to media, reviewed  SMM: 73.4 lbs   Body Fat Mass: 159.5 lbs  Visceral Fat Level: 27  Basal Metabolic Rate: 1,650 kcal    Nutrition history (24 hour food recall)  Breakfast: protein shake  Lunch: tuna  Dinner: baked fish and broccoli  Snack: none   Water: 48 oz/day  Sugar Sweetened beverages: none   Etoh: none   Satiety cues: feels full after consuming a meal, denies having to consume large portions to feel full   Cravings: denies   Food noise:denies   Dietician: previously     Eating Disorders:   Denies     Current physical activity:   Walks most days/week  Barriers: time, knee pain    Stressors/Mental Health   Work   Over the last two weeks how often have you been bothered by little interest or pleasure in doing things: 0  Over the last two weeks how often have you been bothered by feeling down, depressed or hopeless: 0  PHQ-2 Total Score: 0    Sleep habits   Feels like she can sleep all the time, sleeps well, denies sleep apnea symptoms. States has improved since increase in Wellbutrin.     CVD screening  The 10-year ASCVD risk score (Thuy DK, et al., 2019) is: 2.7%    Values used to calculate the score:      Age: 45 years      Sex: Female      Is Non- : Yes      Diabetic: No      Tobacco smoker: No      Systolic Blood Pressure: 130 mmHg      Is BP treated: Yes      HDL Cholesterol: 52 mg/dL      Total Cholesterol: 168 mg/dL    LMP: Hysterectomy     Contraception: Hysterectomy       I spent 6 minutes counseling patient on diet, physical activity and reviewing labs.     Review of patient's allergies indicates:  No Known Allergies  Current Outpatient Medications   Medication Instructions    ALPRAZolam (XANAX) 0.25 mg, Oral, Nightly PRN    amitriptyline (ELAVIL) 25 mg, Oral, Nightly    buPROPion (WELLBUTRIN SR) 150 mg, Oral, Daily     butalbital-acetaminophen-caffeine -40 mg (FIORICET, ESGIC) -40 mg per tablet 1 tablet, Oral, Every 4 hours PRN    estradioL (ESTRACE) 1 mg, Oral, Daily    gabapentin (NEURONTIN) 100 mg, Oral, 3 times daily    hydroCHLOROthiazide 12.5 mg, Oral, Daily    losartan (COZAAR) 100 mg, Oral, Daily    methocarbamoL (ROBAXIN) 500 mg, 4 times daily    naproxen sodium (ANAPROX) 550 MG tablet TAKE 1 TABLET(550 MG) BY MOUTH TWICE DAILY AS NEEDED FOR PAIN    NURTEC 75 mg, Oral, Every other day, Place ODT tablet on the tongue; alternatively the ODT tablet may be placed under the tongue    propranoloL (INDERAL LA) 60 mg, Oral, Daily     Past Surgical History:   Procedure Laterality Date     SECTION, LOW TRANSVERSE      x1    COLONOSCOPY N/A 2022    Procedure: COLONOSCOPY;  Surgeon: Maximus Lopes MD;  Location: [facility];  Service: Endoscopy;  Laterality: N/A;    TOTAL ABDOMINAL HYSTERECTOMY  14    TUBAL LIGATION       family history includes ADD / ADHD in her daughter; Breast cancer in her maternal aunt and maternal grandmother; COPD in her father; Colon cancer in her maternal uncle; Deep vein thrombosis in her maternal uncle and mother; Diabetes in her maternal grandfather; Heart failure in her mother; Hypertension in her mother; Lung cancer in her paternal uncle; Pulmonary embolism in her maternal uncle and mother; Stroke in her maternal grandfather and maternal grandmother; Thrombophilia in her maternal uncle and mother.    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              Social Drivers of Health     Financial Resource Strain: Medium Risk (4/17/2024)    Overall Financial Resource Strain (CARDIA)     Difficulty of Paying Living Expenses: Somewhat hard   Food Insecurity: No Food Insecurity (4/17/2024)    Hunger Vital Sign     Worried About Running Out of Food in the Last Year: Never true     Ran Out of Food in the Last Year: Never true   Transportation Needs: No Transportation Needs (4/17/2024)    PRAPARE - Transportation     Lack of Transportation (Medical): No     Lack of Transportation (Non-Medical): No   Physical Activity: Insufficiently Active (4/17/2024)    Exercise Vital Sign     Days of Exercise per Week: 2 days     Minutes of Exercise per Session: 30 min   Stress: Stress Concern Present (4/17/2024)    Citizen of [place] of Occupational Health - Occupational Stress Questionnaire     Feeling of Stress : Very much   Housing Stability: Low Risk  (11/7/2023)    Housing Stability Vital Sign     Unable to Pay for Housing in the Last Year: No     Number of Places Lived in the Last Year: 1     Unstable Housing in the Last Year: No       Subjective:     Review of Systems   Constitutional: Negative.    HENT: Negative.     Eyes: Negative.    Respiratory: Negative.     Cardiovascular: Negative.    Gastrointestinal: Negative.    Endocrine: Negative.    Genitourinary: Negative.    Musculoskeletal: Negative.    Skin: Negative.    Allergic/Immunologic: Negative.    Neurological: Negative.    Hematological: Negative.    Psychiatric/Behavioral: Negative.       Objective:     Visit Vitals  LMP 09/09/2014       Physical Exam  Neurological:      Mental Status: She is alert and oriented to person, place, and time.   Psychiatric:         Mood and Affect: Mood normal.       Labs Reviewed:     Hematology:  Lab Results   Component Value Date    WBC 6.30 11/14/2024    HGB 11.0 (L) 11/14/2024    HCT 34.7 (L) 11/14/2024     11/14/2024      Chemistry:  Lab Results   Component Value Date     11/14/2024    K 3.5 11/14/2024    BUN 12 11/14/2024    CREATININE 0.8 11/14/2024    EGFRNORACEVR >60.0 11/14/2024    CALCIUM 8.5 (L) 11/14/2024    ALKPHOS 44 11/14/2024    ALBUMIN 2.9 (L) 11/14/2024    AST 20 11/14/2024    ALT 20 11/14/2024    MG 1.9 08/21/2023    [id number] 38 11/14/2024      Lab Results   Component Value Date    HGBA1C 5.6 11/14/2024      Lipid Panel:  Lab Results   Component Value Date    CHOL 168 11/14/2024    HDL 52 11/14/2024    HDL 57 07/24/2023    TRIG 88 11/14/2024    TRIG 83 07/24/2023    TOTALCHOLEST 3.2 11/14/2024      Thyroid:  Lab Results   Component Value Date    TSH 0.860 11/14/2024      Urine:  Lab Results   Component Value Date    APPEARANCEUA Clear 11/14/2024    PROTEINUA Negative 11/14/2024    LEUKOCYTESUR Negative 11/14/2024        Assessment:       ICD-10-CM ICD-9-CM   1. Essential hypertension  I10 401.9   2. Class 3 severe obesity due to excess calories with serious comorbidity and body mass index (BMI) of 50.0 to 59.9 in adult  E66.813 278.01    Z68.43 V85.43    E66.01    3. PREMA (generalized anxiety disorder)  F41.1 300.02       Plan:     1. Essential hypertension (Primary)  There were no vitals filed for this visit.    No results found for this or any previous visit.  Continue HCTZ, Losartan, Propranolol  DASH diet  Encouraged home blood pressure monitoring    2. Class 3 severe obesity due to excess calories with serious comorbidity and body mass index (BMI) of 50.0 to 59.9 in adult  There is no height or weight on file to calculate BMI.  Wt Readings from Last 1 Encounters:   02/27/25 123 kg (271 lb 2.7 oz)   Waist Circumference: 50 in  TSH   Date Value Ref Range Status   11/14/2024 0.860 0.400 - 4.000 uIU/mL Final     Vit D, 25-Hydroxy   Date Value Ref Range Status   11/14/2024 38 30 - 96 ng/mL Final     Comment:     Vitamin D deficiency.........<10 ng/mL                              Vitamin D  insufficiency......10-29 ng/mL       Vitamin D sufficiency........> or equal to 30 ng/mL  Vitamin D toxicity............>100 ng/mL       A modest (5-10%) weight loss improves health and quality of life.     Obesity Complication % Weight Loss for Therapeutic Benefit   Diabetes Prevention 5% to 10%   Diabetes Remission 20%   Hypertension 5% to 15%   CVD Risk Reduction >10%   MASLD/MASH 5% to 10%/ >10%   Sleep Apnea 10%   Osteoarthritis 5% to 10%   Urinary Stress Incontinence  5% to 10%   GERD Women: 5% to 10%, Men 10%   PCOS 5% to 10%         Goal: to weigh <200 lbs  Short-term: lose 10 lbs in 12 weeks  Long-term: lose 40 lbs in 1 year  *Keep in mind that, on average, you may lose 1-2 lbs per week*    Willingness to change:     Patient qualifies for anti-obesity medications due to BMI 54 with comorbidity. Anti-obesity medications are an adjunct to nutritional, physical activity, and behavioral therapies. Medication alone cannot treat obesity. I recommend starting medication to significantly improve patient's risk factor/s.   Medication:   Continue Wellbutrin SR to 150 mg daily  Stop compounded semaglutide d/t no longer a shortage. Patient given medication options such as adding Naltrexone to Wellbutrin. Patient will notify provider with medication option.     Nutrition: Calories per day.      Protein: goal is 114 g/day. Protein has a slower rate of digestion = greater satiety (fullness).        Aim for <25g of added sugar per day.      Recommend to increase fiber intake. USDA guidelines: Women <51 y/o - 25g/day.  www.fullplateliving.org      Eat Fit Shopping List.      Prepare meals in advance.      Eat breakfast within 2 hours of waking up.       Track what you eat. Research shows that people who track their food intake are more successful with weight management. This creates awareness of what you are eating/drinking and can help you see where to make changes.       Get to know your plate.  www.Veritract.gov      Stay  hydrated.     Activity: 2-3 days of strength training. This can be body weight, light weight or elastic bands exercises. Yesware and Cheers are great sources for free workout plans/videos.     Start slowly with an activity you enjoy and make it a habit.     Ask a family member or friend to get active with you.     Aim for 5,000 - 10,000 steps per day     Schedule time to make physical activity a part of your daily routine.     Exercise prescription:     Frequency: 2-3 days/week    Intensity: low  Low: walking, swimming, cycling at a leisurely pace, yoga, crystal chi, light weight training, stretching, slow dancing, leisurely sports like table tennis, and light yard work     Type: walk at home video on [employer]    Time: 15 minutes    Enjoyment: (make it fun)    Sleep: 7-8 hours of sleep a night    *Recognize your progress and remember that each day is a new day*  *Stay on track, even when you feel like you're not making progress*  *Sit Less, Stand Often, Move More*  *You don't have to be perfect; be persistent*    Avoiding Weight Regain:  - continued with modified food intake (changed eating habits)  - eat breakfast every day  - weigh yourself at least once a week  - watch less than 10 hours of TV per week  - continue with increased physical activity  - physical activity, on average, about 1 hour per day    3. PREMA (generalized anxiety disorder)  Continue Wellbutrin SR to 150 mg daily      Follow up in about 6 weeks (around 5/23/2025). In addition to their scheduled follow up, the patient has also been instructed to follow up on as needed basis.     EITAN Torrez

## 2025-04-11 ENCOUNTER — PATIENT MESSAGE (OUTPATIENT)
Dept: PRIMARY CARE CLINIC | Facility: CLINIC | Age: 45
End: 2025-04-11

## 2025-04-11 ENCOUNTER — OFFICE VISIT (OUTPATIENT)
Dept: PRIMARY CARE CLINIC | Facility: CLINIC | Age: 45
End: 2025-04-11
Payer: COMMERCIAL

## 2025-04-11 DIAGNOSIS — F41.1 GAD (GENERALIZED ANXIETY DISORDER): ICD-10-CM

## 2025-04-11 DIAGNOSIS — E66.01 CLASS 3 SEVERE OBESITY DUE TO EXCESS CALORIES WITH SERIOUS COMORBIDITY AND BODY MASS INDEX (BMI) OF 50.0 TO 59.9 IN ADULT: Chronic | ICD-10-CM

## 2025-04-11 DIAGNOSIS — I10 ESSENTIAL HYPERTENSION: Primary | Chronic | ICD-10-CM

## 2025-04-11 DIAGNOSIS — E66.813 CLASS 3 SEVERE OBESITY DUE TO EXCESS CALORIES WITH SERIOUS COMORBIDITY AND BODY MASS INDEX (BMI) OF 50.0 TO 59.9 IN ADULT: Chronic | ICD-10-CM

## 2025-04-11 RX ORDER — BUPROPION HYDROCHLORIDE 150 MG/1
150 TABLET, EXTENDED RELEASE ORAL DAILY
Qty: 30 TABLET | Refills: 1 | Status: SHIPPED | OUTPATIENT
Start: 2025-04-11

## 2025-04-11 NOTE — PATIENT INSTRUCTIONS
Nutritional Diets:  Mediterranean Diet  Therapeutic Lifestyle Diet  DASH (Dietary Approaches to Stop Hypertension)  Atkins Diet  Ornish Diet  Paleo Diet  Vegetarian Diet  Commercial Diet Programs      Websites for Recipe Inspiration:   Minor Studios   [email or web address]  Budget Bytes   [email or web address]  Real Food Dietitians   [email or web address]  Eating Bird Food   [email or web address]  Fit Foodie Finds   [email or web address]/  Wholesome Yum   [email or web address]/  Oh Snap Macros   [email or web address].com/  35 Macro Friendly Meal Prep Recipes   https://[employer]/  9 Meal Prep Success Tips   https://OralWise/      Social Media Recommendations:  Dietitian Kaitlynn Sharp Nutritionist  ERNST Ritchie RD Johnny Hadac Josh New, RD, CSSD, CSCS      Additional Recommendations:  Limit added sugars to <25 g per day.  Hydration: At least 1/2 your body weight in ounces per day + additional 20 ounces for each hour of activity or sweat loss.   1 serving of protein = 10 grams or 1 ounce  1 serving of carbs = 20 grams or approx 1/4 to 1/3 cup  1 serving of fat = 10 grams or about 1 tablespoon   When looking for a lean protein, look for protein > fat on the nutrition label. If fat > protein, it is considered a high fat meat and should be limited during times of weight loss.   Non-starchy vegetables include all vegetables except:   Potatoes/Sweet Potatoes  Corn  Peas   Castro Beans  Winter squash such as acor squash   High Fiber Carbohydrates sources:   Beans/lentils  Whole grains  Potatoes with skin on  Quinoa  Rice  Peas  Corn   Castro beans  Carb balance tortillas or flatout wraps  All fruits  Oatmeal   High Fiber Foods:  Raspberries, 1 cup, 8 grams  Pear, 1 medium, 5.5 grams  Apple with skin, 1 medium, 4.5 grams  Green peas, 1 cup, 9 grams  Broccoli, 1 cup chopped, 5 grams  [place] sprouts, 1 cup, 4 grams  Whole wheat pasta, 1 cup, 6  grams  Quinoa cooked, 1 cup, 5 grams  Plain popcorn,3 cups, 3.5 grams  Lentils, 1 cup, 15.5 grams  Gavino seeds, 1 tablespoons, 3 grams  Almonds, 1 ounce, 3.5 grams  Black beans, 1/2 cup, 7.5 grams  Kristina New Delightful bread, 2 slices, 5 grams  Carb blanace tortillas, 1 soft taco sized, 15 grams  Minimum goal is 25 grams of fiber per day for women and 35 grams per day for men.   Frozen Meal Guidelines:   <400 calories  20 grams or more of protein  Top with rotisserie chicken for extra protein  Pair with additional fiber on the side to make it filling   Piece of fruit, 1/2 a salad kit, bag of frozen vegetables

## 2025-04-15 ENCOUNTER — TELEPHONE (OUTPATIENT)
Dept: BARIATRICS | Facility: CLINIC | Age: 45
End: 2025-04-15
Payer: COMMERCIAL

## 2025-04-15 NOTE — TELEPHONE ENCOUNTER
----- Message from Stormy sent at 4/14/2025 10:34 AM CDT -----  Regarding: Advice  Contact: [phone]  Type:  Needs Medical AdviceWho Called: .Georgia Lópezempted to respond using the pass code to respond to your email Would the patient rather a call back or a response via [facility]? Call back Best Call Back Number: [phone]

## 2025-04-15 NOTE — TELEPHONE ENCOUNTER
Called and spoke with the pt. Apologized that I was out the day before and was unable to return her call then.  Pt completed her Bariatric seminar.  I updated her snapshot and Bariatric navigator.

## 2025-04-21 ENCOUNTER — RESULTS FOLLOW-UP (OUTPATIENT)
Dept: BARIATRICS | Facility: CLINIC | Age: 45
End: 2025-04-21

## 2025-04-21 ENCOUNTER — HOSPITAL ENCOUNTER (OUTPATIENT)
Dept: CARDIOLOGY | Facility: CLINIC | Age: 45
Discharge: HOME | End: 2025-04-21
Payer: COMMERCIAL

## 2025-04-21 ENCOUNTER — CLINICAL SUPPORT (OUTPATIENT)
Dept: BARIATRICS | Facility: CLINIC | Age: 45
End: 2025-04-21
Payer: COMMERCIAL

## 2025-04-21 ENCOUNTER — HOSPITAL ENCOUNTER (OUTPATIENT)
Dept: RADIOLOGY | Facility: HOSPITAL | Age: 45
Discharge: HOME | End: 2025-04-21
Attending: NURSE PRACTITIONER
Payer: COMMERCIAL

## 2025-04-21 ENCOUNTER — OFFICE VISIT (OUTPATIENT)
Dept: BARIATRICS | Facility: CLINIC | Age: 45
End: 2025-04-21
Payer: COMMERCIAL

## 2025-04-21 VITALS
HEART RATE: 72 BPM | WEIGHT: 270.31 LBS | BODY MASS INDEX: 53.07 KG/M2 | OXYGEN SATURATION: 99 % | SYSTOLIC BLOOD PRESSURE: 123 MMHG | HEIGHT: 60 IN | DIASTOLIC BLOOD PRESSURE: 71 MMHG

## 2025-04-21 DIAGNOSIS — E66.01 CLASS 3 SEVERE OBESITY WITH BODY MASS INDEX (BMI) OF 50.0 TO 59.9 IN ADULT, UNSPECIFIED OBESITY TYPE, UNSPECIFIED WHETHER SERIOUS COMORBIDITY PRESENT: ICD-10-CM

## 2025-04-21 DIAGNOSIS — E66.01 MORBID OBESITY WITH BODY MASS INDEX (BMI) OF 50.0 TO 59.9 IN ADULT: ICD-10-CM

## 2025-04-21 DIAGNOSIS — E87.6 HYPOKALEMIA: Primary | ICD-10-CM

## 2025-04-21 DIAGNOSIS — E66.813 CLASS 3 SEVERE OBESITY WITH BODY MASS INDEX (BMI) OF 50.0 TO 59.9 IN ADULT, UNSPECIFIED OBESITY TYPE, UNSPECIFIED WHETHER SERIOUS COMORBIDITY PRESENT: ICD-10-CM

## 2025-04-21 DIAGNOSIS — I10 ESSENTIAL HYPERTENSION: Chronic | ICD-10-CM

## 2025-04-21 DIAGNOSIS — M17.0 PRIMARY OSTEOARTHRITIS OF BOTH KNEES: ICD-10-CM

## 2025-04-21 DIAGNOSIS — M17.0 PRIMARY OSTEOARTHRITIS OF BOTH KNEES: Primary | ICD-10-CM

## 2025-04-21 DIAGNOSIS — Z71.3 DIETARY COUNSELING AND SURVEILLANCE: Primary | ICD-10-CM

## 2025-04-21 DIAGNOSIS — F41.1 GAD (GENERALIZED ANXIETY DISORDER): ICD-10-CM

## 2025-04-21 DIAGNOSIS — F32.5 MAJOR DEPRESSIVE DISORDER WITH SINGLE EPISODE, IN FULL REMISSION: ICD-10-CM

## 2025-04-21 LAB
OHS QRS DURATION: 98 MS
OHS QTC CALCULATION: 451 MS

## 2025-04-21 PROCEDURE — 1159F MED LIST DOCD IN RCRD: CPT | Mod: CPTII,S$GLB,, | Performed by: NURSE PRACTITIONER

## 2025-04-21 PROCEDURE — 99205 OFFICE O/P NEW HI 60 MIN: CPT | Mod: S$GLB,,, | Performed by: NURSE PRACTITIONER

## 2025-04-21 PROCEDURE — 3008F BODY MASS INDEX DOCD: CPT | Mod: CPTII,S$GLB,, | Performed by: NURSE PRACTITIONER

## 2025-04-21 PROCEDURE — 3078F DIAST BP <80 MM HG: CPT | Mod: CPTII,S$GLB,, | Performed by: NURSE PRACTITIONER

## 2025-04-21 PROCEDURE — 99999 PR PBB SHADOW E&M-EST. PATIENT-LVL I: CPT | Mod: PBBFAC,,, | Performed by: DIETITIAN, REGISTERED

## 2025-04-21 PROCEDURE — 4010F ACE/ARB THERAPY RXD/TAKEN: CPT | Mod: CPTII,S$GLB,, | Performed by: NURSE PRACTITIONER

## 2025-04-21 PROCEDURE — 3074F SYST BP LT 130 MM HG: CPT | Mod: CPTII,S$GLB,, | Performed by: NURSE PRACTITIONER

## 2025-04-21 PROCEDURE — 93010 ELECTROCARDIOGRAM REPORT: CPT | Mod: S$GLB,,, | Performed by: STUDENT IN AN ORGANIZED HEALTH CARE EDUCATION/TRAINING PROGRAM

## 2025-04-21 PROCEDURE — 71046 X-RAY EXAM CHEST 2 VIEWS: CPT | Mod: 26,,, | Performed by: RADIOLOGY

## 2025-04-21 PROCEDURE — 97802 MEDICAL NUTRITION INDIV IN: CPT | Mod: S$GLB,,, | Performed by: DIETITIAN, REGISTERED

## 2025-04-21 PROCEDURE — 71046 X-RAY EXAM CHEST 2 VIEWS: CPT | Mod: TC,FY

## 2025-04-21 PROCEDURE — 1160F RVW MEDS BY RX/DR IN RCRD: CPT | Mod: CPTII,S$GLB,, | Performed by: NURSE PRACTITIONER

## 2025-04-21 PROCEDURE — 99999 PR PBB SHADOW E&M-EST. PATIENT-LVL V: CPT | Mod: PBBFAC,,, | Performed by: NURSE PRACTITIONER

## 2025-04-21 RX ORDER — POTASSIUM CHLORIDE 750 MG/1
20 CAPSULE, EXTENDED RELEASE ORAL 2 TIMES DAILY
Qty: 28 CAPSULE | Refills: 0 | Status: SHIPPED | OUTPATIENT
Start: 2025-04-21 | End: 2025-04-28

## 2025-04-21 NOTE — PATIENT INSTRUCTIONS
Prior to surgery you will need to complete:  - Dietitian consult and follow up appointments as needed  - Labs  - Chest X-ray  - EKG  - Psychological evaluation, Please call psychiatry [phone] to schedule  - PCP clearance    In preparation for bariatric surgery, please complete the following:   Discuss your current medications with your primary care provider, remember your medications will need to be crushed, chewable, or in liquid form for the first 2-4 weeks after a gastric bypass or sleeve.  For a gastric band, your medications will need to be crushed indefinitely.    If you take a blood thinner such as: Coumadin (warfarin), Pradaxa (dabigatran), or Plavix (clopidogrel), you will need to speak with your prescribing provider on how or if this medication can be stopped before surgery.   If you take a medication for depression or anxiety, remember to discuss this with the psychologist or psychiatrist that you see.   If you take medication for arthritis on a daily basis that is considered a non-steroidal anti-inflammatory (NSAID), please discuss with your prescribing physician an alternative medication.  After having gastric bypass or gastric sleeve, this group of medications is not appropriate to take due to increased risk of bleeding stomach ulcers.      DEFINITIONS  Appointments: Pre-scheduled meetings or consultations with any physician, advanced practice provider, dietitian, or psychologist, and labs, imaging studies, sleep studies, etc.   Late cancellation: Cancelling an appointment 24-48 hours prior to scheduled time.  No-Show appointment:  is when   You do NOT arrive to your appointment at the time its scheduled.  You call to cancel or cancel via [facility] less than 24 hours in advance of your scheduled appointment  You show up 15 minutes AFTER your scheduled appointment time without any notification of being late.     POLICY  You are allowed up to 3 cancellations for appointments.   After 3  cancellations your case will be placed on hold for 2 months and after that time you can resume the program.   You are allowed only 1 no-show for an appointment.   You will be re-scheduled one time and if there is a 2nd no-show at any point, your case will be placed on hold for 3 months.  After 3 months you can resume the program.     Upon resuming the program after being placed on hold for either above mentioned reasons, if you have a late cancel or no show for any appointment, the bariatric team will review if youre an appropriate candidate for surgery at the monthly meeting.

## 2025-04-21 NOTE — PROGRESS NOTES
BARIATRIC NEW PATIENT EVALUATION    CHIEF COMPLAINT:   Morbid obesity, body mass index is 52.79 kg/m². and inability to lose weight.    HPI:  Georgia Garcia is a 45 y.o. morbidly obese female. Her current body mass index is 52.79 kg/m². She has multiple associated comorbidities including essential hypertension, depression, anxiety, and osteoarthritis.  She has struggled with excess weight since childhood.  Her highest adult weight was 300 lbs at age 45, and her lowest adult weight was 250 lbs at age 16.  The patient has tried calorie counting, WW, atkins, Exercise, and GLP.  The patient was most successful with GLP with a weight loss of 20 lbs.  Her current exercise includes walking 5 times a week. She denies any history of eating disorder such as anorexia, bulimia, or taking laxatives for weight loss, and denies any addiction including illicit substances, alcohol, or gambling.  Patient states she has a good  support system.  She lives with family.  She is currently employed   .  She  denies a history of GERD.  The patient's goal is 170 lbs.    ESS: Score of 6, reviewed 2025.  Does not need Sleep Study.    Pre op weight-270  IBW-129    PAST MEDICAL HISTORY:  Past Medical History:   Diagnosis Date    Anemia     Cervical radiculopathy     COVID-19 virus infection 2021    Diverticulosis     Hypermenorrhea     Hypertension     Insomnia     Internal hemorrhoid     Migraine headache     Morbid obesity     Rectal bleeding     Uterine fibroid        PAST SURGICAL HISTORY:  Past Surgical History:   Procedure Laterality Date     SECTION, LOW TRANSVERSE      x1    COLONOSCOPY N/A 2022    Procedure: COLONOSCOPY;  Surgeon: Maximus Lopes MD;  Location: [facility];  Service: Endoscopy;  Laterality: N/A;    TOTAL ABDOMINAL HYSTERECTOMY  12    TUBAL LIGATION         FAMILY HISTORY:  Family History   Problem Relation Name Age of Onset    Hypertension Mother      Pulmonary embolism Mother      Deep  vein thrombosis Mother      Thrombophilia Mother      Heart failure Mother      Pulmonary embolism Maternal Uncle      Deep vein thrombosis Maternal Uncle      Thrombophilia Maternal Uncle      Colon cancer Maternal Uncle      COPD Father      Diabetes Maternal Grandfather      Stroke Maternal Grandfather      Breast cancer Maternal Aunt      Lung cancer Paternal Uncle      Breast cancer Maternal Grandmother      Stroke Maternal Grandmother      ADD / ADHD Daughter          SOCIAL HISTORY:  Social History     Socioeconomic History    Marital status: Single   Occupational History     Employer: [employer]   Tobacco Use    Smoking status: Never    Smokeless tobacco: Never   Substance and Sexual Activity    Alcohol use: No    Drug use: No    Sexual activity: Yes     Partners: Male     Birth control/protection: Surgical     Comment: hyst; mut monog   Social History Narrative    She is , has one daughter and 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              Social Drivers of Health     Financial Resource Strain: Medium Risk (4/17/2024)    Overall Financial Resource Strain (CARDIA)     Difficulty of Paying Living Expenses: Somewhat hard   Food Insecurity: No Food Insecurity (4/17/2024)    Hunger Vital Sign     Worried About Running Out of Food in the Last Year: Never true     Ran Out of Food in the Last Year: Never true   Transportation Needs: No Transportation Needs (4/17/2024)    PRAPARE - Transportation     Lack of Transportation (Medical): No     Lack of Transportation (Non-Medical): No   Physical Activity: Insufficiently Active (4/17/2024)    Exercise Vital Sign     Days of Exercise per Week: 2 days     Minutes of Exercise per Session: 30 min   Stress: Stress Concern Present (4/17/2024)    [employer] - Occupational Stress Questionnaire     Feeling of Stress : Very much   Housing Stability: Low Risk  (11/7/2023)    Housing  Stability Vital Sign     Unable to Pay for Housing in the Last Year: No     Number of Places Lived in the Last Year: 1     Unstable Housing in the Last Year: No       MEDICATIONS:  Current Medications[1]    ALLERGIES:  Review of patient's allergies indicates:  No Known Allergies    Review of Systems   Constitutional:  Negative for chills and fever.   HENT:  Negative for ear pain, nosebleeds and sore throat.    Eyes:  Negative for blurred vision and double vision.   Respiratory:  Negative for cough and shortness of breath.    Cardiovascular:  Negative for chest pain, palpitations, orthopnea, claudication and leg swelling.   Gastrointestinal:  Negative for abdominal pain, constipation, diarrhea, heartburn, nausea and vomiting.   Genitourinary:  Negative for dysuria and urgency.   Musculoskeletal:  Negative for back pain and joint pain.   Skin:  Negative for rash.   Neurological:  Negative for dizziness, tingling, focal weakness and headaches.   Endo/Heme/Allergies:  Does not bruise/bleed easily.   Psychiatric/Behavioral:  Negative for depression and suicidal ideas.        Vitals:    04/21/25 0827   BP: 123/71   Pulse: 72   SpO2: 99%   Weight: 122.6 kg (270 lb 4.5 oz)   Height: 5' (1.524 m)   PainSc:   5   PainLoc: Head       Physical Exam  Vitals and nursing note reviewed.   Constitutional:       Appearance: She is well-developed. She is morbidly obese.   HENT:      Head: Normocephalic.      Nose: Nose normal.      Mouth/Throat:      Mouth: Mucous membranes are moist.   Eyes:      Extraocular Movements: Extraocular movements intact.   Cardiovascular:      Rate and Rhythm: Normal rate and regular rhythm.      Heart sounds: Normal heart sounds.   Pulmonary:      Effort: Pulmonary effort is normal.      Breath sounds: Normal breath sounds.   Abdominal:      General: Bowel sounds are normal.      Palpations: Abdomen is soft.   Musculoskeletal:         General: Normal range of motion.      Cervical back: Normal range of  motion.   Skin:     General: Skin is warm and dry.      Capillary Refill: Capillary refill takes less than 2 seconds.   Neurological:      Mental Status: She is alert and oriented to person, place, and time.   Psychiatric:         Mood and Affect: Mood normal.          DIAGNOSIS:  1. Morbid obesity, body mass index is 52.79 kg/m². and inability to lose weight.  2. Co-morbidities: essential hypertension, depression, anxiety, and osteoarthritis    PLAN:  The patient is a good candidate for Bariatric Surgery. The patient is interested in laparoscopic sleeve gastrectomy with Dr. Lanier. The surgery and post-op care was discussed in detail with the patient. All questions were answered.    The patient understands that bariatric surgery is a tool to aid in weight loss and that they need to be committed to the diet and exercise post-operatively for successful weight loss. Discussed with patient that bariatric surgery is not the easy way out and that it will take plenty of dedication on the patient's part to be successful. Also discussed the possibility of weight regain if the patient strays from the diet guidelines or exercise requirements. Patient verbalized understanding and wishes to proceed with the work-up.    Estimated Goal weight is 195-200 lbs.    Discussed no NSAIDS post op     ORDERS:  1. Chest X-Ray and EKG  2. Psychological Consult and Bariatric Dietician Consult  3. Bariatric Labs: Per orders.  4. PCP Clearance     PCP: Tami Ward MD  RTC: As scheduled.    I spent a total of 60 minutes on the day of the visit.This includes face to face time and non-face to face time preparing to see the patient (eg, review of tests), obtaining and/or reviewing separately obtained history, documenting clinical information in the electronic or other health record, independently interpreting results and communicating results to the patient/family/caregiver, or care coordinator.          [1]   Current Outpatient Medications:      ALPRAZolam (XANAX) 0.25 MG tablet, Take 1 tablet (0.25 mg total) by mouth nightly as needed for Anxiety., Disp: 30 tablet, Rfl: 0    amitriptyline (ELAVIL) 25 MG tablet, Take 1 tablet (25 mg total) by mouth every evening., Disp: 30 tablet, Rfl: 2    buPROPion (WELLBUTRIN SR) 150 MG TBSR 12 hr tablet, Take 1 tablet (150 mg total) by mouth once daily., Disp: 30 tablet, Rfl: 1    estradioL (ESTRACE) 1 MG tablet, Take 1 tablet (1 mg total) by mouth once daily., Disp: 90 tablet, Rfl: 1    gabapentin (NEURONTIN) 100 MG capsule, Take 1 capsule (100 mg total) by mouth 3 (three) times daily., Disp: 90 capsule, Rfl: 5    hydroCHLOROthiazide (HYDRODIURIL) 12.5 MG Tab, Take 1 tablet (12.5 mg total) by mouth once daily., Disp: 90 tablet, Rfl: 1    losartan (COZAAR) 100 MG tablet, Take 1 tablet (100 mg total) by mouth once daily., Disp: 90 tablet, Rfl: 1    methocarbamoL (ROBAXIN) 500 MG Tab, Take 500 mg by mouth 4 (four) times daily., Disp: , Rfl:     naproxen sodium (ANAPROX) 550 MG tablet, TAKE 1 TABLET(550 MG) BY MOUTH TWICE DAILY AS NEEDED FOR PAIN, Disp: 30 tablet, Rfl: 3    propranoloL (INDERAL LA) 60 MG 24 hr capsule, Take 1 capsule (60 mg total) by mouth once daily., Disp: 30 capsule, Rfl: 2

## 2025-04-21 NOTE — PROGRESS NOTES
"NUTRITIONAL CONSULT    Referring Physician: Dionna Lanier M.D.   Reason for MNT Referral: Initial assessment for sleeve gastrectomy work-up    PAST MEDICAL HISTORY:   45 y.o. female    Weight history includes She has struggled with excess weight since childhood.  Her highest adult weight was 300 lbs at age 45, and her lowest adult weight was 250 lbs at age 16.  .     Weight loss attempts include The patient has tried calorie counting, WW, atkins, Exercise, and GLP.  The patient was most successful with GLP-1 with a weight loss of 20 lbs.  Her current exercise includes walking 5 times a week.    Past Medical History:   Diagnosis Date    Anemia     Cervical radiculopathy     COVID-19 virus infection 2021    Diverticulosis     Hypermenorrhea     Hypertension     Insomnia     Internal hemorrhoid     Migraine headache     Morbid obesity     Rectal bleeding     Uterine fibroid        CLINICAL DATA:  45 y.o.-year-old Black or  female.  Height: 5"  Weight: 270 lbs  IBW: 126 lbs  BMI: 52.79  The patient's goal weight (50% EBW): 198 lbs  Personal goal weight: 170 lbs    Goal for Bariatric Surgery: to improve health, to improve quality of life, to lose weight, and to prevent future medical conditions    DAILY NUTRITIONAL NEEDS: pre-op nutritional bariatric guidelines to promote weight loss  4531-4208 Calories    Grams Protein    NUTRITION & HEALTH HISTORY:  Greatest challenge: sugar-sweetened beverages transitioned to coke zero - can drink up to 2 liters per day    Current diet recall:   B: protein shake - Premier rtd or powder mix with water and 1 tablespoon of peanut butter  L:boiled eggs single packet of tuna with burnett and sometimes salad wit dressing ranch  D: Eating in chicken or fish and broccoli no starchy sides      Current Diet:  Meal pattern: improved  Protein supplements: 1  Snackin / day  Vegetables: Likes a variety. Eats daily.  Fruits: Likes a variety. Eats almost " daily.during work week   Beverages: diet soda, sugar-free beverages/powders/drops, and coffee without sugar  Dining out/delivery: Weekly. Weekend Mostly take-out.  Cooking at home: Weekly. Mostly baked/roast, steamed, and microwaved beef, fish/seafood, pork, chicken/turkey, vegetables, and beans    Exercise:  Past exercise: Adequate    Current exercise: Adequate 5 days a week 10-15 minutes lunch break    Restrictions to Exercise: None.    Vitamins / Minerals / Herbs:   Multivitamin [employer] or Geritol tablets  Vitamin D  Vitamin C  Vitamin B-12  Probiotics  Tumeric  Eldberry      Labs:   None available at time of visit    Food Allergies:   None reported    Social:  Works regular daytime shifts.  Lives with dad.  Grocery shopping and food prep self.  Patient believes the household will be supportive after surgery.  Alcohol: None.  Smoking: None.    ASSESSMENT:  Patient reports attempts at weight loss, only to regain lost weight.  Patient demonstrated knowledge of healthy eating behaviors and exercise patterns; admits to not eating healthy and not exercising at this point.  Patient demonstrates willingness to change lifestyle and make behavior modifications as evidenced by good exercise, dietary changes, including protein drinks, increased fruits, increased vegetables, reduced dining out, better choices when dining out, more food preparation at [employer], and healthier cooking at home.    Barriers to Education: none    Stage of change: action    NUTRITION DIAGNOSIS:    Morbid Obesity related to Excessive calorie intake as evidence by BMI.    BARIATRIC DIET DISCUSSION/PLAN:  Discussed diet after surgery and related to patient's food record.  Reviewed nutrition guidelines for before and after surgery.  Answered all questions.  1200-calorie diet.  1500-calorie diet.  5-6 meals per day  More grocery shopping and meal preparation at home.  increase protein  Return to clinic    RECOMMENDATIONS:  Pt is a potential candidate for  bariatric surgery    Follow up in one month. Needs additional visits with RD.    Patient verbalized understanding.    Communicated nutrition plan with bariatric team.    SESSION TIME:  60 minutes

## 2025-04-22 ENCOUNTER — TELEPHONE (OUTPATIENT)
Facility: CLINIC | Age: 45
End: 2025-04-22

## 2025-04-22 ENCOUNTER — PATIENT MESSAGE (OUTPATIENT)
Facility: CLINIC | Age: 45
End: 2025-04-22

## 2025-04-22 ENCOUNTER — OFFICE VISIT (OUTPATIENT)
Facility: CLINIC | Age: 45
End: 2025-04-22
Payer: COMMERCIAL

## 2025-04-22 DIAGNOSIS — I10 HYPERTENSION, UNSPECIFIED TYPE: ICD-10-CM

## 2025-04-22 DIAGNOSIS — G44.221 CHRONIC TENSION-TYPE HEADACHE, INTRACTABLE: ICD-10-CM

## 2025-04-22 DIAGNOSIS — R11.2 NAUSEA AND VOMITING, UNSPECIFIED VOMITING TYPE: ICD-10-CM

## 2025-04-22 DIAGNOSIS — G43.E19 INTRACTABLE CHRONIC MIGRAINE WITH AURA AND WITHOUT STATUS MIGRAINOSUS: Primary | ICD-10-CM

## 2025-04-22 DIAGNOSIS — R42 DIZZINESS AND GIDDINESS: ICD-10-CM

## 2025-04-22 DIAGNOSIS — F41.1 GAD (GENERALIZED ANXIETY DISORDER): ICD-10-CM

## 2025-04-22 PROCEDURE — 1159F MED LIST DOCD IN RCRD: CPT | Mod: CPTII,95,,

## 2025-04-22 PROCEDURE — 4010F ACE/ARB THERAPY RXD/TAKEN: CPT | Mod: CPTII,95,,

## 2025-04-22 PROCEDURE — 1160F RVW MEDS BY RX/DR IN RCRD: CPT | Mod: CPTII,95,,

## 2025-04-22 PROCEDURE — 3044F HG A1C LEVEL LT 7.0%: CPT | Mod: CPTII,95,,

## 2025-04-22 PROCEDURE — 98007 SYNCH AUDIO-VIDEO EST HI 40: CPT | Mod: 95,,,

## 2025-04-22 RX ORDER — BUTALBITAL, ACETAMINOPHEN AND CAFFEINE 50; 325; 40 MG/1; MG/1; MG/1
1 TABLET ORAL DAILY PRN
Qty: 15 TABLET | Refills: 0 | Status: SHIPPED | OUTPATIENT
Start: 2025-04-22 | End: 2025-04-22 | Stop reason: SDUPTHER

## 2025-04-22 RX ORDER — BUTALBITAL, ACETAMINOPHEN AND CAFFEINE 50; 325; 40 MG/1; MG/1; MG/1
1 TABLET ORAL EVERY 6 HOURS PRN
Qty: 15 TABLET | Refills: 0 | Status: SHIPPED | OUTPATIENT
Start: 2025-04-22 | End: 2025-04-22 | Stop reason: CLARIF

## 2025-04-22 RX ORDER — RIMEGEPANT SULFATE 75 MG/75MG
75 TABLET, ORALLY DISINTEGRATING ORAL EVERY OTHER DAY
Qty: 16 TABLET | Refills: 0 | Status: SHIPPED | OUTPATIENT
Start: 2025-04-22 | End: 2025-05-24

## 2025-04-22 RX ORDER — BUTALBITAL, ACETAMINOPHEN AND CAFFEINE 50; 325; 40 MG/1; MG/1; MG/1
1 TABLET ORAL EVERY 4 HOURS PRN
Qty: 15 TABLET | Refills: 0 | Status: SHIPPED | OUTPATIENT
Start: 2025-04-22 | End: 2025-05-22

## 2025-04-22 NOTE — TELEPHONE ENCOUNTER
Called pharmacy to do verbal order for Fioricet, as it failed to e-prescribe.     Spoke with pharmacist. He advised that the last prescription in march was never filled and advised that he will fill that one first. I verbalized understanding.

## 2025-04-22 NOTE — PROGRESS NOTES
"Subjective:       Patient ID: Georgia Garcia is a 45 y.o. female.    PMH: Sciatica / Depression / PREMA / OA / HTN / Insomnia / Migraines / and other medical conditions.     Chief Complaint: "Headaches"     HPI    The patient presented on 06/2024 / 07/2024 / 08/2024 / 02/2025 / 03/2025 for evaluation of "Headaches". Patient presents today for a follow-up evaluation and recommendation of "Headaches".     The originating site (patient location) is: Home.      The distant site (neurologist location) is: Neurology Clinic at [facility].      The chief complaint leading to consultation is: "Headaches"       Visit type: Virtual visit with synchronous audio and video.      Consent: The patient verbally consented to participating in the video visit and informed that may decline to receive medical services by telemedicine and may withdraw from such care at any time.      I discussed with the patient the nature of our telemedicine visits, that:      I  would evaluate the patient and recommend diagnostics and treatments based on my assessment.    Our sessions are not being recorded and that personal health information is protected.    Our team would provide follow up care in person if/when the patient needs it.    Virtual (video/telemedicine) visits have significant limitations. A telemedicine exam is primarily focused on the history and what I can observe. Several critical parts of the neurological exam cannot be performed.       Interval History: (06/14/2024) - Norco 7.5-325 Q8H PRN and Metoprolol Succinate /Toprol-XL 50 mg PO Daily Discontinued. Patient started on Inderal-LA 60 mg PO Daily. Continue Nurtec. Diagnostic evaluation included Brain MRI / ESR / ROSE / RPR.     Started: 10 years ago, worse in the last few years  Describes: Pounding, some slowly build up but some can come on all at once / The headaches are progressively getting worse and interfering with daily activity.  Timing: Duration of 2 weeks for " migraine type headaches / Duration of 10 hours for regular type headaches / wakes up with migraines / migraines can wake her up from sleep  Frequency: Daily  Pain: Regular headaches: 7-8/10 / Migraines: 10/10  Location: Bilateral temporal, parietal, and occipital regions  Family: No known family history of migraines or headaches in the family.   Medications: Norco / Elavil / Wellbutrin / Gabapentin / Nutrec / Patient reports PCP recently prescribed Nutrec, but has not taken it yet due to PA being processed.   Worsen: Stress / The patient cannot think of food that aggravates the headache.      Alleviated: Sleeping / dark and quiet room /   Associated symptoms: Nausea / Vomiting / Trouble keeping up with tasks due to pain / light and sound sensitivity / dizziness (Light-headed) / blurry vision / double vision / bilateral floaters / Neck muscular pain with radiation to scapular area-is stress related per patient     No tinnitus / not worse with postural changes or valsalva / no scalp sensitivity / numbness/tingling to extremities / bilateral or focal weakness / no trouble with balance        Triggers: Cigarette smoke / strong perfume smells / bright lights / eating citrus   Prodrome symptoms: patient reports she does have an auora of a small feeling prior to migraine onset. She expresses that she can't describe her auora.      Patient reports that she has previously sought ER evaluation for Headache management who treated her with Naproxen. She felt like it was a waste of time and the medication was not effective for her.      Patient reports compliance with blood pressure medication and reports at home blood pressures run around 122/84.      No TMJ problems. No history of head trauma. No history of seizures. No history of smoking. No caffeine overuse. No vertigo. No blacking out. No fever, chills, or rigors. No history of significant memory loss. No history of strokes. No falls.        Interval History: (07/12/2024) -  "Continued Inderal-LA 60 mg PO Daily /  Nurtec 75 mg PO Daily PRN / Zofran-ODT 4 mg PO Q8H PRN. Started Elavil 25 mg PO QHS. Continued PT for vestibular therapy.     Patient reports that headaches are still severe, debilitating, and interfering with daily functioning. Frequency of tension type is daily and migraine type is daily. Duration of tension type is 10 hours and migraine type is 2 weeks. Still "Pounding" in nature. Pain intensity is decreased from 15/10 to 10/10 per patient. Associated symptoms include Nausea / Vomiting / Trouble keeping up with tasks due to pain / light and sound sensitivity / dizziness (Light-headed) / blurry vision / double vision / bilateral floaters / Neck muscular pain with radiation to scapular area-is stress related per patient / tinnitus to bilateral ears / feeling off-balance.     Patient reports chronic Anxiety / Depression. Patient reports that her anxiety and stress have slightly worsened since last visit due to unrelieved Migraines. Patient expresses that she is interested in additional pharmacological therapy for Anxiety management. Denies SI.         Interval History: (08/12/2024) -  Continued Inderal-LA 60 mg PO Daily /  Nurtec 75 mg PO Daily PRN / Zofran-ODT 4 mg PO Q8H PRN / Elavil 25 mg PO QHS.     -Headaches have significantly improved since last visit. Patient reports an increased quality of life and a 95% improvement in headache frequency, duration, pain intensity, and symptom severity.   -Location is still Bilateral temporal, parietal, and occipital regions.   -Positive Headache auras - Patient unable to describe feeling.   -Frequency decreased - tension type is 2 per week and migraine type is 2-3 times per week.  -Duration decreased - tension type is decreased from 10 hours to 3 hours / and migraine type decreased from 2 weeks to 3 days.   -Still "Pounding" in nature  -Pain intensity is decreased from 10/10 to 4/10 per patient.   -Worsened by Stress.   -Triggered by " "Cigarette smoke / strong perfume smells / bright lights / eating citrus  -Alleviated with sleeping / dark and quiet room   -Associated symptoms include Nausea / Vomiting / Trouble keeping up with tasks due to pain / light and sound sensitivity / dizziness (Light-headed) / blurry vision / double vision / bilateral floaters / Neck muscular pain with radiation to scapular area-is stress related per patient / tinnitus to bilateral ears / feeling off-balance - significantly decreased intensity of symptoms per patient  -Patient does report changes in vision. Last eye clinic appointment was April 2024, no abnormalities were noted per patient. Her prescription has not changed within 3 years.             Interval History:  (02/12/2025) - Continued Inderal-LA 60 mg PO Daily /  Nurtec 75 mg PO Daily PRN / Zofran-ODT 4 mg PO Q8H PRN / Elavil 25 mg PO QHS / PT.     -Headaches have significantly improved since last visit. Patient reports an increased quality of life and a 95% improvement in headache frequency, duration, pain intensity, and symptom severity.   -Location is still Bilateral temporal, parietal, and occipital regions.   -Positive Headache auras - Patient unable to describe feeling. She reports Nausea prior to Migraine onset.  -Frequency decreased - tension type is 2-3 per week and migraine type is 2-3 times per month.  -Duration decreased - tension type is 1 hour / and migraine type decreased from 2 weeks to 20 min - 3 days. Migraines alleviated 20 minutes after Nurtec.   -Still "Pounding" in nature.  -Pain intensity is 5-10/10 per patient.   -Worsened by Stress  -Triggered by Cigarette smoke / strong perfume smells / bright lights / eating citrus  -Alleviated with sleeping / dark and quiet room   -Associated symptoms include Nausea / Vomiting / Trouble keeping up with tasks due to pain / light and sound sensitivity / dizziness (Light-headed) / blurry vision  / bilateral floaters / Neck muscular pain with radiation to " scapular area-is stress related per patient / tinnitus to bilateral ears / feeling off-balance - significantly decreased intensity of symptoms per patient    Anxiety:    Patient reports that her chronic anxiety and stress have slightly improved due to Migraine alleviation with current medication regimen. Denies SI. Currently managed by psychiatrist. Elavil 25 mg PO QHS - has helped to improve per patient.     Patient reports tolerating medications well without side effects. She would like to stay on current medication regimen.            Interval History: (03/12/2025) - Continued Inderal-LA 60 mg PO Daily,  Zofran-ODT 4 mg PO Q8H PRN Nausea, and Elavil 25 mg PO QHS / Changed Nurtec 75 mg PO Daily PRN to 75 mg PO every other day for Migraine Prevention Therapy / Started Fioricet PRN.     Patient reports increased stress levels related to life stressors. Home blood pressure readings consistently around 101/70 mmHg.  Headaches are unchanged since last visit.   Double vision - resolved per patient          New Issues: (04/22/2025) -     No new issues per patient.      Medications (Migraine Management):  Inderal-LA 60 mg PO daily - Migraine prophylaxis. Patient reports no side effects. BP at home averages ~121/70s per patient report. Open to dosage increase.  Nurtec ODT 75 mg PO every other day - Prescribed for migraine prophylaxis. Patient reports medication was never received.  Zofran ODT 4 mg PO Q8H PRN - For nausea.  Elavil (Amitriptyline) 25 mg PO QHS - For migraine prophylaxis. Patient reports excessive daytime sedation; unable to increase dose. Not clinically effective at current dose.  Butalbital-Acetaminophen-Caffeine -40 mg (Fioricet/ESGIC) - 1 tablet PO Q4H PRN for acute migraine. Patient reports effective symptom relief with no adverse effects.    Headaches:    -Headaches have significantly worsened since last visit.   -Location is still Bilateral temporal, parietal, and occipital regions.   -Positive  "Headache auras - Patient unable to describe feeling. She reports Nausea prior to Migraine onset.  -Frequency  - Increased - tension type is Daily and migraine type is 2-3 times per week.  -Duration  - Increased - tension type is 1 hour / and migraine type 2 weeks   -Still "Pounding" in nature.  -Pain intensity is 5-10/10 per patient.   -Worsened by Stress  -Triggered by Cigarette smoke / strong perfume smells / bright lights / eating citrus  -Alleviated with sleeping / dark and quiet room   -Associated symptoms include Nausea / Vomiting / Trouble keeping up with tasks due to pain / light and sound sensitivity / dizziness (Light-headed) / blurry vision  / bilateral floaters / Neck muscular pain with radiation to scapular area-is stress related per patient / tinnitus to bilateral ears / feeling off-balance     -Double vision - resolved per patient    -Patient does report changes in vision. Last eye clinic appointment was January 2025, no abnormalities were noted per patient. Her prescription for eye glasses did not change.     -No falls     -Vestibular therapy with physical therapy (PT) -  No vestibular issues identified by PT.      Abortive therapies (tried and failed): Fioricet - Current     -Nurtec 75 mg PO Daily PRN - effective     -Maxalt- not effective - SE of dizziness / nausea / feeling like she was going to pass out      Preventative therapies (tried and failed): Inderal-LA 60 mg PO Daily / Elavil 25 mg PO QHS - Current    -Topamax- not effective (took for about 8-9 years)       Anxiety:     Patient is open to discontinue Elavil due to perceived ineffectiveness.      Review of Systems   Constitutional:  Negative for activity change, appetite change, chills, diaphoresis, fatigue, fever and unexpected weight change.   HENT:  Positive for tinnitus. Negative for congestion, dental problem, drooling, ear discharge, ear pain, facial swelling, hearing loss, mouth sores, nosebleeds, postnasal drip, rhinorrhea, sinus " pressure, sinus pain, sneezing, sore throat, trouble swallowing and voice change.    Eyes:  Positive for photophobia and visual disturbance. Negative for pain, discharge, redness and itching.   Respiratory:  Negative for cough, chest tightness, shortness of breath and wheezing.    Cardiovascular:  Negative for chest pain, palpitations and leg swelling.   Gastrointestinal:  Positive for nausea and vomiting. Negative for abdominal distention, abdominal pain, blood in stool, constipation and diarrhea.   Endocrine: Negative for cold intolerance, heat intolerance, polydipsia, polyphagia and polyuria.   Genitourinary:  Negative for decreased urine volume, difficulty urinating, dysuria, flank pain, frequency, hematuria, pelvic pain, urgency and vaginal discharge.   Musculoskeletal:  Positive for myalgias and neck pain. Negative for arthralgias, back pain, gait problem, joint swelling and neck stiffness.   Skin:  Negative for color change and rash.   Allergic/Immunologic: Negative for immunocompromised state.   Neurological:  Positive for dizziness, light-headedness and headaches. Negative for tremors, seizures, syncope, facial asymmetry, speech difficulty, weakness and numbness.        Patient reports phonophobia and balance issues associated with migraines.    Hematological:  Negative for adenopathy. Does not bruise/bleed easily.   Psychiatric/Behavioral:  Positive for decreased concentration. Negative for agitation, behavioral problems, confusion, dysphoric mood, hallucinations, self-injury, sleep disturbance and suicidal ideas. The patient is not nervous/anxious and is not hyperactive.    All other systems reviewed and are negative.                Current Outpatient Medications:     ALPRAZolam (XANAX) 0.25 MG tablet, Take 1 tablet (0.25 mg total) by mouth nightly as needed for Anxiety., Disp: 30 tablet, Rfl: 0    amitriptyline (ELAVIL) 25 MG tablet, Take 1 tablet (25 mg total) by mouth every evening., Disp: 30 tablet,  Rfl: 2    buPROPion (WELLBUTRIN SR) 150 MG TBSR 12 hr tablet, Take 1 tablet (150 mg total) by mouth once daily., Disp: 30 tablet, Rfl: 1    estradioL (ESTRACE) 1 MG tablet, Take 1 tablet (1 mg total) by mouth once daily., Disp: 90 tablet, Rfl: 1    gabapentin (NEURONTIN) 100 MG capsule, Take 1 capsule (100 mg total) by mouth 3 (three) times daily., Disp: 90 capsule, Rfl: 5    hydroCHLOROthiazide (HYDRODIURIL) 12.5 MG Tab, Take 1 tablet (12.5 mg total) by mouth once daily., Disp: 90 tablet, Rfl: 1    losartan (COZAAR) 100 MG tablet, Take 1 tablet (100 mg total) by mouth once daily., Disp: 90 tablet, Rfl: 1    methocarbamoL (ROBAXIN) 500 MG Tab, Take 500 mg by mouth 4 (four) times daily., Disp: , Rfl:     naproxen sodium (ANAPROX) 550 MG tablet, TAKE 1 TABLET(550 MG) BY MOUTH TWICE DAILY AS NEEDED FOR PAIN, Disp: 30 tablet, Rfl: 3    potassium chloride (MICRO-K) 10 MEQ CpSR, Take 2 capsules (20 mEq total) by mouth 2 (two) times daily. for 7 days, Disp: 28 capsule, Rfl: 0    propranoloL (INDERAL LA) 60 MG 24 hr capsule, Take 1 capsule (60 mg total) by mouth once daily., Disp: 30 capsule, Rfl: 2    Past Medical History:   Diagnosis Date    Anemia     Cervical radiculopathy     COVID-19 virus infection 2021    Diverticulosis     Hypermenorrhea     Hypertension     Insomnia     Internal hemorrhoid     Migraine headache     Morbid obesity     Rectal bleeding     Uterine fibroid        Past Surgical History:   Procedure Laterality Date     SECTION, LOW TRANSVERSE      x1    COLONOSCOPY N/A 2022    Procedure: COLONOSCOPY;  Surgeon: Maximus Lopes MD;  Location: [facility];  Service: Endoscopy;  Laterality: N/A;    TOTAL ABDOMINAL HYSTERECTOMY  14    TUBAL LIGATION         Social History     Socioeconomic History    Marital status: Single   Occupational History     Employer: [employer] [employer]   Tobacco Use    Smoking status: Never    Smokeless tobacco: Never   Substance and Sexual  Activity    Alcohol use: No    Drug use: No    Sexual activity: Yes     Partners: Male     Birth control/protection: Surgical     Comment: hyst; mut monog   Social History Narrative    She is , has one daughter and 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              Social Drivers of Health     Financial Resource Strain: Low Risk  (4/21/2025)    Overall Financial Resource Strain (CARDIA)     Difficulty of Paying Living Expenses: Not hard at all   Food Insecurity: No Food Insecurity (4/21/2025)    Hunger Vital Sign     Worried About Running Out of Food in the Last Year: Never true     Ran Out of Food in the Last Year: Never true   Transportation Needs: No Transportation Needs (4/21/2025)    PRAPARE - Transportation     Lack of Transportation (Medical): No     Lack of Transportation (Non-Medical): No   Physical Activity: Insufficiently Active (4/21/2025)    Exercise Vital Sign     Days of Exercise per Week: 5 days     Minutes of Exercise per Session: 10 min   Stress: No Stress Concern Present (4/21/2025)    [employer] - Occupational Stress Questionnaire     Feeling of Stress : Not at all   Housing Stability: Low Risk  (4/21/2025)    Housing Stability Vital Sign     Unable to Pay for Housing in the Last Year: No     Number of Times Moved in the Last Year: 0     Homeless in the Last Year: No       Past/Current Medical/Surgical History, Past/Current Social History, Past/Current Family History and Past/Current Medications were reviewed in detail.      Objective:     GENERAL APPEARANCE:     The patient looks comfortable.    No signs of respiratory distress.    Normal breathing pattern.    No dysmorphic features    Normal eye contact.     GENERAL MEDICAL EXAM:    HEENT:  Head is atraumatic normocephalic.      Neck and Axillae: No JVD. No visible lesions.    Cardiopulmonary: No cyanosis. No tachypnea. Normal respiratory  effort.    Gastrointestinal/Urogenital:  No jaundice. No stomas or lesions. No visible hernias. No catheters.     Skin, Hair and Nails: No pathognonomic skin rash. No neurofibromatosis. No visible lesions.No stigmata of autoimmune disease. No clubbing.    Limbs: No varicose veins. No visible swelling.    Muskoskeletal: No visible deformities.No visible lesions.      Neurologic Exam      Mental Status   Oriented to person, place, and time.   Oriented to person.   Oriented to place. Oriented to country, city and area.   Oriented to time. Oriented to year, month, date, day and season.   Registration: recalls 3 of 3 objects. Recall at 5 minutes: recalls 3 of 3 objects. Follows 3 step commands.   Attention: normal. Concentration: normal.   Speech: speech is normal   Level of consciousness: alert  Knowledge: good. Able to perform simple calculations.   Able to name object. Able to read. Able to repeat. Able to write. Normal comprehension.      Cranial Nerves      CN II   Visual fields full to confrontation.   Visual acuity: normal  Right visual field deficit: none  Left visual field deficit: none      CN III, IV, VI   Extraocular motions are normal.   Right pupil: Consensual response: intact. Accommodation: intact.   Left pupil: Consensual response: intact. Accommodation: intact.   CN III: no CN III palsy  CN VI: no CN VI palsy  Nystagmus: none   Diplopia: none  Ophthalmoparesis: none  Upgaze: normal  Downgaze: normal  Conjugate gaze: present  Vestibulo-ocular reflex: present     CN V   Facial sensation intact.   Right facial sensation deficit: none  Left facial sensation deficit: none     CN VII   Facial expression full, symmetric.   Right facial weakness: none  Left facial weakness: none     CN VIII   CN VIII normal.   Hearing: intact     CN IX, X   CN IX normal.   CN X normal.   Palate: symmetric     CN XII   CN XII normal.   Tongue: not atrophic  Fasciculations: absent  Tongue deviation: none     Motor Exam   Muscle  bulk: normal  Overall muscle tone: normal  Right arm pronator drift: absent  Left arm pronator drift: absent     Sensory Exam   Graphesthesia: normal  Stereognosis: normal     Gait, Coordination, and Reflexes      Gait  Gait: normal     Tremor   Resting tremor: absent  Intention tremor: absent  Action tremor: absent              Lab Results   Component Value Date    WBC 5.96 04/21/2025    HGB 11.7 (L) 04/21/2025    HCT 35.9 (L) 04/21/2025    MCV 91 04/21/2025     04/21/2025       Sodium   Date Value Ref Range Status   04/21/2025 141 136 - 145 mmol/L Final   11/14/2024 138 136 - 145 mmol/L Final     Potassium   Date Value Ref Range Status   04/21/2025 2.9 (L) 3.5 - 5.1 mmol/L Final   11/14/2024 3.5 3.5 - 5.1 mmol/L Final     Chloride   Date Value Ref Range Status   04/21/2025 104 95 - 110 mmol/L Final   11/14/2024 107 95 - 110 mmol/L Final     CO2   Date Value Ref Range Status   04/21/2025 30 (H) 23 - 29 mmol/L Final   11/14/2024 23 23 - 29 mmol/L Final     Glucose   Date Value Ref Range Status   11/14/2024 86 70 - 110 mg/dL Final     BUN   Date Value Ref Range Status   04/21/2025 11 6 - 20 mg/dL Final     Creatinine   Date Value Ref Range Status   04/21/2025 0.8 0.5 - 1.4 mg/dL Final     Calcium   Date Value Ref Range Status   04/21/2025 8.8 8.7 - 10.5 mg/dL Final   11/14/2024 8.5 (L) 8.7 - 10.5 mg/dL Final     Total Protein   Date Value Ref Range Status   11/14/2024 7.0 6.0 - 8.4 g/dL Final     Albumin   Date Value Ref Range Status   04/21/2025 3.1 (L) 3.5 - 5.2 g/dL Final   11/14/2024 2.9 (L) 3.5 - 5.2 g/dL Final     Total Bilirubin   Date Value Ref Range Status   11/14/2024 0.3 0.1 - 1.0 mg/dL Final     Comment:     For infants and newborns, interpretation of results should be based  on gestational age, weight and in agreement with clinical  observations.    Premature Infant recommended reference ranges:  Up to 24 hours.............<8.0 mg/dL  Up to 48 hours............<12.0 mg/dL  3-5  "days..................<15.0 mg/dL  6-29 days.................<15.0 mg/dL       Bilirubin Total   Date Value Ref Range Status   04/21/2025 0.4 0.1 - 1.0 mg/dL Final     Comment:     For infants and newborns, interpretation of results should be based   on gestational age, weight and in agreement with clinical   observations.    Premature Infant recommended reference ranges:   0-24 hours:  <8.0 mg/dL   24-48 hours: <12.0 mg/dL   3-5 days:    <15.0 mg/dL   6-29 days:   <15.0 mg/dL     Alkaline Phosphatase   Date Value Ref Range Status   11/14/2024 44 40 - 150 U/L Final     ALP   Date Value Ref Range Status   04/21/2025 40 40 - 150 unit/L Final     AST   Date Value Ref Range Status   04/21/2025 22 11 - 45 unit/L Final   11/14/2024 20 10 - 40 U/L Final     ALT   Date Value Ref Range Status   04/21/2025 14 10 - 44 unit/L Final   11/14/2024 20 10 - 44 U/L Final     Anion Gap   Date Value Ref Range Status   04/21/2025 7 (L) 8 - 16 mmol/L Final     eGFR if    Date Value Ref Range Status   07/06/2021 >60.0 >60 mL/min/1.73 m^2 Final     eGFR if non    Date Value Ref Range Status   07/06/2021 >60.0 >60 mL/min/1.73 m^2 Final     Comment:     Calculation used to obtain the estimated glomerular filtration  rate (eGFR) is the CKD-EPI equation.          Lab Results   Component Value Date    [id number] >2,000 (H) 04/21/2025       Lab Results   Component Value Date    TSH 0.847 04/21/2025    [id number] 93 04/21/2025    FREET4 1.11 04/21/2025       No results found in the last 24 hours.    No results found in the last 24 hours.    Reviewed the neuroimaging independently     Assessment:   44 Years old Female with PMH as above came for a follow-up evaluation of  "Headaches"     Intractable chronic migraine with aura and without status migrainosus     Chronic tension-type headache, intractable     PREMA (generalized anxiety disorder)     Nausea and vomiting, unspecified vomiting type     Dizziness and giddiness "     Hypertension, unspecified type   .   Plan:   Patient Neurological Assessment is non-focal via virtual visit.        - Continue Inderal-LA 60 mg PO Daily for migraine prevention therapy and HTN management. Side effects discussed. Pamphlet given. Plan for slow titration to 80 mg BID which can cause low blood pressure, slow heart rate, erectile dysfunction, depression, airway obstruction and heart failure exacerbations. Cannot be used with migraine associate with focal neurological deficits. Instructed patient to keep a BP log and bring to next visit for provider review. No history of Asthma. Patient verbalized understanding. No refills needed.          -Contingency Plan: Will consider titrating Inderal-LA to a higher dose if Nurtec is not approved.    -Plan: Continue Nurtec ODT 75 mg PO every other day for migraine prophylaxis. Discussed potential side effects; patient verbalized understanding. Pharmacy confirms receipt of prescription (3/12/2025, 7:47 AM). Patient reports she has not initiated therapy due to lack of notification from pharmacy regarding medication readiness. Advised patient to contact pharmacy to confirm availability and insurance approval. Instructed to notify clinic of any issues. Patient verbalized understanding.    -Continue butalbital-acetaminophen-caffeine -40 mg (FIORICET, ESGIC) -40 mg per tablet; Take 1 tablet by mouth every 4 (four) hours as needed for Headaches (Acute Migraine Treatment).    -Continue Zofran-ODT 4 mg PO Q8H PRN Nausea / Vomiting associated with Migraines. Side effects discussed. Patient verbalized understanding. No refills needed.     -Discontinue Elavil 25 mg PO QHS for migraine prevention therapy and Anxiety/Depression Management. Patient reports excessive daytime sedation; unable to increase dose. Not clinically effective at current dose.     -Instructed patient to continue following ophthalmology routinely.     -Continue PT for vestibular therapy.      -Patient is requesting FMLA forms to be completed for Migraine Exacerbations and PT Sessions. Completed 02/2025.      -We have discussed the value in aggressively controlling vascular risk factors such as Hypertension, Hyperlipidemia, Obesity, and Diabetes. (SBP <120, LDL <100, A1C <7.0)  We discussed the need to optimize lifestyle choices including a heart healthy diet (Mediterranean or DASH Diet), increased cardiovascular exercise (goal of 150 minutes of moderate intensity per week), and stay cognitively and socially active.   We recommended the MIND Diet, a combination of (Mediterranean and DASH Diet) because it includes a variety of brain-friendly foods to optimize cognitive health and longevity. Patient verbalized understanding.         1. Intractable chronic migraine with aura and without status migrainosus  - butalbital-acetaminophen-caffeine -40 mg (FIORICET, ESGIC) -40 mg per tablet; Take 1 tablet by mouth every 4 (four) hours as needed for Headaches (Acute Migraine Treatment).  Dispense: 15 tablet; Refill: 0  - rimegepant (NURTEC) 75 mg odt; Take 1 tablet (75 mg total) by mouth every other day. Place ODT tablet on the tongue; alternatively the ODT tablet may be placed under the tongue  Dispense: 16 tablet; Refill: 0    2. Chronic tension-type headache, intractable    3. PREMA (generalized anxiety disorder)    4. Nausea and vomiting, unspecified vomiting type    5. Dizziness and giddiness    6. Hypertension, unspecified type       LABORATORY EVALUATION    Labs: ([id number]) Folate / H. pylori IgG / Hepatic Panel / Iron & TIBC / Magnesium / Phosphorus / Vitamin D / Aldosterone / RPR / ROSE / Sed Rate / CBC / BMP / CMP / A1C / TSH / Free T-4 / T3 / T4 / Lipid Panel / Vitamin D / B-12 / Magnesium  -personally reviewed -non-significant abnormalities     RADIOLOGY EVALUATION     Brain MRI Without Contrast- done 06/2024- personally reviewed -no acute abnormalities     MRI Lumbar Spine Without Contrast  - done 12/2022- personally reviewed - Multilevel mild degenerative changes          MANAGEMENT     HEADACHE DIARY     DISCUSSED THE THREE-FOLD MANAGEMENT OF MIGRAINE:      LIFESTYLE CHANGES:       Good sleep hygiene  Avoid general triggers like lack of sleep/too much sleep, prolonged sun exposure, excessive screen time and specific triggers based on you own diary   Minimize physical and emotional stress  Smoking avoidance and cessation  Limit caffeine drinks to 1-2 a day   Good hydration   Small frequent meals and avoid skipping meals   Moderate 30-minute-long aerobic exercises 3 times/week. Avoid strenuous exercise         ABORTIVE MEDICATIONS (ACUTE-RESCUE MEDICATIONS):     Should only be taken 2-3 times/week to avoid rebound and overuse headaches.    I-explained to the patient that pain meds especially triptans should NOT be taken daily to avoid Rebound Headache and Overuse Headache.    Take at the ONSET of the headache sumatriptan 100 mg PO  (or other Triptan) in combination with naproxen 500 mg PO or Ibuprofen 800 mg PO for headache without nausea or vomiting.  This regimen can be repeated only once in 24 hours after 2 hours.    Side effects of triptans were discussed and include rare cardiac and cerebral ischemia and cannot be used with migraine associate with focal neurological deficits (complicated migraine) in addition to drowsiness and potential impairment of driving ability. The patient verbalized understanding.    NSAIDs can cause peptic ulcers, renal insufficiency and may increase the risk of cardiovascular diseases.  SEs were discussed with the patient. The patient verbalized understanding.    Triptans have shown to be more effective than Gepatns with more SE/AE.      AVOID NARCOTICS (OPIATES)      1. No randomized controlled study shows pain-free results with opioids in the treatment of migraine.     2. The physiologic consequences of opioid use are adverse, occur quickly, and can be permanent.  Decreased gray matter, release of calcitonin gene-related peptide, dynorphin, and pro-inflammatory peptides, and activation of excitatory glutamate receptors are all associated with opioid exposure.     3. Opioids are pro-nociceptive, prevent reversal of migraine central sensitization, and interfere with triptan effectiveness.     4.Opioids precipitate bad clinical outcomes, especially transformation to daily headache.     5. They cause disease progression, comorbidity, and excessive health care consumption.           NEXT OPTIONS:    Gepants: Nuretc (rimegepant)75 mg >Ubrelvy (ubrogepant) 100 mg    Ditans: Reyvow (lasmiditan) 100 mg (No driving due to sedation)    Fioricet without codeine with Reglan.      Prednisone with Reglan.      LAST RESORT:     DHE NS Trudhesa (Max 2 a week)     C/I: concomitant use of vasoconstrictors like Triptans, strong CY inhibitors such as HAART PIs (eg, ritonavir, nelfinavir, or indinavir) and Macrolides (eg, erythromycin or clarithromycin), CAD, PVD, Stroke/TIA and Uncontrolled HTN.  Serious SEs include Vasospasm and Fibrosis (chronic use).       IMPENDING STATUS: Prednisone and Vistaril.    STATUS MIGRAINOSUS: ED-Infusion for Status Protocol.        PREVENTATIVE (MORE ACCURATELY MIGRAINE REDUCTION) MEDICATIONS:       Since the patient's headache is very frequent a lengthy discussion about preventative medications was carried out.The patient understands that prevention means DECREASING frequency and severity and NOT elimination.The patient was made aware that any new medication can cause serious allergic reaction.The medication is considered failure only if a therapeutic dose reached and maintained for 6-8 weeks.        HELPFUL SUPPLEMENTS:     Helpful supplements include Co-Q 10, B2, Mg, Feverfew (Dolovent combination) and butterbur (Petadolex)        NEUROPHARMACOLOGY     NEXT OPTIONS:     Zonisamide/Zonegran (ZNS) 100-400 mg QHS is a good alternative to TPM in case of SE/AE.      Lamotrigine/Lamictal  (LTG)slow titration to 100 mg BID which can cause serious skin rash and rare cardiac arrhythmias. LTG is superior to other therapies for specifically reducing migraine aura.     ANTI-CGRP AGENTS: Qulipta (alogepant) 60 mg QD, Erenumab (Aimovig) 140 mg SQ Pen monthly (Reported cases of Constipation and BP elevation) , Galcanezumab (Emgality) 120 mg SQ Pen monthly after a loading dose of 240 mg  and Fremnezumab (Ajovy) (Ligand Blocker): 225 mg SQ monthly or 675 mg every 3 months     Botox 200 units every 3 months.         LAST RESORT OPTIONS:      Namenda 10 mg BID     Valproic acid/ Depakote         NEUROMODULATION     Cefaly, Relivion, Nerivio and GammaCore (VNS)         SCHOOL AND WORK ACCOMMODATIONS        Allow the patient to wear sunglasses or a cap and switch out fluorescent bulbs.    Allow the patient to arrive 5 minutes later and leave 5 minutes earlier to avoid noisy traffic.    Allow the patient to carry a water bottle and refill as needed.    Allow the patient to snack whenever is needed.    Allow the patient to decrease the computer brightness.    Allow the patient to take breaks as needed and extra time for assignments and deadlines.    Allow the patient to avoid strenuous activity as needed.         MEDICAL/SURGICAL COMORBIDITIES     All relevant medical comorbidities noted and managed by primary care physician and medical care team.              HEALTHY LIFESTYLE AND PREVENTATIVE CARE    The patient to adhere to the age-appropriate health maintenance guidelines including screening tests and vaccinations. The patient to adhere to  healthy lifestyle, optimal weight, exercise, healthy diet, good sleep hygiene and avoiding drugs including smoking, alcohol and recreational drugs.    I spent a total of 51 minutes on the day of the visit.This includes face to face time and non-face to face time preparing to see the patient (eg, review of tests), obtaining and/or reviewing separately  obtained history, documenting clinical information in the electronic or other health record, independently interpreting results and communicating results to the patient/family/caregiver, or care coordinator.     Please do not hesitate to contact me with any updates, questions or concerns.    [id number]    Nkechi Soliman, MSN, FNP-C    General Neurology

## 2025-04-22 NOTE — TELEPHONE ENCOUNTER
Received note from PA that there was no additional action required, no [facility] is [facility]. Called pharmacy to confirm whether they were able to fill it. The tech said it went through, but they don't have it in stock. He advised that they will order it and fill it.

## 2025-04-24 ENCOUNTER — PATIENT MESSAGE (OUTPATIENT)
Dept: PSYCHIATRY | Facility: CLINIC | Age: 45
End: 2025-04-24
Payer: COMMERCIAL

## 2025-04-25 ENCOUNTER — TELEPHONE (OUTPATIENT)
Dept: BARIATRICS | Facility: CLINIC | Age: 45
End: 2025-04-25
Payer: COMMERCIAL

## 2025-04-25 NOTE — TELEPHONE ENCOUNTER
Chart reviewed. introduced myself as Jorge coordinator via portal message. Notified of missing wkup. Dashboard, snapshot, and RN f/u updated.  **NEEDED**  Psych evaluation (call [phone] to schedule)  Diet clearance   PCP clearance (I will let you know when to obtain this clearance)

## 2025-04-27 RX ORDER — POTASSIUM CHLORIDE 750 MG/1
CAPSULE, EXTENDED RELEASE ORAL
Qty: 28 CAPSULE | Refills: 0 | OUTPATIENT
Start: 2025-04-27

## 2025-05-01 ENCOUNTER — OFFICE VISIT (OUTPATIENT)
Dept: SPORTS MEDICINE | Facility: CLINIC | Age: 45
End: 2025-05-01
Payer: COMMERCIAL

## 2025-05-01 DIAGNOSIS — M17.0 BILATERAL PRIMARY OSTEOARTHRITIS OF KNEE: Primary | ICD-10-CM

## 2025-05-01 PROCEDURE — 99999 PR PBB SHADOW E&M-EST. PATIENT-LVL II: CPT | Mod: PBBFAC,,, | Performed by: STUDENT IN AN ORGANIZED HEALTH CARE EDUCATION/TRAINING PROGRAM

## 2025-05-01 NOTE — PROCEDURES
Large Joint Aspiration/Injection: bilateral knee    Date/Time: 5/1/2025 4:20 PM    Performed by: Oswaldo Nova MD  Authorized by: Oswaldo Nova MD    Consent Done?:  Yes (Verbal)  Indications:  Arthritis and pain  Site marked: the procedure site was marked    Timeout: prior to procedure the correct patient, procedure, and site was verified    Prep: patient was prepped and draped in usual sterile fashion    Local anesthetic:  Lidocaine 1% without epinephrine  Anesthetic total (ml):  4      Details:  Needle Size:  21 G  Ultrasonic Guidance for needle placement?: Yes    Images are saved and documented.  Approach:  Lateral (superior)  Location:  Knee  Laterality:  Bilateral  Site:  Bilateral knee  Medications (Right):  48 mg hylan g-f 20 48 mg/6 mL  Medications (Left):  48 mg hylan g-f 20 48 mg/6 mL  Patient tolerance:  Patient tolerated the procedure well with no immediate complications     Ultrasound guidance was used for needle localization. Images were saved and stored for documentation. The appropriate structures were visualized. Dynamic visualization of the needle was continuous throughout the procedures and maintained good position.

## 2025-05-01 NOTE — PATIENT INSTRUCTIONS
Assessment:  Georgia Garcia is a 45 y.o. female No chief complaint on file.      Encounter Diagnosis   Name Primary?    Bilateral primary osteoarthritis of knee Yes        Plan:  Ultrasound guided Synvisc One gel injections to bilateral knees  Today you received a gel injection. Unlike steroid injections, these gel injections are a lot thicker and can feel different at first.   It is normal to feel some soreness in the first few days because the gel is expanding that joint space.   It is also normal to experience some swelling in the first few days.   If you are feeling discomfort or having swelling, use ice and tylenol to control symptoms.   It is better to keep the knee joint moving so that the gel can get throughout the joint space.   Sometimes it can take a few weeks for the gel injection to start showing noticeable effects. This is normal.   These gel injections can be repeated every 6 months as needed.   Follow up as needed    Follow-up: as needed.    Thank you for choosing [facility] and Dr. Oswaldo Nova for your orthopedic & sports medicine care. It is our goal to provide you with exceptional care that will help keep you healthy, active, and get you back in the game.    Please do not hesitate to reach out to us via email, phone, or MyChart with any questions, concerns, or feedback.    If you felt that you received exemplary care today, please consider leaving us feedback on [employer] at:  [email or web address]    If you are experiencing pain/discomfort ,or have questions after 5pm and would like to be connected to the [facility] on-call team, please call this number and specify which Sports Medicine provider is treating you: [phone]

## 2025-05-01 NOTE — PROCEDURES
Sports Medicine US - Guidance for Needle Placement    Date/Time: 5/1/2025 4:20 PM    Performed by: Oswaldo Nova MD  Authorized by: Oswaldo Nova MD  Preparation: Patient was prepped and draped in the usual sterile fashion.  Local anesthesia used: no    Anesthesia:  Local anesthesia used: no    Sedation:  Patient sedated: no    Patient tolerance: patient tolerated the procedure well with no immediate complications  Comments: Ultrasound guidance was used for needle localization. Images were saved and stored for documentation. The appropriate structures were visualized. Dynamic visualization of the needle was continuous throughout the procedures and maintained good position.

## 2025-05-03 ENCOUNTER — PATIENT MESSAGE (OUTPATIENT)
Dept: SPORTS MEDICINE | Facility: CLINIC | Age: 45
End: 2025-05-03
Payer: COMMERCIAL

## 2025-05-07 ENCOUNTER — CLINICAL SUPPORT (OUTPATIENT)
Dept: PSYCHIATRY | Facility: CLINIC | Age: 45
End: 2025-05-07
Payer: COMMERCIAL

## 2025-05-07 DIAGNOSIS — Z00.8 ENCOUNTER FOR PRE-SURGICAL PSYCHOLOGICAL ASSESSMENT: Primary | ICD-10-CM

## 2025-05-08 NOTE — PROGRESS NOTES
The patient location is: [place]  The chief complaint leading to consultation is: encounter for presurgical psychological evaluation    Visit type: audiovisual    Each patient to whom he or she provides medical services by telemedicine is:  (1) informed of the relationship between the physician and patient and the respective role of any other health care provider with respect to management of the patient; and (2) notified that he or she may decline to receive medical services by telemedicine and may withdraw from such care at any time.    PRESURGICAL PSYCHOLOGICAL EVALUATION - BARIATRICS  Psychiatry Initial Visit (PhD/PsyD)   Psychological Intake and Assessment    Site: [facility], Department of Psychiatry, Psychology Section  [address] [place] [id number]    CPT Codes:   94224 (1 hour): Psychiatric Diagnostic Evaluation  91667 (1 hour), 18376 (1 hour): Integration of patient data, interpretation of standardized test results and clinical data, clinical decision-making, treatment planning and report, and interactive feedback to the patient  66800 (1 hour), Psychological or neuropsychological test administration, with single automated instrument via electronic platform, with automated result only    NAME: Georgia Garcia  MRN: [id number]  : 1980     Date: 2025  Evaluation Length (direct face-to-face time): 40 minutes  Total Time including report writing, test scoring, chart review, integration of data and feedback: 110 minutes    Clinical status of patient: Outpatient  Met with: Patient  Referred by: Dionna Lanier M.D.    Chief complaint/reason for encounter: Routine psychological evaluation prior to bariatric surgery.     Before this evaluation was initiated, the purposes and process of the assessment and the limits of confidentiality were discussed with the patient who expressed understanding of these issues and verbally consented to proceed with the evaluation.      Type of surgery sought: Ms. Garcia reported that she is seeking the RNY Bypass procedure. However, notes from her bariatric providers indicate that she is seeking the sleeve gastrectomy procedure.     HISTORY OF PRESENT ILLNESS   Ms. Garcia is a 45-year-old Black female who is pursuing bariatric surgery to improve her health and quality of life. She has a history of depression and anxiety for which she is currently prescribed psychiatric medications. She is also engaged in individual therapy. She has never been hospitalized for psychiatric reasons and denied any history of suicidality. She has begun making positive lifestyle changes in anticipation for surgery with good benefit. Ms. Garcia has a Body Mass Index of 52.79 as documented by her bariatric dietician.     Ms. Garcia has struggled with weight as far back as I can remember. Factors that have contributed to her weight gain over the years include not really paying close attention to what the intake is or the difference between a need and a want. She denied a history of emotional eating. She has tried many weight loss methods on her own (i.e., Weight Watchers, Atkins, other dietary programs, weight loss medications) with little success, and she believes that her biggest weight loss challenge recently has been consuming sodas rather than consuming food/meals. When asked about her motivation for seeking surgery now, she reported, I'm getting older. I come from a family of almost everyone is overweight. My mother passed, and it was unexpected. Currently, I'm taking care of my dad. There's so much that I have to do; I have to make sure that I'm okay if I want to make sure anyone else is okay. Her postsurgical goals include good health and longevity and continuing to increase her energy levels.     Ms. Garcia has met with Ms. Veronica Mendoza RD, bariatric dietician, and she has made the following lifestyle changes since beginning the bariatric program: consuming more  protein, reducing intake of sugars, reducing intake of carbohydrates, and eliminating consumption of sodas. She must continue meeting with Ms. Mendoza to demonstrate the implementation of lifestyle changes prior to clearance for bariatric surgery.    Knowledge of Surgery Information:  Basics of procedure: Ms. Garcia reported understanding of the basics of the bariatric procedure.  Risks: Ms. Garcia reported understanding of the risks associated with the procedure. She denied significant concerns related to these risks.   Basics of diet: Ms. Garcia reported understanding of the liquid diet required before and following the bariatric procedure.     MEDICAL HISTORY  Co-morbidities:   Patient Active Problem List    Diagnosis Date Noted    Intractable migraine without aura and without status migrainosus 03/11/2025    Major depressive disorder with single episode, in full remission 11/01/2021    PREMA (generalized anxiety disorder) 10/30/2020    Osteoarthritis of both knees 03/05/2019    Essential hypertension     Migraine headache 06/11/2013    Insomnia 06/11/2013      Past Medical History:   Diagnosis Date    Anemia     Cervical radiculopathy     COVID-19 virus infection 01/2021    Diverticulosis     Hypermenorrhea     Hypertension     Insomnia     Internal hemorrhoid     Migraine headache     Morbid obesity     Rectal bleeding     Uterine fibroid       Pain: Ms. Garcia reported arthritis in her knees for which she receives steroid injections.   Current Pain: 4/10  Best Pain: 2/10  Worst Pain: 10/10    Current Health Behaviors:  Compliant with medical regimens and appointments: Yes  Prescription medication misuse: No  Exercise: Yes; Ms. Garcia reported that she engages in walking during her lunch breaks.   Adequate cognitive functioning: Yes    Current and Past Substance Use:   Alcohol: Denied current use; denied history of abuse or dependency.   Drugs: Denied current use; denied history of abuse or dependency.   Tobacco: Denied.    Caffeine: Ms. Garcia reported that she consumes one cup of coffee twice per week.      Current Medications and Drug Reactions (include OTC, herbal): see medication list     PAST AND CURRENT MENTAL HEALTH  Current Psychiatric Symptoms:   Depression: Denied depressed mood, loss of interest in pleasurable activities, anhedonia, sleep changes, decreased motivation, decreased concentration, feelings of excessive or irrational guilt, helplessness, hopelessness, increased or decreased appetite, increased or decreased motor activity, decreased energy, or suicidal ideations/thoughts of death.  Katerina/Hypomania: Denied increased goal directed activity, decreased need for sleep, pressured speech or increased talkativeness, racing thoughts, increased risk-taking behavior, episodic elevated or irritable mood, flight of ideas, distractibility, inflated self-esteem, or grandiosity.  Anxiety: Denied excessive worry, difficulty controlling worrying, feeling keyed up, feeling easily fatigued, difficulty concentrating or mind going blank, irritability, muscle tension, sleep disturbance, racing thoughts, being unable to relax, or specific phobia.  Panic Attacks: Denied panic attacks characterized by heart palpitations, sweating, trembling, dyspnea, choking sensation, chest pain/discomfort, nausea, dizziness, chills or hot flashes, tingling, derealization, fear of losing control, or fear of dying.  Thoughts: Denied any AVH, paranoia, delusions, ideas of reference, thought insertion or thought broadcasting.  Suicidal thoughts/behaviors: Denied passive or active SI, denied suicidal plans or intent.  Self-injury: Denied.  Substance abuse: Denied abuse or dependence.   Sleep: Denied increased sleep latency, frequent nighttime awakenings, or early morning awakening with inability to return to sleep.  PTSD: Denied.     Current Psychiatric Treatment:  Medications: Wellbutrin, Xanax  Psychotherapy: Ms. Garcia reported that she has participated in  therapy for approximately two months.     Current Stress Management:   Current psychosocial stressors: Ms. Garcia denied current stressors.   Report of coping skills/recreational activities: Ms. Garcia connie with stress through prayer, reaching out to others for support. She enjoys reading, watching movies, participating in a community service organization, and taking rides.   Support system: Ms. Garcia reported that her family and friends, including her aunt, serve as her primary sources of support.     Past Psychiatric History:   Previous diagnosis: Ms. Garcia reported that she was diagnosed with depression and anxiety following the death of her mother in 2018. A review of her medical record indicated diagnoses of Major Depressive Disorder, single episode, in full remission and Generalized Anxiety Disorder.   Previous psychiatric hospitalizations/inpatient treatment: Denied.  History of outpatient treatment: Ms. Garcia reported that she has participated in individual therapy at various points in time since her mother passed away in 2018. She also reported that she has participated in family therapy at various points in time with her daughter and mother.   Previous suicide attempt: Denied.  Non-suicidal self-injury: Denied.    Trauma History: Ms. Garcia reported that the unexpected death of her mother in 2018 was traumatic for her.    History of Eating Disorders:  History of bulimia: Denied recurrent episodes of eating then engaging in inappropriate compensatory behaviors.      History of binge-eating episodes: Denied episodes of binge eating characterized by eating excessive amounts of food within a discrete time period with a lack of control during eating, eating more rapidly than normal, eating until uncomfortably full, eating large amounts of food when not physically hungry, eating alone due to embarrassment, or negative emotions (i.e., disgusted, guilty, depressed) after eating.    Family History of Psychiatric Illness:  None reported.     Psychosocial History and Current Social Situation:    Ms. Garcia was born and raised in [place], [place], by her biological mother and father along with a twin brother. She described her childhood as wonderful. She denied childhood trauma, abuse, and neglect. She performed well in school and earned a high school diploma. She denied being enrolled in special education or being held back. She denied significant detentions, suspensions, and expulsions. She is currently employed full time as a . She denied  service. She is not on disability, and finances are stable. She is single. She has 1 child (age 28). She currently lives with her father. Sikh is important to her as a source of support, and she identifies as a Baptism.  Legal history: She denied history of arrests and convictions. She denied current involvement in litigation.  Access to guns: Ms. Garcia reported access to guns that are stored safely.    PSYCHOLOGICAL ASSESSMENT/TESTING:   All tests were administered according to standardized procedures and were selected based on the reason for referral. Effort on all tests was satisfactory to produce valid results.  MMPI-3. The MMPI-3 provides an assessment of personality and psychopathology with specific evaluation of psychosocial risk factors associated with outcomes of bariatric surgery. Ms. Garcia produced an interpretable MMPI-3 profile. Of note, validity scales suggest that Ms. Garcia presented herself in a positive light by denying some minor faults and shortcomings that most people acknowledge. She also presented herself as remarkably well-adjusted. This reported level of psychological adjustment is rare in the general population. Her scores should be interpreted with this style of responding in mind, as elevations on her scores may underestimate the problems assessed by this test.   TEST RESULTS. Ms. Garcia reported no clinically significant  somatic, cognitive, emotional, thought, behavioral, or interpersonal dysfunction.   GROUP COMPARISONS. Compared to other bariatric surgery candidates, Ms. Garcia reported lower levels of emotional dysfunction, thought dysfunction, behavioral dysfunction, demoralization, low positive emotions, antisocial behavior, dysfunctional negative emotions, malaise, neurological concerns, inefficacy, stress, worry, anxiety-related experiences, shyness, disconstraint, negative emotionality, and introversion.     FEEDBACK. Ms. Garcia was provided with test results and offered the opportunity to respond to feedback and clarify results if needed.  MENTAL STATUS EXAM:  General Appearance:  age appropriate, well dressed, neatly groomed    Speech:  normal tone, normal rate, normal pitch, normal volume    Level of Cooperation:  cooperative    Thought Processes:  normal and logical    Mood:  euthymic    Thought Content:  normal, no suicidality, no homicidality, delusions, or paranoia    Affect:  congruent and appropriate    Orientation:  oriented x3    Memory:  recent memory intact  remote memory intact; able to recall remote personal events   Attention Span & Concentration:  appropriate   Fund of General Knowledge:  appropriate for education    Abstract Reasoning:  Not directly assessed   Judgment & Insight:  good    Language  intact        SUMMARY:  Ms. Garcia is a 45-year-old female referred for presurgical psychological evaluation prior to bariatric surgery.  There are no indications of disabling psychopathology, substance use/abuse, cognitive problems, or disabilities that would prevent understanding and competence with medical treatment. There are no reports of major psychosocial stressors that would interfere with her engagement in treatment. There is no evidence of suicidality.  She exhibits high social stability and good social support. She has good coping strategies to deal with stress and the demands of surgery and recovery.  She  has fair knowledge about bariatric surgery, appropriate expectations for surgery and recovery, adequate understanding of possible risks of this treatment option, and a high willingness to sustain effort for lifestyle changes and health adaptations required. She reports adequate compliance with prior medical regimens.  Results from psychological assessment/testing revealed minimal risks.     IMPRESSIONS AND RECOMMENDATIONS:  Ms. Garcia is a suitable candidate from a psychological perspective. There are no absolute psychological contraindications to bariatric surgery. Overall, Ms. Garcia is at a LOW risk for adverse postsurgical outcomes.    Ms. Garcia currently reports no significant psychiatric problems, major psychosocial stressors, or other problems that would contraindicate surgery or impact her ability to engage in treatment. She is currently involved in therapeutic and psychiatric care to assist with anxious and depressive symptoms, and her symptoms are well managed. She has adequate and appropriate knowledge and expectations, and she is motivated and willing to engage in behaviors to achieve successful outcomes with bariatric surgery. Notably, Ms. Garcia reported that she is seeking the RNY Bypass procedure. However, notes from her bariatric providers indicate that she is seeking the sleeve gastrectomy procedure. It is recommended that Ms. Garcia clarify with her bariatric team which bariatric procedure she is pursuing. She has multiple protective factors that should help her during surgery and recovery.     It is recommended that Ms. Garcia continue taking her psychiatric medications as prescribed. There are no additional recommendations for psychological intervention at this time.    Diagnostic impressions:    ICD-10-CM ICD-9-CM   1. Pre-operative examination  Z01.818 V72.84   2. Major depressive disorder with single episode, in full remission  F32.5 296.26   3. Class 3 severe obesity with body mass index (BMI) of 50.0  to 59.9 in adult, unspecified obesity type, unspecified whether serious comorbidity present  E66.813 278.01    E66.01 V85.43    Z68.43    4. History of anxiety  Z86.59 V11.8        Plan: This report will be sent to the referring provider with impressions and recommendations. It will be the referring team's decision whether the patient proceeds with surgery. Services related to the presurgical psychological evaluation are now concluded.      Aparna Johnston, Ph.D.

## 2025-05-09 ENCOUNTER — OFFICE VISIT (OUTPATIENT)
Dept: PSYCHIATRY | Facility: CLINIC | Age: 45
End: 2025-05-09
Payer: COMMERCIAL

## 2025-05-09 DIAGNOSIS — E66.813 CLASS 3 SEVERE OBESITY WITH BODY MASS INDEX (BMI) OF 50.0 TO 59.9 IN ADULT, UNSPECIFIED OBESITY TYPE, UNSPECIFIED WHETHER SERIOUS COMORBIDITY PRESENT: ICD-10-CM

## 2025-05-09 DIAGNOSIS — Z86.59 HISTORY OF ANXIETY: ICD-10-CM

## 2025-05-09 DIAGNOSIS — F32.5 MAJOR DEPRESSIVE DISORDER WITH SINGLE EPISODE, IN FULL REMISSION: ICD-10-CM

## 2025-05-09 DIAGNOSIS — E66.01 CLASS 3 SEVERE OBESITY WITH BODY MASS INDEX (BMI) OF 50.0 TO 59.9 IN ADULT, UNSPECIFIED OBESITY TYPE, UNSPECIFIED WHETHER SERIOUS COMORBIDITY PRESENT: ICD-10-CM

## 2025-05-09 DIAGNOSIS — Z01.818 PRE-OPERATIVE EXAMINATION: Primary | ICD-10-CM

## 2025-05-09 NOTE — LETTER
May 9, 2025        Allie Lopez, NP  [address]  [facility] [address]             [facility] [address]  [address]  [facility] [address]  Phone: [phone]   Patient: Georgia Garcia   MR Number: [id number]   YOB: 1980   Date of Visit: 5/9/2025       Dear Dr. Lopez and Dr. Marquise Lanier:     Thank you for referring Georgia Garcia to me for evaluation. Below are the relevant portions of my assessment and plan of care.    Ms. Garcia is a suitable candidate from a psychological perspective. There are no absolute psychological contraindications to bariatric surgery. Overall, Ms. Garcia is at a LOW risk for adverse postsurgical outcomes.    Ms. Garcia currently reports no significant psychiatric problems, major psychosocial stressors, or other problems that would contraindicate surgery or impact her ability to engage in treatment. She is currently involved in therapeutic and psychiatric care to assist with anxious and depressive symptoms, and her symptoms are well managed. She has adequate and appropriate knowledge and expectations, and she is motivated and willing to engage in behaviors to achieve successful outcomes with bariatric surgery. Notably, Ms. Garcia reported that she is seeking the RNY Bypass procedure. However, notes from her bariatric providers indicate that she is seeking the sleeve gastrectomy procedure. It is recommended that Ms. Garcia clarify with her bariatric team which bariatric procedure she is pursuing. She has multiple protective factors that should help her during surgery and recovery.     It is recommended that Ms. Garcia continue taking her psychiatric medications as prescribed. There are no additional recommendations for psychological intervention at this time. If you have questions, please do not hesitate to contact me.     Sincerely,    Aparna Johnston, Ph.D.  Clinical Psychologist

## 2025-05-13 ENCOUNTER — PATIENT MESSAGE (OUTPATIENT)
Dept: BARIATRICS | Facility: CLINIC | Age: 45
End: 2025-05-13
Payer: COMMERCIAL

## 2025-05-19 ENCOUNTER — PATIENT MESSAGE (OUTPATIENT)
Dept: BARIATRICS | Facility: CLINIC | Age: 45
End: 2025-05-19

## 2025-05-19 ENCOUNTER — CLINICAL SUPPORT (OUTPATIENT)
Dept: BARIATRICS | Facility: CLINIC | Age: 45
End: 2025-05-19
Payer: COMMERCIAL

## 2025-05-19 ENCOUNTER — PATIENT MESSAGE (OUTPATIENT)
Dept: PRIMARY CARE CLINIC | Facility: CLINIC | Age: 45
End: 2025-05-19
Payer: COMMERCIAL

## 2025-05-19 DIAGNOSIS — M17.0 PRIMARY OSTEOARTHRITIS OF BOTH KNEES: ICD-10-CM

## 2025-05-19 DIAGNOSIS — I10 ESSENTIAL HYPERTENSION: Chronic | ICD-10-CM

## 2025-05-19 DIAGNOSIS — E66.01 MORBID OBESITY WITH BODY MASS INDEX (BMI) OF 50.0 TO 59.9 IN ADULT: ICD-10-CM

## 2025-05-19 DIAGNOSIS — Z71.3 DIETARY COUNSELING AND SURVEILLANCE: Primary | ICD-10-CM

## 2025-05-19 PROCEDURE — 97803 MED NUTRITION INDIV SUBSEQ: CPT | Mod: 95,,, | Performed by: DIETITIAN, REGISTERED

## 2025-05-19 NOTE — PROGRESS NOTES
The patient location is:  Patient Home   The chief complaint leading to consultation is: morbid obesity in work up for bariatric surgery  Visit type: Virtual visit with synchronous audio and video  Total time spent with patient: 30 min  Each patient to whom he or she provides medical services by telemedicine is:  (1) informed of the relationship between the physician and patient and the respective role of any other health care provider with respect to management of the patient; and (2) notified that he or she may decline to receive medical services by telemedicine and may withdraw from such care at any time.  Nutrition Handout located in AVS section of pt's [facility] and/or sent as a message via [facility] portal.  Pt informed of handout and how to access this information in [employer] kailey.  NUTRITION NOTE    Referring Physician: Dionna Lanier M.D.   Reason for MNT Referral: 4 months medically supervised diet counseling and surveillance pending laproscopic sleeve gastrectomy    Patient presents for follow-up visit for MSD with weight loss over the past month; 1 lbs total weight loss by making numerous dietary and lifestyle changes.    CLINICAL DATA:  45 y.o. female.    Current Weight: 269 lbs lbs self reported  Weight Change Since Initial Visit: Loss of 1 lbs  Ideal Body Weight:126  lbs  Body mass index is 52.54 kg/m².  Initial Wt: 270 lb    DAILY NUTRITIONAL NEEDS: pre-op nutritional bariatric guidelines to promote weight loss  3561-5103 Calories    Grams Protein    CURRENT DIET:  Reduced-calorie diet.  Diet Recall: Food records are not present.  Greatest challenge: sugar-sweetened beverages transitioned to coke zero - 12 oz cans 2 in past 2 weeks  Current diet recall:   B: protein shake - Premier rtd or powder mix with water and 1 tablespoon of peanut butter  L:boiled eggs single packet of tuna with burnett and sometimes salad wit dressing ranch or skips  D:chicken or fish and broccoli no starchy  sides  Diet Includes:   Meal Pattern: Improved.  Protein Supplements: 1 per day.  Snacking: Inadequate. Snacks include none.  Vegetables: Likes a variety. Eats daily.  Fruits: Likes a variety. Eats almost daily.during work week   Beverages: diet soda, sugar-free beverages/powders/drops, coffee without sugar, and decrease in amt of coffee  v-8 splash  Dining out/delivery: Weekly. Once Mostly take-out.  Cooking at home: Weekly. Mostly baked/roast, steamed, and microwaved beef, fish/seafood, pork, chicken/turkey, vegetables, and beans    Exercise:  Past exercise: Adequate    Current exercise: Adequate 5 days a week 10-15 minutes lunch break    Restrictions to Exercise: None.    Vitamins / Minerals / Herbs:   Multivitamin [employer] or Geritol tablets  Vitamin D  Vitamin C  Vitamin B-12  Probiotics  Tumeric  Eldberry      Labs:   None available at time of visit    Food Allergies:   None reported    Social:  Works regular daytime shifts.  Lives with dad.  Grocery shopping and food prep self.  Patient believes the household will be supportive after surgery.  Alcohol: None.  Smoking: None.      ASSESSMENT:  Patient demonstrates some willingness to change lifestyle habits as evidenced by weight loss, good/increased exercise, dietary changes, including protein drinks, reduced sugar-sweetened beverages, reduced eating out/delivery, more food preparation at home, and healthier cooking at home.    Doing fairly well with working on greatest challenges (sugar-sweetened beverages).    Barriers to Education:  none  Stage of Change:  action    NUTRITION DIAGNOSIS:  Morbid Obesity related to Excessive calorie intake as evidence by BMI.  Status: Decreased    PLAN/RECOMMENDATION:  Pt is a potential candidate for bariatric surgery     Diet: Adjust diet plan.  Work on Bariatric Nutrition Checklist.  Work on gradually cutting back on starchy CHO in the diet.  1200-calorie diet.  5-6 meals per day  increase protein  Return to clinic    Exercise:  Increase.    Behavior Modification: Begin to document food & activity logs daily.    Return to clinic in one month. Needs additional visit(s) with RD.    Communicated nutrition plan with bariatric team.    SESSION TIME:  30 minutes

## 2025-05-20 VITALS — WEIGHT: 269 LBS | BODY MASS INDEX: 52.54 KG/M2

## 2025-05-22 RX ORDER — ESTRADIOL 1 MG/1
TABLET ORAL
Qty: 90 TABLET | Refills: 1 | Status: SHIPPED | OUTPATIENT
Start: 2025-05-22

## 2025-05-22 RX ORDER — LOSARTAN POTASSIUM 100 MG/1
TABLET ORAL
Qty: 90 TABLET | Refills: 1 | Status: SHIPPED | OUTPATIENT
Start: 2025-05-22

## 2025-05-22 NOTE — TELEPHONE ENCOUNTER
Refill Decision Note   Georgia Radha  is requesting a refill authorization.  Brief Assessment and Rationale for Refill:  Approve     Medication Therapy Plan:         Comments:     Note composed:10:16 AM 05/22/2025

## 2025-05-22 NOTE — TELEPHONE ENCOUNTER
No care due was identified.  [facility] Embedded Care Due Messages. Reference number: [id number].   5/22/2025 3:17:35 AM CDT

## 2025-05-29 ENCOUNTER — OFFICE VISIT (OUTPATIENT)
Dept: NEUROLOGY | Facility: CLINIC | Age: 45
End: 2025-05-29
Payer: COMMERCIAL

## 2025-05-29 DIAGNOSIS — R11.2 NAUSEA AND VOMITING, UNSPECIFIED VOMITING TYPE: ICD-10-CM

## 2025-05-29 DIAGNOSIS — F41.1 GAD (GENERALIZED ANXIETY DISORDER): ICD-10-CM

## 2025-05-29 DIAGNOSIS — R42 DIZZINESS AND GIDDINESS: ICD-10-CM

## 2025-05-29 DIAGNOSIS — G44.221 CHRONIC TENSION-TYPE HEADACHE, INTRACTABLE: ICD-10-CM

## 2025-05-29 DIAGNOSIS — I10 HYPERTENSION, UNSPECIFIED TYPE: ICD-10-CM

## 2025-05-29 DIAGNOSIS — G43.E19 INTRACTABLE CHRONIC MIGRAINE WITH AURA AND WITHOUT STATUS MIGRAINOSUS: Primary | ICD-10-CM

## 2025-05-29 PROCEDURE — 98007 SYNCH AUDIO-VIDEO EST HI 40: CPT | Mod: 95,,,

## 2025-05-29 PROCEDURE — 1160F RVW MEDS BY RX/DR IN RCRD: CPT | Mod: CPTII,95,,

## 2025-05-29 PROCEDURE — 3044F HG A1C LEVEL LT 7.0%: CPT | Mod: CPTII,95,,

## 2025-05-29 PROCEDURE — 4010F ACE/ARB THERAPY RXD/TAKEN: CPT | Mod: CPTII,95,,

## 2025-05-29 PROCEDURE — 1159F MED LIST DOCD IN RCRD: CPT | Mod: CPTII,95,,

## 2025-05-29 RX ORDER — RIMEGEPANT SULFATE 75 MG/75MG
75 TABLET, ORALLY DISINTEGRATING ORAL EVERY OTHER DAY
Qty: 16 TABLET | Refills: 0 | Status: SHIPPED | OUTPATIENT
Start: 2025-05-29 | End: 2025-06-30

## 2025-05-29 RX ORDER — BUTALBITAL, ACETAMINOPHEN AND CAFFEINE 50; 325; 40 MG/1; MG/1; MG/1
1 TABLET ORAL EVERY 4 HOURS PRN
Qty: 15 TABLET | Refills: 0 | Status: SHIPPED | OUTPATIENT
Start: 2025-05-29 | End: 2025-06-28

## 2025-05-29 RX ORDER — ONDANSETRON 4 MG/1
4 TABLET, ORALLY DISINTEGRATING ORAL EVERY 8 HOURS PRN
Qty: 30 TABLET | Refills: 0 | Status: SHIPPED | OUTPATIENT
Start: 2025-05-29 | End: 2025-06-28

## 2025-05-29 RX ORDER — PROPRANOLOL HYDROCHLORIDE 60 MG/1
60 CAPSULE, EXTENDED RELEASE ORAL DAILY
Qty: 30 CAPSULE | Refills: 2 | Status: SHIPPED | OUTPATIENT
Start: 2025-05-29 | End: 2025-08-27

## 2025-05-29 NOTE — PROGRESS NOTES
"Subjective:       Patient ID: Georgia Garcia is a 45 y.o. female.    PMH: Sciatica / Depression / PREMA / OA / HTN / Insomnia / Migraines / and other medical conditions.     Chief Complaint: "Headaches"     HPI    The patient presented on 06/2024 / 07/2024 / 08/2024 / 02/2025 / 03/2025 / 04/2025 for evaluation of "Headaches". Patient presents today for a follow-up evaluation and recommendation of "Headaches".    The patient location is: [place]    The originating site (patient location) is: Home.      The distant site (neurologist location) is: Neurology Clinic at [facility].      The chief complaint leading to consultation is: "Headaches"       Visit type: Virtual visit with synchronous audio and video.      Consent: The patient verbally consented to participating in the video visit and informed that may decline to receive medical services by telemedicine and may withdraw from such care at any time.      I discussed with the patient the nature of our telemedicine visits, that:      I  would evaluate the patient and recommend diagnostics and treatments based on my assessment.    Our sessions are not being recorded and that personal health information is protected.    Our team would provide follow up care in person if/when the patient needs it.    Virtual (video/telemedicine) visits have significant limitations. A telemedicine exam is primarily focused on the history and what I can observe. Several critical parts of the neurological exam cannot be performed.       Interval History: (06/14/2024) - Norco 7.5-325 Q8H PRN and Metoprolol Succinate /Toprol-XL 50 mg PO Daily Discontinued. Patient started on Inderal-LA 60 mg PO Daily. Continue Nurtec. Diagnostic evaluation included Brain MRI / ESR / ROSE / RPR.     Started: 10 years ago, worse in the last few years  Describes: Pounding, some slowly build up but some can come on all at once / The headaches are progressively getting worse and interfering with daily " activity.  Timing: Duration of 2 weeks for migraine type headaches / Duration of 10 hours for regular type headaches / wakes up with migraines / migraines can wake her up from sleep  Frequency: Daily  Pain: Regular headaches: 7-8/10 / Migraines: 10/10  Location: Bilateral temporal, parietal, and occipital regions  Family: No known family history of migraines or headaches in the family.   Medications: Norco / Elavil / Wellbutrin / Gabapentin / Nutrec / Patient reports PCP recently prescribed Nutrec, but has not taken it yet due to PA being processed.   Worsen: Stress / The patient cannot think of food that aggravates the headache.      Alleviated: Sleeping / dark and quiet room /   Associated symptoms: Nausea / Vomiting / Trouble keeping up with tasks due to pain / light and sound sensitivity / dizziness (Light-headed) / blurry vision / double vision / bilateral floaters / Neck muscular pain with radiation to scapular area-is stress related per patient     No tinnitus / not worse with postural changes or valsalva / no scalp sensitivity / numbness/tingling to extremities / bilateral or focal weakness / no trouble with balance        Triggers: Cigarette smoke / strong perfume smells / bright lights / eating citrus   Prodrome symptoms: patient reports she does have an auora of a small feeling prior to migraine onset. She expresses that she can't describe her auora.      Patient reports that she has previously sought ER evaluation for Headache management who treated her with Naproxen. She felt like it was a waste of time and the medication was not effective for her.      Patient reports compliance with blood pressure medication and reports at home blood pressures run around 122/84.      No TMJ problems. No history of head trauma. No history of seizures. No history of smoking. No caffeine overuse. No vertigo. No blacking out. No fever, chills, or rigors. No history of significant memory loss. No history of strokes. No  "falls.        Interval History: (07/12/2024) - Continued Inderal-LA 60 mg PO Daily /  Nurtec 75 mg PO Daily PRN / Zofran-ODT 4 mg PO Q8H PRN. Started Elavil 25 mg PO QHS. Continued PT for vestibular therapy.     Patient reports that headaches are still severe, debilitating, and interfering with daily functioning. Frequency of tension type is daily and migraine type is daily. Duration of tension type is 10 hours and migraine type is 2 weeks. Still "Pounding" in nature. Pain intensity is decreased from 15/10 to 10/10 per patient. Associated symptoms include Nausea / Vomiting / Trouble keeping up with tasks due to pain / light and sound sensitivity / dizziness (Light-headed) / blurry vision / double vision / bilateral floaters / Neck muscular pain with radiation to scapular area-is stress related per patient / tinnitus to bilateral ears / feeling off-balance.     Patient reports chronic Anxiety / Depression. Patient reports that her anxiety and stress have slightly worsened since last visit due to unrelieved Migraines. Patient expresses that she is interested in additional pharmacological therapy for Anxiety management. Denies SI.         Interval History: (08/12/2024) -  Continued Inderal-LA 60 mg PO Daily /  Nurtec 75 mg PO Daily PRN / Zofran-ODT 4 mg PO Q8H PRN / Elavil 25 mg PO QHS.     -Headaches have significantly improved since last visit. Patient reports an increased quality of life and a 95% improvement in headache frequency, duration, pain intensity, and symptom severity.   -Location is still Bilateral temporal, parietal, and occipital regions.   -Positive Headache auras - Patient unable to describe feeling.   -Frequency decreased - tension type is 2 per week and migraine type is 2-3 times per week.  -Duration decreased - tension type is decreased from 10 hours to 3 hours / and migraine type decreased from 2 weeks to 3 days.   -Still "Pounding" in nature  -Pain intensity is decreased from 10/10 to 4/10 per " "patient.   -Worsened by Stress.   -Triggered by Cigarette smoke / strong perfume smells / bright lights / eating citrus  -Alleviated with sleeping / dark and quiet room   -Associated symptoms include Nausea / Vomiting / Trouble keeping up with tasks due to pain / light and sound sensitivity / dizziness (Light-headed) / blurry vision / double vision / bilateral floaters / Neck muscular pain with radiation to scapular area-is stress related per patient / tinnitus to bilateral ears / feeling off-balance - significantly decreased intensity of symptoms per patient  -Patient does report changes in vision. Last eye clinic appointment was April 2024, no abnormalities were noted per patient. Her prescription has not changed within 3 years.             Interval History:  (02/12/2025) - Continued Inderal-LA 60 mg PO Daily /  Nurtec 75 mg PO Daily PRN / Zofran-ODT 4 mg PO Q8H PRN / Elavil 25 mg PO QHS / PT.     -Headaches have significantly improved since last visit. Patient reports an increased quality of life and a 95% improvement in headache frequency, duration, pain intensity, and symptom severity.   -Location is still Bilateral temporal, parietal, and occipital regions.   -Positive Headache auras - Patient unable to describe feeling. She reports Nausea prior to Migraine onset.  -Frequency decreased - tension type is 2-3 per week and migraine type is 2-3 times per month.  -Duration decreased - tension type is 1 hour / and migraine type decreased from 2 weeks to 20 min - 3 days. Migraines alleviated 20 minutes after Nurtec.   -Still "Pounding" in nature.  -Pain intensity is 5-10/10 per patient.   -Worsened by Stress  -Triggered by Cigarette smoke / strong perfume smells / bright lights / eating citrus  -Alleviated with sleeping / dark and quiet room   -Associated symptoms include Nausea / Vomiting / Trouble keeping up with tasks due to pain / light and sound sensitivity / dizziness (Light-headed) / blurry vision  / bilateral " floaters / Neck muscular pain with radiation to scapular area-is stress related per patient / tinnitus to bilateral ears / feeling off-balance - significantly decreased intensity of symptoms per patient    Anxiety:    Patient reports that her chronic anxiety and stress have slightly improved due to Migraine alleviation with current medication regimen. Denies SI. Currently managed by psychiatrist. Elavil 25 mg PO QHS - has helped to improve per patient.     Patient reports tolerating medications well without side effects. She would like to stay on current medication regimen.            Interval History: (03/12/2025) - Continued Inderal-LA 60 mg PO Daily,  Zofran-ODT 4 mg PO Q8H PRN Nausea, and Elavil 25 mg PO QHS / Changed Nurtec 75 mg PO Daily PRN to 75 mg PO every other day for Migraine Prevention Therapy / Started Fioricet PRN.     Patient reports increased stress levels related to life stressors. Home blood pressure readings consistently around 101/70 mmHg.  Headaches are unchanged since last visit.   Double vision - resolved per patient          Interval History: (04/22/2025) - Continued Inderal-LA 60 mg PO Daily, Nurtec ODT 75 mg PO every other day, Fioricet, Zofran / Discontinued Elavil     Medications (Migraine Management):  Inderal-LA 60 mg PO daily - Migraine prophylaxis. Patient reports no side effects. BP at home averages ~121/70s per patient report. Open to dosage increase.  Nurtec ODT 75 mg PO every other day - Prescribed for migraine prophylaxis. Patient reports medication was never received.  Zofran ODT 4 mg PO Q8H PRN - For nausea.  Elavil (Amitriptyline) 25 mg PO QHS - For migraine prophylaxis. Patient reports excessive daytime sedation; unable to increase dose. Not clinically effective at current dose.  Butalbital-Acetaminophen-Caffeine -40 mg (Fioricet/ESGIC) - 1 tablet PO Q4H PRN for acute migraine. Patient reports effective symptom relief with no adverse  "effects.    Headaches:    -Headaches have significantly worsened since last visit.   -Location is still Bilateral temporal, parietal, and occipital regions.   -Positive Headache auras - Patient unable to describe feeling. She reports Nausea prior to Migraine onset.  -Frequency  - Increased - tension type is Daily and migraine type is 2-3 times per week.  -Duration  - Increased - tension type is 1 hour / and migraine type 2 weeks   -Still "Pounding" in nature.  -Pain intensity is 5-10/10 per patient.   -Worsened by Stress  -Triggered by Cigarette smoke / strong perfume smells / bright lights / eating citrus  -Alleviated with sleeping / dark and quiet room   -Associated symptoms include Nausea / Vomiting / Trouble keeping up with tasks due to pain / light and sound sensitivity / dizziness (Light-headed) / blurry vision  / bilateral floaters / Neck muscular pain with radiation to scapular area-is stress related per patient / tinnitus to bilateral ears / feeling off-balance     -Double vision - resolved per patient    -Patient does report changes in vision. Last eye clinic appointment was January 2025, no abnormalities were noted per patient. Her prescription for eye glasses did not change.     -No falls     -Vestibular therapy with physical therapy (PT) -  No vestibular issues identified by PT.              New Issues: (05/29/2025) -     No new issues per patient.      History of Present Illness    CHIEF COMPLAINT:  Georgia presents today for follow up of migraines and headaches.    HEADACHES AND MIGRAINES:  She experiences migraines twice weekly lasting approximately 4 days per episode, with pain intensity 5-10/10. She also has regular headaches 3 times weekly.    MEDICATIONS:  She takes propranolol 60mg daily, Zofran 4mg every 8 hours as needed for nausea, and Fioricet one tablet every 4 hours as needed for acute migraine. She discontinued Elavil without complications. She has been unable to obtain Nurtec due to " "pharmacy-related issues.          Medications (Migraine Management):  Inderal-LA 60 mg PO daily - Migraine prophylaxis. Patient reports no side effects. BP at home averages ~121/70s per patient report. Open to dosage increase.  Nurtec ODT 75 mg PO every other day - Prescribed for migraine prophylaxis. Patient reports medication was never received.  Zofran ODT 4 mg PO Q8H PRN - For nausea.  Butalbital-Acetaminophen-Caffeine -40 mg (Fioricet/ESGIC) - 1 tablet PO Q4H PRN for acute migraine. Patient reports effective symptom relief with no adverse effects.    Headaches:    -Headaches have slightly improved since last visit.   -Location is still Bilateral temporal, parietal, and occipital regions.   -Positive Headache auras - Patient unable to describe feeling. She reports Nausea prior to Migraine onset.  -Frequency  - decreased - tension type is 3 times per week and migraine type is 2-3 times per week.  -Duration  - decreased - tension type is 1 hour / and migraine type is 4 days  -Still "Pounding" in nature.  -Pain intensity is 5-10/10 per patient.   -Worsened by Stress  -Triggered by Cigarette smoke / strong perfume smells / bright lights / eating citrus  -Alleviated with sleeping / dark and quiet room   -Associated symptoms include Nausea / Vomiting / Trouble keeping up with tasks due to pain / light and sound sensitivity / dizziness (Light-headed) / blurry vision  / bilateral floaters / Neck muscular pain with radiation to scapular area-is stress related per patient / tinnitus to bilateral ears / feeling off-balance     -Double vision - resolved per patient    -Patient does report changes in vision. Last eye clinic appointment was January 2025, no abnormalities were noted per patient. Her prescription for eye glasses did not change.     -No falls     -Vestibular therapy with physical therapy (PT) -  No vestibular issues identified by PT.      Abortive therapies (tried and failed): Fioricet - Current "     -Nurtec 75 mg PO Daily PRN - effective     -Maxalt- not effective - SE of dizziness / nausea / feeling like she was going to pass out      Preventative therapies (tried and failed): Inderal-LA 60 mg PO Daily  - Current    -Topamax- not effective (took for about 8-9 years)       Anxiety:     Patient is open to discontinue Elavil due to perceived ineffectiveness.      Review of Systems   Constitutional:  Negative for activity change, appetite change, chills, diaphoresis, fatigue, fever and unexpected weight change.   HENT:  Positive for tinnitus. Negative for congestion, dental problem, drooling, ear discharge, ear pain, facial swelling, hearing loss, mouth sores, nosebleeds, postnasal drip, rhinorrhea, sinus pressure, sinus pain, sneezing, sore throat, trouble swallowing and voice change.    Eyes:  Positive for photophobia and visual disturbance. Negative for pain, discharge, redness and itching.   Respiratory:  Negative for cough, chest tightness, shortness of breath and wheezing.    Cardiovascular:  Negative for chest pain, palpitations and leg swelling.   Gastrointestinal:  Positive for nausea and vomiting. Negative for abdominal distention, abdominal pain, blood in stool, constipation and diarrhea.   Endocrine: Negative for cold intolerance, heat intolerance, polydipsia, polyphagia and polyuria.   Genitourinary:  Negative for decreased urine volume, difficulty urinating, dysuria, flank pain, frequency, hematuria, pelvic pain, urgency and vaginal discharge.   Musculoskeletal:  Positive for myalgias and neck pain. Negative for arthralgias, back pain, gait problem, joint swelling and neck stiffness.   Skin:  Negative for color change and rash.   Allergic/Immunologic: Negative for immunocompromised state.   Neurological:  Positive for dizziness, light-headedness and headaches. Negative for tremors, seizures, syncope, facial asymmetry, speech difficulty, weakness and numbness.        Patient reports phonophobia  and balance issues associated with migraines.    Hematological:  Negative for adenopathy. Does not bruise/bleed easily.   Psychiatric/Behavioral:  Positive for decreased concentration. Negative for agitation, behavioral problems, confusion, dysphoric mood, hallucinations, self-injury, sleep disturbance and suicidal ideas. The patient is not nervous/anxious and is not hyperactive.    All other systems reviewed and are negative.                Current Outpatient Medications:     ALPRAZolam (XANAX) 0.25 MG tablet, Take 1 tablet (0.25 mg total) by mouth nightly as needed for Anxiety., Disp: 30 tablet, Rfl: 0    buPROPion (WELLBUTRIN SR) 150 MG TBSR 12 hr tablet, Take 1 tablet (150 mg total) by mouth once daily., Disp: 30 tablet, Rfl: 1    estradioL (ESTRACE) 1 MG tablet, TAKE 1 TABLET(1 MG) BY MOUTH DAILY, Disp: 90 tablet, Rfl: 1    gabapentin (NEURONTIN) 100 MG capsule, Take 1 capsule (100 mg total) by mouth 3 (three) times daily., Disp: 90 capsule, Rfl: 5    hydroCHLOROthiazide (HYDRODIURIL) 12.5 MG Tab, Take 1 tablet (12.5 mg total) by mouth once daily., Disp: 90 tablet, Rfl: 1    losartan (COZAAR) 100 MG tablet, TAKE 1 TABLET(100 MG) BY MOUTH DAILY, Disp: 90 tablet, Rfl: 1    methocarbamoL (ROBAXIN) 500 MG Tab, Take 500 mg by mouth 4 (four) times daily., Disp: , Rfl:     naproxen sodium (ANAPROX) 550 MG tablet, TAKE 1 TABLET(550 MG) BY MOUTH TWICE DAILY AS NEEDED FOR PAIN, Disp: 30 tablet, Rfl: 3    propranoloL (INDERAL LA) 60 MG 24 hr capsule, Take 1 capsule (60 mg total) by mouth once daily., Disp: 30 capsule, Rfl: 2    Past Medical History:   Diagnosis Date    Anemia     Cervical radiculopathy     COVID-19 virus infection 2021    Diverticulosis     Hypermenorrhea     Hypertension     Insomnia     Internal hemorrhoid     Migraine headache     Morbid obesity     Rectal bleeding     Uterine fibroid        Past Surgical History:   Procedure Laterality Date     SECTION, LOW TRANSVERSE      x1    COLONOSCOPY  N/A 7/7/2022    Procedure: COLONOSCOPY;  Surgeon: Maximus Lopes MD;  Location: [facility];  Service: Endoscopy;  Laterality: N/A;    TOTAL ABDOMINAL HYSTERECTOMY  9/30/14    TUBAL LIGATION         Social History     Socioeconomic History    Marital status: Single   Occupational History     Employer: [employer]   Tobacco Use    Smoking status: Never    Smokeless tobacco: Never   Substance and Sexual Activity    Alcohol use: No    Drug use: No    Sexual activity: Yes     Partners: Male     Birth control/protection: Surgical     Comment: hyst; mut monog   Social History Narrative    She is , has one daughter and 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              Social Drivers of Health     Financial Resource Strain: Low Risk  (4/21/2025)    Overall Financial Resource Strain (CARDIA)     Difficulty of Paying Living Expenses: Not hard at all   Food Insecurity: No Food Insecurity (4/21/2025)    Hunger Vital Sign     Worried About Running Out of Food in the Last Year: Never true     Ran Out of Food in the Last Year: Never true   Transportation Needs: No Transportation Needs (4/21/2025)    PRAPARE - Transportation     Lack of Transportation (Medical): No     Lack of Transportation (Non-Medical): No   Physical Activity: Insufficiently Active (4/21/2025)    Exercise Vital Sign     Days of Exercise per Week: 5 days     Minutes of Exercise per Session: 10 min   Stress: No Stress Concern Present (4/21/2025)    [place] [employer] - Occupational Stress Questionnaire     Feeling of Stress : Not at all   Housing Stability: Low Risk  (4/21/2025)    Housing Stability Vital Sign     Unable to Pay for Housing in the Last Year: No     Number of Times Moved in the Last Year: 0     Homeless in the Last Year: No       Past/Current Medical/Surgical History, Past/Current Social History, Past/Current Family History and Past/Current Medications were  reviewed in detail.      Objective:     GENERAL APPEARANCE:     The patient looks comfortable.    No signs of respiratory distress.    Normal breathing pattern.    No dysmorphic features    Normal eye contact.     GENERAL MEDICAL EXAM:    HEENT:  Head is atraumatic normocephalic.      Neck and Axillae: No JVD. No visible lesions.    Cardiopulmonary: No cyanosis. No tachypnea. Normal respiratory effort.    Gastrointestinal/Urogenital:  No jaundice. No stomas or lesions. No visible hernias. No catheters.     Skin, Hair and Nails: No pathognonomic skin rash. No neurofibromatosis. No visible lesions.No stigmata of autoimmune disease. No clubbing.    Limbs: No varicose veins. No visible swelling.    Muskoskeletal: No visible deformities.No visible lesions.      Neurologic Exam      Mental Status   Oriented to person, place, and time.   Oriented to person.   Oriented to place. Oriented to country, city and area.   Oriented to time. Oriented to year, month, date, day and season.   Registration: recalls 3 of 3 objects. Recall at 5 minutes: recalls 3 of 3 objects. Follows 3 step commands.   Attention: normal. Concentration: normal.   Speech: speech is normal   Level of consciousness: alert  Knowledge: good. Able to perform simple calculations.   Able to name object. Able to read. Able to repeat. Able to write. Normal comprehension.      Cranial Nerves      CN II   Visual fields full to confrontation.   Visual acuity: normal  Right visual field deficit: none  Left visual field deficit: none      CN III, IV, VI   Extraocular motions are normal.   Right pupil: Consensual response: intact. Accommodation: intact.   Left pupil: Consensual response: intact. Accommodation: intact.   CN III: no CN III palsy  CN VI: no CN VI palsy  Nystagmus: none   Diplopia: none  Ophthalmoparesis: none  Upgaze: normal  Downgaze: normal  Conjugate gaze: present  Vestibulo-ocular reflex: present     CN V   Facial sensation intact.   Right facial  sensation deficit: none  Left facial sensation deficit: none     CN VII   Facial expression full, symmetric.   Right facial weakness: none  Left facial weakness: none     CN VIII   CN VIII normal.   Hearing: intact     CN IX, X   CN IX normal.   CN X normal.   Palate: symmetric     CN XII   CN XII normal.   Tongue: not atrophic  Fasciculations: absent  Tongue deviation: none     Motor Exam   Muscle bulk: normal  Overall muscle tone: normal  Right arm pronator drift: absent  Left arm pronator drift: absent     Sensory Exam   Graphesthesia: normal  Stereognosis: normal     Gait, Coordination, and Reflexes      Gait  Gait: normal     Tremor   Resting tremor: absent  Intention tremor: absent  Action tremor: absent              Lab Results   Component Value Date    WBC 5.96 04/21/2025    HGB 11.7 (L) 04/21/2025    HCT 35.9 (L) 04/21/2025    MCV 91 04/21/2025     04/21/2025       Sodium   Date Value Ref Range Status   04/21/2025 141 136 - 145 mmol/L Final   11/14/2024 138 136 - 145 mmol/L Final     Potassium   Date Value Ref Range Status   04/21/2025 2.9 (L) 3.5 - 5.1 mmol/L Final   11/14/2024 3.5 3.5 - 5.1 mmol/L Final     Chloride   Date Value Ref Range Status   04/21/2025 104 95 - 110 mmol/L Final   11/14/2024 107 95 - 110 mmol/L Final     CO2   Date Value Ref Range Status   04/21/2025 30 (H) 23 - 29 mmol/L Final   11/14/2024 23 23 - 29 mmol/L Final     Glucose   Date Value Ref Range Status   04/21/2025 88 70 - 110 mg/dL Final   11/14/2024 86 70 - 110 mg/dL Final     BUN   Date Value Ref Range Status   04/21/2025 11 6 - 20 mg/dL Final     Creatinine   Date Value Ref Range Status   04/21/2025 0.8 0.5 - 1.4 mg/dL Final     Calcium   Date Value Ref Range Status   04/21/2025 8.8 8.7 - 10.5 mg/dL Final   11/14/2024 8.5 (L) 8.7 - 10.5 mg/dL Final     Protein Total   Date Value Ref Range Status   04/21/2025 7.4 6.0 - 8.4 gm/dL Final     Total Protein   Date Value Ref Range Status   11/14/2024 7.0 6.0 - 8.4 g/dL Final      Albumin   Date Value Ref Range Status   04/21/2025 3.1 (L) 3.5 - 5.2 g/dL Final   11/14/2024 2.9 (L) 3.5 - 5.2 g/dL Final     Total Bilirubin   Date Value Ref Range Status   11/14/2024 0.3 0.1 - 1.0 mg/dL Final     Comment:     For infants and newborns, interpretation of results should be based  on gestational age, weight and in agreement with clinical  observations.    Premature Infant recommended reference ranges:  Up to 24 hours.............<8.0 mg/dL  Up to 48 hours............<12.0 mg/dL  3-5 days..................<15.0 mg/dL  6-29 days.................<15.0 mg/dL       Bilirubin Total   Date Value Ref Range Status   04/21/2025 0.4 0.1 - 1.0 mg/dL Final     Comment:     For infants and newborns, interpretation of results should be based   on gestational age, weight and in agreement with clinical   observations.    Premature Infant recommended reference ranges:   0-24 hours:  <8.0 mg/dL   24-48 hours: <12.0 mg/dL   3-5 days:    <15.0 mg/dL   6-29 days:   <15.0 mg/dL     Alkaline Phosphatase   Date Value Ref Range Status   11/14/2024 44 40 - 150 U/L Final     ALP   Date Value Ref Range Status   04/21/2025 40 40 - 150 unit/L Final     AST   Date Value Ref Range Status   04/21/2025 22 11 - 45 unit/L Final   11/14/2024 20 10 - 40 U/L Final     ALT   Date Value Ref Range Status   04/21/2025 14 10 - 44 unit/L Final   11/14/2024 20 10 - 44 U/L Final     Anion Gap   Date Value Ref Range Status   04/21/2025 7 (L) 8 - 16 mmol/L Final     eGFR if    Date Value Ref Range Status   07/06/2021 >60.0 >60 mL/min/1.73 m^2 Final     eGFR if non    Date Value Ref Range Status   07/06/2021 >60.0 >60 mL/min/1.73 m^2 Final     Comment:     Calculation used to obtain the estimated glomerular filtration  rate (eGFR) is the CKD-EPI equation.          Lab Results   Component Value Date    FWLSDVNP83 >2,000 (H) 04/21/2025       Lab Results   Component Value Date    TSH 0.847 04/21/2025    [id number] 93  "04/21/2025    FREET4 1.11 04/21/2025       No results found in the last 24 hours.    No results found in the last 24 hours.    Reviewed the neuroimaging independently     Assessment:   44 Years old Female with PMH as above came for a follow-up evaluation of  "Headaches"     Intractable chronic migraine with aura and without status migrainosus     Chronic tension-type headache, intractable     PREMA (generalized anxiety disorder)     Nausea and vomiting, unspecified vomiting type     Dizziness and giddiness     Hypertension, unspecified type   .   Plan:   Patient Neurological Assessment is non-focal via virtual visit.       Assessment & Plan    G43.701 Chronic migraine without aura, not intractable, with status migrainosus  G44.229 Chronic tension-type headache, not intractable  R11.0 Nausea    IMPRESSION:  - Assessed response to discontinuation of Elavil and current medication regimen.  - Evaluated effectiveness of Fioricet in controlling migraines, noting shorter duration (4 days vs. 2 weeks previously).  - Considered increasing propranolol if Nurtec remains unavailable.  - Maintained current treatment plan given satisfaction with progress, while aiming for further headache reduction.    CHRONIC MIGRAINE:   Start Nurtec 75 mg every other day for migraine prevention, discontinue Fioricet when starting.   Continue propranolol 60 mg daily.   Refill Fioricet 1 tablet every 4 hours as needed for acute migraine.    NAUSEA:   Refill Zofran 4 mg every 8 hours as needed for nausea.    FOLLOW-UP AND GENERAL INSTRUCTIONS:   Follow up in 4 weeks for virtual appointment.   Contact the office if unable to obtain Nurtec prescription.   Use patient portal to reach out if needed.           - Continue Inderal-LA 60 mg PO Daily for migraine prevention therapy and HTN management. Side effects discussed. Pamphlet given. Plan for slow titration to 80 mg BID which can cause low blood pressure, slow heart rate, erectile dysfunction, " depression, airway obstruction and heart failure exacerbations. Cannot be used with migraine associate with focal neurological deficits. Instructed patient to keep a BP log and bring to next visit for provider review. No history of Asthma. Patient verbalized understanding. No refills needed.          -Contingency Plan: Will consider titrating Inderal-LA to a higher dose if Nurtec is not approved.    -Plan: Continue Nurtec ODT 75 mg PO every other day for migraine prophylaxis. Discussed potential side effects; patient verbalized understanding. Pharmacy confirms receipt of prescription (3/12/2025, 7:47 AM). Patient reports she has not initiated therapy due to lack of notification from pharmacy regarding medication readiness. Advised patient to contact pharmacy to confirm availability and insurance approval. Instructed to notify clinic of any issues. Patient verbalized understanding.    -Continue butalbital-acetaminophen-caffeine -40 mg (FIORICET, ESGIC) -40 mg per tablet; Take 1 tablet by mouth every 4 (four) hours as needed for Headaches (Acute Migraine Treatment).    -Patient instructed to Discontinue Fioricet if Nurtec is received and started. She verbalized understanding.     -Continue Zofran-ODT 4 mg PO Q8H PRN Nausea / Vomiting associated with Migraines. Side effects discussed. Patient verbalized understanding. No refills needed.     -Discontinue Elavil 25 mg PO QHS for migraine prevention therapy and Anxiety/Depression Management. Patient reports excessive daytime sedation; unable to increase dose. Not clinically effective at current dose.     -Instructed patient to continue following ophthalmology routinely.     -Continue PT for vestibular therapy.     -Patient is requesting FMLA forms to be completed for Migraine Exacerbations and PT Sessions. Completed 02/2025.      -We have discussed the value in aggressively controlling vascular risk factors such as Hypertension, Hyperlipidemia, Obesity, and  Diabetes. (SBP <120, LDL <100, A1C <7.0)  We discussed the need to optimize lifestyle choices including a heart healthy diet (Mediterranean or DASH Diet), increased cardiovascular exercise (goal of 150 minutes of moderate intensity per week), and stay cognitively and socially active.   We recommended the MIND Diet, a combination of (Mediterranean and DASH Diet) because it includes a variety of brain-friendly foods to optimize cognitive health and longevity. Patient verbalized understanding.               LABORATORY EVALUATION    Labs: ([id number]) Vitamin B1 / B12 / Folate / H. pylori IgG / Hepatic Panel / Iron & TIBC / Magnesium / Phosphorus / Vitamin D / Aldosterone / RPR / ROSE / Sed Rate / CBC / BMP / CMP / A1C / TSH / Free T-4 / T3 / T4 / Lipid Panel / Vitamin D / B-12 / Magnesium  -personally reviewed -non-significant abnormalities     RADIOLOGY EVALUATION     Brain MRI Without Contrast- done 06/2024- personally reviewed -no acute abnormalities     MRI Lumbar Spine Without Contrast - done 12/2022- personally reviewed - Multilevel mild degenerative changes          MANAGEMENT     HEADACHE DIARY     DISCUSSED THE THREE-FOLD MANAGEMENT OF MIGRAINE:      LIFESTYLE CHANGES:       Good sleep hygiene  Avoid general triggers like lack of sleep/too much sleep, prolonged sun exposure, excessive screen time and specific triggers based on you own diary   Minimize physical and emotional stress  Smoking avoidance and cessation  Limit caffeine drinks to 1-2 a day   Good hydration   Small frequent meals and avoid skipping meals   Moderate 30-minute-long aerobic exercises 3 times/week. Avoid strenuous exercise         ABORTIVE MEDICATIONS (ACUTE-RESCUE MEDICATIONS):     Should only be taken 2-3 times/week to avoid rebound and overuse headaches.    I-explained to the patient that pain meds especially triptans should NOT be taken daily to avoid Rebound Headache and Overuse Headache.    Take at the ONSET of the headache sumatriptan  100 mg PO  (or other Triptan) in combination with naproxen 500 mg PO or Ibuprofen 800 mg PO for headache without nausea or vomiting.  This regimen can be repeated only once in 24 hours after 2 hours.    Side effects of triptans were discussed and include rare cardiac and cerebral ischemia and cannot be used with migraine associate with focal neurological deficits (complicated migraine) in addition to drowsiness and potential impairment of driving ability. The patient verbalized understanding.    NSAIDs can cause peptic ulcers, renal insufficiency and may increase the risk of cardiovascular diseases.  SEs were discussed with the patient. The patient verbalized understanding.    Triptans have shown to be more effective than Gepatns with more SE/AE.      AVOID NARCOTICS (OPIATES)      1. No randomized controlled study shows pain-free results with opioids in the treatment of migraine.     2. The physiologic consequences of opioid use are adverse, occur quickly, and can be permanent. Decreased gray matter, release of calcitonin gene-related peptide, dynorphin, and pro-inflammatory peptides, and activation of excitatory glutamate receptors are all associated with opioid exposure.     3. Opioids are pro-nociceptive, prevent reversal of migraine central sensitization, and interfere with triptan effectiveness.     4.Opioids precipitate bad clinical outcomes, especially transformation to daily headache.     5. They cause disease progression, comorbidity, and excessive health care consumption.           NEXT OPTIONS:    Gepants: Nuretc (rimegepant)75 mg >Ubrelvy (ubrogepant) 100 mg    Ditans: Reyvow (lasmiditan) 100 mg (No driving due to sedation)    Fioricet without codeine with Reglan.      Prednisone with Reglan.      LAST RESORT:     DHE NS Trudhesa (Max 2 a week)     C/I: concomitant use of vasoconstrictors like Triptans, strong CY inhibitors such as HAART PIs (eg, ritonavir, nelfinavir, or indinavir) and Macrolides  (eg, erythromycin or clarithromycin), CAD, PVD, Stroke/TIA and Uncontrolled HTN.  Serious SEs include Vasospasm and Fibrosis (chronic use).       IMPENDING STATUS: Prednisone and Vistaril.    STATUS MIGRAINOSUS: ED-Infusion for Status Protocol.        PREVENTATIVE (MORE ACCURATELY MIGRAINE REDUCTION) MEDICATIONS:       Since the patient's headache is very frequent a lengthy discussion about preventative medications was carried out.The patient understands that prevention means DECREASING frequency and severity and NOT elimination.The patient was made aware that any new medication can cause serious allergic reaction.The medication is considered failure only if a therapeutic dose reached and maintained for 6-8 weeks.        HELPFUL SUPPLEMENTS:     Helpful supplements include Co-Q 10, B2, Mg, Feverfew (Dolovent combination) and butterbur (Petadolex)        NEUROPHARMACOLOGY     NEXT OPTIONS:     Zonisamide/Zonegran (ZNS) 100-400 mg QHS is a good alternative to TPM in case of SE/AE.     Lamotrigine/Lamictal  (LTG)slow titration to 100 mg BID which can cause serious skin rash and rare cardiac arrhythmias. LTG is superior to other therapies for specifically reducing migraine aura.     ANTI-CGRP AGENTS: Qulipta (alogepant) 60 mg QD, Erenumab (Aimovig) 140 mg SQ Pen monthly (Reported cases of Constipation and BP elevation) , Galcanezumab (Emgality) 120 mg SQ Pen monthly after a loading dose of 240 mg  and Fremnezumab (Ajovy) (Ligand Blocker): 225 mg SQ monthly or 675 mg every 3 months     Botox 200 units every 3 months.         LAST RESORT OPTIONS:      Namenda 10 mg BID     Valproic acid/ Depakote         NEUROMODULATION     Cefaly, Relivion, Nerivio and GammaCore (VNS)         SCHOOL AND WORK ACCOMMODATIONS        Allow the patient to wear sunglasses or a cap and switch out fluorescent bulbs.    Allow the patient to arrive 5 minutes later and leave 5 minutes earlier to avoid noisy traffic.    Allow the patient to carry a  water bottle and refill as needed.    Allow the patient to snack whenever is needed.    Allow the patient to decrease the computer brightness.    Allow the patient to take breaks as needed and extra time for assignments and deadlines.    Allow the patient to avoid strenuous activity as needed.         MEDICAL/SURGICAL COMORBIDITIES     All relevant medical comorbidities noted and managed by primary care physician and medical care team.              HEALTHY LIFESTYLE AND PREVENTATIVE CARE    The patient to adhere to the age-appropriate health maintenance guidelines including screening tests and vaccinations. The patient to adhere to  healthy lifestyle, optimal weight, exercise, healthy diet, good sleep hygiene and avoiding drugs including smoking, alcohol and recreational drugs.    I spent a total of 42 minutes on the day of the visit.This includes face to face time and non-face to face time preparing to see the patient (eg, review of tests), obtaining and/or reviewing separately obtained history, documenting clinical information in the electronic or other health record, independently interpreting results and communicating results to the patient/family/caregiver, or care coordinator.     Face to Face time with patient:   42 minutes of total time spent on the encounter, which includes face to face time and non-face to face time preparing to see the patient (eg, review of tests), Obtaining and/or reviewing separately obtained history, Documenting clinical information in the electronic or other health record, Independently interpreting results (not separately reported) and communicating results to the patient/family/caregiver, or Care coordination (not separately reported).     This note was generated with the assistance of ambient listening technology. Verbal consent was obtained by the patient and accompanying visitor(s) for the recording of patient appointment to facilitate this note. I attest to having reviewed and  edited the generated note for accuracy, though some syntax or spelling errors may persist. Please contact the author of this note for any clarification.        Each patient to whom he or she provides medical services by telemedicine is:  (1) informed of the relationship between the physician and patient and the respective role of any other health care provider with respect to management of the patient; and (2) notified that he or she may decline to receive medical services by telemedicine and may withdraw from such care at any time.    Notes:             Please do not hesitate to contact me with any updates, questions or concerns.    [id number]    Nkechi Soliman, MSN, FNP-C    General Neurology

## 2025-06-03 ENCOUNTER — PATIENT MESSAGE (OUTPATIENT)
Dept: BARIATRICS | Facility: CLINIC | Age: 45
End: 2025-06-03
Payer: COMMERCIAL

## 2025-06-16 ENCOUNTER — CLINICAL SUPPORT (OUTPATIENT)
Dept: BARIATRICS | Facility: CLINIC | Age: 45
End: 2025-06-16
Payer: COMMERCIAL

## 2025-06-16 DIAGNOSIS — M17.0 PRIMARY OSTEOARTHRITIS OF BOTH KNEES: ICD-10-CM

## 2025-06-16 DIAGNOSIS — E66.01 MORBID OBESITY WITH BMI OF 50.0-59.9, ADULT: ICD-10-CM

## 2025-06-16 DIAGNOSIS — I10 ESSENTIAL HYPERTENSION: Chronic | ICD-10-CM

## 2025-06-16 DIAGNOSIS — Z71.3 DIETARY COUNSELING AND SURVEILLANCE: Primary | ICD-10-CM

## 2025-06-16 PROCEDURE — 97803 MED NUTRITION INDIV SUBSEQ: CPT | Mod: 95,,, | Performed by: DIETITIAN, REGISTERED

## 2025-06-16 NOTE — PROGRESS NOTES
00The patient location is: [place]  The chief complaint leading to consultation is: morbid obesity in work up for bariatric surgery    Visit type: audiovisual    Face to Face time with patient: 20 min  30 minutes of total time spent on the encounter, which includes face to face time and non-face to face time preparing to see the patient (eg, review of tests), Obtaining and/or reviewing separately obtained history, Documenting clinical information in the electronic or other health record, Independently interpreting results (not separately reported) and communicating results to the patient/family/caregiver, or Care coordination (not separately reported).   The patient location is:  Patient Home   The chief complaint leading to consultation is: morbid obesity in work up for bariatric surgery  Visit type: Virtual visit with synchronous audio and video  Total time spent with patient: 30 min  Each patient to whom he or she provides medical services by telemedicine is:  (1) informed of the relationship between the physician and patient and the respective role of any other health care provider with respect to management of the patient; and (2) notified that he or she may decline to receive medical services by telemedicine and may withdraw from such care at any time.  Nutrition Handout located in AVS section of pt's [facility] and/or sent as a message via [facility] portal.  Pt informed of handout and how to access this information in [employer] kailey.  NUTRITION NOTE    Referring Physician: Dionna Lanier M.D.   Reason for MNT Referral: 4 months medically supervised diet counseling and surveillance pending laproscopic sleeve gastrectomy    Patient presents for follow-up visit for MSD with weight loss over the past month; 8 lbs total weight loss by making numerous dietary and lifestyle changes.    CLINICAL DATA:  45 y.o. female.  Current wt: 262 lbs home scale   Last Weight: 269 lbs lbs self reported  Weight Change  Since Initial Visit: Loss of 8 lbs  Ideal Body Weight:126  lbs  Body mass index is 51.17 kg/m².  Initial Wt: 270 lb    DAILY NUTRITIONAL NEEDS: pre-op nutritional bariatric guidelines to promote weight loss  [id number] Calories    Grams Protein    CURRENT DIET:  Reduced-calorie diet.  Diet Recall: Food records are not present.  Greatest challenge: sugar-sweetened beverages transitioned to coke zero  Current diet recall:   B: protein shake powder mix with water and 1 tablespoon of peanut butter  L:skips or  boiled eggs single packet of tuna with burnett and sometimes salad wit dressing ranch or skips  D:chicken or fish and broccoli no starchy sides  Diet Includes:   Meal Pattern: Improved.  Protein Supplements: 1 per day.  Snacking: Adequate. Snacks include  .peanuts or fruit  Vegetables: Likes a variety. Eats daily.  Fruits: Likes a variety. Eats almost daily.during work week   Beverages: water only in past 3 weeks    Dining out/delivery: Weekly. Once at most Mostly take-out.  Cooking at home: Weekly. Mostly baked/roast, steamed, and microwaved beef, fish/seafood, pork, chicken/turkey, vegetables, and beans    Exercise:  Past exercise: Adequate    Current exercise: Adequate walking 5 days a week 10-15 minutes lunch break    Restrictions to Exercise: None.    Vitamins / Minerals / Herbs:   Multivitamin [employer] or Geritol tablets  Vitamin D  Vitamin C  Vitamin B-12  Probiotics  Tumeric  Eldberry      Labs:   None available at time of visit    Food Allergies:   None reported    Social:  Works regular daytime shifts.    Alcohol: None.  Smoking: None.      ASSESSMENT:  Patient demonstrates some willingness to change lifestyle habits as evidenced by weight loss, good/increased exercise, dietary changes, including protein drinks, reduced sugar-sweetened beverages, reduced eating out/delivery, more food preparation at home, and healthier cooking at home.    Doing fairly well with working on greatest challenges  (sugar-sweetened beverages).    Barriers to Education:  none  Stage of Change:  action    NUTRITION DIAGNOSIS:  Morbid Obesity related to Excessive calorie intake as evidence by BMI.  Status: Decreased    PLAN/RECOMMENDATION:  Pt is a potential candidate for bariatric surgery     Diet: Adjust diet plan.  Work on Bariatric Nutrition Checklist.  Work on gradually cutting back on starchy CHO in the diet.  1200-calorie diet.  5-6 meals per day  increase protein  Return to clinic    Exercise: Increase.    Behavior Modification: Begin to document food & activity logs daily.    Return to clinic in one month. Needs additional visit(s) with RD.    Communicated nutrition plan with bariatric team.    SESSION TIME:  30 minutes            Each patient to whom he or she provides medical services by telemedicine is:  (1) informed of the relationship between the physician and patient and the respective role of any other health care provider with respect to management of the patient; and (2) notified that he or she may decline to receive medical services by telemedicine and may withdraw from such care at any time.    Notes:

## 2025-06-17 VITALS — BODY MASS INDEX: 51.17 KG/M2 | WEIGHT: 262 LBS

## 2025-06-25 ENCOUNTER — TELEPHONE (OUTPATIENT)
Dept: BARIATRICS | Facility: CLINIC | Age: 45
End: 2025-06-25
Payer: COMMERCIAL

## 2025-06-26 ENCOUNTER — OFFICE VISIT (OUTPATIENT)
Dept: NEUROLOGY | Facility: CLINIC | Age: 45
End: 2025-06-26
Payer: COMMERCIAL

## 2025-06-26 DIAGNOSIS — G44.221 CHRONIC TENSION-TYPE HEADACHE, INTRACTABLE: ICD-10-CM

## 2025-06-26 DIAGNOSIS — F41.1 GAD (GENERALIZED ANXIETY DISORDER): ICD-10-CM

## 2025-06-26 DIAGNOSIS — R11.2 NAUSEA AND VOMITING, UNSPECIFIED VOMITING TYPE: ICD-10-CM

## 2025-06-26 DIAGNOSIS — I10 HYPERTENSION, UNSPECIFIED TYPE: ICD-10-CM

## 2025-06-26 DIAGNOSIS — R42 DIZZINESS AND GIDDINESS: ICD-10-CM

## 2025-06-26 DIAGNOSIS — G43.E19 INTRACTABLE CHRONIC MIGRAINE WITH AURA AND WITHOUT STATUS MIGRAINOSUS: Primary | ICD-10-CM

## 2025-06-26 PROCEDURE — 4010F ACE/ARB THERAPY RXD/TAKEN: CPT | Mod: CPTII,95,,

## 2025-06-26 PROCEDURE — 1159F MED LIST DOCD IN RCRD: CPT | Mod: CPTII,95,,

## 2025-06-26 PROCEDURE — 1160F RVW MEDS BY RX/DR IN RCRD: CPT | Mod: CPTII,95,,

## 2025-06-26 PROCEDURE — 98007 SYNCH AUDIO-VIDEO EST HI 40: CPT | Mod: 95,,,

## 2025-06-26 PROCEDURE — 3044F HG A1C LEVEL LT 7.0%: CPT | Mod: CPTII,95,,

## 2025-06-26 RX ORDER — RIMEGEPANT SULFATE 75 MG/75MG
75 TABLET, ORALLY DISINTEGRATING ORAL EVERY OTHER DAY
Qty: 16 TABLET | Refills: 2 | Status: SHIPPED | OUTPATIENT
Start: 2025-06-26 | End: 2025-09-30

## 2025-06-26 RX ORDER — ONDANSETRON 4 MG/1
4 TABLET, ORALLY DISINTEGRATING ORAL EVERY 8 HOURS PRN
Qty: 30 TABLET | Refills: 0 | Status: SHIPPED | OUTPATIENT
Start: 2025-06-26 | End: 2025-07-26

## 2025-06-26 RX ORDER — PROPRANOLOL HYDROCHLORIDE 60 MG/1
60 CAPSULE, EXTENDED RELEASE ORAL DAILY
Qty: 30 CAPSULE | Refills: 2 | Status: SHIPPED | OUTPATIENT
Start: 2025-06-26 | End: 2025-09-24

## 2025-06-26 RX ORDER — ALPRAZOLAM 0.25 MG/1
0.25 TABLET ORAL NIGHTLY PRN
Qty: 30 TABLET | Refills: 0 | Status: SHIPPED | OUTPATIENT
Start: 2025-06-26

## 2025-06-26 NOTE — TELEPHONE ENCOUNTER
No care due was identified.  [facility] Embedded Care Due Messages. Reference number: [id number].   6/26/2025 9:17:54 AM CDT

## 2025-06-26 NOTE — PROGRESS NOTES
"  Subjective:       Patient ID: Georgia Garcia is a 45 y.o. female.    PMH: Sciatica / Depression / PREMA / OA / HTN / Insomnia / Migraines / and other medical conditions.     Chief Complaint: "Headaches"     HPI    The patient presented on 06/2024 / 07/2024 / 08/2024 / 02/2025 / 03/2025 / 04/2025 / 05/2025 for evaluation of "Headaches". Patient presents today for a follow-up evaluation and recommendation of "Headaches".    The patient location is: [place]    The originating site (patient location) is: Home.      The distant site (neurologist location) is: Neurology Clinic at [facility].      The chief complaint leading to consultation is: "Headaches"       Visit type: Virtual visit with synchronous audio and video.      Consent: The patient verbally consented to participating in the video visit and informed that may decline to receive medical services by telemedicine and may withdraw from such care at any time.      I discussed with the patient the nature of our telemedicine visits, that:      I  would evaluate the patient and recommend diagnostics and treatments based on my assessment.    Our sessions are not being recorded and that personal health information is protected.    Our team would provide follow up care in person if/when the patient needs it.    Virtual (video/telemedicine) visits have significant limitations. A telemedicine exam is primarily focused on the history and what I can observe. Several critical parts of the neurological exam cannot be performed.       Interval History: (06/14/2024) - Norco 7.5-325 Q8H PRN and Metoprolol Succinate /Toprol-XL 50 mg PO Daily Discontinued. Patient started on Inderal-LA 60 mg PO Daily. Continue Nurtec. Diagnostic evaluation included Brain MRI / ESR / ROSE / RPR.     Started: 10 years ago, worse in the last few years  Describes: Pounding, some slowly build up but some can come on all at once / The headaches are progressively getting worse and interfering " with daily activity.  Timing: Duration of 2 weeks for migraine type headaches / Duration of 10 hours for regular type headaches / wakes up with migraines / migraines can wake her up from sleep  Frequency: Daily  Pain: Regular headaches: 7-8/10 / Migraines: 10/10  Location: Bilateral temporal, parietal, and occipital regions  Family: No known family history of migraines or headaches in the family.   Medications: Norco / Elavil / Wellbutrin / Gabapentin / Nutrec / Patient reports PCP recently prescribed Nutrec, but has not taken it yet due to PA being processed.   Worsen: Stress / The patient cannot think of food that aggravates the headache.      Alleviated: Sleeping / dark and quiet room /   Associated symptoms: Nausea / Vomiting / Trouble keeping up with tasks due to pain / light and sound sensitivity / dizziness (Light-headed) / blurry vision / double vision / bilateral floaters / Neck muscular pain with radiation to scapular area-is stress related per patient     No tinnitus / not worse with postural changes or valsalva / no scalp sensitivity / numbness/tingling to extremities / bilateral or focal weakness / no trouble with balance        Triggers: Cigarette smoke / strong perfume smells / bright lights / eating citrus   Prodrome symptoms: patient reports she does have an auora of a small feeling prior to migraine onset. She expresses that she can't describe her auora.      Patient reports that she has previously sought ER evaluation for Headache management who treated her with Naproxen. She felt like it was a waste of time and the medication was not effective for her.      Patient reports compliance with blood pressure medication and reports at home blood pressures run around 122/84.      No TMJ problems. No history of head trauma. No history of seizures. No history of smoking. No caffeine overuse. No vertigo. No blacking out. No fever, chills, or rigors. No history of significant memory loss. No history of  "strokes. No falls.        Interval History: (07/12/2024) - Continued Inderal-LA 60 mg PO Daily /  Nurtec 75 mg PO Daily PRN / Zofran-ODT 4 mg PO Q8H PRN. Started Elavil 25 mg PO QHS. Continued PT for vestibular therapy.     Patient reports that headaches are still severe, debilitating, and interfering with daily functioning. Frequency of tension type is daily and migraine type is daily. Duration of tension type is 10 hours and migraine type is 2 weeks. Still "Pounding" in nature. Pain intensity is decreased from 15/10 to 10/10 per patient. Associated symptoms include Nausea / Vomiting / Trouble keeping up with tasks due to pain / light and sound sensitivity / dizziness (Light-headed) / blurry vision / double vision / bilateral floaters / Neck muscular pain with radiation to scapular area-is stress related per patient / tinnitus to bilateral ears / feeling off-balance.     Patient reports chronic Anxiety / Depression. Patient reports that her anxiety and stress have slightly worsened since last visit due to unrelieved Migraines. Patient expresses that she is interested in additional pharmacological therapy for Anxiety management. Denies SI.         Interval History: (08/12/2024) -  Continued Inderal-LA 60 mg PO Daily /  Nurtec 75 mg PO Daily PRN / Zofran-ODT 4 mg PO Q8H PRN / Elavil 25 mg PO QHS.     -Headaches have significantly improved since last visit. Patient reports an increased quality of life and a 95% improvement in headache frequency, duration, pain intensity, and symptom severity.   -Location is still Bilateral temporal, parietal, and occipital regions.   -Positive Headache auras - Patient unable to describe feeling.   -Frequency decreased - tension type is 2 per week and migraine type is 2-3 times per week.  -Duration decreased - tension type is decreased from 10 hours to 3 hours / and migraine type decreased from 2 weeks to 3 days.   -Still "Pounding" in nature  -Pain intensity is decreased from 10/10 to " "4/10 per patient.   -Worsened by Stress.   -Triggered by Cigarette smoke / strong perfume smells / bright lights / eating citrus  -Alleviated with sleeping / dark and quiet room   -Associated symptoms include Nausea / Vomiting / Trouble keeping up with tasks due to pain / light and sound sensitivity / dizziness (Light-headed) / blurry vision / double vision / bilateral floaters / Neck muscular pain with radiation to scapular area-is stress related per patient / tinnitus to bilateral ears / feeling off-balance - significantly decreased intensity of symptoms per patient  -Patient does report changes in vision. Last eye clinic appointment was April 2024, no abnormalities were noted per patient. Her prescription has not changed within 3 years.             Interval History:  (02/12/2025) - Continued Inderal-LA 60 mg PO Daily /  Nurtec 75 mg PO Daily PRN / Zofran-ODT 4 mg PO Q8H PRN / Elavil 25 mg PO QHS / PT.     -Headaches have significantly improved since last visit. Patient reports an increased quality of life and a 95% improvement in headache frequency, duration, pain intensity, and symptom severity.   -Location is still Bilateral temporal, parietal, and occipital regions.   -Positive Headache auras - Patient unable to describe feeling. She reports Nausea prior to Migraine onset.  -Frequency decreased - tension type is 2-3 per week and migraine type is 2-3 times per month.  -Duration decreased - tension type is 1 hour / and migraine type decreased from 2 weeks to 20 min - 3 days. Migraines alleviated 20 minutes after Nurtec.   -Still "Pounding" in nature.  -Pain intensity is 5-10/10 per patient.   -Worsened by Stress  -Triggered by Cigarette smoke / strong perfume smells / bright lights / eating citrus  -Alleviated with sleeping / dark and quiet room   -Associated symptoms include Nausea / Vomiting / Trouble keeping up with tasks due to pain / light and sound sensitivity / dizziness (Light-headed) / blurry vision  / " bilateral floaters / Neck muscular pain with radiation to scapular area-is stress related per patient / tinnitus to bilateral ears / feeling off-balance - significantly decreased intensity of symptoms per patient    Anxiety:    Patient reports that her chronic anxiety and stress have slightly improved due to Migraine alleviation with current medication regimen. Denies SI. Currently managed by psychiatrist. Elavil 25 mg PO QHS - has helped to improve per patient.     Patient reports tolerating medications well without side effects. She would like to stay on current medication regimen.            Interval History: (03/12/2025) - Continued Inderal-LA 60 mg PO Daily,  Zofran-ODT 4 mg PO Q8H PRN Nausea, and Elavil 25 mg PO QHS / Changed Nurtec 75 mg PO Daily PRN to 75 mg PO every other day for Migraine Prevention Therapy / Started Fioricet PRN.     Patient reports increased stress levels related to life stressors. Home blood pressure readings consistently around 101/70 mmHg.  Headaches are unchanged since last visit.   Double vision - resolved per patient          Interval History: (04/22/2025) - Continued Inderal-LA 60 mg PO Daily, Nurtec ODT 75 mg PO every other day, Fioricet, Zofran / Discontinued Elavil     Medications (Migraine Management):  Inderal-LA 60 mg PO daily - Migraine prophylaxis. Patient reports no side effects. BP at home averages ~121/70s per patient report. Open to dosage increase.  Nurtec ODT 75 mg PO every other day - Prescribed for migraine prophylaxis. Patient reports medication was never received.  Zofran ODT 4 mg PO Q8H PRN - For nausea.  Elavil (Amitriptyline) 25 mg PO QHS - For migraine prophylaxis. Patient reports excessive daytime sedation; unable to increase dose. Not clinically effective at current dose.  Butalbital-Acetaminophen-Caffeine -40 mg (Fioricet/ESGIC) - 1 tablet PO Q4H PRN for acute migraine. Patient reports effective symptom relief with no adverse  "effects.    Headaches:    -Headaches have significantly worsened since last visit.   -Location is still Bilateral temporal, parietal, and occipital regions.   -Positive Headache auras - Patient unable to describe feeling. She reports Nausea prior to Migraine onset.  -Frequency  - Increased - tension type is Daily and migraine type is 2-3 times per week.  -Duration  - Increased - tension type is 1 hour / and migraine type 2 weeks   -Still "Pounding" in nature.  -Pain intensity is 5-10/10 per patient.   -Worsened by Stress  -Triggered by Cigarette smoke / strong perfume smells / bright lights / eating citrus  -Alleviated with sleeping / dark and quiet room   -Associated symptoms include Nausea / Vomiting / Trouble keeping up with tasks due to pain / light and sound sensitivity / dizziness (Light-headed) / blurry vision  / bilateral floaters / Neck muscular pain with radiation to scapular area-is stress related per patient / tinnitus to bilateral ears / feeling off-balance     -Double vision - resolved per patient    -Patient does report changes in vision. Last eye clinic appointment was January 2025, no abnormalities were noted per patient. Her prescription for eye glasses did not change.     -No falls     -Vestibular therapy with physical therapy (PT) -  No vestibular issues identified by PT.            Interval History: (05/29/2025) - Started Nurtec 75 mg every other day / Discontinued Fioricet & Elavil / Continued propranolol 60 mg daily & Zofran 4 mg every 8 hours PRN /         New Issues: 06/26/2025) -     No new issues per patient.      History of Present Illness    CHIEF COMPLAINT:  Georgia presents today for follow up of migraines    HEADACHES AND MIGRAINES:  She experiences frequent headaches every weekday morning, potentially work-related. Migraine episodes occur 2-3 times weekly, initially presenting strongly and lasting approximately one hour before transitioning to a regular headache. Early medication " "administration helps mitigate migraine severity. She reports improvement in symptoms since starting Nurtec 2-3 weeks ago, taking it every other day without side effects. BC Powder is used for breakthrough pain when unable to take Nurtec. She continues Propranolol 60 mg daily with stable blood pressure, originally prescribed for migraine management.    PAST MEDICAL HISTORY:  She previously experienced significant dizziness and near-syncope with Maxalt. Colonoscopy 2 years ago showed single diverticulum and five diverticulosis in ascending colon, 4 mm polyp in sigmoid colon (removed), and internal hemorrhoids. No evidence of bleeding was noted.          Medications (Migraine Management):  Inderal-LA 60 mg PO daily - Migraine prophylaxis. Patient reports no side effects. BP at home averages ~121/70s per patient report. Open to dosage increase.  Nurtec ODT 75 mg PO every other day - Prescribed for migraine prophylaxis. Started 2-3 weeks ago   Zofran ODT 4 mg PO Q8H PRN - For nausea.  Butalbital-Acetaminophen-Caffeine -40 mg (Fioricet/ESGIC) - 1 tablet PO Q4H PRN for acute migraine. Patient reports effective symptom relief with no adverse effects. Discontinued due to starting Nurtec  BC Powder PRN tension Headache - no side effects / history of gastritis / GI ulcers / Liver Disease per patient.     Headaches:    -Headaches have slightly improved since last visit.   -Location is still Bilateral temporal, parietal, and occipital regions.   -Positive Headache auras - Patient unable to describe feeling. She reports Nausea prior to Migraine onset.  -Frequency  - decreased - tension type is Daily and migraine type is 2-3 times per week.  -Duration  - decreased - tension type is 1 hour / and migraine type is 1 hour  -Still "Pounding" in nature.  -Pain intensity is 5-10/10 per patient.   -Worsened by Stress  -Triggered by Cigarette smoke / strong perfume smells / bright lights / eating citrus  -Alleviated with sleeping / " dark and quiet room   -Associated symptoms include Nausea / Vomiting / Trouble keeping up with tasks due to pain / light and sound sensitivity / dizziness (Light-headed) / blurry vision  / bilateral floaters / Neck muscular pain with radiation to scapular area-is stress related per patient / tinnitus to bilateral ears / feeling off-balance     -Double vision - resolved per patient    -Patient does report changes in vision. Last eye clinic appointment was January 2025, no abnormalities were noted per patient. Her prescription for eye glasses did not change.     -No falls     -Vestibular therapy with physical therapy (PT) -  No vestibular issues identified by PT.      Abortive therapies (tried and failed):     Fioricet - effective    -Nurtec 75 mg PO Daily PRN - effective     -Maxalt- not effective - SE of dizziness / nausea / feeling like she was going to pass out      Preventative therapies (tried and failed): Inderal-LA 60 mg PO Daily  / Nurtec - Current    -Topamax- not effective (took for about 8-9 years)       Anxiety:     Patient is open to discontinue Elavil due to perceived ineffectiveness.      Review of Systems   Constitutional:  Negative for activity change, appetite change, chills, diaphoresis, fatigue, fever and unexpected weight change.   HENT:  Positive for tinnitus. Negative for congestion, dental problem, drooling, ear discharge, ear pain, facial swelling, hearing loss, mouth sores, nosebleeds, postnasal drip, rhinorrhea, sinus pressure, sinus pain, sneezing, sore throat, trouble swallowing and voice change.    Eyes:  Positive for photophobia and visual disturbance. Negative for pain, discharge, redness and itching.   Respiratory:  Negative for cough, chest tightness, shortness of breath and wheezing.    Cardiovascular:  Negative for chest pain, palpitations and leg swelling.   Gastrointestinal:  Positive for nausea and vomiting. Negative for abdominal distention, abdominal pain, blood in stool,  constipation and diarrhea.   Endocrine: Negative for cold intolerance, heat intolerance, polydipsia, polyphagia and polyuria.   Genitourinary:  Negative for decreased urine volume, difficulty urinating, dysuria, flank pain, frequency, hematuria, pelvic pain, urgency and vaginal discharge.   Musculoskeletal:  Positive for myalgias and neck pain. Negative for arthralgias, back pain, gait problem, joint swelling and neck stiffness.   Skin:  Negative for color change and rash.   Allergic/Immunologic: Negative for immunocompromised state.   Neurological:  Positive for dizziness, light-headedness and headaches. Negative for tremors, seizures, syncope, facial asymmetry, speech difficulty, weakness and numbness.        Patient reports phonophobia and balance issues associated with migraines.    Hematological:  Negative for adenopathy. Does not bruise/bleed easily.   Psychiatric/Behavioral:  Positive for decreased concentration. Negative for agitation, behavioral problems, confusion, dysphoric mood, hallucinations, self-injury, sleep disturbance and suicidal ideas. The patient is not nervous/anxious and is not hyperactive.    All other systems reviewed and are negative.                Current Outpatient Medications:     ALPRAZolam (XANAX) 0.25 MG tablet, Take 1 tablet (0.25 mg total) by mouth nightly as needed for Anxiety., Disp: 30 tablet, Rfl: 0    buPROPion (WELLBUTRIN SR) 150 MG TBSR 12 hr tablet, Take 1 tablet (150 mg total) by mouth once daily., Disp: 30 tablet, Rfl: 1    butalbital-acetaminophen-caffeine -40 mg (FIORICET, ESGIC) -40 mg per tablet, Take 1 tablet by mouth every 4 (four) hours as needed for Headaches (Acute Migraine Treatment)., Disp: 15 tablet, Rfl: 0    estradioL (ESTRACE) 1 MG tablet, TAKE 1 TABLET(1 MG) BY MOUTH DAILY, Disp: 90 tablet, Rfl: 1    gabapentin (NEURONTIN) 100 MG capsule, Take 1 capsule (100 mg total) by mouth 3 (three) times daily., Disp: 90 capsule, Rfl: 5     hydroCHLOROthiazide (HYDRODIURIL) 12.5 MG Tab, Take 1 tablet (12.5 mg total) by mouth once daily., Disp: 90 tablet, Rfl: 1    losartan (COZAAR) 100 MG tablet, TAKE 1 TABLET(100 MG) BY MOUTH DAILY, Disp: 90 tablet, Rfl: 1    methocarbamoL (ROBAXIN) 500 MG Tab, Take 500 mg by mouth 4 (four) times daily., Disp: , Rfl:     naproxen sodium (ANAPROX) 550 MG tablet, TAKE 1 TABLET(550 MG) BY MOUTH TWICE DAILY AS NEEDED FOR PAIN, Disp: 30 tablet, Rfl: 3    ondansetron (ZOFRAN-ODT) 4 MG TbDL, Take 1 tablet (4 mg total) by mouth every 8 (eight) hours as needed (Nausea)., Disp: 30 tablet, Rfl: 0    propranoloL (INDERAL LA) 60 MG 24 hr capsule, Take 1 capsule (60 mg total) by mouth once daily., Disp: 30 capsule, Rfl: 2    rimegepant (NURTEC) 75 mg odt, Take 1 tablet (75 mg total) by mouth every other day. Place ODT tablet on the tongue; alternatively the ODT tablet may be placed under the tongue, Disp: 16 tablet, Rfl: 0    Past Medical History:   Diagnosis Date    Anemia     Cervical radiculopathy     COVID-19 virus infection 2021    Diverticulosis     Hypermenorrhea     Hypertension     Insomnia     Internal hemorrhoid     Migraine headache     Morbid obesity     Rectal bleeding     Uterine fibroid        Past Surgical History:   Procedure Laterality Date     SECTION, LOW TRANSVERSE      x1    COLONOSCOPY N/A 2022    Procedure: COLONOSCOPY;  Surgeon: Maximus Lopes MD;  Location: [facility];  Service: Endoscopy;  Laterality: N/A;    TOTAL ABDOMINAL HYSTERECTOMY  14    TUBAL LIGATION         Social History     Socioeconomic History    Marital status: Single   Occupational History     Employer: [employer]   Tobacco Use    Smoking status: Never    Smokeless tobacco: Never   Substance and Sexual Activity    Alcohol use: No    Drug use: No    Sexual activity: Yes     Partners: Male     Birth control/protection: Surgical     Comment: hyst; mut monog   Social History Narrative    She is  , has one daughter and 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              Social Drivers of Health     Financial Resource Strain: Low Risk  (4/21/2025)    Overall Financial Resource Strain (CARDIA)     Difficulty of Paying Living Expenses: Not hard at all   Food Insecurity: No Food Insecurity (4/21/2025)    Hunger Vital Sign     Worried About Running Out of Food in the Last Year: Never true     Ran Out of Food in the Last Year: Never true   Transportation Needs: No Transportation Needs (4/21/2025)    PRAPARE - Transportation     Lack of Transportation (Medical): No     Lack of Transportation (Non-Medical): No   Physical Activity: Insufficiently Active (4/21/2025)    Exercise Vital Sign     Days of Exercise per Week: 5 days     Minutes of Exercise per Session: 10 min   Stress: No Stress Concern Present (4/21/2025)    [place] [employer] - Occupational Stress Questionnaire     Feeling of Stress : Not at all   Housing Stability: Low Risk  (4/21/2025)    Housing Stability Vital Sign     Unable to Pay for Housing in the Last Year: No     Number of Times Moved in the Last Year: 0     Homeless in the Last Year: No       Past/Current Medical/Surgical History, Past/Current Social History, Past/Current Family History and Past/Current Medications were reviewed in detail.      Objective:     GENERAL APPEARANCE:     The patient looks comfortable.    No signs of respiratory distress.    Normal breathing pattern.    No dysmorphic features    Normal eye contact.     GENERAL MEDICAL EXAM:    HEENT:  Head is atraumatic normocephalic.      Neck and Axillae: No JVD. No visible lesions.    Cardiopulmonary: No cyanosis. No tachypnea. Normal respiratory effort.    Gastrointestinal/Urogenital:  No jaundice. No stomas or lesions. No visible hernias. No catheters.     Skin, Hair and Nails: No pathognonomic skin rash. No neurofibromatosis. No visible lesions.No stigmata of autoimmune  disease. No clubbing.    Limbs: No varicose veins. No visible swelling.    Muskoskeletal: No visible deformities.No visible lesions.      Neurologic Exam      Mental Status   Oriented to person, place, and time.   Oriented to person.   Oriented to place. Oriented to country, city and area.   Oriented to time. Oriented to year, month, date, day and season.   Registration: recalls 3 of 3 objects. Recall at 5 minutes: recalls 3 of 3 objects. Follows 3 step commands.   Attention: normal. Concentration: normal.   Speech: speech is normal   Level of consciousness: alert  Knowledge: good. Able to perform simple calculations.   Able to name object. Able to read. Able to repeat. Able to write. Normal comprehension.      Cranial Nerves      CN II   Visual fields full to confrontation.   Visual acuity: normal  Right visual field deficit: none  Left visual field deficit: none      CN III, IV, VI   Extraocular motions are normal.   Right pupil: Consensual response: intact. Accommodation: intact.   Left pupil: Consensual response: intact. Accommodation: intact.   CN III: no CN III palsy  CN VI: no CN VI palsy  Nystagmus: none   Diplopia: none  Ophthalmoparesis: none  Upgaze: normal  Downgaze: normal  Conjugate gaze: present  Vestibulo-ocular reflex: present     CN V   Facial sensation intact.   Right facial sensation deficit: none  Left facial sensation deficit: none     CN VII   Facial expression full, symmetric.   Right facial weakness: none  Left facial weakness: none     CN VIII   CN VIII normal.   Hearing: intact     CN IX, X   CN IX normal.   CN X normal.   Palate: symmetric     CN XII   CN XII normal.   Tongue: not atrophic  Fasciculations: absent  Tongue deviation: none     Motor Exam   Muscle bulk: normal  Overall muscle tone: normal  Right arm pronator drift: absent  Left arm pronator drift: absent     Sensory Exam   Graphesthesia: normal  Stereognosis: normal     Gait, Coordination, and Reflexes      Gait  Gait:  normal     Tremor   Resting tremor: absent  Intention tremor: absent  Action tremor: absent              Lab Results   Component Value Date    WBC 5.96 04/21/2025    HGB 11.7 (L) 04/21/2025    HCT 35.9 (L) 04/21/2025    MCV 91 04/21/2025     04/21/2025       Sodium   Date Value Ref Range Status   04/21/2025 141 136 - 145 mmol/L Final   11/14/2024 138 136 - 145 mmol/L Final     Potassium   Date Value Ref Range Status   04/21/2025 2.9 (L) 3.5 - 5.1 mmol/L Final   11/14/2024 3.5 3.5 - 5.1 mmol/L Final     Chloride   Date Value Ref Range Status   04/21/2025 104 95 - 110 mmol/L Final   11/14/2024 107 95 - 110 mmol/L Final     CO2   Date Value Ref Range Status   04/21/2025 30 (H) 23 - 29 mmol/L Final   11/14/2024 23 23 - 29 mmol/L Final     Glucose   Date Value Ref Range Status   04/21/2025 88 70 - 110 mg/dL Final   11/14/2024 86 70 - 110 mg/dL Final     BUN   Date Value Ref Range Status   04/21/2025 11 6 - 20 mg/dL Final     Creatinine   Date Value Ref Range Status   04/21/2025 0.8 0.5 - 1.4 mg/dL Final     Calcium   Date Value Ref Range Status   04/21/2025 8.8 8.7 - 10.5 mg/dL Final   11/14/2024 8.5 (L) 8.7 - 10.5 mg/dL Final     Protein Total   Date Value Ref Range Status   04/21/2025 7.4 6.0 - 8.4 gm/dL Final     Total Protein   Date Value Ref Range Status   11/14/2024 7.0 6.0 - 8.4 g/dL Final     Albumin   Date Value Ref Range Status   04/21/2025 3.1 (L) 3.5 - 5.2 g/dL Final   11/14/2024 2.9 (L) 3.5 - 5.2 g/dL Final     Total Bilirubin   Date Value Ref Range Status   11/14/2024 0.3 0.1 - 1.0 mg/dL Final     Comment:     For infants and newborns, interpretation of results should be based  on gestational age, weight and in agreement with clinical  observations.    Premature Infant recommended reference ranges:  Up to 24 hours.............<8.0 mg/dL  Up to 48 hours............<12.0 mg/dL  3-5 days..................<15.0 mg/dL  6-29 days.................<15.0 mg/dL       Bilirubin Total   Date Value Ref Range  "Status   04/21/2025 0.4 0.1 - 1.0 mg/dL Final     Comment:     For infants and newborns, interpretation of results should be based   on gestational age, weight and in agreement with clinical   observations.    Premature Infant recommended reference ranges:   0-24 hours:  <8.0 mg/dL   24-48 hours: <12.0 mg/dL   3-5 days:    <15.0 mg/dL   6-29 days:   <15.0 mg/dL     Alkaline Phosphatase   Date Value Ref Range Status   11/14/2024 44 40 - 150 U/L Final     ALP   Date Value Ref Range Status   04/21/2025 40 40 - 150 unit/L Final     AST   Date Value Ref Range Status   04/21/2025 22 11 - 45 unit/L Final   11/14/2024 20 10 - 40 U/L Final     ALT   Date Value Ref Range Status   04/21/2025 14 10 - 44 unit/L Final   11/14/2024 20 10 - 44 U/L Final     Anion Gap   Date Value Ref Range Status   04/21/2025 7 (L) 8 - 16 mmol/L Final     eGFR if    Date Value Ref Range Status   07/06/2021 >60.0 >60 mL/min/1.73 m^2 Final     eGFR if non    Date Value Ref Range Status   07/06/2021 >60.0 >60 mL/min/1.73 m^2 Final     Comment:     Calculation used to obtain the estimated glomerular filtration  rate (eGFR) is the CKD-EPI equation.          Lab Results   Component Value Date    [id number] >2,000 (H) 04/21/2025       Lab Results   Component Value Date    TSH 0.847 04/21/2025    [id number] 93 04/21/2025    FREET4 1.11 04/21/2025       No results found in the last 24 hours.    No results found in the last 24 hours.    Reviewed the neuroimaging independently     Assessment:   44 Years old Female with PMH as above came for a follow-up evaluation of  "Headaches"     Intractable chronic migraine with aura and without status migrainosus     Chronic tension-type headache, intractable     PREMA (generalized anxiety disorder)     Nausea and vomiting, unspecified vomiting type     Dizziness and giddiness     Hypertension, unspecified type   .   Plan:   Patient Neurological Assessment is non-focal via virtual visit.   "     IMPRESSION:  Evaluated response to recently initiated Nurtec for migraine prevention. Patient reports improvement in migraine duration; will continue Nurtec every other day with close monitoring given short treatment duration.  Considered alternative therapies in light of patients history of adverse reactions to triptans.  Reviewed liver function and colonoscopy results to assess safety of current medication regimen.    MIGRAINE:  Discussed that Nurtec may take 2-3 months to reach full preventative efficacy.  Continue Propranolol 60 mg daily for migraine prevention; no changes to dosage at this time per patient request.   Reviewed potential risks associated with frequent BC Powder use (e.g., liver toxicity, gastric irritation). Advised limiting use to fewer than 3 days per week for breakthrough headaches to minimize adverse effects. Patient verbalized understanding of risks and wishes to continue.     FOLLOW-UP:  Re-evaluate migraine prevention plan in 3 months.  Instructed patient to contact the office via the patient portal if headache frequency increases or if new symptoms arise prior to follow-up.     - Continue Inderal-LA 60 mg PO Daily for migraine prevention therapy and HTN management. Side effects discussed. Pamphlet given. Plan for slow titration to 80 mg BID which can cause low blood pressure, slow heart rate, erectile dysfunction, depression, airway obstruction and heart failure exacerbations. Cannot be used with migraine associate with focal neurological deficits. Instructed patient to keep a BP log and bring to next visit for provider review. No history of Asthma. Patient verbalized understanding. No refills needed.          -Contingency Plan: Will consider titrating Inderal-LA to a higher dose if patient is receptive in the future.     -Plan: Continue Nurtec ODT 75 mg PO every other day for migraine prophylaxis. Discussed potential side effects; patient verbalized understanding.     -Discontinue   butalbital-acetaminophen-caffeine -40 mg (FIORICET, ESGIC) -40 mg per tablet; Take 1 tablet by mouth every 4 (four) hours as needed for Headaches (Acute Migraine Treatment) - contraindicated with Nurtec use.     -Continue Zofran-ODT 4 mg PO Q8H PRN Nausea / Vomiting associated with Migraines. Side effects discussed. Patient verbalized understanding. No refills needed.     -Discontinue Elavil 25 mg PO QHS for migraine prevention therapy and Anxiety/Depression Management. Patient reports excessive daytime sedation; unable to increase dose. Not clinically effective at current dose.     -Instructed patient to continue following ophthalmology routinely.     -Continue PT for vestibular therapy.     -Patient is requesting FMLA forms to be completed for Migraine Exacerbations and PT Sessions. Completed 02/2025.      -We have discussed the value in aggressively controlling vascular risk factors such as Hypertension, Hyperlipidemia, Obesity, and Diabetes. (SBP <120, LDL <100, A1C <7.0)  We discussed the need to optimize lifestyle choices including a heart healthy diet (Mediterranean or DASH Diet), increased cardiovascular exercise (goal of 150 minutes of moderate intensity per week), and stay cognitively and socially active.   We recommended the MIND Diet, a combination of (Mediterranean and DASH Diet) because it includes a variety of brain-friendly foods to optimize cognitive health and longevity. Patient verbalized understanding.         1. Intractable chronic migraine with aura and without status migrainosus  - rimegepant (NURTEC) 75 mg odt; Take 1 tablet (75 mg total) by mouth every other day. Place ODT tablet on the tongue; alternatively the ODT tablet may be placed under the tongue  Dispense: 16 tablet; Refill: 2  - propranoloL (INDERAL LA) 60 MG 24 hr capsule; Take 1 capsule (60 mg total) by mouth once daily.  Dispense: 30 capsule; Refill: 2    2. Chronic tension-type headache, intractable    3. PREMA  (generalized anxiety disorder)    4. Nausea and vomiting, unspecified vomiting type  - ondansetron (ZOFRAN-ODT) 4 MG TbDL; Take 1 tablet (4 mg total) by mouth every 8 (eight) hours as needed (Nausea).  Dispense: 30 tablet; Refill: 0    5. Dizziness and giddiness    6. Hypertension, unspecified type  - propranoloL (INDERAL LA) 60 MG 24 hr capsule; Take 1 capsule (60 mg total) by mouth once daily.  Dispense: 30 capsule; Refill: 2         LABORATORY EVALUATION    Labs: ([id number]) Vitamin B1 / B12 / Folate / H. pylori IgG / Hepatic Panel / Iron & TIBC / Magnesium / Phosphorus / Vitamin D / Aldosterone / RPR / ROSE / Sed Rate / CBC / BMP / CMP / A1C / TSH / Free T-4 / T3 / T4 / Lipid Panel / Vitamin D / B-12 / Magnesium  -personally reviewed -non-significant abnormalities     RADIOLOGY EVALUATION     Brain MRI Without Contrast- done 06/2024- personally reviewed -no acute abnormalities     MRI Lumbar Spine Without Contrast - done 12/2022- personally reviewed - Multilevel mild degenerative changes          MANAGEMENT     HEADACHE DIARY     DISCUSSED THE THREE-FOLD MANAGEMENT OF MIGRAINE:      LIFESTYLE CHANGES:       Good sleep hygiene  Avoid general triggers like lack of sleep/too much sleep, prolonged sun exposure, excessive screen time and specific triggers based on you own diary   Minimize physical and emotional stress  Smoking avoidance and cessation  Limit caffeine drinks to 1-2 a day   Good hydration   Small frequent meals and avoid skipping meals   Moderate 30-minute-long aerobic exercises 3 times/week. Avoid strenuous exercise         ABORTIVE MEDICATIONS (ACUTE-RESCUE MEDICATIONS):     Should only be taken 2-3 times/week to avoid rebound and overuse headaches.    I-explained to the patient that pain meds especially triptans should NOT be taken daily to avoid Rebound Headache and Overuse Headache.    Take at the ONSET of the headache sumatriptan 100 mg PO  (or other Triptan) in combination with naproxen 500 mg PO  or Ibuprofen 800 mg PO for headache without nausea or vomiting.  This regimen can be repeated only once in 24 hours after 2 hours.    Side effects of triptans were discussed and include rare cardiac and cerebral ischemia and cannot be used with migraine associate with focal neurological deficits (complicated migraine) in addition to drowsiness and potential impairment of driving ability. The patient verbalized understanding.    NSAIDs can cause peptic ulcers, renal insufficiency and may increase the risk of cardiovascular diseases.  SEs were discussed with the patient. The patient verbalized understanding.    Triptans have shown to be more effective than Gepatns with more SE/AE.      AVOID NARCOTICS (OPIATES)      1. No randomized controlled study shows pain-free results with opioids in the treatment of migraine.     2. The physiologic consequences of opioid use are adverse, occur quickly, and can be permanent. Decreased gray matter, release of calcitonin gene-related peptide, dynorphin, and pro-inflammatory peptides, and activation of excitatory glutamate receptors are all associated with opioid exposure.     3. Opioids are pro-nociceptive, prevent reversal of migraine central sensitization, and interfere with triptan effectiveness.     4.Opioids precipitate bad clinical outcomes, especially transformation to daily headache.     5. They cause disease progression, comorbidity, and excessive health care consumption.           NEXT OPTIONS:    Gepants: Nuretc (rimegepant)75 mg >Ubrelvy (ubrogepant) 100 mg    Ditans: Reyvow (lasmiditan) 100 mg (No driving due to sedation)    Fioricet without codeine with Reglan.      Prednisone with Reglan.      LAST RESORT:     DHE NS Trudhesa (Max 2 a week)     C/I: concomitant use of vasoconstrictors like Triptans, strong CY inhibitors such as HAART PIs (eg, ritonavir, nelfinavir, or indinavir) and Macrolides (eg, erythromycin or clarithromycin), CAD, PVD, Stroke/TIA and  Uncontrolled HTN.  Serious SEs include Vasospasm and Fibrosis (chronic use).       IMPENDING STATUS: Prednisone and Vistaril.    STATUS MIGRAINOSUS: ED-Infusion for Status Protocol.        PREVENTATIVE (MORE ACCURATELY MIGRAINE REDUCTION) MEDICATIONS:       Since the patient's headache is very frequent a lengthy discussion about preventative medications was carried out.The patient understands that prevention means DECREASING frequency and severity and NOT elimination.The patient was made aware that any new medication can cause serious allergic reaction.The medication is considered failure only if a therapeutic dose reached and maintained for 6-8 weeks.        HELPFUL SUPPLEMENTS:     Helpful supplements include Co-Q 10, B2, Mg, Feverfew (Dolovent combination) and butterbur (Petadolex)        NEUROPHARMACOLOGY     NEXT OPTIONS:     Zonisamide/Zonegran (ZNS) 100-400 mg QHS is a good alternative to TPM in case of SE/AE.     Lamotrigine/Lamictal  (LTG)slow titration to 100 mg BID which can cause serious skin rash and rare cardiac arrhythmias. LTG is superior to other therapies for specifically reducing migraine aura.     ANTI-CGRP AGENTS: Qulipta (alogepant) 60 mg QD, Erenumab (Aimovig) 140 mg SQ Pen monthly (Reported cases of Constipation and BP elevation) , Galcanezumab (Emgality) 120 mg SQ Pen monthly after a loading dose of 240 mg  and Fremnezumab (Ajovy) (Ligand Blocker): 225 mg SQ monthly or 675 mg every 3 months     Botox 200 units every 3 months.         LAST RESORT OPTIONS:      Namenda 10 mg BID     Valproic acid/ Depakote         NEUROMODULATION     Cefaly, Relivion, Nerivio and GammaCore (VNS)         SCHOOL AND WORK ACCOMMODATIONS        Allow the patient to wear sunglasses or a cap and switch out fluorescent bulbs.    Allow the patient to arrive 5 minutes later and leave 5 minutes earlier to avoid noisy traffic.    Allow the patient to carry a water bottle and refill as needed.    Allow the patient to  snack whenever is needed.    Allow the patient to decrease the computer brightness.    Allow the patient to take breaks as needed and extra time for assignments and deadlines.    Allow the patient to avoid strenuous activity as needed.         MEDICAL/SURGICAL COMORBIDITIES     All relevant medical comorbidities noted and managed by primary care physician and medical care team.              HEALTHY LIFESTYLE AND PREVENTATIVE CARE    The patient to adhere to the age-appropriate health maintenance guidelines including screening tests and vaccinations. The patient to adhere to  healthy lifestyle, optimal weight, exercise, healthy diet, good sleep hygiene and avoiding drugs including smoking, alcohol and recreational drugs.    I spent a total of 41 minutes on the day of the visit.This includes face to face time and non-face to face time preparing to see the patient (eg, review of tests), obtaining and/or reviewing separately obtained history, documenting clinical information in the electronic or other health record, independently interpreting results and communicating results to the patient/family/caregiver, or care coordinator.     Face to Face time with patient:   41 minutes of total time spent on the encounter, which includes face to face time and non-face to face time preparing to see the patient (eg, review of tests), Obtaining and/or reviewing separately obtained history, Documenting clinical information in the electronic or other health record, Independently interpreting results (not separately reported) and communicating results to the patient/family/caregiver, or Care coordination (not separately reported).     This note was generated with the assistance of ambient listening technology. Verbal consent was obtained by the patient and accompanying visitor(s) for the recording of patient appointment to facilitate this note. I attest to having reviewed and edited the generated note for accuracy, though some syntax  or spelling errors may persist. Please contact the author of this note for any clarification.        Each patient to whom he or she provides medical services by telemedicine is:  (1) informed of the relationship between the physician and patient and the respective role of any other health care provider with respect to management of the patient; and (2) notified that he or she may decline to receive medical services by telemedicine and may withdraw from such care at any time.                    Please do not hesitate to contact me with any updates, questions or concerns.    [id number]    ELIZABETH Khan, FNP-C    General Neurology

## 2025-07-07 ENCOUNTER — PATIENT MESSAGE (OUTPATIENT)
Dept: BARIATRICS | Facility: CLINIC | Age: 45
End: 2025-07-07
Payer: COMMERCIAL

## 2025-07-08 ENCOUNTER — PATIENT MESSAGE (OUTPATIENT)
Dept: BARIATRICS | Facility: CLINIC | Age: 45
End: 2025-07-08
Payer: COMMERCIAL

## 2025-07-13 NOTE — TELEPHONE ENCOUNTER
No care due was identified.  [facility] Embedded Care Due Messages. Reference number: [id number].   7/13/2025 3:18:03 AM CDT

## 2025-07-13 NOTE — TELEPHONE ENCOUNTER
No care due was identified.  [facility] Embedded Care Due Messages. Reference number: [id number].   7/13/2025 3:17:33 AM CDT

## 2025-07-14 RX ORDER — METOPROLOL SUCCINATE 50 MG/1
50 TABLET, EXTENDED RELEASE ORAL
Qty: 90 TABLET | Refills: 3 | OUTPATIENT
Start: 2025-07-14

## 2025-07-14 RX ORDER — HYDROCHLOROTHIAZIDE 12.5 MG/1
TABLET ORAL
Qty: 90 TABLET | Refills: 0 | Status: SHIPPED | OUTPATIENT
Start: 2025-07-14

## 2025-07-14 NOTE — TELEPHONE ENCOUNTER
Refill Decision Note   Georgia Garcia  is requesting a refill authorization.  Brief Assessment and Rationale for Refill:  Quick Discontinue     Medication Therapy Plan:  The original prescription was discontinued on 6/14/2024 by Nkechi Soliman NP for the following reason: Formulary change.      Comments:     Note composed:11:04 AM 07/14/2025

## 2025-07-14 NOTE — TELEPHONE ENCOUNTER
Refill Routing Note   Medication(s) are not appropriate for processing by [facility] for the following reason(s):        Required labs abnormal    [facility] action(s):  Defer             Appointments  past 12m or future 3m with PCP    Date Provider   Last Visit   11/14/2024 Tami Ward MD   Next Visit   Visit date not found Tami Ward MD   ED visits in past 90 days: 0        Note composed:11:54 AM 07/14/2025

## 2025-07-16 ENCOUNTER — CLINICAL SUPPORT (OUTPATIENT)
Dept: BARIATRICS | Facility: CLINIC | Age: 45
End: 2025-07-16
Payer: COMMERCIAL

## 2025-07-16 DIAGNOSIS — E66.01 MORBID OBESITY WITH BMI OF 50.0-59.9, ADULT: ICD-10-CM

## 2025-07-16 DIAGNOSIS — Z71.3 DIETARY COUNSELING: Primary | ICD-10-CM

## 2025-07-16 DIAGNOSIS — I10 ESSENTIAL HYPERTENSION: Chronic | ICD-10-CM

## 2025-07-16 NOTE — PROGRESS NOTES
The patient location is: [place]  The chief complaint leading to consultation is: morbid obesity    Visit type: audiovisual    Face to Face time with patient: 15 min  15 minutes of total time spent on the encounter, which includes face to face time and non-face to face time preparing to see the patient (eg, review of tests), Obtaining and/or reviewing separately obtained history, Documenting clinical information in the electronic or other health record, Independently interpreting results (not separately reported) and communicating results to the patient/family/caregiver, or Care coordination (not separately reported).         Each patient to whom he or she provides medical services by telemedicine is:  (1) informed of the relationship between the physician and patient and the respective role of any other health care provider with respect to management of the patient; and (2) notified that he or she may decline to receive medical services by telemedicine and may withdraw from such care at any time.        NUTRITION NOTE     Referring Physician: Dionna Lanier M.D.   Reason for MNT Referral: Follow up Assessment pending Gastric Bypass surgery     Patient presents for follow-up visit with self reported weight stable over the past month; 8 lbs total weight loss by making numerous dietary and lifestyle changes in preparation for bariatric surgery.     CLINICAL DATA:  45 y.o. female.  Current wt: 262 lbs home scale   Ideal Body Weight:126 lbs  Body mass index is 51.17 kg/m².     DAILY NUTRITIONAL NEEDS: pre-op nutritional bariatric guidelines to promote weight loss  [id number] Calories    Grams Protein     CURRENT DIET:  Reduced-calorie diet.  Diet Recall:    Current diet recall:     B: protein powder, water and 1 tablespoon of peanut butter in   L: deli meat and Italian dressing on a salad OR packet of tuna   Sn: boiled egg or tbsp of pb or fruit  D: baked salmon and steamed broccoli (trying to  avoid/limit starchy sides)    Diet Includes:   Meal Pattern: Improved. 3-4 small meals per day  Protein Supplements: 1 per day. Protein powder + water. Premier prot shakes as well.  Snacking: Adequate. Snacks include peanuts or fruit  Vegetables: Likes a variety. Eats daily.  Fruits: Likes a variety. Eats almost daily.  Beverages: water mostly. No sodas, even zero sugar  Dining out/delivery: Weekly. Once at most Mostly take-out.  Cooking at home: Weekly. Mostly baked/roast, steamed beef, fish/seafood, pork, chicken/turkey, vegetables, and beans     Exercise:  Walking 5 days a week 10-15 minutes lunch break     Restrictions to Exercise: None.     Vitamins / Minerals / Herbs:   Multivitamin [employer] or Geritol tablets  Vitamin D  Vitamin C  Vitamin B-12  Probiotics  Tumeric  Eldberry     Labs:   Reviewed     Food Allergies:   None reported     Social:  Works regular daytime shifts.     Alcohol: None.  Smoking: None.     ASSESSMENT:  Patient demonstrates some willingness to change lifestyle habits as evidenced by weight loss, good/increased exercise, dietary changes, including protein drinks, reduced sugar-sweetened beverages, reduced eating out/delivery, more food preparation at home, and healthier cooking at home.     Doing well with working on greatest challenges (sugar-sweetened beverages).     Barriers to Education:  none  Stage of Change:  action     NUTRITION DIAGNOSIS:  Morbid Obesity related to Excessive calorie intake as evidence by BMI.     PLAN/RECOMMENDATION:  Pt is a good candidate for bariatric surgery - gastric bypass.     Will f/u before/after surgery as needed.     Communicated nutrition plan with bariatric team.     SESSION TIME:  15 minutes

## 2025-07-22 ENCOUNTER — TELEPHONE (OUTPATIENT)
Dept: BARIATRICS | Facility: CLINIC | Age: 45
End: 2025-07-22
Payer: COMMERCIAL

## 2025-07-24 ENCOUNTER — PATIENT MESSAGE (OUTPATIENT)
Dept: INTERNAL MEDICINE | Facility: CLINIC | Age: 45
End: 2025-07-24
Payer: COMMERCIAL

## 2025-07-24 ENCOUNTER — TELEPHONE (OUTPATIENT)
Dept: BARIATRICS | Facility: CLINIC | Age: 45
End: 2025-07-24
Payer: COMMERCIAL

## 2025-07-24 DIAGNOSIS — I10 ESSENTIAL HYPERTENSION: Chronic | ICD-10-CM

## 2025-07-24 DIAGNOSIS — E66.01 MORBID OBESITY WITH BMI OF 50.0-59.9, ADULT: Primary | ICD-10-CM

## 2025-07-24 DIAGNOSIS — M17.0 PRIMARY OSTEOARTHRITIS OF BOTH KNEES: ICD-10-CM

## 2025-07-24 NOTE — LETTER
[facility] - B[facility]ric Surg 2nd Fl  [address]  [facility] [address]  Phone: [phone]  Fax: [phone] 2025       Attn: Pre-determination Dept.    RE:  Georgia Garcia          OC #: [id number]          : 1980    To Whom It May Concern:  I am sending this letter on behalf of Georgia Garcia (a 45 y.o. female) for pre-approval for Bariatric Surgery; specifically the laparoscopic [place]na-en-Y gastric bypass. Georgia  has a past medical history of  Morbid obesity, BMI 50-59.9, Hypertension, and Osteoarthritis. Georgia Garcia  has made numerous attempts at dieting and exercise programs, and has failed to maintain any sustained weight loss.  Weight/Height/BMI  Estimated body mass index is 51.17 kg/m² as calculated from the following:  Height as of 25: 5' (1.524 m).  Weight as of 25: 120 kg (265 lb).  Georgia Garcia has been evaluated in our bariatric program by myself, the , and the program dietician and is felt to be an excellent candidate for surgery.  In addition, she has undergone a psychological evaluation, from which a letter is enclosed.  Georgia Garcia has undergone extensive pre-operative education and understands all the risks, benefits, and possible complications of surgery.  She has also undergone dietary education and thorough nutritional evaluation via a registered dietician.  Our program provides long term nutritional counseling with unlimited consults with the dietician.    Our team is sending this letter to receive pre-approval for the indicated procedure.  Please let us know if you have any questions or require any further information.  Sincerely,    Dionna Hawk MD  General, Laparoscopic, and Bariatric Surgery  [facility] - [place], [place]

## 2025-07-24 NOTE — TELEPHONE ENCOUNTER
Spoke with patient and confirmed the surgical procedure of laparoscopic shamar-en-y gastric bypass with Dr Lanier on 9/4/25.  Patient changed from a gastric sleeve to a gastric bypass prior to scheduling procedure.  Scheduled preop appts/surgery date/2 week and 8 week post op appts. All dates and times agreed upon. Pt aware that if they are required to have PCP clearance, it must be within 6 months of surgery, unless their medical history has changed, it should be dated, signed and in chart for preop appointment. All medications have been reviewed regarding the necessity to be crushed or broken into pieces smaller that the tip of a pencil eraser for 2 weeks following gastric sleeve surgery and 4 weeks following gastric bypass surgery. Pt instructed to stop taking all NSAIDS 1 week before surgery and for life after surgery. Pt aware that protein liquid diet start date is 8/21/25. Patient is doing well with their diet. Patient was instructed about the progression of the diet phases. The patient's current weight is 265 lbs, height is 5', and BMI is 51.78. Refer to medical letter of necessity from Surgeon. Discussed the importance of increased physical activity and dieting lifestyle changes to improve weight loss and meet goals. Screened patient for history of UTI per protocol. Discussed with patient to avoid antibiotics and elective procedures involving sedation/anesthesia within 30 days of surgery unless cleared by the bariatric department. Patient instructed to call the bariatric clinic post op for any s/s of UTI. Patient's mailing address confirmed and informed to expect a manilla envelop containing bariatric surgery pre op booklet, appointment reminders, protein and fluid log sheets, and liquid diet and vitamin information sheets. Pt aware that all appts can be seen in my [facility] patient portal at this time. Confirmed email address and informed patient that they will be enrolled in the Patient Reported Outcomes  program to track their progress and successes. The first email will be sent 2-3 weeks before surgery and then every year on your surgery anniversary date. Office phone and fax number given to patient for any future questions/concerns. Discussed the pre-surgery complex carbohydrate beverage to purchase in [facility] pharmacy to drink 30 minutes before the surgery arrival time. Reviewed the policy of scheduling a covid test 72 hours prior to surgery if necessary.

## 2025-07-29 ENCOUNTER — PATIENT MESSAGE (OUTPATIENT)
Dept: OBSTETRICS AND GYNECOLOGY | Facility: CLINIC | Age: 45
End: 2025-07-29
Payer: COMMERCIAL

## 2025-07-30 DIAGNOSIS — F41.1 GAD (GENERALIZED ANXIETY DISORDER): ICD-10-CM

## 2025-07-30 RX ORDER — ALPRAZOLAM 0.25 MG/1
0.25 TABLET ORAL NIGHTLY PRN
Qty: 30 TABLET | Refills: 0 | Status: SHIPPED | OUTPATIENT
Start: 2025-07-30

## 2025-07-30 NOTE — TELEPHONE ENCOUNTER
No care due was identified.  [facility] Embedded Care Due Messages. Reference number: [id number].   7/30/2025 10:09:54 AM CDT

## 2025-08-04 ENCOUNTER — PATIENT MESSAGE (OUTPATIENT)
Dept: NEUROLOGY | Facility: CLINIC | Age: 45
End: 2025-08-04
Payer: COMMERCIAL

## 2025-08-04 ENCOUNTER — PATIENT MESSAGE (OUTPATIENT)
Dept: PAIN MEDICINE | Facility: CLINIC | Age: 45
End: 2025-08-04
Payer: COMMERCIAL

## 2025-08-08 ENCOUNTER — HOSPITAL ENCOUNTER (OUTPATIENT)
Dept: RADIOLOGY | Facility: HOSPITAL | Age: 45
Discharge: HOME | End: 2025-08-08
Attending: PHYSICIAN ASSISTANT
Payer: COMMERCIAL

## 2025-08-08 ENCOUNTER — HOSPITAL ENCOUNTER (OUTPATIENT)
Dept: CARDIOLOGY | Facility: HOSPITAL | Age: 45
Discharge: HOME | End: 2025-08-08
Payer: COMMERCIAL

## 2025-08-08 ENCOUNTER — OFFICE VISIT (OUTPATIENT)
Dept: INTERNAL MEDICINE | Facility: CLINIC | Age: 45
End: 2025-08-08
Payer: COMMERCIAL

## 2025-08-08 VITALS
WEIGHT: 267.19 LBS | DIASTOLIC BLOOD PRESSURE: 80 MMHG | OXYGEN SATURATION: 97 % | SYSTOLIC BLOOD PRESSURE: 112 MMHG | BODY MASS INDEX: 52.46 KG/M2 | TEMPERATURE: 98 F | HEIGHT: 60 IN | HEART RATE: 92 BPM

## 2025-08-08 DIAGNOSIS — Z01.818 PREOP EXAMINATION: Primary | ICD-10-CM

## 2025-08-08 DIAGNOSIS — Z01.818 PREOP EXAMINATION: ICD-10-CM

## 2025-08-08 LAB
OHS QRS DURATION: 90 MS
OHS QTC CALCULATION: 401 MS

## 2025-08-08 PROCEDURE — 99999 PR PBB SHADOW E&M-EST. PATIENT-LVL IV: CPT | Mod: PBBFAC,,, | Performed by: PHYSICIAN ASSISTANT

## 2025-08-08 PROCEDURE — 1159F MED LIST DOCD IN RCRD: CPT | Mod: CPTII,S$GLB,, | Performed by: PHYSICIAN ASSISTANT

## 2025-08-08 PROCEDURE — 3079F DIAST BP 80-89 MM HG: CPT | Mod: CPTII,S$GLB,, | Performed by: PHYSICIAN ASSISTANT

## 2025-08-08 PROCEDURE — 4010F ACE/ARB THERAPY RXD/TAKEN: CPT | Mod: CPTII,S$GLB,, | Performed by: PHYSICIAN ASSISTANT

## 2025-08-08 PROCEDURE — 3044F HG A1C LEVEL LT 7.0%: CPT | Mod: CPTII,S$GLB,, | Performed by: PHYSICIAN ASSISTANT

## 2025-08-08 PROCEDURE — 93005 ELECTROCARDIOGRAM TRACING: CPT

## 2025-08-08 PROCEDURE — 71046 X-RAY EXAM CHEST 2 VIEWS: CPT | Mod: TC

## 2025-08-08 PROCEDURE — 71046 X-RAY EXAM CHEST 2 VIEWS: CPT | Mod: 26,,, | Performed by: RADIOLOGY

## 2025-08-08 PROCEDURE — 3008F BODY MASS INDEX DOCD: CPT | Mod: CPTII,S$GLB,, | Performed by: PHYSICIAN ASSISTANT

## 2025-08-08 PROCEDURE — 99214 OFFICE O/P EST MOD 30 MIN: CPT | Mod: S$GLB,,, | Performed by: PHYSICIAN ASSISTANT

## 2025-08-08 PROCEDURE — 3074F SYST BP LT 130 MM HG: CPT | Mod: CPTII,S$GLB,, | Performed by: PHYSICIAN ASSISTANT

## 2025-08-08 PROCEDURE — 93010 ELECTROCARDIOGRAM REPORT: CPT | Mod: ,,, | Performed by: INTERNAL MEDICINE

## 2025-08-12 ENCOUNTER — PATIENT MESSAGE (OUTPATIENT)
Dept: BARIATRICS | Facility: CLINIC | Age: 45
End: 2025-08-12
Payer: COMMERCIAL

## 2025-08-12 ENCOUNTER — TELEPHONE (OUTPATIENT)
Dept: INTERNAL MEDICINE | Facility: CLINIC | Age: 45
End: 2025-08-12
Payer: COMMERCIAL

## 2025-08-20 ENCOUNTER — OFFICE VISIT (OUTPATIENT)
Dept: BARIATRICS | Facility: CLINIC | Age: 45
End: 2025-08-20
Payer: COMMERCIAL

## 2025-08-20 VITALS
BODY MASS INDEX: 51.6 KG/M2 | SYSTOLIC BLOOD PRESSURE: 128 MMHG | TEMPERATURE: 99 F | OXYGEN SATURATION: 100 % | HEART RATE: 73 BPM | HEIGHT: 60 IN | WEIGHT: 262.81 LBS | DIASTOLIC BLOOD PRESSURE: 84 MMHG

## 2025-08-20 DIAGNOSIS — I10 ESSENTIAL HYPERTENSION: ICD-10-CM

## 2025-08-20 DIAGNOSIS — F41.1 GAD (GENERALIZED ANXIETY DISORDER): ICD-10-CM

## 2025-08-20 DIAGNOSIS — M17.0 PRIMARY OSTEOARTHRITIS OF BOTH KNEES: ICD-10-CM

## 2025-08-20 DIAGNOSIS — F32.5 MAJOR DEPRESSIVE DISORDER WITH SINGLE EPISODE, IN FULL REMISSION: ICD-10-CM

## 2025-08-20 DIAGNOSIS — Z98.84 BARIATRIC SURGERY STATUS: ICD-10-CM

## 2025-08-20 DIAGNOSIS — G43.719 INTRACTABLE CHRONIC MIGRAINE WITHOUT AURA AND WITHOUT STATUS MIGRAINOSUS: ICD-10-CM

## 2025-08-20 DIAGNOSIS — E66.813 CLASS 3 SEVERE OBESITY WITH BODY MASS INDEX (BMI) OF 50.0 TO 59.9 IN ADULT, UNSPECIFIED OBESITY TYPE, UNSPECIFIED WHETHER SERIOUS COMORBIDITY PRESENT: Primary | ICD-10-CM

## 2025-08-20 PROCEDURE — 4010F ACE/ARB THERAPY RXD/TAKEN: CPT | Mod: CPTII,S$GLB,, | Performed by: SURGERY

## 2025-08-20 PROCEDURE — 99215 OFFICE O/P EST HI 40 MIN: CPT | Mod: S$GLB,,, | Performed by: SURGERY

## 2025-08-20 PROCEDURE — 3074F SYST BP LT 130 MM HG: CPT | Mod: CPTII,S$GLB,, | Performed by: SURGERY

## 2025-08-20 PROCEDURE — 99999 PR PBB SHADOW E&M-EST. PATIENT-LVL IV: CPT | Mod: PBBFAC,,, | Performed by: SURGERY

## 2025-08-20 PROCEDURE — 1160F RVW MEDS BY RX/DR IN RCRD: CPT | Mod: CPTII,S$GLB,, | Performed by: SURGERY

## 2025-08-20 PROCEDURE — 3008F BODY MASS INDEX DOCD: CPT | Mod: CPTII,S$GLB,, | Performed by: SURGERY

## 2025-08-20 PROCEDURE — 1159F MED LIST DOCD IN RCRD: CPT | Mod: CPTII,S$GLB,, | Performed by: SURGERY

## 2025-08-20 PROCEDURE — 3044F HG A1C LEVEL LT 7.0%: CPT | Mod: CPTII,S$GLB,, | Performed by: SURGERY

## 2025-08-20 PROCEDURE — 3079F DIAST BP 80-89 MM HG: CPT | Mod: CPTII,S$GLB,, | Performed by: SURGERY

## 2025-08-20 RX ORDER — ONDANSETRON 8 MG/1
8 TABLET, ORALLY DISINTEGRATING ORAL EVERY 6 HOURS PRN
Qty: 30 TABLET | Refills: 0 | Status: SHIPPED | OUTPATIENT
Start: 2025-08-20

## 2025-08-20 RX ORDER — GABAPENTIN 300 MG/1
300 CAPSULE ORAL 2 TIMES DAILY
Qty: 10 CAPSULE | Refills: 0 | Status: SHIPPED | OUTPATIENT
Start: 2025-08-20

## 2025-08-20 RX ORDER — URSODIOL 500 MG/1
500 TABLET, FILM COATED ORAL DAILY
Qty: 30 TABLET | Refills: 5 | Status: SHIPPED | OUTPATIENT
Start: 2025-08-20 | End: 2026-02-16

## 2025-08-20 RX ORDER — BUPROPION HYDROCHLORIDE 100 MG/1
100 TABLET ORAL 2 TIMES DAILY
COMMUNITY
Start: 2025-06-02

## 2025-08-20 RX ORDER — OMEPRAZOLE 40 MG/1
40 CAPSULE, DELAYED RELEASE ORAL EVERY MORNING
Qty: 30 CAPSULE | Refills: 2 | Status: SHIPPED | OUTPATIENT
Start: 2025-08-20

## 2025-08-21 ENCOUNTER — PATIENT MESSAGE (OUTPATIENT)
Dept: INTERNAL MEDICINE | Facility: CLINIC | Age: 45
End: 2025-08-21
Payer: COMMERCIAL

## 2025-08-21 ENCOUNTER — CLINICAL SUPPORT (OUTPATIENT)
Dept: BARIATRICS | Facility: CLINIC | Age: 45
End: 2025-08-21
Payer: COMMERCIAL

## 2025-08-21 DIAGNOSIS — Z71.3 DIETARY COUNSELING AND SURVEILLANCE: Primary | ICD-10-CM

## 2025-08-22 RX ORDER — FAMOTIDINE 10 MG/ML
20 INJECTION, SOLUTION INTRAVENOUS ONCE
OUTPATIENT
Start: 2025-08-22 | End: 2025-08-22

## 2025-08-22 RX ORDER — ACETAMINOPHEN 10 MG/ML
1000 INJECTION, SOLUTION INTRAVENOUS ONCE
OUTPATIENT
Start: 2025-08-22 | End: 2025-08-22

## 2025-08-22 RX ORDER — HEPARIN SODIUM 5000 [USP'U]/ML
5000 INJECTION, SOLUTION INTRAVENOUS; SUBCUTANEOUS ONCE
OUTPATIENT
Start: 2025-08-22 | End: 2025-08-22

## 2025-08-22 RX ORDER — DEXTROMETHORPHAN/PSEUDOEPHED 2.5-7.5/.8
40 DROPS ORAL 4 TIMES DAILY PRN
OUTPATIENT
Start: 2025-08-22

## 2025-08-22 RX ORDER — ACETAMINOPHEN 650 MG/20.3ML
1000 LIQUID ORAL EVERY 8 HOURS
OUTPATIENT
Start: 2025-08-22

## 2025-08-22 RX ORDER — METRONIDAZOLE 500 MG/100ML
500 INJECTION, SOLUTION INTRAVENOUS
OUTPATIENT
Start: 2025-08-22

## 2025-08-22 RX ORDER — ACETAMINOPHEN 650 MG/20.3ML
500 LIQUID ORAL
OUTPATIENT
Start: 2025-08-22

## 2025-08-22 RX ORDER — GABAPENTIN 250 MG/5ML
300 SOLUTION ORAL 2 TIMES DAILY
OUTPATIENT
Start: 2025-08-22

## 2025-08-22 RX ORDER — SCOPOLAMINE 1 MG/3D
1 PATCH, EXTENDED RELEASE TRANSDERMAL ONCE
OUTPATIENT
Start: 2025-08-22 | End: 2025-08-22

## 2025-08-22 RX ORDER — PROCHLORPERAZINE EDISYLATE 5 MG/ML
5 INJECTION INTRAMUSCULAR; INTRAVENOUS EVERY 6 HOURS PRN
OUTPATIENT
Start: 2025-08-22

## 2025-08-22 RX ORDER — SODIUM CHLORIDE 9 MG/ML
INJECTION, SOLUTION INTRAVENOUS CONTINUOUS
OUTPATIENT
Start: 2025-08-22

## 2025-08-22 RX ORDER — SODIUM CHLORIDE, SODIUM LACTATE, POTASSIUM CHLORIDE, CALCIUM CHLORIDE 600; 310; 30; 20 MG/100ML; MG/100ML; MG/100ML; MG/100ML
INJECTION, SOLUTION INTRAVENOUS CONTINUOUS
OUTPATIENT
Start: 2025-08-22

## 2025-08-22 RX ORDER — PANTOPRAZOLE SODIUM 40 MG/10ML
40 INJECTION, POWDER, LYOPHILIZED, FOR SOLUTION INTRAVENOUS 2 TIMES DAILY
OUTPATIENT
Start: 2025-08-22

## 2025-08-22 RX ORDER — OXYCODONE HCL 5 MG/5 ML
5 SOLUTION, ORAL ORAL EVERY 6 HOURS PRN
Refills: 0 | OUTPATIENT
Start: 2025-08-22

## 2025-08-22 RX ORDER — LIDOCAINE HYDROCHLORIDE 10 MG/ML
1 INJECTION, SOLUTION EPIDURAL; INFILTRATION; INTRACAUDAL; PERINEURAL ONCE
OUTPATIENT
Start: 2025-08-22 | End: 2025-08-22

## 2025-08-22 RX ORDER — ENOXAPARIN SODIUM 100 MG/ML
60 INJECTION SUBCUTANEOUS EVERY 12 HOURS
OUTPATIENT
Start: 2025-08-22

## 2025-08-22 RX ORDER — KETOROLAC TROMETHAMINE 15 MG/ML
15 INJECTION, SOLUTION INTRAMUSCULAR; INTRAVENOUS ONCE
OUTPATIENT
Start: 2025-08-22 | End: 2025-08-22

## 2025-08-22 RX ORDER — ONDANSETRON HYDROCHLORIDE 2 MG/ML
8 INJECTION, SOLUTION INTRAVENOUS EVERY 6 HOURS
OUTPATIENT
Start: 2025-08-22

## 2025-08-22 RX ORDER — MUPIROCIN 20 MG/G
OINTMENT TOPICAL
OUTPATIENT
Start: 2025-08-22

## 2025-08-26 ENCOUNTER — PATIENT MESSAGE (OUTPATIENT)
Dept: BARIATRICS | Facility: CLINIC | Age: 45
End: 2025-08-26
Payer: COMMERCIAL

## 2025-09-03 ENCOUNTER — TELEPHONE (OUTPATIENT)
Dept: BARIATRICS | Facility: CLINIC | Age: 45
End: 2025-09-03
Payer: COMMERCIAL

## 2025-09-03 ENCOUNTER — ANESTHESIA EVENT (OUTPATIENT)
Dept: SURGERY | Facility: HOSPITAL | Age: 45
End: 2025-09-03
Payer: COMMERCIAL

## 2025-09-04 ENCOUNTER — ANESTHESIA (OUTPATIENT)
Dept: SURGERY | Facility: HOSPITAL | Age: 45
End: 2025-09-04
Payer: COMMERCIAL

## 2025-09-04 PROBLEM — E66.9 OBESITY: Status: ACTIVE | Noted: 2025-09-04

## 2025-09-04 PROCEDURE — 25000003 PHARM REV CODE 250

## 2025-09-04 PROCEDURE — 63600175 PHARM REV CODE 636 W HCPCS

## 2025-09-04 PROCEDURE — 63600175 PHARM REV CODE 636 W HCPCS: Performed by: SURGERY

## 2025-09-04 RX ORDER — PROPOFOL 10 MG/ML
VIAL (ML) INTRAVENOUS
Status: DISCONTINUED | OUTPATIENT
Start: 2025-09-04 | End: 2025-09-04

## 2025-09-04 RX ORDER — PHENYLEPHRINE HCL IN 0.9% NACL 1 MG/10 ML
SYRINGE (ML) INTRAVENOUS
Status: DISCONTINUED | OUTPATIENT
Start: 2025-09-04 | End: 2025-09-04

## 2025-09-04 RX ORDER — ROCURONIUM BROMIDE 10 MG/ML
INJECTION, SOLUTION INTRAVENOUS
Status: DISCONTINUED | OUTPATIENT
Start: 2025-09-04 | End: 2025-09-04

## 2025-09-04 RX ORDER — DEXAMETHASONE SODIUM PHOSPHATE 4 MG/ML
INJECTION, SOLUTION INTRA-ARTICULAR; INTRALESIONAL; INTRAMUSCULAR; INTRAVENOUS; SOFT TISSUE
Status: DISCONTINUED | OUTPATIENT
Start: 2025-09-04 | End: 2025-09-04

## 2025-09-04 RX ORDER — MIDAZOLAM HYDROCHLORIDE 1 MG/ML
INJECTION INTRAMUSCULAR; INTRAVENOUS
Status: DISCONTINUED | OUTPATIENT
Start: 2025-09-04 | End: 2025-09-04

## 2025-09-04 RX ORDER — ONDANSETRON HYDROCHLORIDE 2 MG/ML
INJECTION, SOLUTION INTRAVENOUS
Status: DISCONTINUED | OUTPATIENT
Start: 2025-09-04 | End: 2025-09-04

## 2025-09-04 RX ORDER — VASOPRESSIN 20 [USP'U]/ML
INJECTION, SOLUTION INTRAMUSCULAR; SUBCUTANEOUS
Status: DISCONTINUED | OUTPATIENT
Start: 2025-09-04 | End: 2025-09-04

## 2025-09-04 RX ORDER — CEFAZOLIN SODIUM 1 G/3ML
INJECTION, POWDER, FOR SOLUTION INTRAMUSCULAR; INTRAVENOUS
Status: DISCONTINUED | OUTPATIENT
Start: 2025-09-04 | End: 2025-09-04

## 2025-09-04 RX ORDER — EPHEDRINE SULFATE 50 MG/ML
INJECTION, SOLUTION INTRAVENOUS
Status: DISCONTINUED | OUTPATIENT
Start: 2025-09-04 | End: 2025-09-04

## 2025-09-04 RX ORDER — LIDOCAINE HYDROCHLORIDE 20 MG/ML
INJECTION, SOLUTION EPIDURAL; INFILTRATION; INTRACAUDAL; PERINEURAL
Status: DISCONTINUED | OUTPATIENT
Start: 2025-09-04 | End: 2025-09-04

## 2025-09-04 RX ORDER — KETAMINE HCL IN 0.9 % NACL 50 MG/5 ML
SYRINGE (ML) INTRAVENOUS
Status: DISCONTINUED | OUTPATIENT
Start: 2025-09-04 | End: 2025-09-04

## 2025-09-04 RX ORDER — DEXMEDETOMIDINE HYDROCHLORIDE 100 UG/ML
INJECTION, SOLUTION INTRAVENOUS
Status: DISCONTINUED | OUTPATIENT
Start: 2025-09-04 | End: 2025-09-04

## 2025-09-04 RX ADMIN — METRONIDAZOLE 500 MG: 500 INJECTION, SOLUTION INTRAVENOUS at 10:09

## 2025-09-04 RX ADMIN — ONDANSETRON 4 MG: 2 INJECTION INTRAMUSCULAR; INTRAVENOUS at 12:09

## 2025-09-04 RX ADMIN — ROCURONIUM BROMIDE 20 MG: 10 INJECTION, SOLUTION INTRAVENOUS at 12:09

## 2025-09-04 RX ADMIN — EPHEDRINE SULFATE 5 MG: 50 INJECTION INTRAVENOUS at 11:09

## 2025-09-04 RX ADMIN — SUGAMMADEX 200 MG: 100 INJECTION, SOLUTION INTRAVENOUS at 01:09

## 2025-09-04 RX ADMIN — SODIUM CHLORIDE: 0.9 INJECTION, SOLUTION INTRAVENOUS at 10:09

## 2025-09-04 RX ADMIN — LIDOCAINE HYDROCHLORIDE 100 MG: 20 INJECTION, SOLUTION EPIDURAL; INFILTRATION; INTRACAUDAL at 10:09

## 2025-09-04 RX ADMIN — ROCURONIUM BROMIDE 20 MG: 10 INJECTION, SOLUTION INTRAVENOUS at 11:09

## 2025-09-04 RX ADMIN — DEXMEDETOMIDINE 8 MCG: 200 INJECTION, SOLUTION INTRAVENOUS at 11:09

## 2025-09-04 RX ADMIN — Medication 200 MCG: at 11:09

## 2025-09-04 RX ADMIN — DEXAMETHASONE SODIUM PHOSPHATE 4 MG: 4 INJECTION INTRA-ARTICULAR; INTRALESIONAL; INTRAMUSCULAR; INTRAVENOUS; SOFT TISSUE at 10:09

## 2025-09-04 RX ADMIN — DEXMEDETOMIDINE 24 MCG: 200 INJECTION, SOLUTION INTRAVENOUS at 10:09

## 2025-09-04 RX ADMIN — VASOPRESSIN 1 UNITS: 20 INJECTION, SOLUTION INTRAMUSCULAR; SUBCUTANEOUS at 12:09

## 2025-09-04 RX ADMIN — Medication 150 MCG: at 10:09

## 2025-09-04 RX ADMIN — Medication 100 MCG: at 11:09

## 2025-09-04 RX ADMIN — PROPOFOL 150 MG: 10 INJECTION, EMULSION INTRAVENOUS at 10:09

## 2025-09-04 RX ADMIN — VASOPRESSIN 2 UNITS: 20 INJECTION, SOLUTION INTRAMUSCULAR; SUBCUTANEOUS at 12:09

## 2025-09-04 RX ADMIN — DEXMEDETOMIDINE 8 MCG: 200 INJECTION, SOLUTION INTRAVENOUS at 12:09

## 2025-09-04 RX ADMIN — SODIUM CHLORIDE, SODIUM GLUCONATE, SODIUM ACETATE, POTASSIUM CHLORIDE, MAGNESIUM CHLORIDE, SODIUM PHOSPHATE, DIBASIC, AND POTASSIUM PHOSPHATE: .53; .5; .37; .037; .03; .012; .00082 INJECTION, SOLUTION INTRAVENOUS at 11:09

## 2025-09-04 RX ADMIN — ROCURONIUM BROMIDE 60 MG: 10 INJECTION, SOLUTION INTRAVENOUS at 10:09

## 2025-09-04 RX ADMIN — MIDAZOLAM 2 MG: 1 INJECTION INTRAMUSCULAR; INTRAVENOUS at 10:09

## 2025-09-04 RX ADMIN — Medication 20 MG: at 10:09

## 2025-09-04 RX ADMIN — Medication 150 MCG: at 12:09

## 2025-09-04 RX ADMIN — Medication 10 MG: at 11:09

## 2025-09-04 RX ADMIN — CEFAZOLIN 3 G: 330 INJECTION, POWDER, FOR SOLUTION INTRAMUSCULAR; INTRAVENOUS at 10:09

## 2025-09-05 ENCOUNTER — DOCUMENTATION ONLY (OUTPATIENT)
Dept: BARIATRICS | Facility: CLINIC | Age: 45
End: 2025-09-05
Payer: COMMERCIAL